# Patient Record
Sex: FEMALE | Race: WHITE | Employment: UNEMPLOYED | URBAN - METROPOLITAN AREA
[De-identification: names, ages, dates, MRNs, and addresses within clinical notes are randomized per-mention and may not be internally consistent; named-entity substitution may affect disease eponyms.]

---

## 2017-03-16 ENCOUNTER — EMPLOYEE WELLNESS (OUTPATIENT)
Dept: OTHER | Age: 62
End: 2017-03-16

## 2017-03-16 LAB
CHOLESTEROL/HDL RATIO: 3.3
CHOLESTEROL: 240 MG/DL
GLUCOSE BLD-MCNC: 92 MG/DL (ref 70–99)
HDLC SERPL-MCNC: 72 MG/DL
LDL CHOLESTEROL: 149 MG/DL (ref 0–130)
TRIGL SERPL-MCNC: 93 MG/DL
VLDLC SERPL CALC-MCNC: ABNORMAL MG/DL (ref 1–30)

## 2017-12-18 NOTE — PROGRESS NOTES
Subjective:      Patient ID: Miguel Sultana is a 63 y.o. female. HPI Presents for annual pap . Still has vag dryness admits when she uses the estrace on a regular basis things are improved, we did discuss DHEA cream the [facility] pharmacy will think about   Allergy : None  Med HX: None  Surgery : Leg Sx.  ,  , Breast biopsy     Review of Systems   Constitutional: Negative for chills, fatigue and fever. HENT: Negative for sinus pressure, sneezing, sore throat, tinnitus, trouble swallowing and voice change. Eyes: Negative for photophobia and visual disturbance. Respiratory: Negative for cough, shortness of breath, wheezing and stridor. Cardiovascular: Negative for chest pain and leg swelling. Gastrointestinal: Negative for abdominal distention, constipation, diarrhea, nausea and vomiting. Endocrine: Negative for polydipsia, polyphagia and polyuria. Genitourinary: Negative for decreased urine volume, difficulty urinating, dyspareunia, dysuria, flank pain, frequency, genital sores, hematuria, menstrual problem, urgency and vaginal bleeding. Musculoskeletal: Negative for arthralgias, back pain and gait problem. Skin: Negative for color change, pallor and rash. Neurological: Negative for dizziness, tremors, seizures, syncope, facial asymmetry, speech difficulty, light-headedness and headaches. Hematological: Negative for adenopathy. Does not bruise/bleed easily. Psychiatric/Behavioral: Negative for agitation, behavioral problems, confusion, decreased concentration, dysphoric mood, hallucinations and self-injury. The patient is not nervous/anxious. Objective:   Physical Exam   Constitutional: She is oriented to person, place, and time. She appears well-developed and well-nourished. HENT:   Head: Normocephalic. Right Ear: External ear normal.   Left Ear: External ear normal.   Eyes: Conjunctivae are normal. Pupils are equal, round, and reactive to light.    Neck: Normal range of motion. Neck supple. No thyromegaly present. Cardiovascular: Normal rate, regular rhythm, normal heart sounds and intact distal pulses. No murmur heard. Pulmonary/Chest: Effort normal and breath sounds normal. No respiratory distress. She has no wheezes. She has no rales. Abdominal: Soft. Bowel sounds are normal. She exhibits no distension. There is no rebound. Genitourinary: Vagina normal and uterus normal. No vaginal discharge found. Genitourinary Comments: Vag walls thin     Musculoskeletal: Normal range of motion. She exhibits no edema or tenderness. Neurological: She is alert and oriented to person, place, and time. Skin: Skin is warm and dry. No rash noted. No erythema. Psychiatric: She has a normal mood and affect.  Her behavior is normal. Thought content normal.       Assessment:    well adult exam with pap   Screen mammogram         Plan:    Collect pap   BSE reviewed  Mammogram   Osteoporosis prevention reviewed  Dexascan 2018   Preventive Health through PCP  RV prn/annual

## 2017-12-19 ENCOUNTER — HOSPITAL ENCOUNTER (OUTPATIENT)
Age: 62
Setting detail: SPECIMEN
Discharge: HOME | End: 2017-12-19
Payer: COMMERCIAL

## 2017-12-19 ENCOUNTER — OFFICE VISIT (OUTPATIENT)
Dept: OBGYN CLINIC | Age: 62
End: 2017-12-19
Payer: COMMERCIAL

## 2017-12-19 VITALS
WEIGHT: 138.2 LBS | DIASTOLIC BLOOD PRESSURE: 64 MMHG | SYSTOLIC BLOOD PRESSURE: 116 MMHG | BODY MASS INDEX: 24.49 KG/M2 | HEIGHT: 63 IN

## 2017-12-19 DIAGNOSIS — Z12.31 VISIT FOR SCREENING MAMMOGRAM: ICD-10-CM

## 2017-12-19 DIAGNOSIS — Z01.419 PAP SMEAR, AS PART OF ROUTINE GYNECOLOGICAL EXAMINATION: Primary | ICD-10-CM

## 2017-12-19 PROCEDURE — 99396 PREV VISIT EST AGE 40-64: CPT | Performed by: NURSE PRACTITIONER

## 2017-12-19 ASSESSMENT — ENCOUNTER SYMPTOMS
VOMITING: 0
CONSTIPATION: 0
VOICE CHANGE: 0
TROUBLE SWALLOWING: 0
WHEEZING: 0
SORE THROAT: 0
SHORTNESS OF BREATH: 0
STRIDOR: 0
DIARRHEA: 0
NAUSEA: 0
ABDOMINAL DISTENTION: 0
BACK PAIN: 0
COUGH: 0
PHOTOPHOBIA: 0
COLOR CHANGE: 0
SINUS PRESSURE: 0

## 2017-12-21 ENCOUNTER — HOSPITAL ENCOUNTER (OUTPATIENT)
Dept: WOMENS IMAGING | Age: 62
Discharge: HOME | End: 2017-12-21
Payer: COMMERCIAL

## 2017-12-21 DIAGNOSIS — Z12.31 VISIT FOR SCREENING MAMMOGRAM: ICD-10-CM

## 2017-12-21 LAB
HPV SAMPLE: NORMAL
HPV SOURCE: NORMAL
HPV, GENOTYPE 16: NOT DETECTED
HPV, GENOTYPE 18: NOT DETECTED
HPV, HIGH RISK OTHER: NOT DETECTED
HPV, INTERPRETATION: NORMAL

## 2017-12-21 PROCEDURE — G0202 SCR MAMMO BI INCL CAD: HCPCS

## 2018-01-04 LAB — CYTOLOGY REPORT: NORMAL

## 2018-02-20 ENCOUNTER — TELEPHONE (OUTPATIENT)
Dept: OBGYN CLINIC | Age: 63
End: 2018-02-20

## 2018-02-20 DIAGNOSIS — Z12.11 ENCOUNTER FOR SCREENING COLONOSCOPY: Primary | ICD-10-CM

## 2018-02-23 DIAGNOSIS — Z12.11 COLON CANCER SCREENING: Primary | ICD-10-CM

## 2018-02-27 RX ORDER — SODIUM, POTASSIUM,MAG SULFATES 17.5-3.13G
SOLUTION, RECONSTITUTED, ORAL ORAL
Qty: 1 BOTTLE | Refills: 0 | Status: SHIPPED | OUTPATIENT
Start: 2018-02-27 | End: 2018-05-01 | Stop reason: ALTCHOICE

## 2018-03-15 ENCOUNTER — EMPLOYEE WELLNESS (OUTPATIENT)
Dept: OTHER | Age: 63
End: 2018-03-15

## 2018-03-20 VITALS — BODY MASS INDEX: 24.45 KG/M2 | WEIGHT: 138 LBS

## 2018-03-22 LAB
CHOLESTEROL/HDL RATIO: 3.5
CHOLESTEROL: 223 MG/DL
GLUCOSE BLD-MCNC: 82 MG/DL (ref 70–99)
HDLC SERPL-MCNC: 63 MG/DL
LDL CHOLESTEROL: 142 MG/DL (ref 0–130)
PATIENT FASTING?: YES
TRIGL SERPL-MCNC: 89 MG/DL
VLDLC SERPL CALC-MCNC: ABNORMAL MG/DL (ref 1–30)

## 2018-03-30 ENCOUNTER — HOSPITAL ENCOUNTER (OUTPATIENT)
Age: 63
Setting detail: OUTPATIENT SURGERY
Discharge: HOME | End: 2018-03-30
Attending: INTERNAL MEDICINE | Admitting: INTERNAL MEDICINE
Payer: COMMERCIAL

## 2018-03-30 VITALS
HEART RATE: 62 BPM | OXYGEN SATURATION: 97 % | SYSTOLIC BLOOD PRESSURE: 107 MMHG | RESPIRATION RATE: 16 BRPM | DIASTOLIC BLOOD PRESSURE: 62 MMHG | TEMPERATURE: 98.7 F | BODY MASS INDEX: 24.45 KG/M2 | WEIGHT: 138 LBS | HEIGHT: 63 IN

## 2018-03-30 PROCEDURE — 88305 TISSUE EXAM BY PATHOLOGIST: CPT

## 2018-03-30 PROCEDURE — 3609010300 HC COLONOSCOPY W/BIOPSY SINGLE/MULTIPLE: Performed by: INTERNAL MEDICINE

## 2018-03-30 PROCEDURE — 2580000003 HC RX 258: Performed by: INTERNAL MEDICINE

## 2018-03-30 PROCEDURE — 7100000010 HC PHASE II RECOVERY - FIRST 15 MIN: Performed by: INTERNAL MEDICINE

## 2018-03-30 PROCEDURE — 6360000002 HC RX W HCPCS: Performed by: INTERNAL MEDICINE

## 2018-03-30 PROCEDURE — 7100000011 HC PHASE II RECOVERY - ADDTL 15 MIN: Performed by: INTERNAL MEDICINE

## 2018-03-30 PROCEDURE — 99153 MOD SED SAME PHYS/QHP EA: CPT | Performed by: INTERNAL MEDICINE

## 2018-03-30 PROCEDURE — 99152 MOD SED SAME PHYS/QHP 5/>YRS: CPT | Performed by: INTERNAL MEDICINE

## 2018-03-30 RX ORDER — FENTANYL CITRATE 50 UG/ML
INJECTION, SOLUTION INTRAMUSCULAR; INTRAVENOUS PRN
Status: DISCONTINUED | OUTPATIENT
Start: 2018-03-30 | End: 2018-03-30 | Stop reason: HOSPADM

## 2018-03-30 RX ORDER — MIDAZOLAM HYDROCHLORIDE 1 MG/ML
INJECTION INTRAMUSCULAR; INTRAVENOUS PRN
Status: DISCONTINUED | OUTPATIENT
Start: 2018-03-30 | End: 2018-03-30 | Stop reason: HOSPADM

## 2018-03-30 RX ORDER — SODIUM CHLORIDE 9 MG/ML
INJECTION, SOLUTION INTRAVENOUS CONTINUOUS
Status: DISCONTINUED | OUTPATIENT
Start: 2018-03-30 | End: 2018-03-30 | Stop reason: HOSPADM

## 2018-03-30 RX ADMIN — SODIUM CHLORIDE: 9 INJECTION, SOLUTION INTRAVENOUS at 08:35

## 2018-03-30 ASSESSMENT — PAIN - FUNCTIONAL ASSESSMENT: PAIN_FUNCTIONAL_ASSESSMENT: 0-10

## 2018-03-30 ASSESSMENT — PAIN SCALES - GENERAL: PAINLEVEL_OUTOF10: 0

## 2018-04-02 VITALS — BODY MASS INDEX: 24.45 KG/M2 | WEIGHT: 138 LBS

## 2018-04-02 LAB — SURGICAL PATHOLOGY REPORT: NORMAL

## 2018-05-01 ENCOUNTER — OFFICE VISIT (OUTPATIENT)
Dept: GASTROENTEROLOGY | Age: 63
End: 2018-05-01
Payer: COMMERCIAL

## 2018-05-01 VITALS
HEIGHT: 63 IN | HEART RATE: 72 BPM | BODY MASS INDEX: 25.16 KG/M2 | SYSTOLIC BLOOD PRESSURE: 111 MMHG | DIASTOLIC BLOOD PRESSURE: 65 MMHG | WEIGHT: 142 LBS | OXYGEN SATURATION: 97 %

## 2018-05-01 DIAGNOSIS — D12.6 SERRATED ADENOMA OF COLON: ICD-10-CM

## 2018-05-01 PROCEDURE — 99213 OFFICE O/P EST LOW 20 MIN: CPT | Performed by: INTERNAL MEDICINE

## 2018-05-01 ASSESSMENT — ENCOUNTER SYMPTOMS
COUGH: 0
RESPIRATORY NEGATIVE: 1
BLOOD IN STOOL: 0
SHORTNESS OF BREATH: 0
ABDOMINAL PAIN: 0
WHEEZING: 0
ABDOMINAL DISTENTION: 0
SORE THROAT: 0
TROUBLE SWALLOWING: 0
CONSTIPATION: 0
RHINORRHEA: 0
VOMITING: 0
SINUS PRESSURE: 0
RECTAL PAIN: 0
VOICE CHANGE: 0
ALLERGIC/IMMUNOLOGIC NEGATIVE: 1
SINUS PAIN: 0
ANAL BLEEDING: 0
FACIAL SWELLING: 0
BACK PAIN: 0
GASTROINTESTINAL NEGATIVE: 1
NAUSEA: 0
DIARRHEA: 0

## 2018-12-19 ASSESSMENT — ENCOUNTER SYMPTOMS
CONSTIPATION: 0
VOMITING: 0
SHORTNESS OF BREATH: 0
NAUSEA: 0
ABDOMINAL PAIN: 0
COUGH: 0
DIARRHEA: 0
RHINORRHEA: 0
BACK PAIN: 0

## 2018-12-19 NOTE — PROGRESS NOTES
Arm, Position: Sitting, Cuff Size: Large Adult)   Resp 16   Ht 5' 3\" (1.6 m)   Wt 142 lb 2 oz (64.5 kg)   LMP 2010   BMI 25.18 kg/m²     Assessment:       Diagnosis Orders   1. Pap smear, as part of routine gynecological examination  PAP Smear   2. Postmenopausal         Breast exam completed. Pelvic exam pap smear collected and sent. Cultures sent No    Plan:   Collect pap   BSE reviewed, Mammogram annually. Cultures declined   Uses estrace vaginally intermittently. DVT signs reviewed with patient. Refill medication if appropriate  Diet & Exercise reviewed with pt. DEXA SCAN-recommended, but pt declined she states she will send us the heel screening. Recommend Calcium with Vitamin D, weight bearing exercises limit caffeine. Colonoscopy reviewed continue as recommended  Hemorrhoid- encouraged tucks wipes high fiber increase water would consider anusol if persist and refer back to GI. Preventive  Health through PCP   RV prn/annual           Orders Placed This Encounter   Procedures    PAP Smear     Patient History:    Patient's last menstrual period was 2010. OBGYN Status: Postmenopausal  Past Surgical History:  2014: BREAST BIOPSY  No date:  SECTION, LOW TRANSVERSE      Comment: [id number] [facility]  3/30/2018: COLONOSCOPY N/A      Comment: COLONOSCOPY WITH BIOPSY and photos performed                by Meryl Walters MD at [address]  2018: COLONOSCOPY      Comment: Small polyp upper part of the right                colon--sessile serrated adenoma, redundant                colon and spasms  1979: LEG SURGERY      Smoking status: Never Smoker                                                              Smokeless tobacco: Never Used                           Standing Status:   Future     Standing Expiration Date:   2019     Order Specific Question:   Collection Type     Answer:    Thin Prep     Order Specific Question:   Prior Abnormal Pap Test     Answer:   No     Order Specific Question:   Screening or Diagnostic     Answer:   Screening     Order Specific Question:   HPV Requested? Answer:   Yes     Order Specific Question:   High Risk Patient     Answer:   N/A       Patient given educational materials - seepatient instructions. Discussed use, benefit, and side effects of prescribed medications. All patient questions answered. Pt voiced understanding. Reviewed health maintenance. Instructed to continue current medications, diet and exercise. Patient agreedwith treatment plan. Follow up as directed.       Electronically signed by ANAIS Jimenes CNP on 12/27/2018at 4:58 PM

## 2018-12-21 ENCOUNTER — HOSPITAL ENCOUNTER (OUTPATIENT)
Dept: WOMENS IMAGING | Age: 63
Discharge: HOME | End: 2018-12-23
Payer: COMMERCIAL

## 2018-12-21 DIAGNOSIS — Z12.39 BREAST CANCER SCREENING: ICD-10-CM

## 2018-12-21 PROCEDURE — 77067 SCR MAMMO BI INCL CAD: CPT

## 2018-12-27 ENCOUNTER — OFFICE VISIT (OUTPATIENT)
Dept: OBGYN CLINIC | Age: 63
End: 2018-12-27
Payer: COMMERCIAL

## 2018-12-27 ENCOUNTER — HOSPITAL ENCOUNTER (OUTPATIENT)
Age: 63
Setting detail: SPECIMEN
Discharge: HOME | End: 2018-12-27
Payer: COMMERCIAL

## 2018-12-27 VITALS
RESPIRATION RATE: 16 BRPM | WEIGHT: 142.13 LBS | SYSTOLIC BLOOD PRESSURE: 102 MMHG | DIASTOLIC BLOOD PRESSURE: 60 MMHG | BODY MASS INDEX: 25.18 KG/M2 | HEIGHT: 63 IN

## 2018-12-27 DIAGNOSIS — Z01.419 PAP SMEAR, AS PART OF ROUTINE GYNECOLOGICAL EXAMINATION: Primary | ICD-10-CM

## 2018-12-27 DIAGNOSIS — Z78.0 POSTMENOPAUSAL: ICD-10-CM

## 2018-12-27 PROCEDURE — 99396 PREV VISIT EST AGE 40-64: CPT | Performed by: NURSE PRACTITIONER

## 2018-12-27 ASSESSMENT — ENCOUNTER SYMPTOMS: BLOOD IN STOOL: 0

## 2018-12-27 NOTE — PATIENT INSTRUCTIONS
results and keep a list of the medicines you take. How can you care for yourself at home? · Sit in a few inches of warm water (sitz bath) 3 times a day and after bowel movements. The warm water helps with pain and itching. · Put ice on your anal area several times a day for 10 minutes at a time. Put a thin cloth between the ice and your skin. Follow this by placing a warm, wet towel on the area for another 10 to 20 minutes. · Take pain medicines exactly as directed. ? If the doctor gave you a prescription medicine for pain, take it as prescribed. ? If you are not taking a prescription pain medicine, ask your doctor if you can take an over-the-counter medicine. · Keep the anal area clean, but be gentle. Use water and a fragrance-free soap, such as [place], or use baby wipes or medicated pads, such as Tucks. · Wear cotton underwear and loose clothing to decrease moisture in the anal area. · Eat more fiber. Include foods such as whole-grain breads and cereals, raw vegetables, raw and dried fruits, and beans. · Drink plenty of fluids, enough so that your urine is light yellow or clear like water. If you have kidney, heart, or liver disease and have to limit fluids, talk with your doctor before you increase the amount of fluids you drink. · Use a stool softener that contains bran or psyllium. You can save money by buying bran or psyllium (available in bulk at most health food stores) and sprinkling it on foods or stirring it into fruit juice. Or you can use a product such as Metamucil or Hydrocil. · Practice healthy bowel habits. ? Go to the bathroom as soon as you have the urge. ? Avoid straining to pass stools. Relax and give yourself time to let things happen naturally. ? Do not hold your breath while passing stools. ? Do not read while sitting on the toilet. Get off the toilet as soon as you have finished. · Take your medicines exactly as prescribed.  Call your doctor if you think you are having a cancer of the breast, uterus, or ovaries. What is estradiol vaginal (local)? Estradiol is a form of estrogen, a female sex hormone that regulates many processes in the body. Some estradiol products placed directly into the vagina are used for \"local\" treatment of vaginal menopause symptoms (such as dryness, burning, and irritation). Other vaginal estradiol products are used for treating vaginal menopause symptoms and symptoms that affect other parts of the body (such as hot flashes). This type of vaginal estradiol has \"systemic\" effects, meaning that it can affect parts of the body other than where the medicine is directly applied. This medication guide provides information about estradiol vaginal for local treatment of vaginal symptoms of menopause (such as dryness, itching, irritation, and pain during sexual intercourse). Estradiol vaginal (local) may also be used for purposes not listed in this medication guide. What should I discuss with my healthcare provider before using estradiol vaginal (local)? You should not use estradiol if you are allergic to it, or if you have:  · unusual vaginal bleeding that has not been checked by a doctor;  · liver disease;  · a history of heart attack, stroke, or blood clot;  · an increased risk of having blood clots due to a heart problem or a hereditary blood disorder; or  · a history of hormone-related cancer, or cancer of the breast, uterus/cervix, or vagina. Do not use estradiol if you are pregnant. Tell your doctor right away if you become pregnant during treatment. Using this medicine can increase your risk of blood clots, stroke, or heart attack. You are even more at risk if you have high blood pressure, diabetes, high cholesterol, if you are overweight, or if you smoke. Estradiol should not be used to prevent heart disease, stroke, or dementia, because this medicine may actually increase your risk of developing these conditions.   To make sure this medicine is safe for you, tell your doctor if you have ever had:  · heart disease;  · liver problems, or jaundice caused by pregnancy or taking hormones;  · kidney disease;  · gallbladder disease;  · asthma;  · epilepsy or other seizure disorder;  · migraines;  · lupus;  · endometriosis or uterine fibroid tumors;  · hereditary angioedema (an autoimmune disorder);  · porphyria (a genetic enzyme disorder that causes symptoms affecting the skin or nervous system);  · a thyroid disorder; or  · high or low levels of calcium in your blood. Long-term use of estradiol may increase your risk of cancer of the breast, uterus, or ovaries. Talk with your doctor about this risk. Estradiol lowers the hormone needed to produce breast milk and can slow breast milk production. Tell your doctor if you are breast-feeding. How should I use estradiol vaginal (local)? Follow all directions on your prescription label. Do not use this medicine in larger or smaller amounts or for longer than recommended. Estradiol may increase your risk of developing a condition that may lead to uterine cancer. Your doctor may prescribe a progestin to help lower this risk. Call your doctor at once if you have any unusual vaginal bleeding. Read all patient information, medication guides, and instruction sheets provided to you. Ask your doctor or pharmacist if you have any questions. Wash your hands before and after inserting estradiol vaginal.  Use the applicator provided to measure the prescribed dose of estradiol vaginal cream. Take apart the cream applicator and wash it with mild soap and warm water after each use. Each estradiol vaginal tablet is supplied in a single-use disposable applicator. Throw the tablet applicator away after one use. You should not be able to feel the vaginal ring once it is in place. Leave the vaginal ring in place for 90 days, then remove it. Your doctor may want you to replace it with a new ring.  The ring does not need to be removed during sexual intercourse. If the ring is bothersome, you may remove it, rinse it with warm water, and reinsert it after intercourse. To remove the ring, loop a finger through the ring and gently pull it from the vagina. Call your doctor if you have trouble removing a vaginal ring. Your doctor should check your progress on a regular basis to determine whether you should continue this treatment. Self-examine your breasts for lumps on a monthly basis and have a mammogram every year while using estradiol. If you need major surgery with long-term bed rest, you may need to stop using this medicine for a short time. Any doctor or surgeon who treats you should know that you are using estradiol. Store at room temperature away from moisture and heat. Keep the vaginal ring in its protective pouch until you are ready to use it. What happens if I miss a dose? Use the missed dose as soon as you remember. Skip the missed dose if it is almost time for your next scheduled dose. Do not use extra medicine to make up the missed dose. Remove the vaginal ring and insert a new one as soon as you remember. Do not use an extra vaginal ring to make up the missed wearing time. If a vaginal ring falls out, rinse it with warm water and reinsert it. If it slides down into the lower part of the vagina, use your finger to push it in farther. What happens if I overdose? Seek emergency medical attention or call the Poison Help line at 1-774.528.1294. What should I avoid while using estradiol vaginal (local)? Avoid smoking. It can greatly increase your risk of blood clots, stroke, or heart attack while using estradiol. Avoid using other vaginal products without your doctor's advice. Grapefruit and grapefruit juice may interact with estradiol and lead to unwanted side effects. Discuss the use of grapefruit products with your doctor. What are the possible side effects of estradiol vaginal (local)?   Get emergency medical help if

## 2019-01-11 LAB — CYTOLOGY REPORT: NORMAL

## 2019-03-06 ENCOUNTER — EMPLOYEE WELLNESS (OUTPATIENT)
Dept: OTHER | Age: 64
End: 2019-03-06

## 2019-03-06 LAB
CHOLESTEROL/HDL RATIO: 3.7
CHOLESTEROL: 215 MG/DL
GLUCOSE BLD-MCNC: 91 MG/DL (ref 70–99)
HDLC SERPL-MCNC: 58 MG/DL
LDL CHOLESTEROL: 140 MG/DL (ref 0–130)
PATIENT FASTING?: YES
TRIGL SERPL-MCNC: 83 MG/DL
VLDLC SERPL CALC-MCNC: ABNORMAL MG/DL (ref 1–30)

## 2019-05-06 VITALS — BODY MASS INDEX: 23.91 KG/M2 | WEIGHT: 135 LBS

## 2019-11-26 ENCOUNTER — PATIENT MESSAGE (OUTPATIENT)
Dept: OBGYN CLINIC | Age: 64
End: 2019-11-26

## 2019-12-23 ENCOUNTER — HOSPITAL ENCOUNTER (OUTPATIENT)
Dept: WOMENS IMAGING | Age: 64
Discharge: HOME | End: 2019-12-25
Payer: COMMERCIAL

## 2019-12-23 DIAGNOSIS — Z12.39 SCREENING BREAST EXAMINATION: ICD-10-CM

## 2019-12-23 PROCEDURE — 77063 BREAST TOMOSYNTHESIS BI: CPT

## 2020-01-02 ENCOUNTER — HOSPITAL ENCOUNTER (OUTPATIENT)
Age: 65
Setting detail: SPECIMEN
Discharge: HOME | End: 2020-01-02
Payer: COMMERCIAL

## 2020-01-02 ENCOUNTER — OFFICE VISIT (OUTPATIENT)
Dept: OBGYN CLINIC | Age: 65
End: 2020-01-02
Payer: COMMERCIAL

## 2020-01-02 VITALS
HEIGHT: 63 IN | WEIGHT: 140 LBS | SYSTOLIC BLOOD PRESSURE: 118 MMHG | BODY MASS INDEX: 24.8 KG/M2 | DIASTOLIC BLOOD PRESSURE: 62 MMHG

## 2020-01-02 PROCEDURE — 99396 PREV VISIT EST AGE 40-64: CPT | Performed by: NURSE PRACTITIONER

## 2020-01-02 ASSESSMENT — ENCOUNTER SYMPTOMS
NAUSEA: 0
COLOR CHANGE: 0
VOMITING: 0
ABDOMINAL PAIN: 0
RHINORRHEA: 0
SHORTNESS OF BREATH: 0
BACK PAIN: 0
CONSTIPATION: 0
DIARRHEA: 0
COUGH: 0

## 2020-01-02 NOTE — PATIENT INSTRUCTIONS
[id number] [employer]. Care instructions adapted under license by [facility] ([facility]). If you have questions about a medical condition or this instruction, always ask your healthcare professional. [address] any warranty or liability for your use of this information. Learning About Breast Cancer Screening  What is breast cancer screening? Breast cancer occurs when cells that are not normal grow in one or both of your breasts. Screening tests can help find breast cancer early. Cancer is easier to treat when it's found early. Having concerns about breast cancer is common. That's why it's important to talk with your doctor about when to start and how often to get screened for breast cancer. How is breast cancer screening done? Several screening tests can be used to check for breast cancer. · Mammograms check for signs of cancer using X-rays. They can show tumors that are too small for you or your doctor to feel. During a mammogram, a machine squeezes your breasts to make them flatter and easier to X-ray. At least two pictures are taken of each breast. One is taken from the top and one from the side. · 3-D mammograms are also called digital breast tomosynthesis. Your breast is positioned on a flat plate. A top plate is pressed against your breast to keep it in position. The X-ray arm then moves in an arc above the breast and takes many pictures. A computer uses these X-rays to create a three-dimensional image. · Clinical breast exams are a doctor's exam. Your doctor carefully feels your breasts and under your arms to check for lumps or other changes. After the screening, your doctor will tell you the results. You will also be told if you need any follow-up tests. When should you get screened? Talk with your doctor about when you should start being tested for breast cancer. How often you get tested and the kind of tests you get will depend on your age and your risk.   The guidelines that follow are for women who have an average risk for breast cancer. If you have a higher risk for breast cancer, such as having a family history of breast cancer in multiple relatives or at a young age, your doctor may recommend different screening for you. · Ages 21 to 44: Some experts recommend that women have a clinical breast exam every 3 years, starting at age 21. Ask your doctor how often you should have this test. If you have a high risk for breast cancer, talk with your doctor about when to start yearly mammograms and other screening tests. · Ages 36 and older: Talk with your doctor about how often you should have mammograms and clinical breast exams. What is your risk for breast cancer? If you don't already know your risk of breast cancer, you can ask your doctor about it. You can also look it up at www.cancer.gov/bcrisktool/. If your doctor says that you have a high or very high risk, ask about ways to reduce your risk. These could include getting extra screening, taking medicine, or having surgery. If you have a strong family history of breast cancer, ask your doctor about genetic testing. What steps can you take to stay healthy? Some things that increase your risk of breast cancer, such as your age and being female, cannot be controlled. But you can do some things to stay as healthy as you can. · Learn what your breasts normally look and feel like. If you notice any changes, tell your doctor. · Drink alcohol wisely. Your risk goes up the more you drink. For the best health, women should have no more than 1 drink a day or 7 drinks a week. · If you smoke, quit. When you quit smoking, you lower your chances of getting many types of cancer. You can also do your best to eat well, be active, and stay at a healthy weight. Eating healthy foods and being active every day, as well as staying at a healthy weight, may help prevent cancer. Where can you learn more?   Go to even more at risk if you have high blood pressure, diabetes, high cholesterol, if you are overweight, or if you smoke. Estradiol should not be used to prevent heart disease, stroke, or dementia. This medicine may actually increase your risk of developing these conditions. Tell your doctor if you have ever had:  · heart disease;  · liver problems, or jaundice caused by pregnancy or taking hormones;  · kidney disease;  · gallbladder disease;  · asthma;  · epilepsy or other seizure disorder;  · migraines;  · lupus;  · endometriosis or uterine fibroid tumors;  · hereditary angioedema;  · porphyria (a genetic enzyme disorder that causes symptoms affecting the skin or nervous system);  · a thyroid disorder; or  · high or low levels of calcium in your blood. Using estradiol may increase your risk of cancer of the breast, uterus, or ovaries. Talk with your doctor about this risk. Estradiol can slow breast milk production. Tell your doctor if you are breast-feeding. How should I use estradiol vaginal (local)? Follow all directions on your prescription label and read all medication guides or instruction sheets. Use the medicine exactly as directed. Estradiol may increase your risk of developing a condition that may lead to uterine cancer. Your doctor may prescribe a progestin to help lower this risk. Call your doctor at once if you have any unusual vaginal bleeding. Wash your hands before and after inserting estradiol vaginal.  Use the applicator provided to measure the prescribed dose of estradiol vaginal cream. Take apart the cream applicator and wash it with mild soap and warm water after each use. Each estradiol vaginal tablet is supplied in a single-use disposable applicator. Throw the tablet applicator away after one use. You should not be able to feel the vaginal ring once it is in place. Leave the vaginal ring in place for 90 days, then remove it. Your doctor may want you to replace it with a new ring.  The medicines, vitamins, and herbal products. Not all possible interactions are listed here. Tell your doctor about all your current medicines and any medicine you start or stop using. Where can I get more information? Your pharmacist can provide more information about estradiol vaginal.  Remember, keep this and all other medicines out of the reach of children, never share your medicines with others, and use this medication only for the indication prescribed. Every effort has been made to ensure that the information provided by [address] is accurate, up-to-date, and complete, but no guarantee is made to that effect. Drug information contained herein may be time sensitive. [facility] information has been compiled for use by healthcare practitioners and consumers in the [place] and therefore [facility] does not warrant that uses outside of the [place] are appropriate, unless specifically indicated otherwise. [facility]'s drug information does not endorse drugs, diagnose patients or recommend therapy. [facility] drug information is an informational resource designed to assist licensed healthcare practitioners in caring for their patients and/or to serve consumers viewing this service as a supplement to, and not a substitute for, the expertise, skill, knowledge and judgment of healthcare practitioners. The absence of a warning for a given drug or drug combination in no way should be construed to indicate that the drug or drug combination is safe, effective or appropriate for any given patient. [facility] does not assume any responsibility for any aspect of healthcare administered with the aid of information [facility] provides. The information contained herein is not intended to cover all possible uses, directions, precautions, warnings, drug interactions, allergic reactions, or adverse effects.  If you have questions about the drugs you are taking, check with your doctor, nurse or pharmacist.  Copyright use this medicine in larger or smaller amounts or for longer than recommended. Read all patient information, medication guides, and instruction sheets provided to you. Ask your doctor or pharmacist if you have any questions. The usual dose of this medicine is one insert placed into the vagina every day at bedtime. Use only the applicator supplied with this medicine to insert a prasterone vaginal insert. Store this medicine at room temperature away from moisture and heat. You may also store the medicine in a refrigerator. Do not freeze. What happens if I miss a dose? Use the missed dose as soon as you remember. Skip the missed dose if it is almost time for your next scheduled dose. Do not use extra medicine to make up the missed dose. What happens if I overdose? An overdose of prasterone vaginal is not expected to be dangerous. Seek emergency medical attention or call the Poison Help line at 1-465.140.8549 if anyone has accidentally swallowed the medication. What should I avoid while using prasterone vaginal?  Follow your doctor's instructions about any restrictions on food, beverages, or activity. What are the possible side effects of prasterone vaginal?  Get emergency medical help if you have signs of an allergic reaction: hives; difficult breathing; swelling of your face, lips, tongue, or throat. Common side effects may include:  · vaginal discharge; or  · changes in your Pap smear results. This is not a complete list of side effects and others may occur. Call your doctor for medical advice about side effects. You may report side effects to FDA at 7-823-ITN-6123. What other drugs will affect prasterone vaginal?  It is not likely that other drugs you take orally or inject will have an effect on prasterone used in the vagina. But many drugs can interact with each other.  Tell each of your healthcare providers about all medicines you use, including prescription and over-the-counter medicines, vitamins, and herbal products. Where can I get more information? Your pharmacist can provide more information about prasterone vaginal.  Remember, keep this and all other medicines out of the reach of children, never share your medicines with others, and use this medication only for the indication prescribed. Every effort has been made to ensure that the information provided by [address] is accurate, up-to-date, and complete, but no guarantee is made to that effect. Drug information contained herein may be time sensitive. [facility] information has been compiled for use by healthcare practitioners and consumers in the [place] and therefore [facility] does not warrant that uses outside of the [place] are appropriate, unless specifically indicated otherwise. [facility]'s drug information does not endorse drugs, diagnose patients or recommend therapy. [facility]'s drug information is an informational resource designed to assist licensed healthcare practitioners in caring for their patients and/or to serve consumers viewing this service as a supplement to, and not a substitute for, the expertise, skill, knowledge and judgment of healthcare practitioners. The absence of a warning for a given drug or drug combination in no way should be construed to indicate that the drug or drug combination is safe, effective or appropriate for any given patient. [facility] does not assume any responsibility for any aspect of healthcare administered with the aid of information [facility] provides. The information contained herein is not intended to cover all possible uses, directions, precautions, warnings, drug interactions, allergic reactions, or adverse effects. If you have questions about the drugs you are taking, check with your doctor, nurse or pharmacist.  Copyright [id number] [address] [place] [address] Dr TYSON. Version: 1.03. Revision date: 7/18/2017. Care instructions adapted under license by [facility] ([facility]).  If you have questions about a medical condition or this instruction, always ask your healthcare professional. [place] [address] any warranty or liability for your use of this information. Encourage Replens vaginal moisturizer twice weekly and lubricant such as astroglide or ky with intercourse.

## 2020-01-02 NOTE — PROGRESS NOTES
Psychiatric/Behavioral: Negative for self-injury and suicidal ideas. Objective:     Physical Exam  Vitals signs and nursing note reviewed. Constitutional:       General: She is not in acute distress. Appearance: She is well-developed. She is not diaphoretic. HENT:      Head: Normocephalic and atraumatic. Right Ear: External ear normal.      Left Ear: External ear normal.      Nose: Nose normal.   Eyes:      Pupils: Pupils are equal, round, and reactive to light. Neck:      Musculoskeletal: Normal range of motion and neck supple. Thyroid: No thyromegaly. Cardiovascular:      Rate and Rhythm: Normal rate and regular rhythm. Heart sounds: Normal heart sounds. No murmur. No friction rub. No gallop. Comments: No bilateral calf tenderness or swelling  Pulmonary:      Effort: Pulmonary effort is normal. No respiratory distress. Breath sounds: Normal breath sounds. No wheezing. Abdominal:      General: Bowel sounds are normal.      Palpations: Abdomen is soft. Tenderness: There is no tenderness. Genitourinary:     Comments: Breasts nipples everted, no masses or tenderness, does BSE  Vulva-no lesions  Vagina-pale smooth  Cervix-firm, 2 cm. Nontender, freely movable, no lesions  Uterus-ant. Smooth, firm, nontender, freely movable  Adnexa-no masses or tenderness   Musculoskeletal: Normal range of motion. Lymphadenopathy:      Cervical: No cervical adenopathy. Skin:     General: Skin is warm and dry. Findings: No rash. Neurological:      Mental Status: She is alert and oriented to person, place, and time. Cranial Nerves: No cranial nerve deficit. Deep Tendon Reflexes: Reflexes are normal and symmetric. Psychiatric:         Behavior: Behavior normal.         Thought Content:  Thought content normal.         Judgment: Judgment normal.       /62 (Site: Right Upper Arm, Position: Sitting, Cuff Size: Medium Adult)   Ht 5' 3\" (1.6 m)   Wt 140 lb

## 2020-01-07 LAB
HPV SAMPLE: NORMAL
HPV, GENOTYPE 16: NOT DETECTED
HPV, GENOTYPE 18: NOT DETECTED
HPV, HIGH RISK OTHER: NOT DETECTED
HPV, INTERPRETATION: NORMAL
SPECIMEN DESCRIPTION: NORMAL

## 2020-01-08 LAB — CYTOLOGY REPORT: NORMAL

## 2020-02-05 ENCOUNTER — OFFICE VISIT (OUTPATIENT)
Dept: PRIMARY CARE CLINIC | Age: 65
End: 2020-02-05
Payer: COMMERCIAL

## 2020-02-05 VITALS
TEMPERATURE: 97.8 F | OXYGEN SATURATION: 98 % | WEIGHT: 141.1 LBS | DIASTOLIC BLOOD PRESSURE: 78 MMHG | HEART RATE: 83 BPM | BODY MASS INDEX: 24.99 KG/M2 | SYSTOLIC BLOOD PRESSURE: 119 MMHG | RESPIRATION RATE: 18 BRPM

## 2020-02-05 LAB
INFLUENZA A ANTIBODY: NORMAL
INFLUENZA B ANTIBODY: NORMAL

## 2020-02-05 PROCEDURE — 99214 OFFICE O/P EST MOD 30 MIN: CPT | Performed by: NURSE PRACTITIONER

## 2020-02-05 PROCEDURE — 87804 INFLUENZA ASSAY W/OPTIC: CPT | Performed by: NURSE PRACTITIONER

## 2020-02-05 RX ORDER — PREDNISONE 20 MG/1
TABLET ORAL
Qty: 11 TABLET | Refills: 0 | Status: SHIPPED | OUTPATIENT
Start: 2020-02-05 | End: 2020-03-12 | Stop reason: SDUPTHER

## 2020-02-05 RX ORDER — AZITHROMYCIN 250 MG/1
250 TABLET, FILM COATED ORAL SEE ADMIN INSTRUCTIONS
Qty: 6 TABLET | Refills: 0 | Status: SHIPPED | OUTPATIENT
Start: 2020-02-05 | End: 2020-02-10

## 2020-02-05 RX ORDER — AMOXICILLIN AND CLAVULANATE POTASSIUM 875; 125 MG/1; MG/1
1 TABLET, FILM COATED ORAL 2 TIMES DAILY
Qty: 20 TABLET | Refills: 0 | Status: SHIPPED | OUTPATIENT
Start: 2020-02-05 | End: 2020-02-15

## 2020-02-05 ASSESSMENT — ENCOUNTER SYMPTOMS
SINUS PRESSURE: 0
DIARRHEA: 0
TROUBLE SWALLOWING: 0
WHEEZING: 0
COUGH: 1
SINUS PAIN: 0
NAUSEA: 0
SORE THROAT: 1
SHORTNESS OF BREATH: 0
ABDOMINAL PAIN: 0
VOMITING: 0
RHINORRHEA: 1

## 2020-02-05 NOTE — PROGRESS NOTES
[facility] PHYSICIANS  [facility] PRIMARY CARE [place] [address] [address]  [facility]  Dept: [phone]  Dept Fax: [phone]    Shelby Hair is a 60 y.o. female [facility] for her medical conditions/complaints as noted below. Shelby Hair is c/o of Cough (X 2 weeks); Fever; Generalized Body Aches; and Headache      HPI:     Cough   This is a new problem. The current episode started 1 to 4 weeks ago. The problem has been gradually worsening. The problem occurs constantly. The cough is productive of sputum. Associated symptoms include chills, a fever, headaches, myalgias, postnasal drip, rhinorrhea and a sore throat. Pertinent negatives include no chest pain, rash, shortness of breath or wheezing. Nothing aggravates the symptoms. She has tried OTC cough suppressant for the symptoms. The treatment provided mild relief. Her past medical history is significant for bronchitis. There is no history of asthma or COPD. Fever    This is a recurrent problem. The current episode started 1 to 4 weeks ago. The problem occurs intermittently. The problem has been waxing and waning. The maximum temperature noted was 101 to 101.9 F. Associated symptoms include congestion, coughing, headaches and a sore throat. Pertinent negatives include no abdominal pain, chest pain, diarrhea, nausea, rash, vomiting or wheezing. She has tried acetaminophen and NSAIDs for the symptoms. The treatment provided mild relief. Risk factors: no immunosuppression and no recent sickness        No past medical history on file. Current Outpatient Medications   Medication Sig Dispense Refill    amoxicillin-clavulanate (AUGMENTIN) 875-125 MG per tablet Take 1 tablet by mouth 2 times daily for 10 days 20 tablet 0    predniSONE (DELTASONE) 20 MG tablet Take 2 tablets by mouth daily for 3 days, THEN 1 tablet daily for 3 days, THEN 0.5 tablets daily for 3 days.  11 tablet 0    azithromycin (ZITHROMAX) 250 General: No focal deficit present. Mental Status: She is alert and oriented to person, place, and time. Psychiatric:         Behavior: Behavior normal.       /78 (Site: Right Upper Arm, Position: Sitting)   Pulse 83   Temp 97.8 °F (36.6 °C) (Temporal)   Resp 18   Wt 141 lb 1.6 oz (64 kg)   LMP 01/01/2010 (Approximate)   SpO2 98%   BMI 24.99 kg/m²     Assessment:      Diagnosis Orders   1. Community acquired pneumonia of left lower lobe of lung (HCC)  amoxicillin-clavulanate (AUGMENTIN) 875-125 MG per tablet    predniSONE (DELTASONE) 20 MG tablet    azithromycin (ZITHROMAX) 250 MG tablet   2. Cough  POCT Influenza A/B       Plan:             Discussed exam, POCT findings, plan of care (including prescriptive and supportive as listed below) and follow-up atlength with  Patient. Reviewed all prescribed and recommended medications, administration and side effects. Encouraged to return to [address] [place] [address] for noimprovement and or worsening of symptoms. To ER or call 911 if any difficulty breathing, shortness of breath, inability to swallow, hives or temp greater than 103 degrees. Questions answered. They verbalized understandingand agreement. Return if symptoms worsen or fail to improve. Orders Placed This Encounter   Medications    amoxicillin-clavulanate (AUGMENTIN) 875-125 MG per tablet     Sig: Take 1 tablet by mouth 2 times daily for 10 days     Dispense:  20 tablet     Refill:  0    predniSONE (DELTASONE) 20 MG tablet     Sig: Take 2 tablets by mouth daily for 3 days, THEN 1 tablet daily for 3 days, THEN 0.5 tablets daily for 3 days. Dispense:  11 tablet     Refill:  0    azithromycin (ZITHROMAX) 250 MG tablet     Sig: Take 1 tablet by mouth See Admin Instructions for 5 days 500mg on day 1 followed by 250mg on days 2 - 5     Dispense:  6 tablet     Refill:  0          All patient questions answered. Pt voiced understanding.       Electronically signed by Thais Hunter ANAIS Soliman CNP on 2/5/2020 at 1:33 PM

## 2020-03-02 ENCOUNTER — HOSPITAL ENCOUNTER (EMERGENCY)
Age: 65
Discharge: HOME | End: 2020-03-02
Payer: COMMERCIAL

## 2020-03-02 ENCOUNTER — NURSE TRIAGE (OUTPATIENT)
Dept: OTHER | Facility: CLINIC | Age: 65
End: 2020-03-02

## 2020-03-02 ENCOUNTER — APPOINTMENT (OUTPATIENT)
Dept: GENERAL RADIOLOGY | Age: 65
End: 2020-03-02
Payer: COMMERCIAL

## 2020-03-02 VITALS
SYSTOLIC BLOOD PRESSURE: 120 MMHG | TEMPERATURE: 98 F | DIASTOLIC BLOOD PRESSURE: 62 MMHG | RESPIRATION RATE: 14 BRPM | OXYGEN SATURATION: 97 % | HEART RATE: 80 BPM

## 2020-03-02 LAB
ANION GAP SERPL CALCULATED.3IONS-SCNC: 11 MMOL/L (ref 9–17)
BUN BLDV-MCNC: 12 MG/DL (ref 8–23)
BUN/CREAT BLD: 15 (ref 9–20)
CALCIUM SERPL-MCNC: 9.2 MG/DL (ref 8.6–10.4)
CHLORIDE BLD-SCNC: 102 MMOL/L (ref 98–107)
CO2: 27 MMOL/L (ref 20–31)
CREAT SERPL-MCNC: 0.82 MG/DL (ref 0.5–0.9)
GFR AFRICAN AMERICAN: >60 ML/MIN
GFR NON-AFRICAN AMERICAN: >60 ML/MIN
GFR SERPL CREATININE-BSD FRML MDRD: ABNORMAL ML/MIN/{1.73_M2}
GFR SERPL CREATININE-BSD FRML MDRD: ABNORMAL ML/MIN/{1.73_M2}
GLUCOSE BLD-MCNC: 125 MG/DL (ref 70–99)
POTASSIUM SERPL-SCNC: 3.8 MMOL/L (ref 3.7–5.3)
SODIUM BLD-SCNC: 140 MMOL/L (ref 135–144)
TROPONIN INTERP: NORMAL
TROPONIN T: NORMAL NG/ML
TROPONIN, HIGH SENSITIVITY: <6 NG/L (ref 0–14)

## 2020-03-02 PROCEDURE — 96375 TX/PRO/DX INJ NEW DRUG ADDON: CPT

## 2020-03-02 PROCEDURE — 80048 BASIC METABOLIC PNL TOTAL CA: CPT

## 2020-03-02 PROCEDURE — 71045 X-RAY EXAM CHEST 1 VIEW: CPT

## 2020-03-02 PROCEDURE — 93005 ELECTROCARDIOGRAM TRACING: CPT | Performed by: EMERGENCY MEDICINE

## 2020-03-02 PROCEDURE — 96374 THER/PROPH/DIAG INJ IV PUSH: CPT

## 2020-03-02 PROCEDURE — 99284 EMERGENCY DEPT VISIT MOD MDM: CPT

## 2020-03-02 PROCEDURE — 36415 COLL VENOUS BLD VENIPUNCTURE: CPT

## 2020-03-02 PROCEDURE — 6360000002 HC RX W HCPCS: Performed by: PHYSICIAN ASSISTANT

## 2020-03-02 PROCEDURE — 84484 ASSAY OF TROPONIN QUANT: CPT

## 2020-03-02 RX ORDER — CYCLOBENZAPRINE HCL 10 MG
10 TABLET ORAL 3 TIMES DAILY PRN
Qty: 21 TABLET | Refills: 0 | Status: SHIPPED | OUTPATIENT
Start: 2020-03-02 | End: 2020-03-12

## 2020-03-02 RX ORDER — KETOROLAC TROMETHAMINE 15 MG/ML
30 INJECTION, SOLUTION INTRAMUSCULAR; INTRAVENOUS ONCE
Status: COMPLETED | OUTPATIENT
Start: 2020-03-02 | End: 2020-03-02

## 2020-03-02 RX ORDER — NAPROXEN 500 MG/1
500 TABLET ORAL 2 TIMES DAILY
Qty: 14 TABLET | Refills: 0 | Status: SHIPPED | OUTPATIENT
Start: 2020-03-02 | End: 2020-03-12 | Stop reason: ALTCHOICE

## 2020-03-02 RX ORDER — ORPHENADRINE CITRATE 30 MG/ML
60 INJECTION INTRAMUSCULAR; INTRAVENOUS ONCE
Status: COMPLETED | OUTPATIENT
Start: 2020-03-02 | End: 2020-03-02

## 2020-03-02 RX ADMIN — KETOROLAC TROMETHAMINE 30 MG: 15 INJECTION, SOLUTION INTRAMUSCULAR; INTRAVENOUS at 21:18

## 2020-03-02 RX ADMIN — ORPHENADRINE CITRATE 60 MG: 30 INJECTION INTRAMUSCULAR; INTRAVENOUS at 21:18

## 2020-03-02 ASSESSMENT — PAIN SCALES - GENERAL
PAINLEVEL_OUTOF10: 8
PAINLEVEL_OUTOF10: 9

## 2020-03-02 ASSESSMENT — PAIN DESCRIPTION - LOCATION: LOCATION: BACK;SHOULDER

## 2020-03-02 ASSESSMENT — PAIN DESCRIPTION - FREQUENCY: FREQUENCY: INTERMITTENT

## 2020-03-02 ASSESSMENT — PAIN DESCRIPTION - ORIENTATION: ORIENTATION: LEFT;POSTERIOR

## 2020-03-02 ASSESSMENT — PAIN DESCRIPTION - PAIN TYPE: TYPE: ACUTE PAIN

## 2020-03-02 ASSESSMENT — PAIN DESCRIPTION - DESCRIPTORS: DESCRIPTORS: THROBBING

## 2020-03-03 LAB
EKG ATRIAL RATE: 73 BPM
EKG P AXIS: 47 DEGREES
EKG P-R INTERVAL: 126 MS
EKG Q-T INTERVAL: 410 MS
EKG QRS DURATION: 86 MS
EKG QTC CALCULATION (BAZETT): 451 MS
EKG R AXIS: 57 DEGREES
EKG T AXIS: 23 DEGREES
EKG VENTRICULAR RATE: 73 BPM

## 2020-03-03 PROCEDURE — 93010 ELECTROCARDIOGRAM REPORT: CPT | Performed by: INTERNAL MEDICINE

## 2020-03-03 NOTE — ED PROVIDER NOTES
[facility] ED  EMERGENCY DEPARTMENT ENCOUNTER      Pt Name: Mahogany Doran  MRN: [id number]  [place] 1955  Date of evaluation: 3/2/2020  Provider: Tyra Hansen PA-C    CHIEF COMPLAINT       Chief Complaint   Patient presents with    Shoulder Pain     on and off since last week; left sided that radiates down her left arm       HISTORY OF PRESENT ILLNESS    Mahogany Doran is a 60 y.o. female who presents to the emergency department from home pain under the left scapula beginning last week and now hurting in the left lateral upper arm area. No anterior chest pain shortness of breath pleuritic component seems to be worse with certain movements but not all the time she states that it hurt for a day and then yesterday she was able to walk on the treadmill for 30 minutes for approximately 2 miles without any pain. Triage notes and Nursing notes were reviewed by myself. Any discrepancies are addressed above. PAST MEDICAL HISTORY   History reviewed. No pertinent past medical history. SURGICAL HISTORY       Past Surgical History:   Procedure Laterality Date    BREAST BIOPSY  2014     SECTION, LOW TRANSVERSE       &     COLONOSCOPY N/A 3/30/2018    COLONOSCOPY WITH BIOPSY and photos performed by Elly Brizuela MD at [address] [place] [address]  2018    Small polyp upper part of the right colon--sessile serrated adenoma, redundant colon and spasms    [address]       Discharge Medication List as of 3/2/2020  9:57 PM          ALLERGIES     Patient has no known allergies.     FAMILY HISTORY       Family History   Problem Relation Age of Onset    Cancer Mother         parotid    Thyroid Disease Mother     Other Father         PKD    Hypertension Father     Heart Attack Maternal Grandmother     Heart Attack Maternal Grandfather     Other Paternal Grandmother         PKD    Cancer Maternal Aunt         breast    Cancer Maternal Aunt         pancreas        SOCIAL HISTORY       Social History     Socioeconomic History    Marital status:      Spouse name: None    Number of children: None    Years of education: None    Highest education level: None   Occupational History    None   Social Needs    Financial resource strain: None    Food insecurity:     Worry: None     Inability: None    Transportation needs:     Medical: None     Non-medical: None   Tobacco Use    Smoking status: Never Smoker    Smokeless tobacco: Never Used   Substance and Sexual Activity    Alcohol use: No    Drug use: No    Sexual activity: None   Lifestyle    Physical activity:     Days per week: None     Minutes per session: None    Stress: None   Relationships    Social connections:     Talks on phone: None     Gets together: None     Attends Restorationism service: None     Active member of club or organization: None     Attends meetings of clubs or organizations: None     Relationship status: None    Intimate partner violence:     Fear of current or ex partner: None     Emotionally abused: None     Physically abused: None     Forced sexual activity: None   Other Topics Concern    None   Social History Narrative    None       REVIEW OF SYSTEMS     Review of Systems    Except as noted above the remainder of the review of systems was reviewed and is negative. SCREENINGS    [place] Coma Scale  Eye Opening: Spontaneous  Best Verbal Response: Oriented  Best Motor Response: Obeys commands  [place] Coma Scale Score: 15      PHYSICAL EXAM    (up to 7 for level 4, 8 or more for level 5)     ED Triage Vitals [03/02/20 1932]   BP Temp Temp Source Pulse Resp SpO2 Height Weight   120/62 98 °F (36.7 °C) Oral 80 14 97 % -- --       Physical Exam  Active and oriented ×3. Nontoxic. No acute distress. Well-hydrated.   Head is atraumatic, facies symmetrical.  Pupils equal round and reactive to light, extraocular movements intact, anterior chambers clear bilaterally  Neck is supple. No adenopathy. Respirations nonlabored. Lungs clear to auscultation. No wheezes rales or rhonchi noted. Heart regular rate and rhythm. No murmur noted  Skin free of any obvious rashes or lesions. Back with small area of tenderness noted just superior to the rhomboid on the left scapula. Range of motion of upper extremity intact with distal neurovascular response intact there is also a small area of tenderness just inferior to the base of the left deltoid that causes the patient discomfort. Extremities without edema. No calf tenderness noted. Distal pulses and sensation intact. Good capillary refill noted. No acute neurologic deficit noted. Clear speech. Good affect. Pleasant patient. DIAGNOSTIC RESULTS     EKG:(none if blank)  All EKG's are interpreted by [facility] Phys[facility] who either signs or Co-signs this chart in the absence of a cardiologist.  EKG Interpretation  Interpreted by emergency department physician  Rhythm: normal sinus   Rate: 73  Axis: normal  Ectopy: none  Conduction: normal  ST Segments: no acute change  T Waves: non specific changes and inversion in  v1, III and aVr  Q Waves: none    Clinical Impression: non-specific EKG  [facility]      RADIOLOGY: (none if blank)   Interpretation per the Radiologistbelow, if available at the time of this note:    XR CHEST PORTABLE   Final Result   No evidence of acute cardiopulmonary disease. LABS:  Labs Reviewed   BASIC METABOLIC PANEL W/ REFLEX TO MG FOR LOW K - Abnormal; Notable for the following components:       Result Value    Glucose 125 (*)     All other components within normal limits   TROPONIN       All other labs were within normal range or not returned as of this dictation.     EMERGENCY DEPARTMENT COURSE andMedical Decision Making:     Vitals:    Vitals:    03/02/20 1932   BP: 120/62   Pulse: 80   Resp: 14   Temp: 98 °F (36.7 °C)   TempSrc: Oral   SpO2: 97%       [facility]/ Findings consistent with musculoskeletal pain recommend naproxen and muscle relaxers and follow-up with family physician. Suspicion for pulmonary embolism thoracic dissection or acute MI is low. Strict return precautions and follow up instructions were discussed with the patient with which the patient agrees    ED Medications administered this visit:    Medications   ketorolac (TORADOL) injection 30 mg (30 mg Intravenous Given 3/2/20 2118)   orphenadrine (NORFLEX) injection 60 mg (60 mg Intravenous Given 3/2/20 2118)       CONSULTS: (None if blank)  None    Procedures: (None if blank)       CLINICAL       1.  Upper back pain on left side          DISPOSITION/PLAN   DISPOSITION Decision To Discharge 03/02/2020 09:56:04 PM      PATIENT REFERRED TO:  Derik Petty, [address] [place] [id number] [phone]    In 4 days        DISCHARGE MEDICATIONS:  Discharge Medication List as of 3/2/2020  9:57 PM      START taking these medications    Details   naproxen (NAPROSYN) 500 MG tablet Take 1 tablet by mouth 2 times daily, Disp-14 tablet, R-0Print      cyclobenzaprine (FLEXERIL) 10 MG tablet Take 1 tablet by mouth 3 times daily as needed for Muscle spasms, Disp-21 tablet, R-0Print                    (Please note that portions of this note were completed with a voice recognition program.  Efforts were made to edit the dictations but occasionallywords are mis-transcribed.)      Jose Thompson II, PA-C (electronically signed)           Jose Thompson II, PA-C  03/02/20 [id number]

## 2020-03-12 ENCOUNTER — OFFICE VISIT (OUTPATIENT)
Dept: PRIMARY CARE CLINIC | Age: 65
End: 2020-03-12
Payer: COMMERCIAL

## 2020-03-12 VITALS
DIASTOLIC BLOOD PRESSURE: 72 MMHG | RESPIRATION RATE: 14 BRPM | TEMPERATURE: 97.7 F | BODY MASS INDEX: 25.41 KG/M2 | HEART RATE: 91 BPM | WEIGHT: 143.4 LBS | SYSTOLIC BLOOD PRESSURE: 107 MMHG | HEIGHT: 63 IN

## 2020-03-12 PROCEDURE — 99214 OFFICE O/P EST MOD 30 MIN: CPT | Performed by: NURSE PRACTITIONER

## 2020-03-12 RX ORDER — PREDNISONE 20 MG/1
TABLET ORAL
Qty: 20 TABLET | Refills: 0 | Status: SHIPPED | OUTPATIENT
Start: 2020-03-12 | End: 2021-11-19

## 2020-03-12 ASSESSMENT — ENCOUNTER SYMPTOMS
WHEEZING: 0
SORE THROAT: 0
PHOTOPHOBIA: 0
BACK PAIN: 1
SHORTNESS OF BREATH: 0
VOMITING: 0
DIARRHEA: 0
RHINORRHEA: 0
CONSTIPATION: 0
TROUBLE SWALLOWING: 0
COUGH: 0
VISUAL CHANGE: 0
NAUSEA: 0

## 2020-03-12 ASSESSMENT — PATIENT HEALTH QUESTIONNAIRE - PHQ9
SUM OF ALL RESPONSES TO PHQ9 QUESTIONS 1 & 2: 0
2. FEELING DOWN, DEPRESSED OR HOPELESS: 0
1. LITTLE INTEREST OR PLEASURE IN DOING THINGS: 0
SUM OF ALL RESPONSES TO PHQ QUESTIONS 1-9: 0
SUM OF ALL RESPONSES TO PHQ QUESTIONS 1-9: 0

## 2020-03-12 NOTE — PATIENT INSTRUCTIONS
you must sit for long periods of time, take breaks from sitting. Get up and walk around, or lie in a comfortable position. · Try using a heating pad on a low or medium setting for 15 to 20 minutes every 2 or 3 hours. Try a warm shower in place of one session with the heating pad. · You can also try an ice pack for 10 to 15 minutes every 2 to 3 hours. Put a thin cloth between the ice pack and your skin. · Take pain medicines exactly as directed. ? If the doctor gave you a prescription medicine for pain, take it as prescribed. ? If you are not taking a prescription pain medicine, ask your doctor if you can take an over-the-counter medicine. · Take short walks several times a day. You can start with 5 to 10 minutes, 3 or 4 times a day, and work up to longer walks. Walk on level surfaces and avoid hills and stairs until your back is better. · Return to work and other activities as soon as you can. Continued rest without activity is usually not good for your back. · To prevent future back pain, do exercises to stretch and strengthen your back and stomach. Learn how to use good posture, safe lifting techniques, and proper body mechanics. When should you call for help? Call your doctor now or seek immediate medical care if:    · You have new or worsening numbness in your legs.     · You have new or worsening weakness in your legs. (This could make it hard to stand up.)     · You lose control of your bladder or bowels.    Watch closely for changes in your health, and be sure to contact your doctor if:    · You have a fever, lose weight, or don't feel well.     · You do not get better as expected. Where can you learn more? Go to [email or web address]. [email or web address] and sign in to your [employer] account. Enter U121 in the [employer] box to learn more about \"Back Pain: Care Instructions. \"     If you do not have an account, please click on the \"Sign Up Now\" link.   Current as of: June 26,

## 2020-03-12 NOTE — PROGRESS NOTES
discharge. Musculoskeletal: Positive for back pain and neck pain. Negative for gait problem. Skin: Negative for rash. Neurological: Positive for numbness. Negative for dizziness, tingling, syncope, weakness and headaches. Physical Exam:   Vitals:  /72 (Site: Right Upper Arm, Position: Sitting, Cuff Size: Medium Adult)   Pulse 91   Temp 97.7 °F (36.5 °C) (Temporal)   Resp 14   Ht 5' 3\" (1.6 m)   Wt 143 lb 6.4 oz (65 kg)   LMP 01/01/2010 (Approximate)   BMI 25.40 kg/m²     Physical Exam  Vitals signs and nursing note reviewed. Constitutional:       General: She is not in acute distress. Appearance: Normal appearance. HENT:      Mouth/Throat:      Mouth: Mucous membranes are moist.   Eyes:      General: No scleral icterus. Conjunctiva/sclera: Conjunctivae normal.   Neck:      Musculoskeletal: Neck supple. Decreased range of motion. Muscular tenderness present. No neck rigidity, injury, torticollis or spinous process tenderness. Vascular: No carotid bruit. Cardiovascular:      Rate and Rhythm: Normal rate and regular rhythm. Pulses: Normal pulses. Heart sounds: No murmur. Pulmonary:      Effort: Pulmonary effort is normal.      Breath sounds: Normal breath sounds. No wheezing. Abdominal:      General: Bowel sounds are normal. There is no distension. Palpations: Abdomen is soft. Tenderness: There is no abdominal tenderness. Musculoskeletal:         General: Tenderness present. Right lower leg: No edema. Left lower leg: No edema. Lymphadenopathy:      Cervical: No cervical adenopathy. Skin:     General: Skin is warm and dry. Findings: No rash. Neurological:      Mental Status: She is alert and oriented to person, place, and time.    Psychiatric:         Mood and Affect: Mood normal.         Behavior: Behavior normal.         Data:     Lab Results   Component Value Date     03/02/2020    K 3.8 03/02/2020     03/02/2020

## 2020-03-13 ENCOUNTER — HOSPITAL ENCOUNTER (OUTPATIENT)
Dept: GENERAL RADIOLOGY | Age: 65
Discharge: HOME | End: 2020-03-15
Payer: COMMERCIAL

## 2020-03-13 ENCOUNTER — TELEPHONE (OUTPATIENT)
Dept: PRIMARY CARE CLINIC | Age: 65
End: 2020-03-13

## 2020-03-13 PROCEDURE — 72040 X-RAY EXAM NECK SPINE 2-3 VW: CPT

## 2020-03-13 NOTE — TELEPHONE ENCOUNTER
Called patient and informed her that her xray showed mild age related changes, nothing acute. Verbalizes understanding.

## 2020-12-23 ENCOUNTER — HOSPITAL ENCOUNTER (OUTPATIENT)
Dept: WOMENS IMAGING | Age: 65
Discharge: HOME | End: 2020-12-25
Payer: COMMERCIAL

## 2020-12-23 PROCEDURE — 77063 BREAST TOMOSYNTHESIS BI: CPT

## 2021-05-11 ENCOUNTER — HOSPITAL ENCOUNTER (OUTPATIENT)
Age: 66
Setting detail: SPECIMEN
Discharge: HOME | End: 2021-05-11
Payer: COMMERCIAL

## 2021-05-11 ENCOUNTER — OFFICE VISIT (OUTPATIENT)
Dept: OBGYN CLINIC | Age: 66
End: 2021-05-11
Payer: COMMERCIAL

## 2021-05-11 VITALS
DIASTOLIC BLOOD PRESSURE: 70 MMHG | BODY MASS INDEX: 25.51 KG/M2 | RESPIRATION RATE: 16 BRPM | WEIGHT: 144 LBS | SYSTOLIC BLOOD PRESSURE: 116 MMHG

## 2021-05-11 DIAGNOSIS — Z12.31 SCREENING MAMMOGRAM, ENCOUNTER FOR: ICD-10-CM

## 2021-05-11 DIAGNOSIS — Z01.419 WOMEN'S ANNUAL ROUTINE GYNECOLOGICAL EXAMINATION: Primary | ICD-10-CM

## 2021-05-11 DIAGNOSIS — Z13.820 SCREENING FOR OSTEOPOROSIS: ICD-10-CM

## 2021-05-11 PROCEDURE — 99396 PREV VISIT EST AGE 40-64: CPT | Performed by: NURSE PRACTITIONER

## 2021-05-11 NOTE — PROGRESS NOTES
Savita Coles is a 63 y.o.  here for her annual exam.  The patient was seen and examined. The patients past medical, surgical, social and family history were reviewed. Current medications and allergies were reviewed, and documented in the chart. She is  works for [employer] in [place] [employer].      Exercise Yes  Diet Yes  Tobacco abuse No     Last PAP: 2020-negative, hx of abnormal PAP yes - possible colp over 20 yrs ago. Family hx uterine or ovarian cancer-denies  Last mammogram- 2020-negative Family hx of breast cancer -maternal aunt  Last Dexa scan if indicated- Denies fracture hx had heel screening in 2019 we have results from 2019 normal she states this year normal too. She is not currently taking calcium with vit D. .   Colon cancer screening 2018- every 3-5 yrs, family hx colon cancer -denies           Sexually active: yes - with , multiple partners: No, Dyspareunia: dryness no longer taking estrace. , Vaginal discharge: no,  UTI symptoms: no, voiding difficulties: no, bowels regular:No bloating:no        Menstrual history:  Menopause around age 53-48, had hot flashes but improved     HX vaginal dryness/atrophy has stopped vaginal estrace. OB History   No obstetric history on file. Vitals:    21 1413   BP: 116/70   Site: Left Upper Arm   Position: Sitting   Cuff Size: Large Adult   Resp: 16   Weight: 144 lb (65.3 kg)       Wt Readings from Last 3 Encounters:   21 144 lb (65.3 kg)   20 143 lb 6.4 oz (65 kg)   20 141 lb 1.6 oz (64 kg)     No past medical history on file.                                                                 Past Surgical History:   Procedure Laterality Date    BREAST BIOPSY  2014     SECTION, LOW TRANSVERSE       &     COLONOSCOPY N/A 3/30/2018    COLONOSCOPY WITH BIOPSY and photos performed by Lamine Oconnor MD at [address] [place] [address] COLONOSCOPY  2018    Small polyp upper part of the Musculoskeletal: Negative for back pain and myalgias. Skin: Negative for color change and rash. Neurological: Negative for dizziness, light-headedness and headaches. Hematological: Negative for adenopathy. Does not bruise/bleed easily. Psychiatric/Behavioral: Negative for self-injury and suicidal ideas. Objective:     Physical Exam  Vitals signs and nursing note reviewed. Constitutional:       General: She is not in acute distress. Appearance: She is well-developed. She is not diaphoretic. HENT:      Head: Normocephalic and atraumatic. Right Ear: External ear normal.      Left Ear: External ear normal.      Nose: Nose normal.   Eyes:      Pupils: Pupils are equal, round, and reactive to light. Neck:      Musculoskeletal: Normal range of motion and neck supple. Thyroid: No thyromegaly. Cardiovascular:      Rate and Rhythm: Normal rate and regular rhythm. Heart sounds: Normal heart sounds. No murmur. No friction rub. No gallop. Comments: No bilateral calf tenderness or swelling  Pulmonary:      Effort: Pulmonary effort is normal. No respiratory distress. Breath sounds: Normal breath sounds. No wheezing. Abdominal:      General: Bowel sounds are normal.      Palpations: Abdomen is soft. Tenderness: There is no abdominal tenderness. Genitourinary:     Comments: Breasts nipples everted, no masses or tenderness, does BSE  Vulva-no lesions  Vagina-pale smooth, atrophic mucosa  Cervix-firm, 2 cm. Nontender, freely movable, no lesions  Uterus-ant. Smooth, firm, nontender, freely movable  Adnexa-no masses or tenderness   Musculoskeletal: Normal range of motion. Lymphadenopathy:      Cervical: No cervical adenopathy. Skin:     General: Skin is warm and dry. Findings: No rash. Neurological:      Mental Status: She is alert and oriented to person, place, and time. Cranial Nerves: No cranial nerve deficit.       Deep Tendon Reflexes: Reflexes are normal and symmetric. Psychiatric:         Behavior: Behavior normal.         Thought Content: Thought content normal.         Judgment: Judgment normal.       /70 (Site: Left Upper Arm, Position: Sitting, Cuff Size: Large Adult)   Resp 16   Wt 144 lb (65.3 kg)   LMP 2010 (Approximate)   BMI 25.51 kg/m²     Assessment:       Diagnosis Orders   1. Women's annual routine gynecological examination  PAP Smear   2. Screening mammogram, encounter for  KHAI DIGITAL SCREEN W OR WO CAD BILATERAL   3. Screening for osteoporosis  DEXA BONE DENSITY AXIAL SKELETON       Breast exam completed. Pelvic exam pap smear collected and sent. Cultures sent No    Plan:   Collect pap   BSE reviewed, Mammogram ordered: yes    Cultures declined   Vaginal atrophy/dryness- using lubricants encouraged Encouraged Replens vaginal moisturizer twice weekly and lubricant such as astroglide or ky with intercourse. If symptoms persist/worsen will consider restarting vaginal estrace. Discussed she could be at increased risk of breast cancer, cardiac events, strokes and blood clots if restarts estrogen. Diet & Exercise reviewed with pt. DEXA SCAN ordered: yes  Recommend Calcium with Vitamin D   Colonoscopy reviewed with pt -states believes UTD until 5 yrs  Preventive  Health through PCP   RV prn/annual           Orders Placed This Encounter   Procedures    KHAI DIGITAL SCREEN W OR WO CAD BILATERAL     Standing Status:   Future     Standing Expiration Date:   2022     Order Specific Question:   Reason for exam:     Answer:   annual screening mammogram    DEXA BONE DENSITY AXIAL SKELETON     Standing Status:   Future     Standing Expiration Date:   2022    PAP Smear     Patient History:    Patient's last menstrual period was 2010 (approximate).   OBGYN Status: Postmenopausal  Past Surgical History:  2014: BREAST BIOPSY  No date:  SECTION, LOW TRANSVERSE      Comment:  [id number] [facility]  3/30/2018: COLONOSCOPY; N/A

## 2021-05-11 NOTE — PATIENT INSTRUCTIONS
Preventing Osteoporosis: Care Instructions  Your Care Instructions    Osteoporosis means the bones are weak and thin enough that they can break easily. The older you are, the more likely you are to get osteoporosis. But with plenty of calcium, vitamin D, and exercise, you can help prevent osteoporosis. The preteen and teen years are a key time for bone building. With the help of calcium, vitamin D, and exercise in those early years and beyond, the bones reach their peak density and strength by age 27. After age 27, your bones naturally start to thin and weaken. The stronger your bones are at around age 27, the lower your risk for osteoporosis. But no matter what your age and risk are, your bones still need calcium, vitamin D, and exercise to stay strong. Also avoid smoking, and limit alcohol. Smoking and heavy alcohol use can make your bones thinner. Talk to your doctor about any special risks you might have, such as having a close relative with osteoporosis or taking a medicine that can weaken bones. Your doctor can tell you the best ways to protect your bones from thinning. Follow-up care is a key part of your treatment and safety. Be sure to make and go to all appointments, and call your doctor if you are having problems. It's also a good idea to know your test results and keep a list of the medicines you take. How can you care for yourself at home? · Get enough calcium and vitamin D. The [place] [employer] recommends adults younger than age 46 need 1,000 mg of calcium and 600 IU of vitamin D each day. Women ages 46 to 79 need 1,200 mg of calcium and 600 IU of vitamin D each day. Men ages 46 to 79 need 1,000 mg of calcium and 600 IU of vitamin D each day. Adults 71 and older need 1,200 mg of calcium and 800 IU of vitamin D each day. ? Eat foods rich in calcium, like yogurt, cheese, milk, and dark green vegetables. ? Eat foods rich in vitamin D, like eggs, fatty fish, cereal, and fortified milk.   ? Get 9736-0617 Healthwise, Incorporated. Care instructions adapted under license by [facility] ([facility]). If you have questions about a medical condition or this instruction, always ask your healthcare professional. [address] any warranty or liability for your use of this information. Learning About Breast Cancer Screening  What is breast cancer screening? Breast cancer occurs when cells that are not normal grow in one or both of your breasts. Screening tests can help find breast cancer early. Cancer is easier to treat when it's found early. Having concerns about breast cancer is common. That's why it's important to talk with your doctor about when to start and how often to get screened for breast cancer. How is breast cancer screening done? Several screening tests can be used to check for breast cancer. · Mammograms check for signs of cancer using X-rays. They can show tumors that are too small for you or your doctor to feel. During a mammogram, a machine squeezes your breasts to make them flatter and easier to X-ray. At least two pictures are taken of each breast. One is taken from the top and one from the side. · 3-D mammograms are also called digital breast tomosynthesis. Your breast is positioned on a flat plate. A top plate is pressed against your breast to keep it in position. The X-ray arm then moves in an arc above the breast and takes many pictures. A computer uses these X-rays to create a three-dimensional image. · Clinical breast exams are a doctor's exam. Your doctor carefully feels your breasts and under your arms to check for lumps or other changes. After the screening, your doctor will tell you the results. You will also be told if you need any follow-up tests. When should you get screened? Talk with your doctor about when you should start being tested for breast cancer. How often you get tested and the kind of tests you get will depend on your age and your risk.   The guidelines that follow are for women who have an average risk for breast cancer. If you have a higher risk for breast cancer, such as having a family history of breast cancer in multiple relatives or at a young age, your doctor may recommend different screening for you. · Ages 21 to 44: Some experts recommend that women have a clinical breast exam every 3 years, starting at age 21. Ask your doctor how often you should have this test. If you have a high risk for breast cancer, talk with your doctor about when to start yearly mammograms and other screening tests. · Ages 36 and older: Talk with your doctor about how often you should have mammograms and clinical breast exams. What is your risk for breast cancer? If you don't already know your risk of breast cancer, you can ask your doctor about it. You can also look it up at www.cancer.gov/bcrisktool/. If your doctor says that you have a high or very high risk, ask about ways to reduce your risk. These could include getting extra screening, taking medicine, or having surgery. If you have a strong family history of breast cancer, ask your doctor about genetic testing. What steps can you take to stay healthy? Some things that increase your risk of breast cancer, such as your age and being female, cannot be controlled. But you can do some things to stay as healthy as you can. · Learn what your breasts normally look and feel like. If you notice any changes, tell your doctor. · Drink alcohol wisely. Your risk goes up the more you drink. For the best health, women should have no more than 1 drink a day or 7 drinks a week. · If you smoke, quit. When you quit smoking, you lower your chances of getting many types of cancer. You can also do your best to eat well, be active, and stay at a healthy weight. Eating healthy foods and being active every day, as well as staying at a healthy weight, may help prevent cancer. Where can you learn more?   Go to https://chpepiceweb.[employer]. org and sign in to your [employer] account. Enter R767 in the [facility] box to learn more about \"Learning About Breast Cancer Screening. \"     If you do not have an account, please click on the \"Sign Up Now\" link. Current as of: December 19, 2018  Content Version: 12.0  © 0562-2017 [employer]. Care instructions adapted under license by [facility] ([facility]). If you have questions about a medical condition or this instruction, always ask your healthcare professional. [address] any warranty or liability for your use of this information. Pap Test: Care Instructions  Your Care Instructions    The Pap test (also called a Pap smear) is a screening test for cancer of the cervix, which is the lower part of the uterus that opens into the vagina. The test can help your doctor find early changes in the cells that could lead to cancer. The sample of cells taken during your test has been sent to a lab so that an expert can look at the cells. It usually takes a week or two to get the results back. Follow-up care is a key part of your treatment and safety. Be sure to make and go to all appointments, and call your doctor if you are having problems. It's also a good idea to know your test results and keep a list of the medicines you take. What do the results mean? · A normal result means that the test did not find any abnormal cells in the sample. · An abnormal result can mean many things. Most of these are not cancer. The results of your test may be abnormal because:  ? You have an infection of the vagina or cervix, such as a yeast infection. ? You have an IUD (intrauterine device for birth control). ? You have low estrogen levels after menopause that are causing the cells to change. ? You have cell changes that may be a sign of precancer or cancer. The results are ranked based on how serious the changes might be.   There are many other reasons why you might not get a normal result. If the results were abnormal, you may need to get another test within a few weeks or months. If the results show changes that could be a sign of cancer, you may need a test called a colposcopy, which provides a more complete view of the cervix. Sometimes the lab cannot use the sample because it does not contain enough cells or was not preserved well. If so, you may need to have the test again. This is not common, but it does happen from time to time. When should you call for help? Watch closely for changes in your health, and be sure to contact your doctor if:    · You have vaginal bleeding or pain for more than 2 days after the test. It is normal to have a small amount of bleeding for a day or two after the test.   Where can you learn more? Go to https://NooshpetarunCarhoots.comeb.[employer]. org and sign in to your [employer] account. Enter P306 in the [employer] box to learn more about \"Pap Test: Care Instructions. \"     If you do not have an account, please click on the \"Sign Up Now\" link. Current as of: December 19, 2018  Content Version: 12.0  © 8721-3093 Healthwise, Incorporated. Care instructions adapted under license by [facility] ([facility]). If you have questions about a medical condition or this instruction, always ask your healthcare professional. [address] any warranty or liability for your use of this information.

## 2021-05-13 ASSESSMENT — ENCOUNTER SYMPTOMS
VOMITING: 0
SHORTNESS OF BREATH: 0
NAUSEA: 0
BACK PAIN: 0
RHINORRHEA: 0
ABDOMINAL PAIN: 0
COLOR CHANGE: 0
COUGH: 0
DIARRHEA: 0
CONSTIPATION: 0

## 2021-05-14 LAB — CYTOLOGY REPORT: NORMAL

## 2021-07-15 LAB
CHOLESTEROL/HDL RATIO: 3.8
CHOLESTEROL: 211 MG/DL
GLUCOSE BLD-MCNC: 88 MG/DL (ref 70–99)
HDLC SERPL-MCNC: 56 MG/DL
LDL CHOLESTEROL: 138 MG/DL (ref 0–130)
PATIENT FASTING?: YES
TRIGL SERPL-MCNC: 85 MG/DL
VLDLC SERPL CALC-MCNC: ABNORMAL MG/DL (ref 1–30)

## 2021-11-03 ENCOUNTER — HOSPITAL ENCOUNTER (OUTPATIENT)
Age: 66
Discharge: HOME | End: 2021-11-05
Payer: COMMERCIAL

## 2021-11-03 ENCOUNTER — HOSPITAL ENCOUNTER (OUTPATIENT)
Dept: GENERAL RADIOLOGY | Age: 66
Discharge: HOME | End: 2021-11-05
Payer: COMMERCIAL

## 2021-11-03 DIAGNOSIS — R52 PAIN: ICD-10-CM

## 2021-11-03 PROCEDURE — 73564 X-RAY EXAM KNEE 4 OR MORE: CPT

## 2021-11-03 PROCEDURE — 73590 X-RAY EXAM OF LOWER LEG: CPT

## 2021-11-08 ENCOUNTER — HOSPITAL ENCOUNTER (OUTPATIENT)
Age: 66
Discharge: HOME | End: 2021-11-08
Payer: COMMERCIAL

## 2021-11-08 LAB
BUN BLDV-MCNC: 13 MG/DL (ref 8–23)
CREAT SERPL-MCNC: 0.7 MG/DL (ref 0.5–0.9)
GFR AFRICAN AMERICAN: >60 ML/MIN
GFR NON-AFRICAN AMERICAN: >60 ML/MIN
GFR SERPL CREATININE-BSD FRML MDRD: NORMAL ML/MIN/{1.73_M2}
GFR SERPL CREATININE-BSD FRML MDRD: NORMAL ML/MIN/{1.73_M2}

## 2021-11-08 PROCEDURE — 36415 COLL VENOUS BLD VENIPUNCTURE: CPT

## 2021-11-08 PROCEDURE — 84520 ASSAY OF UREA NITROGEN: CPT

## 2021-11-08 PROCEDURE — 82565 ASSAY OF CREATININE: CPT

## 2021-11-09 ENCOUNTER — HOSPITAL ENCOUNTER (OUTPATIENT)
Dept: MRI IMAGING | Age: 66
Discharge: HOME | End: 2021-11-11
Payer: COMMERCIAL

## 2021-11-09 DIAGNOSIS — R22.42 MASS OF THIGH, LEFT: ICD-10-CM

## 2021-11-09 PROCEDURE — 6360000004 HC RX CONTRAST MEDICATION: Performed by: ORTHOPAEDIC SURGERY

## 2021-11-09 PROCEDURE — A9579 GAD-BASE MR CONTRAST NOS,1ML: HCPCS | Performed by: ORTHOPAEDIC SURGERY

## 2021-11-09 PROCEDURE — 73720 MRI LWR EXTREMITY W/O&W/DYE: CPT

## 2021-11-09 RX ADMIN — GADOTERIDOL 12 ML: 279.3 INJECTION, SOLUTION INTRAVENOUS at 16:30

## 2021-11-19 ENCOUNTER — HOSPITAL ENCOUNTER (OUTPATIENT)
Age: 66
Discharge: HOME | End: 2021-11-19
Payer: COMMERCIAL

## 2021-11-19 ENCOUNTER — HOSPITAL ENCOUNTER (OUTPATIENT)
Dept: LAB | Age: 66
Setting detail: SPECIMEN
Discharge: HOME | End: 2021-11-19
Payer: COMMERCIAL

## 2021-11-19 ENCOUNTER — OFFICE VISIT (OUTPATIENT)
Dept: FAMILY MEDICINE CLINIC | Age: 66
End: 2021-11-19
Payer: COMMERCIAL

## 2021-11-19 ENCOUNTER — ANESTHESIA EVENT (OUTPATIENT)
Dept: OPERATING ROOM | Age: 66
End: 2021-11-19
Payer: COMMERCIAL

## 2021-11-19 VITALS
HEART RATE: 84 BPM | DIASTOLIC BLOOD PRESSURE: 70 MMHG | TEMPERATURE: 97 F | HEIGHT: 63 IN | RESPIRATION RATE: 14 BRPM | OXYGEN SATURATION: 98 % | BODY MASS INDEX: 25.16 KG/M2 | WEIGHT: 142 LBS | SYSTOLIC BLOOD PRESSURE: 120 MMHG

## 2021-11-19 DIAGNOSIS — Z01.818 PRE-OP EXAM: ICD-10-CM

## 2021-11-19 DIAGNOSIS — Z20.822 COVID-19 RULED OUT: ICD-10-CM

## 2021-11-19 DIAGNOSIS — Z01.818 PRE-OP EXAM: Primary | ICD-10-CM

## 2021-11-19 LAB
ANION GAP SERPL CALCULATED.3IONS-SCNC: 10 MMOL/L (ref 9–17)
BUN BLDV-MCNC: 16 MG/DL (ref 8–23)
BUN/CREAT BLD: 22 (ref 9–20)
CALCIUM SERPL-MCNC: 10.1 MG/DL (ref 8.6–10.4)
CHLORIDE BLD-SCNC: 101 MMOL/L (ref 98–107)
CO2: 29 MMOL/L (ref 20–31)
CREAT SERPL-MCNC: 0.73 MG/DL (ref 0.5–0.9)
GFR AFRICAN AMERICAN: >60 ML/MIN
GFR NON-AFRICAN AMERICAN: >60 ML/MIN
GFR SERPL CREATININE-BSD FRML MDRD: ABNORMAL ML/MIN/{1.73_M2}
GFR SERPL CREATININE-BSD FRML MDRD: ABNORMAL ML/MIN/{1.73_M2}
GLUCOSE BLD-MCNC: 108 MG/DL (ref 70–99)
HCT VFR BLD CALC: 42 % (ref 36.3–47.1)
HEMOGLOBIN: 13.5 G/DL (ref 11.9–15.1)
MCH RBC QN AUTO: 28.7 PG (ref 25.2–33.5)
MCHC RBC AUTO-ENTMCNC: 32.1 G/DL (ref 28.4–34.8)
MCV RBC AUTO: 89.4 FL (ref 82.6–102.9)
NRBC AUTOMATED: 0 PER 100 WBC
PDW BLD-RTO: 12.2 % (ref 11.8–14.4)
PLATELET # BLD: 361 K/UL (ref 138–453)
PMV BLD AUTO: 9.5 FL (ref 8.1–13.5)
POTASSIUM SERPL-SCNC: 4.1 MMOL/L (ref 3.7–5.3)
RBC # BLD: 4.7 M/UL (ref 3.95–5.11)
SODIUM BLD-SCNC: 140 MMOL/L (ref 135–144)
WBC # BLD: 8.2 K/UL (ref 3.5–11.3)

## 2021-11-19 PROCEDURE — U0003 INFECTIOUS AGENT DETECTION BY NUCLEIC ACID (DNA OR RNA); SEVERE ACUTE RESPIRATORY SYNDROME CORONAVIRUS 2 (SARS-COV-2) (CORONAVIRUS DISEASE [COVID-19]), AMPLIFIED PROBE TECHNIQUE, MAKING USE OF HIGH THROUGHPUT TECHNOLOGIES AS DESCRIBED BY CMS-2020-01-R: HCPCS

## 2021-11-19 PROCEDURE — C9803 HOPD COVID-19 SPEC COLLECT: HCPCS

## 2021-11-19 PROCEDURE — U0005 INFEC AGEN DETEC AMPLI PROBE: HCPCS

## 2021-11-19 PROCEDURE — 93005 ELECTROCARDIOGRAM TRACING: CPT

## 2021-11-19 PROCEDURE — 36415 COLL VENOUS BLD VENIPUNCTURE: CPT

## 2021-11-19 PROCEDURE — 85027 COMPLETE CBC AUTOMATED: CPT

## 2021-11-19 PROCEDURE — 99214 OFFICE O/P EST MOD 30 MIN: CPT | Performed by: NURSE PRACTITIONER

## 2021-11-19 PROCEDURE — 80048 BASIC METABOLIC PNL TOTAL CA: CPT

## 2021-11-19 ASSESSMENT — ENCOUNTER SYMPTOMS
SORE THROAT: 0
SINUS PAIN: 0
RHINORRHEA: 0
COUGH: 0
SHORTNESS OF BREATH: 0
WHEEZING: 0
SINUS PRESSURE: 0
ABDOMINAL PAIN: 0
CONSTIPATION: 0
DIARRHEA: 0

## 2021-11-19 NOTE — PROGRESS NOTES
Jen Feil    Age 63 y.o.    female    1955    MRN [id number]    11/22/2021  Arrival Time_____________  OR Time____________[address]     Procedure(s):  INCISIONAL BIOPSY LEFT THIGH MASS                      General     Surgeon(s):  Brett Everett, MD      DAY ADMIT ___  SDS/OP ___  OUTPT IN BED ___         Phone [phone] (home) [phone] (work)   PCP _____________________ Phone_________________ Naseem ( ) Epic CE ( ) Appt ________    ADDITIONAL INFO __________________________________ Cardio/Consult _____________    NOTES _____________________________________________________________________    ____________________________________________________________________________    PAT APPT DATE:________ TIME: ________  FAXED QAD: _______  (__) H&P w/ hospitalist  ____________________________________________________________________________    COVID TEST: Date/Location______________        NURSING HISTORY COMPLETE: _______  (__) CBC       (__) W/ DIFF ___________  (__)  ECHO    __________  (__) Hgb A1C    ___________  (__) CHEST X RAY   __________  (__) LIPID PROFILE  ___________  (__) EKG   __________  (__) PT/PTT   ___________  (__) PFT's   __________  (__) BMP   ___________  (__) CAROTIDS  __________  (__) CMP   ___________  (__) VEIN MAPPING  __________  (__) U/A   ___________  (__) HISTORY & PHYSICAL __________  (__) URINE C & S  ___________  (__) CARDIAC CLEARANCE __________  (__) U/A W/ FLEX  ___________  (__) PULM.  CLEARANCE __________  (__) SERUM PREGNANCY ___________  (__) Check Epic DOS orders __________  (__) TYPE & SCREEN ________ repeat ( ) (__)  __________________ __________  (__) ALBUMIN   ___________  (__)  __________________ __________  (__) TRANSFERRIN  ___________  (__)  __________________ __________  (__) LIVER PROFILE  ___________  (__)  __________________ __________  (__) CARBOXY HGB  ___________  (__) URINE PREG DOS __________  (__) NICOTINE & MET.  ___________  (__) BLOOD SUGAR DOS __________  (__) PREALBUMIN  ___________    (__) MRSA NASAL SWAB ___________  (__) BLOOD THINNERS __________  (__) ACE/ ARBS: _____________________    (__) BETABLOCKERS ___________________

## 2021-11-19 NOTE — PATIENT INSTRUCTIONS
SURVEY:    You may be receiving a survey from [employer] regarding your visit today. Please complete the survey to enable us to provide the highest quality of care to you and your family. If you cannot score us a very good on any question, please call the office to discuss how we could of made your experience a very good one. Thank you.

## 2021-11-19 NOTE — PROGRESS NOTES
LLE, TUMOR ON SHIN        Medications:       Prior to Admission medications    Not on File        Allergies:       Patient has no known allergies. Social History:     Tobacco:    reports that she has never smoked. She has never used smokeless tobacco.  Alcohol:      reports current alcohol use. Drug Use:  reports no history of drug use. Family History:     Family History   Problem Relation Age of Onset    Cancer Mother         parotid    Thyroid Disease Mother     Other Father         PKD    Hypertension Father     Heart Attack Maternal Grandmother     Heart Attack Maternal Grandfather     Other Paternal Grandmother         PKD    Cancer Maternal Aunt         breast    Cancer Maternal Aunt         pancreas       Review of Systems:     Positive and Negative as described in HPI    Review of Systems   Constitutional: Negative for chills, fatigue, fever and unexpected weight change. HENT: Negative for congestion, postnasal drip, rhinorrhea, sinus pressure, sinus pain and sore throat. Respiratory: Negative for cough, shortness of breath and wheezing. Cardiovascular: Negative for chest pain and palpitations. Gastrointestinal: Negative for abdominal pain, constipation and diarrhea. Genitourinary: Negative for difficulty urinating. Musculoskeletal:        Admits left leg pain    Skin: Negative for rash. Neurological: Negative for dizziness, light-headedness and headaches. Physical Exam:   Vitals:  /70   Pulse 84   Temp 97 °F (36.1 °C)   Resp 14   Ht 5' 3\" (1.6 m)   Wt 142 lb (64.4 kg)   LMP 01/01/2010 (Approximate)   SpO2 98%   BMI 25.15 kg/m²     Physical Exam  Constitutional:       General: She is not in acute distress. Appearance: Normal appearance. She is normal weight. She is not ill-appearing or toxic-appearing. HENT:      Head: Normocephalic. Right Ear: Tympanic membrane, ear canal and external ear normal. There is no impacted cerumen.       Left Ear: Tympanic membrane, ear canal and external ear normal. There is no impacted cerumen. Nose: Nose normal. No congestion or rhinorrhea. Mouth/Throat:      Mouth: Mucous membranes are moist.      Pharynx: No oropharyngeal exudate or posterior oropharyngeal erythema. Eyes:      Conjunctiva/sclera: Conjunctivae normal.   Cardiovascular:      Rate and Rhythm: Normal rate and regular rhythm. Pulses: Normal pulses. Heart sounds: Normal heart sounds. No murmur heard. Pulmonary:      Effort: Pulmonary effort is normal. No respiratory distress. Breath sounds: Normal breath sounds. No stridor. No wheezing, rhonchi or rales. Abdominal:      General: Abdomen is flat. Bowel sounds are normal. There is no distension. Palpations: Abdomen is soft. There is no mass. Tenderness: There is no abdominal tenderness. There is no guarding. Hernia: No hernia is present. Musculoskeletal:      Cervical back: Neck supple. Right lower leg: No edema. Left lower leg: No edema. Lymphadenopathy:      Cervical: No cervical adenopathy. Skin:     General: Skin is warm. Neurological:      Mental Status: She is alert and oriented to person, place, and time. Psychiatric:         Mood and Affect: Mood normal.         Behavior: Behavior normal.         Thought Content:  Thought content normal.         Judgment: Judgment normal.         Data:     Lab Results   Component Value Date     11/19/2021    K 4.1 11/19/2021     11/19/2021    CO2 29 11/19/2021    BUN 16 11/19/2021    CREATININE 0.73 11/19/2021    GLUCOSE 108 11/19/2021    GLUCOSE 95 03/28/2012     Lab Results   Component Value Date    WBC 8.2 11/19/2021    RBC 4.70 11/19/2021    HGB 13.5 11/19/2021    HCT 42.0 11/19/2021    MCV 89.4 11/19/2021    MCH 28.7 11/19/2021    MCHC 32.1 11/19/2021    RDW 12.2 11/19/2021     11/19/2021    MPV 9.5 11/19/2021     No results found for: TSH  Lab Results   Component Value Date CHOL 211 07/15/2021    HDL 56 07/15/2021       Assessment/Plan:      Diagnosis Orders   1. Pre-op exam  Basic Metabolic Panel    CBC    EKG 12 lead   2. COVID-19 ruled out  COVID-19     -EKG, BMP, CBC ordered today  -Covid test ordered today  -No concerning findings to delay patient from upcoming biopsy found from history taking or physical exam today  -Following the visit, EKG and lab tests were reviewed and the patient was cleared for surgery from a primary care standpoint. Medical clearance form was completed and faxed. Completed Refills   Requested Prescriptions      No prescriptions requested or ordered in this encounter       Orders Placed This Encounter   Procedures    COVID-19     Standing Status:   Future     Number of Occurrences:   1     Standing Expiration Date:   11/19/2022     Scheduling Instructions:      1) Due to current limited availability of the COVID-19 test, tests will be prioritized based on responses to questions above. Testing may be delayed due to volume. 2) Print and instruct patient to adhere to CDC home isolation program. (Link Above)              3) Set up or refer patient for a monitoring program.              4) Have patient sign up for and leverage [employer] (if not previously done). Order Specific Question:   Is this test for diagnosis or screening? Answer:   Screening     Order Specific Question:   Symptomatic for COVID-19 as defined by CDC? Answer:   No     Order Specific Question:   Date of Symptom Onset     Answer:   N/A     Order Specific Question:   Hospitalized for COVID-19? Answer:   No     Order Specific Question:   Admitted to ICU for COVID-19? Answer:   No     Order Specific Question:   Employed in healthcare setting? Answer:   Yes     Order Specific Question:   Resident in a congregate (group) care setting? Answer:   No     Order Specific Question:   Pregnant?      Answer:   No     Order Specific Question:   Previously tested for COVID-19? Answer:   Unknown    Basic Metabolic Panel     Standing Status:   Future     Number of Occurrences:   1     Standing Expiration Date:   11/19/2022    CBC     Standing Status:   Future     Number of Occurrences:   1     Standing Expiration Date:   11/19/2022    EKG 12 lead     Standing Status:   Future     Number of Occurrences:   1     Standing Expiration Date:   1/18/2022     Order Specific Question:   Reason for Exam?     Answer: Other     Comments:   pre op        No results found for this visit on 11/19/21. Return if symptoms worsen or fail to improve.     Electronically signed by ANAIS Palacios CNP on 11/22/21 at 8:19 AM.

## 2021-11-20 LAB
EKG ATRIAL RATE: 68 BPM
EKG P AXIS: 67 DEGREES
EKG P-R INTERVAL: 154 MS
EKG Q-T INTERVAL: 388 MS
EKG QRS DURATION: 84 MS
EKG QTC CALCULATION (BAZETT): 412 MS
EKG R AXIS: 58 DEGREES
EKG T AXIS: 25 DEGREES
EKG VENTRICULAR RATE: 68 BPM
SARS-COV-2: NORMAL
SARS-COV-2: NOT DETECTED
SOURCE: NORMAL

## 2021-11-20 PROCEDURE — 93010 ELECTROCARDIOGRAM REPORT: CPT | Performed by: INTERNAL MEDICINE

## 2021-11-22 ENCOUNTER — ANESTHESIA (OUTPATIENT)
Dept: OPERATING ROOM | Age: 66
End: 2021-11-22
Payer: COMMERCIAL

## 2021-11-22 ENCOUNTER — HOSPITAL ENCOUNTER (OUTPATIENT)
Age: 66
Setting detail: OUTPATIENT SURGERY
Discharge: HOME | End: 2021-11-22
Attending: ORTHOPAEDIC SURGERY | Admitting: ORTHOPAEDIC SURGERY
Payer: COMMERCIAL

## 2021-11-22 VITALS
TEMPERATURE: 97.4 F | SYSTOLIC BLOOD PRESSURE: 113 MMHG | BODY MASS INDEX: 24.63 KG/M2 | HEIGHT: 63 IN | WEIGHT: 139 LBS | RESPIRATION RATE: 36 BRPM | HEART RATE: 77 BPM | DIASTOLIC BLOOD PRESSURE: 60 MMHG | OXYGEN SATURATION: 98 %

## 2021-11-22 VITALS
DIASTOLIC BLOOD PRESSURE: 56 MMHG | RESPIRATION RATE: 6 BRPM | SYSTOLIC BLOOD PRESSURE: 101 MMHG | OXYGEN SATURATION: 99 %

## 2021-11-22 DIAGNOSIS — R22.42 MASS OF LEFT THIGH: ICD-10-CM

## 2021-11-22 PROCEDURE — 3600000013 HC SURGERY LEVEL 3 ADDTL 15MIN: Performed by: ORTHOPAEDIC SURGERY

## 2021-11-22 PROCEDURE — 2580000003 HC RX 258: Performed by: ORTHOPAEDIC SURGERY

## 2021-11-22 PROCEDURE — 6370000000 HC RX 637 (ALT 250 FOR IP): Performed by: ANESTHESIOLOGY

## 2021-11-22 PROCEDURE — 6360000002 HC RX W HCPCS: Performed by: NURSE ANESTHETIST, CERTIFIED REGISTERED

## 2021-11-22 PROCEDURE — 2500000003 HC RX 250 WO HCPCS: Performed by: NURSE ANESTHETIST, CERTIFIED REGISTERED

## 2021-11-22 PROCEDURE — 2500000003 HC RX 250 WO HCPCS: Performed by: ORTHOPAEDIC SURGERY

## 2021-11-22 PROCEDURE — 3600000003 HC SURGERY LEVEL 3 BASE: Performed by: ORTHOPAEDIC SURGERY

## 2021-11-22 PROCEDURE — 7100000000 HC PACU RECOVERY - FIRST 15 MIN: Performed by: ORTHOPAEDIC SURGERY

## 2021-11-22 PROCEDURE — 3700000000 HC ANESTHESIA ATTENDED CARE: Performed by: ORTHOPAEDIC SURGERY

## 2021-11-22 PROCEDURE — 2709999900 HC NON-CHARGEABLE SUPPLY: Performed by: ORTHOPAEDIC SURGERY

## 2021-11-22 PROCEDURE — 3700000001 HC ADD 15 MINUTES (ANESTHESIA): Performed by: ORTHOPAEDIC SURGERY

## 2021-11-22 PROCEDURE — 2580000003 HC RX 258: Performed by: ANESTHESIOLOGY

## 2021-11-22 PROCEDURE — 88331 PATH CONSLTJ SURG 1 BLK 1SPC: CPT

## 2021-11-22 PROCEDURE — 88307 TISSUE EXAM BY PATHOLOGIST: CPT

## 2021-11-22 PROCEDURE — 6360000002 HC RX W HCPCS: Performed by: ORTHOPAEDIC SURGERY

## 2021-11-22 PROCEDURE — 7100000001 HC PACU RECOVERY - ADDTL 15 MIN: Performed by: ORTHOPAEDIC SURGERY

## 2021-11-22 RX ORDER — DIPHENHYDRAMINE HYDROCHLORIDE 50 MG/ML
12.5 INJECTION INTRAMUSCULAR; INTRAVENOUS
Status: DISCONTINUED | OUTPATIENT
Start: 2021-11-22 | End: 2021-11-22 | Stop reason: HOSPADM

## 2021-11-22 RX ORDER — ONDANSETRON 2 MG/ML
4 INJECTION INTRAMUSCULAR; INTRAVENOUS PRN
Status: DISCONTINUED | OUTPATIENT
Start: 2021-11-22 | End: 2021-11-22 | Stop reason: HOSPADM

## 2021-11-22 RX ORDER — ONDANSETRON 2 MG/ML
INJECTION INTRAMUSCULAR; INTRAVENOUS PRN
Status: DISCONTINUED | OUTPATIENT
Start: 2021-11-22 | End: 2021-11-22 | Stop reason: SDUPTHER

## 2021-11-22 RX ORDER — DEXAMETHASONE SODIUM PHOSPHATE 4 MG/ML
INJECTION, SOLUTION INTRA-ARTICULAR; INTRALESIONAL; INTRAMUSCULAR; INTRAVENOUS; SOFT TISSUE PRN
Status: DISCONTINUED | OUTPATIENT
Start: 2021-11-22 | End: 2021-11-22 | Stop reason: SDUPTHER

## 2021-11-22 RX ORDER — HYDRALAZINE HYDROCHLORIDE 20 MG/ML
5 INJECTION INTRAMUSCULAR; INTRAVENOUS EVERY 10 MIN PRN
Status: DISCONTINUED | OUTPATIENT
Start: 2021-11-22 | End: 2021-11-22 | Stop reason: HOSPADM

## 2021-11-22 RX ORDER — LIDOCAINE HYDROCHLORIDE 20 MG/ML
INJECTION, SOLUTION INFILTRATION; PERINEURAL PRN
Status: DISCONTINUED | OUTPATIENT
Start: 2021-11-22 | End: 2021-11-22 | Stop reason: SDUPTHER

## 2021-11-22 RX ORDER — MORPHINE SULFATE 2 MG/ML
1 INJECTION, SOLUTION INTRAMUSCULAR; INTRAVENOUS EVERY 5 MIN PRN
Status: DISCONTINUED | OUTPATIENT
Start: 2021-11-22 | End: 2021-11-22 | Stop reason: HOSPADM

## 2021-11-22 RX ORDER — SODIUM CHLORIDE, SODIUM LACTATE, POTASSIUM CHLORIDE, CALCIUM CHLORIDE 600; 310; 30; 20 MG/100ML; MG/100ML; MG/100ML; MG/100ML
INJECTION, SOLUTION INTRAVENOUS CONTINUOUS
Status: DISCONTINUED | OUTPATIENT
Start: 2021-11-22 | End: 2021-11-22 | Stop reason: HOSPADM

## 2021-11-22 RX ORDER — LABETALOL HYDROCHLORIDE 5 MG/ML
5 INJECTION, SOLUTION INTRAVENOUS EVERY 10 MIN PRN
Status: DISCONTINUED | OUTPATIENT
Start: 2021-11-22 | End: 2021-11-22 | Stop reason: HOSPADM

## 2021-11-22 RX ORDER — OXYCODONE HYDROCHLORIDE AND ACETAMINOPHEN 5; 325 MG/1; MG/1
1 TABLET ORAL PRN
Status: COMPLETED | OUTPATIENT
Start: 2021-11-22 | End: 2021-11-22

## 2021-11-22 RX ORDER — MAGNESIUM HYDROXIDE 1200 MG/15ML
LIQUID ORAL CONTINUOUS PRN
Status: COMPLETED | OUTPATIENT
Start: 2021-11-22 | End: 2021-11-22

## 2021-11-22 RX ORDER — PROPOFOL 10 MG/ML
INJECTION, EMULSION INTRAVENOUS PRN
Status: DISCONTINUED | OUTPATIENT
Start: 2021-11-22 | End: 2021-11-22 | Stop reason: SDUPTHER

## 2021-11-22 RX ORDER — MEPERIDINE HYDROCHLORIDE 50 MG/ML
12.5 INJECTION INTRAMUSCULAR; INTRAVENOUS; SUBCUTANEOUS EVERY 5 MIN PRN
Status: DISCONTINUED | OUTPATIENT
Start: 2021-11-22 | End: 2021-11-22 | Stop reason: HOSPADM

## 2021-11-22 RX ORDER — MORPHINE SULFATE 2 MG/ML
2 INJECTION, SOLUTION INTRAMUSCULAR; INTRAVENOUS EVERY 5 MIN PRN
Status: DISCONTINUED | OUTPATIENT
Start: 2021-11-22 | End: 2021-11-22 | Stop reason: HOSPADM

## 2021-11-22 RX ORDER — BUPIVACAINE HYDROCHLORIDE 2.5 MG/ML
INJECTION, SOLUTION EPIDURAL; INFILTRATION; INTRACAUDAL PRN
Status: DISCONTINUED | OUTPATIENT
Start: 2021-11-22 | End: 2021-11-22 | Stop reason: ALTCHOICE

## 2021-11-22 RX ORDER — SODIUM CHLORIDE 0.9 % (FLUSH) 0.9 %
10 SYRINGE (ML) INJECTION EVERY 12 HOURS SCHEDULED
Status: DISCONTINUED | OUTPATIENT
Start: 2021-11-22 | End: 2021-11-22 | Stop reason: HOSPADM

## 2021-11-22 RX ORDER — SODIUM CHLORIDE 9 MG/ML
25 INJECTION, SOLUTION INTRAVENOUS PRN
Status: DISCONTINUED | OUTPATIENT
Start: 2021-11-22 | End: 2021-11-22 | Stop reason: HOSPADM

## 2021-11-22 RX ORDER — HYDROCODONE BITARTRATE AND ACETAMINOPHEN 5; 325 MG/1; MG/1
1 TABLET ORAL EVERY 8 HOURS PRN
Qty: 3 TABLET | Refills: 0 | Status: SHIPPED | OUTPATIENT
Start: 2021-11-22 | End: 2021-11-23

## 2021-11-22 RX ORDER — PROMETHAZINE HYDROCHLORIDE 25 MG/ML
6.25 INJECTION, SOLUTION INTRAMUSCULAR; INTRAVENOUS
Status: DISCONTINUED | OUTPATIENT
Start: 2021-11-22 | End: 2021-11-22 | Stop reason: HOSPADM

## 2021-11-22 RX ORDER — OXYCODONE HYDROCHLORIDE AND ACETAMINOPHEN 5; 325 MG/1; MG/1
2 TABLET ORAL PRN
Status: COMPLETED | OUTPATIENT
Start: 2021-11-22 | End: 2021-11-22

## 2021-11-22 RX ORDER — SCOLOPAMINE TRANSDERMAL SYSTEM 1 MG/1
1 PATCH, EXTENDED RELEASE TRANSDERMAL
Status: DISCONTINUED | OUTPATIENT
Start: 2021-11-22 | End: 2021-11-22 | Stop reason: HOSPADM

## 2021-11-22 RX ORDER — SODIUM CHLORIDE 0.9 % (FLUSH) 0.9 %
10 SYRINGE (ML) INJECTION PRN
Status: DISCONTINUED | OUTPATIENT
Start: 2021-11-22 | End: 2021-11-22 | Stop reason: HOSPADM

## 2021-11-22 RX ORDER — SCOLOPAMINE TRANSDERMAL SYSTEM 1 MG/1
1 PATCH, EXTENDED RELEASE TRANSDERMAL ONCE
Status: CANCELLED | OUTPATIENT
Start: 2021-11-22 | End: 2021-11-22

## 2021-11-22 RX ORDER — FENTANYL CITRATE 50 UG/ML
INJECTION, SOLUTION INTRAMUSCULAR; INTRAVENOUS PRN
Status: DISCONTINUED | OUTPATIENT
Start: 2021-11-22 | End: 2021-11-22 | Stop reason: SDUPTHER

## 2021-11-22 RX ADMIN — CEFAZOLIN 2000 MG: 10 INJECTION, POWDER, FOR SOLUTION INTRAVENOUS at 12:10

## 2021-11-22 RX ADMIN — FENTANYL CITRATE 25 MCG: 50 INJECTION INTRAMUSCULAR; INTRAVENOUS at 12:29

## 2021-11-22 RX ADMIN — FENTANYL CITRATE 25 MCG: 50 INJECTION INTRAMUSCULAR; INTRAVENOUS at 12:24

## 2021-11-22 RX ADMIN — OXYCODONE AND ACETAMINOPHEN 1 TABLET: 5; 325 TABLET ORAL at 14:18

## 2021-11-22 RX ADMIN — LIDOCAINE HYDROCHLORIDE 60 MG: 20 INJECTION, SOLUTION INFILTRATION; PERINEURAL at 12:14

## 2021-11-22 RX ADMIN — ONDANSETRON 4 MG: 2 INJECTION INTRAMUSCULAR; INTRAVENOUS at 12:19

## 2021-11-22 RX ADMIN — DEXAMETHASONE SODIUM PHOSPHATE 8 MG: 4 INJECTION, SOLUTION INTRAMUSCULAR; INTRAVENOUS at 12:19

## 2021-11-22 RX ADMIN — FENTANYL CITRATE 25 MCG: 50 INJECTION INTRAMUSCULAR; INTRAVENOUS at 12:20

## 2021-11-22 RX ADMIN — PROPOFOL 200 MG: 10 INJECTION, EMULSION INTRAVENOUS at 12:15

## 2021-11-22 RX ADMIN — SODIUM CHLORIDE, SODIUM LACTATE, POTASSIUM CHLORIDE, AND CALCIUM CHLORIDE: .6; .31; .03; .02 INJECTION, SOLUTION INTRAVENOUS at 12:11

## 2021-11-22 RX ADMIN — PROPOFOL 25 MG: 10 INJECTION, EMULSION INTRAVENOUS at 12:43

## 2021-11-22 RX ADMIN — PROPOFOL 25 MG: 10 INJECTION, EMULSION INTRAVENOUS at 12:40

## 2021-11-22 ASSESSMENT — PAIN SCALES - GENERAL
PAINLEVEL_OUTOF10: 5
PAINLEVEL_OUTOF10: 0
PAINLEVEL_OUTOF10: 0

## 2021-11-22 ASSESSMENT — PULMONARY FUNCTION TESTS
PIF_VALUE: 2
PIF_VALUE: 2
PIF_VALUE: 1
PIF_VALUE: 3
PIF_VALUE: 3
PIF_VALUE: 0
PIF_VALUE: 3
PIF_VALUE: 0
PIF_VALUE: 3
PIF_VALUE: 2
PIF_VALUE: 3
PIF_VALUE: 3
PIF_VALUE: 2
PIF_VALUE: 0
PIF_VALUE: 2
PIF_VALUE: 2
PIF_VALUE: 1
PIF_VALUE: 3
PIF_VALUE: 2
PIF_VALUE: 1
PIF_VALUE: 2
PIF_VALUE: 3
PIF_VALUE: 3
PIF_VALUE: 2
PIF_VALUE: 14
PIF_VALUE: 0
PIF_VALUE: 2
PIF_VALUE: 2

## 2021-11-22 ASSESSMENT — PAIN - FUNCTIONAL ASSESSMENT: PAIN_FUNCTIONAL_ASSESSMENT: 0-10

## 2021-11-22 NOTE — H&P
I have reviewed the patients history and physical and have found no changes. I have reviewed the procedure with the patient and answered all questions and explained the risks and benefits. The operative site was marked. Risks benefits of nartotic use and abuse were discussed as were alternatives to their use. They have consented for the use of narcotics. Because of the type of procedure the patient may need and use narcotics above 30 MED. The patient was counseled at length about the risks of asha Covid-19 in the amanda-operative and post-operative states including the recovery window of their procedure. The patient was made aware that asha Covid-19 after a surgical procedure may worsen their prognosis for recovering from the virus and lend to a higher morbidity and or mortality risk. The patient was given the options of postponing their procedure. All of the risks, benefits, and alternatives were discussed. The patient does wish to proceed with the procedure.

## 2021-11-22 NOTE — PROGRESS NOTES
Patient DCd home,teaching completed. Answered all questions. IV removed. Patient taken to car by wheelchair.

## 2021-11-22 NOTE — ANESTHESIA PRE PROCEDURE
Department of Anesthesiology  Preprocedure Note       Name:  Jaswant Mendoza   Age:  63 y.o.  :  1955                                          MRN:  [id number]         Date:  2021      Surgeon: Laurence Franks):  Molly Espinosa MD    Procedure: Procedure(s):  INCISIONAL BIOPSY LEFT THIGH MASS    Medications prior to admission:   Prior to Admission medications    Not on File       Current medications:    Current Facility-Administered Medications   Medication Dose Route Frequency Provider Last Rate Last Admin    ceFAZolin (ANCEF) 2000 mg in dextrose 5 % 100 mL IVPB  2,000 mg IntraVENous On Call to [address] [place] [address], MD        lactated ringers infusion   IntraVENous Continuous Venkatesh Brownlee MD        sodium chloride flush 0.9 % injection 10 mL  10 mL IntraVENous 2 times per day Venkatesh Brownlee MD        sodium chloride flush 0.9 % injection 10 mL  10 mL IntraVENous PRN Venkatesh Brownlee MD        0.9 % sodium chloride infusion  25 mL IntraVENous PRN Venkatesh Brownlee MD        famotidine (PEPCID) injection 20 mg  20 mg IntraVENous Once Venkatesh Brownlee MD        scopolamine (TRANSDERM-SCOP) transdermal patch 1 patch  1 patch TransDERmal Q72H Torri Wright MD   1 patch at 21 1106       Allergies:  No Known Allergies    Problem List:    Patient Active Problem List   Diagnosis Code    Dyspareunia in female N94.10    Post-menopause atrophic vaginitis N95.2    Serrated adenoma of colon D12.6       Past Medical History:        Diagnosis Date    PONV (postoperative nausea and vomiting)        Past Surgical History:        Procedure Laterality Date    ABDOMEN SURGERY      BREAST BIOPSY  2014     SECTION, LOW TRANSVERSE       &     COLONOSCOPY N/A 3/30/2018    COLONOSCOPY WITH BIOPSY and photos performed by Prema Francisco MD at [facility]  2018    Small polyp upper part of the right colon--sessile serrated adenoma, redundant colon and spasms    LEG SURGERY Left 1979    LLE, TUMOR ON [place]       Social History:    Social History     Tobacco Use    Smoking status: Never Smoker    Smokeless tobacco: Never Used   Substance Use Topics    Alcohol use: Yes     Comment: Socially                                Counseling given: Not Answered      Vital Signs (Current):   Vitals:    11/19/21 1036 11/22/21 1003   BP:  121/75   Pulse:  81   Resp:  16   Temp:  98.1 °F (36.7 °C)   TempSrc:  Temporal   SpO2:  98%   Weight: 140 lb (63.5 kg) 139 lb (63 kg)   Height: 3' (0.914 m) 5' 3\" (1.6 m)                                              BP Readings from Last 3 Encounters:   11/22/21 121/75   11/19/21 120/70   05/11/21 116/70       NPO Status: Time of last liquid consumption: 1700                        Time of last solid consumption: 1700                        Date of last liquid consumption: 11/21/21                        Date of last solid food consumption: 11/21/21    BMI:   Wt Readings from Last 3 Encounters:   11/22/21 139 lb (63 kg)   11/19/21 142 lb (64.4 kg)   05/11/21 144 lb (65.3 kg)     Body mass index is 24.62 kg/m². CBC:   Lab Results   Component Value Date    WBC 8.2 11/19/2021    RBC 4.70 11/19/2021    HGB 13.5 11/19/2021    HCT 42.0 11/19/2021    MCV 89.4 11/19/2021    RDW 12.2 11/19/2021     11/19/2021       CMP:   Lab Results   Component Value Date     11/19/2021    K 4.1 11/19/2021     11/19/2021    CO2 29 11/19/2021    BUN 16 11/19/2021    CREATININE 0.73 11/19/2021    GFRAA >60 11/19/2021    LABGLOM >60 11/19/2021    GLUCOSE 108 11/19/2021    GLUCOSE 95 03/28/2012    CALCIUM 10.1 11/19/2021       POC Tests: No results for input(s): POCGLU, POCNA, POCK, POCCL, POCBUN, POCHEMO, POCHCT in the last 72 hours.     Coags: No results found for: PROTIME, INR, APTT    HCG (If Applicable): No results found for: PREGTESTUR, PREGSERUM, HCG, HCGQUANT     ABGs: No results found for: PHART, PO2ART, TQH2PSL, NOI2XBD, BEART, E1QRFYAM     Type & Screen (If Applicable):  No results found for: LABABO, LABRH    Drug/Infectious Status (If Applicable):  No results found for: HIV, HEPCAB    COVID-19 Screening (If Applicable):   Lab Results   Component Value Date    COVID19 Not Detected 11/19/2021           Anesthesia Evaluation  Patient summary reviewed and Nursing notes reviewed   history of anesthetic complications: PONV. Airway: Mallampati: I  TM distance: >3 FB   Neck ROM: full  Mouth opening: > = 3 FB Dental: normal exam         Pulmonary:Negative Pulmonary ROS and normal exam  breath sounds clear to auscultation                             Cardiovascular:Negative CV ROS            Rhythm: regular  Rate: normal                    Neuro/Psych:   Negative Neuro/Psych ROS              GI/Hepatic/Renal: Neg GI/Hepatic/Renal ROS            Endo/Other: Negative Endo/Other ROS                    Abdominal:             Vascular: negative vascular ROS. Other Findings:             Anesthesia Plan      general     ASA 2       Induction: intravenous. MIPS: Postoperative opioids intended and Prophylactic antiemetics administered. Anesthetic plan and risks discussed with patient. Plan discussed with CRNA.                   Rachel Campoverde MD   11/22/2021

## 2021-11-22 NOTE — OP NOTE
Operative Note      Patient: Garrett Jones  YOB: 1955  MRN: [id number]    Date of Procedure: 11/22/2021    Pre-Op Diagnosis: Mass of left thigh [R22.42]    Post-Op Diagnosis: Same       Procedure(s):  INCISIONAL BIOPSY LEFT THIGH MASS    Surgeon(s):  Lise Hanna MD    Assistant:   Surgical Assistant: Anna Agustin; Eda Maddox  Physician Assistant: SKYLER Garcia    Anesthesia: General    Estimated Blood Loss (mL): Minimal    Complications: None    Specimens:   ID Type Source Tests Collected by Time Destination   A : LEFT [address] [place] [address], MD 11/22/2021 1226        Implants:  * No implants in log *      Drains: * No LDAs found *    Findings: none    Detailed Description of Procedure: Indications for surgery: This is a pleasant female with a left thigh mass presents today for biopsy risk benefits and alternatives were discussed including permanent neurologic injury vascular injury infection DVT or other potential complications were discussed alternatives were discussed and the patient consented    Details of procedure: The patient was taken to the operating placed supine underwent general anesthesia. Left thigh was prepped draped you sterile fashion. A longitudinal incision was made over the mass on the skin tissue through the fascia to the mass. Mass was biopsied frozen return that of a possible malignancy. More tissue was taken for permanent.   The wound was irrigated and closed sterile dressing was applied the patient was taken from the operating to recovery where they arrived in a stable condition    Electronically signed by Lise Hanna MD on 11/22/2021 at 12:52 PM

## 2021-11-22 NOTE — ANESTHESIA POSTPROCEDURE EVALUATION
Department of Anesthesiology  Postprocedure Note    Patient: Jemima Degroot  MRN: [id number]  YOB: 1955  Date of evaluation: 11/22/2021  Time:  4:27 PM     Procedure Summary     Date: 11/22/21 Room / Location: [facility]    Anesthesia Start: 1211 Anesthesia Stop: 1252    Procedure: INCISIONAL BIOPSY LEFT THIGH MASS (Left Thigh) Diagnosis:       Mass of left thigh      (Mass of left thigh [R22.42])    Surgeons: Jennie Shone, MD Responsible Provider: Jessy Rice MD    Anesthesia Type: general ASA Status: 2          Anesthesia Type: general    Conchita Phase I: Conchita Score: 10    Conchita Phase II:      Last vitals: Reviewed and per EMR flowsheets.        Anesthesia Post Evaluation    Patient location during evaluation: PACU  Patient participation: complete - patient participated  Level of consciousness: awake and alert  Pain score: 0  Airway patency: patent  Nausea & Vomiting: no nausea and no vomiting  Complications: no  Cardiovascular status: blood pressure returned to baseline  Respiratory status: acceptable  Hydration status: stable

## 2021-11-29 ENCOUNTER — TELEPHONE (OUTPATIENT)
Dept: PRIMARY CARE CLINIC | Age: 66
End: 2021-11-29

## 2021-11-29 DIAGNOSIS — C49.9 LIPOSARCOMA (HCC): Primary | ICD-10-CM

## 2021-12-03 ENCOUNTER — HOSPITAL ENCOUNTER (OUTPATIENT)
Dept: NUCLEAR MEDICINE | Age: 66
Discharge: HOME | End: 2021-12-05
Payer: COMMERCIAL

## 2021-12-03 ENCOUNTER — TELEPHONE (OUTPATIENT)
Dept: PRIMARY CARE CLINIC | Age: 66
End: 2021-12-03

## 2021-12-03 DIAGNOSIS — C49.9 LIPOSARCOMA (HCC): ICD-10-CM

## 2021-12-03 PROCEDURE — 78306 BONE IMAGING WHOLE BODY: CPT

## 2021-12-03 PROCEDURE — A9503 TC99M MEDRONATE: HCPCS | Performed by: NURSE PRACTITIONER

## 2021-12-03 PROCEDURE — 3430000000 HC RX DIAGNOSTIC RADIOPHARMACEUTICAL: Performed by: NURSE PRACTITIONER

## 2021-12-03 RX ORDER — TC 99M MEDRONATE 20 MG/10ML
25 INJECTION, POWDER, LYOPHILIZED, FOR SOLUTION INTRAVENOUS
Status: COMPLETED | OUTPATIENT
Start: 2021-12-03 | End: 2021-12-03

## 2021-12-03 RX ADMIN — TC 99M MEDRONATE 25 MILLICURIE: 20 INJECTION, POWDER, LYOPHILIZED, FOR SOLUTION INTRAVENOUS at 12:52

## 2021-12-03 NOTE — TELEPHONE ENCOUNTER
----- Message from ANAIS Arechiga CNP sent at 12/3/2021  2:45 PM EST -----  No obvious abnormality noted other than the tumor in the left thigh. Continue follow-up with orthopedics and oncology as scheduled. Thank you.

## 2021-12-07 ENCOUNTER — HOSPITAL ENCOUNTER (OUTPATIENT)
Dept: CT IMAGING | Age: 66
Discharge: HOME | End: 2021-12-09
Payer: COMMERCIAL

## 2021-12-07 DIAGNOSIS — C49.9 MALIGNANT NEOPLASM OF CONNECTIVE AND SOFT TISSUE (HCC): ICD-10-CM

## 2021-12-07 PROCEDURE — 6360000004 HC RX CONTRAST MEDICATION: Performed by: ORTHOPAEDIC SURGERY

## 2021-12-07 PROCEDURE — 74177 CT ABD & PELVIS W/CONTRAST: CPT

## 2021-12-07 RX ADMIN — IOPAMIDOL 75 ML: 755 INJECTION, SOLUTION INTRAVENOUS at 13:14

## 2021-12-14 ENCOUNTER — HOSPITAL ENCOUNTER (OUTPATIENT)
Dept: RADIATION ONCOLOGY | Age: 66
Discharge: HOME | End: 2021-12-14
Payer: COMMERCIAL

## 2021-12-14 VITALS
WEIGHT: 144 LBS | TEMPERATURE: 97.8 F | HEART RATE: 75 BPM | BODY MASS INDEX: 25.52 KG/M2 | RESPIRATION RATE: 14 BRPM | OXYGEN SATURATION: 98 % | HEIGHT: 63 IN | SYSTOLIC BLOOD PRESSURE: 123 MMHG | DIASTOLIC BLOOD PRESSURE: 76 MMHG

## 2021-12-14 DIAGNOSIS — C49.22 LIPOSARCOMA OF THIGH, LEFT (HCC): ICD-10-CM

## 2021-12-14 DIAGNOSIS — C49.22 SOFT TISSUE SARCOMA OF LEFT THIGH (HCC): Primary | ICD-10-CM

## 2021-12-14 PROCEDURE — 99205 OFFICE O/P NEW HI 60 MIN: CPT | Performed by: RADIOLOGY

## 2021-12-14 PROCEDURE — 99213 OFFICE O/P EST LOW 20 MIN: CPT | Performed by: RADIOLOGY

## 2021-12-14 NOTE — PROGRESS NOTES
Referring Physician: Dr Suggs Carlie:    21 0745   BP: 123/76   Pulse: 75   Resp: 14   Temp: 97.8 °F (36.6 °C)   SpO2: 98%    :  Patient Currently in Pain: Denies             Wt Readings from Last 1 Encounters:   21 144 lb (65.3 kg)        Body mass index is 25.51 kg/m². Height: 5' 3\" (160 cm)         Immunizations:    Influenza status:    [x]   Current   []   Patient declined    Pneumococcal status:  []   Current  []   Patient declined  Covid status:   [x]  Dose #1:                     [x]  Dose #2:               []   Patient declined    Smoking Status:    [] Smoker - PPD:   [] Nonsmoker - Quit Date:               [x] Never a smoker      No chief complaint on file. Cancer Staging  No matching staging information was found for the patient. Prior Radiation Therapy? No   If yes, site treated:   Facility:                             Date:    Concurrent Chemo/radiation? No   If yes, start date:    Prior Chemotherapy? No   If yes    Facility:                             Date:    Prior Hormonal Therapy or Contraceptive Medications? Yes   If yes                                Date: Pt unsure of dates but states less than 6 months     Head and Neck Cancer? No   If yes, please remind physician to place swallow study order and speech therapy order. Pacemaker/Defibulator/ICD:  No    Do you have any musculoskeletal diseases, Lupus or arthritic conditions? No   If yes, are you currently taking Methotrexate? []  Yes  [x]  No              No current outpatient medications on file. No current facility-administered medications for this encounter.        Past Medical History:   Diagnosis Date    Cancer ([address]     PONV (postoperative nausea and vomiting)        Past Surgical History:   Procedure Laterality Date    ABDOMEN SURGERY      BREAST BIOPSY  2014     SECTION, LOW TRANSVERSE       &     COLONOSCOPY N/A 3/30/2018    COLONOSCOPY WITH BIOPSY and photos performed by Ulices Murphy MD at [facility]  03/30/2018    Small polyp upper part of the right colon--sessile serrated adenoma, redundant colon and spasms    LEG BIOPSY EXCISION Left 11/22/2021    INCISIONAL BIOPSY LEFT THIGH MASS performed by Venecia Chaparro MD at [facility] Left 1979    LLE, TUMOR ON [place]       Family History   Problem Relation Age of Onset    Cancer Mother         parotid    Thyroid Disease Mother     Other Father         PKD    Hypertension Father     Heart Attack Maternal Grandmother     Heart Attack Maternal Grandfather     Other Paternal Grandmother         PKD    Cancer Maternal Aunt         breast    Cancer Maternal Aunt         pancreas       Social History     Socioeconomic History    Marital status:      Spouse name: Not on file    Number of children: Not on file    Years of education: Not on file    Highest education level: Not on file   Occupational History    Not on file   Tobacco Use    Smoking status: Never Smoker    Smokeless tobacco: Never Used   Vaping Use    Vaping Use: Never used   Substance and Sexual Activity    Alcohol use: Yes     Comment: Socially    Drug use: No    Sexual activity: Not on file   Other Topics Concern    Not on file   Social History Narrative    Not on file     Social Determinants of Health     Financial Resource Strain:     Difficulty of Paying Living Expenses: Not on file   Food Insecurity:     Worried About [address] [employer] in the Last Year: Not on file    Haim of Food in the Last Year: Not on file   Transportation Needs:     Lack of Transportation (Medical): Not on file    Lack of Transportation (Non-Medical):  Not on file   Physical Activity:     Days of Exercise per Week: Not on file    Minutes of Exercise per Session: Not on file   Stress:     Feeling of Stress : Not on file   Social Connections:     Frequency of Communication with Friends and Family: Not on file    Frequency of Social Gatherings with Friends and Family: Not on file    Attends Rastafarian Services: Not on file    Active Member of Clubs or Organizations: Not on file    Attends Club or Organization Meetings: Not on file    Marital Status: Not on file   Intimate Partner Violence:     Fear of Current or Ex-Partner: Not on file    Emotionally Abused: Not on file    Physically Abused: Not on file    Sexually Abused: Not on file   Housing Stability:     Unable to Pay for Housing in the Last Year: Not on file    Number of [place] in the Last Year: Not on file    Unstable Housing in the Last Year: Not on file             FALLS RISK SCREEN  Instructions:  Assess the patient and enter the appropriate indicators that are present for fall risk identification. Total the numbers entered and assign a fall risk score from Table 2.  Reassess patient at a minimum every 12 weeks or with status change. Assessment   Date  12/14/2021     1. Mental Ability: confusion/cognitively impaired 0     2. Elimination Issues: incontinence, frequency 0       3. Ambulatory: use of assistive devices (walker, cane, off-loading devices),        attached to equipment (IV pole, oxygen) 0     4. Sensory Limitations: dizziness, vertigo, impaired vision 0     5. Age less than 65        0     6. Age 72 or greater 0     7. Medication: diuretics, strong analgesics, hypnotics, sedatives,        antihypertensive agents 0   8. Falls:  recent history of falls within the last 3 months (not to include slipping or        tripping) 0   TOTAL 0    If score of 4 or greater was education given? No       TABLE 2   Risk Score Risk Level Plan of Care   0-3 Little or  No Risk 1. Provide assistance as indicated for ambulation activities  2. Reorient confused/cognitively impaired patient  3. Call-light/bell within patient's reach  4. Chair/bed in low position, stretcher/bed with siderails up except when performing patient care activities  5.   Educate patient/family/caregiver on falls prevention  6.  Reassess in 12 weeks or with any noted change in patient condition which places them at a risk for a fall   4-6 Moderate Risk 1. Provide assistance as indicated for ambulation activities  2. Reorient confused/cognitively impaired patient  3. Call-light/bell within patient's reach  4. Chair/bed in low position, stretcher/bed with siderails up except when performing patient care activities  5. Educate patient/family/caregiver on falls prevention     7 or   Higher High Risk 1. Place patient in easily observable treatment room  2. Patient attended at all times by family member or staff  3. Provide assistance as indicated for ambulation activities  4. Reorient confused/cognitively impaired patient  5. Call-light/bell within patient's reach  6. Chair/bed in low position, stretcher/bed with siderails up except when performing patient care activities  7. Educate patient/family/caregiver on falls prevention             Assessment/Plan: Patient was seen today for consultation. Dr Donny Weston notified and examined pt. Pt signed consent, copy was provided.  She will return for teach/DORENE Bell RN 12/14/2021 8:03 AM

## 2021-12-15 PROBLEM — C49.22: Status: ACTIVE | Noted: 2021-12-15

## 2021-12-15 NOTE — CONSULTS
[address]            Radiation Oncology          [address] [facility]          rafaelginny corinne Talbot, [address]        [facility]: [phone]        F: [phone]       [employer] [address] [place] [address]       Radiation Oncology   [address] [facility], [address] [facility]       Bernie Yin: [phone]       F: [phone]       Beam.        12/15/2021    Patient: Brian Luke   YOB: 1955       Dear Dr Arnie James: Thank you for referring Brian Luke to me for evaluation. Below are the relevant portions of my assessment and plan of care. If you have questions, please do not hesitate to call me. I look forward to following this patient along with you. Sincerely,    Electronically signed by Elizabeth Roy MD on 12/15/21 at 1:29 PM EST    CC: Patient Care Team:  ANAIS Moura CNP as PCP - General (Family Nurse Practitioner)  ANAIS Moura CNP as PCP - 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 female with a mass on the left thigh which she noticed a few months ago. Patient felt this mass slowly increase in size at which point she saw her local orthopedic surgeon and was eventually referred to Dr. Arnie James in [place] for a incisional biopsy which was performed on 2021.   Unfortunately the pathology revealed a high-grade liposarcoma in the left thigh. Patient had an MRI of the left femur on 2021 which revealed the tumor to be approximately 8.9 x 8 x 6.8 cm. Patient was seen and also had a bone scan and CT chest abdomen pelvis done which showed no evidence of metastatic disease. Patient was recommended to see us today in radiation oncology for consideration of neoadjuvant treatment prior to surgery to help achieve better surgical margins at the time of resection. Patient denies any other symptoms of headaches, dizziness, chest pain, shortness of breath, abdominal pain,, diarrhea, bony pain, swelling she does note some weakness and limited range of motion in her left leg because of the mass. PRIOR RADIATION HISTORY:  No prior history of radiation therapy     PACEMAKER: None    PAST MEDICAL HISTORY:  Past Medical History:   Diagnosis Date    Cancer ([address]     PONV (postoperative nausea and vomiting)        PAST SURGICAL HISTORY:  Past Surgical History:   Procedure Laterality Date    ABDOMEN SURGERY      BREAST BIOPSY  2014     SECTION, LOW TRANSVERSE       &     COLONOSCOPY N/A 3/30/2018    COLONOSCOPY WITH BIOPSY and photos performed by Ora Dakin, MD at [address] [place] [address]  2018    Small polyp upper part of the right colon--sessile serrated adenoma, redundant colon and spasms    LEG BIOPSY EXCISION Left 2021    INCISIONAL BIOPSY LEFT THIGH MASS performed by Carina Brizuela MD at  [facility] Left     LLE, TUMOR ON SHIN       MEDICATIONS:  No current outpatient medications on file.     ALLERGIES:   No Known Allergies    SOCIAL HISTORY:  Social History     Socioeconomic History    Marital status:      Spouse name: Not on file    Number of children: Not on file    Years of education: Not on file    Highest education level: Not on file   Occupational History    Not on file   Tobacco Use    Smoking status: Never Smoker    Smokeless tobacco: Never Used Vaping Use    Vaping Use: Never used   Substance and Sexual Activity    Alcohol use: Yes     Comment: Socially    Drug use: No    Sexual activity: Not on file   Other Topics Concern    Not on file   Social History Narrative    Not on file     Social Determinants of Health     Financial Resource Strain:     Difficulty of Paying Living Expenses: Not on file   Food Insecurity:     Worried About Running Out of Food in the Last Year: Not on file    Haim of Food in the Last Year: Not on file   Transportation Needs:     Lack of Transportation (Medical): Not on file    Lack of Transportation (Non-Medical):  Not on file   Physical Activity:     Days of Exercise per Week: Not on file    Minutes of Exercise per Session: Not on file   Stress:     Feeling of Stress : Not on file   Social Connections:     Frequency of Communication with Friends and Family: Not on file    Frequency of Social Gatherings with Friends and Family: Not on file    Attends [employer] Services: Not on file    Active Member of [address] [place] [address] or Organizations: Not on file    Attends Club or Organization Meetings: Not on file    Marital Status: Not on file   Intimate Partner Violence:     Fear of Current or Ex-Partner: Not on file    Emotionally Abused: Not on file    Physically Abused: Not on file    Sexually Abused: Not on file   Housing Stability:     Unable to Pay for Housing in the Last Year: Not on file    Number of [place] in the Last Year: Not on file    Unstable Housing in the Last Year: Not on file       FAMILY HISTORY:  Family History   Problem Relation Age of Onset    Cancer Mother         parotid    Thyroid Disease Mother     Other Father         PKD    Hypertension Father     Heart Attack Maternal Grandmother     Heart Attack Maternal Grandfather     Other Paternal Grandmother         PKD    Cancer Maternal Aunt         breast    Cancer Maternal Aunt         pancreas       REVIEW OF SYSTEMS: GENERAL/CONSTITUTIONAL: The patient denies fever, fatigue, weakness, weight gain or weight loss. HEENT: The patient denies pain, redness, loss of vision, double or blurred vision. The patient denies ringing in the ears, loss of hearing, hoarseness, trouble swallowing, or painful swallowing. CARDIOVASCULAR: The patient denies chest pain, irregular heartbeats, sudden changes in heartbeat or palpitation. RESPIRATORY: The patient denies shortness of breath, cough, hemoptysis. GASTROINTESTINAL: The patient denies nausea, vomiting, diarrhea, constipation, blood in the stools. GENITOURINARY: The patient denies difficult urination, pain or burning with urination, blood in the urine. MUSCULOSKELETAL: (+) L leg ROM limited by mass. The patient denies arm, buttock, thigh or calf cramps. No joint or muscle pain. SKIN: The patient denies easy bruising, skin redness, skin rash, hives. NEUROLOGIC: The patient denies headache, dizziness, fainting, muscle spasm, loss of consciousness. ENDOCRINE: The patient denies intolerance to hot or cold temperature, flushing. HEMATOLOGIC/LYMPHATIC: The patient denies anemia, bleeding tendency or clotting tendency. ALLERGIC/IMMUNOLOGIC: The patient denies rhinitis, asthma, skin sensitivity. PYSCHOLOGIC: The patient denies any depression, anxiety, or confusion. A full 14 point review of systems was performed and assessed and found to be negative except as noted above. PHYSICAL EXAMINATION:    CHAPERONE: Family/friend/companieon Present    ECO Symptomatic but completely ambulatory    VITAL SIGNS: /76   Pulse 75   Temp 97.8 °F (36.6 °C)   Resp 14   Ht 5' 3\" (1.6 m)   Wt 144 lb (65.3 kg)   LMP 2010 (Approximate)   SpO2 98%   BMI 25.51 kg/m²   GENERAL:  General appearance is that of a well-nourished, well-developed in no apparent distress. HEENT: Normocephalic, atraumatic, EOMI, moist mucosa, no erythema.    NECK:  No adenopathy or a palpable thyroid mass, trachea is midline. HEART:  Normal rate and regular rhythm, normal heart sounds. LUNGS:  Pulmonary effort normal. Normal breath sounds. ABDOMEN:  Soft, nontender, non distended. EXTREMITIES: (+) Large L anterior thigh mass ~10cm. No edema. No calf tenderness. MSK:  No spinal tenderness. Normal ROM. NEUROLOGICAL: Alert and oriented. Strength and sensation intact bilaterally. No focal deficits. PSYCH: Mood normal, behavior normal.  SKIN: No erythema, no desquamation. LABS:  WBC   Date Value Ref Range Status   2021 8.2 3.5 - 11.3 k/uL Final     Hemoglobin   Date Value Ref Range Status   2021 13.5 11.9 - 15.1 g/dL Final     Platelets   Date Value Ref Range Status   2021 361 138 - 453 k/uL Final     No results found for: , CEA  No results found for:   No results found for: PSA      IMAGIN/9/21 MRI L Femur  Impression   1. Large heterogeneously enhancing intramuscular mass lesion in the left   vastus lateralis, left rectus femoris and left vastus intermedius muscle   measuring 8.9 x 8.0 x 6.8 cm in greatest dimensions with edema throughout the   quadriceps musculature.  Mild edema in the soft tissues along the anterior   aspect of the left knee and about the thigh.  No discrete organized fluid   collection.  Findings are highly concerning for soft tissue malignancy with   differential considerations including synovial sarcoma and malignant fibrous   histiocytoma.  Consultation with orthopedic oncologist is recommended prior   to any intervention. 2. Indeterminate 8 mm lesion in the mid/distal femoral diaphysis which could   represent a previously seen bone island from left femur radiographs of   2009.  Metastatic lesion not entirely excluded on the basis of this   study.  Further evaluation with nuclear medicine whole-body bone scan is   recommended. 3. No acute osseous abnormality.        PATHOLOGY:  21 L Thigh Biopsy  FINAL DIAGNOSIS:     Left thigh mass, incisional biopsy:      - High grade liposarcoma.  See comment. COMMENT:  The specimen consists of sheets of malignant epithelioid cells   with low nuclear to cytoplasmic ratios, severe nuclear pleomorphism,   atypical mitotic figures and lipoblasts.  Tumoral necrosis is evident. The morphologic features are consistent with the diagnosis of high grade   liposarcoma with the differential diagnosis including pleomorphic   liposarcoma and dedifferentiated liposarcoma.  A second pathologist has   reviewed this case and agrees with the above diagnosis.  FISH analysis   for MDM2 amplification is ordered with the interpretation and definitive   classification to be issued in a separate report.            ROCJO/MARTINEZ     ASSESSMENT AND PLAN:   Baldemar Cunningham is a 61 y.o. female with a stage III T2 N0 M0 high grade liposarcoma of the left thigh. We reviewed with the patient and family the testing that has been done so far, including imaging and pathology results. We also reviewed their staging based on the testing that as been done and the recommendations per the NCCN guidelines. We discussed the options of treatment, including surgery, chemotherapy, and radiation, and the rationale of why radiation therapy would be indicated for this diagnosis. We also discussed that they have the option to not pursue our recommended treatment as well. We reviewed the recommendation for neoadjuvant radiation therapy to help allow for more oncologically thorough resection with adequate margins given the location of the tumor. We reviewed the logistics of radiation treatment planning, including a CT Simulation session, as well as daily treatments for approximately 5 weeks.     With regards to radiation to the extremity, I discussed the possible short-term side effects discussed included skin irritation (causing redness, dryness, or peeling), tiredness, low blood counts (causing infection or bleeding) and swelling of the extremity. Possible long-term side effects discussed included hyperpigmentation of the skin, damage to the bone (causing fracture), damage to the nerves (causing numbness or weakness), and swelling of the extremity. After ample time to review the patient's questions, an informed consent was obtained to proceed with scheduling a treatment planning session for radiation therapy. Patient will also be referred for lymphedema and physical therapy to help with her range of motion as well as prevent edema. Patient was in agreement with my recommendations. All questions were answered to their satisfaction. Patient was advised to contact us anytime should they have any questions or concerns. Electronically signed by Cedric Baumgarten, MD on 12/15/21 at 1:29 PM EST      Drugs Prescribed:  New Prescriptions    No medications on file       Orders Placed:     Orders Placed This Encounter   Procedures   [address] [place]   [address] [facility]         CC:  Patient Care Team:  ANAIS Myers CNP as PCP - General (Family Nurse Practitioner)  ANAIS Myers CNP as PCP - [facility] Empaneled Provider         Total time spent on this case on the day of encounter is more than 60 minutes. This time includes combination of one or more of the following - review of necessary tests, review of pertinent medical records from the EMR, performing medically appropriate examination and evaluation, counseling and educating the patient/family/caregiver, ordering necessary medical tests, procedures etc., documenting the clinical information in the electronic medical record, care coordination, referring and communicating with other health care providers and interpretation of results independently.  This note is created with the assistance of a speech recognition program.  While intending to generate a document that actually reflects the content of the visit, the document can still have some errors including those of syntax and sound a like substitutions which may escape proof reading. It such instances, actual meaning can be extrapolated by contextual diversion.

## 2021-12-20 ENCOUNTER — HOSPITAL ENCOUNTER (OUTPATIENT)
Dept: RADIATION ONCOLOGY | Age: 66
Discharge: HOME | End: 2021-12-20
Payer: COMMERCIAL

## 2021-12-20 ENCOUNTER — TELEPHONE (OUTPATIENT)
Dept: INFUSION THERAPY | Age: 66
End: 2021-12-20

## 2021-12-20 PROCEDURE — 77263 THER RADIOLOGY TX PLNG CPLX: CPT | Performed by: RADIOLOGY

## 2021-12-20 PROCEDURE — 77334 RADIATION TREATMENT AID(S): CPT | Performed by: RADIOLOGY

## 2021-12-20 RX ORDER — CYCLOBENZAPRINE HCL 5 MG
5 TABLET ORAL NIGHTLY PRN
Qty: 7 TABLET | Refills: 0 | Status: SHIPPED | OUTPATIENT
Start: 2021-12-20 | End: 2021-12-27

## 2021-12-20 NOTE — TELEPHONE ENCOUNTER
[facility]    PG-SGA screening tool reviewed with score of 10. Pt reports unintentional weight loss, no appetite, leg pain, consuming less than normal amounts of food, and not feeling her normal self but able to be up and about with fairly normal activity. Full nutrition assessment for scores > 4. Will follow up for full nutrition assessment.     MAKAYLA Cai, RD, LD  Registered Dietitian  [facility] [place]  [phone]

## 2021-12-23 ENCOUNTER — HOSPITAL ENCOUNTER (OUTPATIENT)
Dept: RADIATION ONCOLOGY | Age: 66
Discharge: HOME | End: 2021-12-23
Payer: COMMERCIAL

## 2021-12-23 PROCEDURE — 77300 RADIATION THERAPY DOSE PLAN: CPT | Performed by: RADIOLOGY

## 2021-12-23 PROCEDURE — 77301 RADIOTHERAPY DOSE PLAN IMRT: CPT | Performed by: RADIOLOGY

## 2021-12-23 PROCEDURE — 77336 RADIATION PHYSICS CONSULT: CPT | Performed by: RADIOLOGY

## 2021-12-23 PROCEDURE — 77338 DESIGN MLC DEVICE FOR IMRT: CPT | Performed by: RADIOLOGY

## 2021-12-30 ENCOUNTER — TELEPHONE (OUTPATIENT)
Dept: ONCOLOGY | Age: 66
End: 2021-12-30

## 2021-12-30 NOTE — TELEPHONE ENCOUNTER
NUTRITION NOTE    Called pt on listed phone number r/t pgsga score 10. Pt reports wt loss, poor appetite have resolved after acceptance of diagnosis and now having a treatment plan in place. No nutrition-related concerns or questions at this time. Pt appreciative of phone call. Will follow as requested.     MAKAYLA Inman, RD, LD  Registered Dietitian  [facility] [place]  [phone]

## 2022-01-05 ENCOUNTER — HOSPITAL ENCOUNTER (OUTPATIENT)
Dept: RADIATION ONCOLOGY | Age: 67
Discharge: HOME | End: 2022-01-05
Payer: COMMERCIAL

## 2022-01-05 PROCEDURE — 77014 PR CT GUIDANCE PLACEMENT RAD THERAPY FIELDS: CPT | Performed by: RADIOLOGY

## 2022-01-05 PROCEDURE — 77336 RADIATION PHYSICS CONSULT: CPT | Performed by: RADIOLOGY

## 2022-01-05 PROCEDURE — 77386 HC NTSTY MODUL RAD TX DLVR CPLX: CPT | Performed by: RADIOLOGY

## 2022-01-06 ENCOUNTER — HOSPITAL ENCOUNTER (OUTPATIENT)
Dept: RADIATION ONCOLOGY | Age: 67
Discharge: HOME | End: 2022-01-06
Payer: COMMERCIAL

## 2022-01-06 PROCEDURE — 77386 HC NTSTY MODUL RAD TX DLVR CPLX: CPT | Performed by: RADIOLOGY

## 2022-01-06 PROCEDURE — 77014 PR CT GUIDANCE PLACEMENT RAD THERAPY FIELDS: CPT | Performed by: RADIOLOGY

## 2022-01-07 ENCOUNTER — HOSPITAL ENCOUNTER (OUTPATIENT)
Dept: RADIATION ONCOLOGY | Age: 67
Discharge: HOME | End: 2022-01-07
Attending: RADIOLOGY
Payer: COMMERCIAL

## 2022-01-07 PROCEDURE — 77014 PR CT GUIDANCE PLACEMENT RAD THERAPY FIELDS: CPT | Performed by: RADIOLOGY

## 2022-01-07 PROCEDURE — 77386 HC NTSTY MODUL RAD TX DLVR CPLX: CPT | Performed by: RADIOLOGY

## 2022-01-10 ENCOUNTER — HOSPITAL ENCOUNTER (OUTPATIENT)
Dept: PHYSICAL THERAPY | Age: 67
Setting detail: THERAPIES SERIES
Discharge: HOME | End: 2022-01-10
Payer: COMMERCIAL

## 2022-01-10 ENCOUNTER — HOSPITAL ENCOUNTER (OUTPATIENT)
Dept: RADIATION ONCOLOGY | Age: 67
Discharge: HOME | End: 2022-01-10
Attending: RADIOLOGY
Payer: COMMERCIAL

## 2022-01-10 PROCEDURE — 97140 MANUAL THERAPY 1/> REGIONS: CPT

## 2022-01-10 PROCEDURE — 77386 HC NTSTY MODUL RAD TX DLVR CPLX: CPT | Performed by: RADIOLOGY

## 2022-01-10 PROCEDURE — 97162 PT EVAL MOD COMPLEX 30 MIN: CPT

## 2022-01-10 PROCEDURE — 77014 PR CT GUIDANCE PLACEMENT RAD THERAPY FIELDS: CPT | Performed by: RADIOLOGY

## 2022-01-10 ASSESSMENT — PAIN SCALES - GENERAL: PAINLEVEL_OUTOF10: 4

## 2022-01-11 ENCOUNTER — HOSPITAL ENCOUNTER (OUTPATIENT)
Dept: RADIATION ONCOLOGY | Age: 67
Discharge: HOME | End: 2022-01-11
Attending: RADIOLOGY
Payer: COMMERCIAL

## 2022-01-11 PROCEDURE — 77014 PR CT GUIDANCE PLACEMENT RAD THERAPY FIELDS: CPT | Performed by: RADIOLOGY

## 2022-01-11 PROCEDURE — 77386 HC NTSTY MODUL RAD TX DLVR CPLX: CPT | Performed by: RADIOLOGY

## 2022-01-12 ENCOUNTER — HOSPITAL ENCOUNTER (OUTPATIENT)
Dept: RADIATION ONCOLOGY | Age: 67
Discharge: HOME | End: 2022-01-12
Payer: COMMERCIAL

## 2022-01-12 ENCOUNTER — HOSPITAL ENCOUNTER (OUTPATIENT)
Dept: RADIATION ONCOLOGY | Age: 67
Discharge: HOME | End: 2022-01-12
Attending: RADIOLOGY
Payer: COMMERCIAL

## 2022-01-12 VITALS
DIASTOLIC BLOOD PRESSURE: 68 MMHG | TEMPERATURE: 98.8 F | OXYGEN SATURATION: 97 % | SYSTOLIC BLOOD PRESSURE: 117 MMHG | WEIGHT: 145 LBS | RESPIRATION RATE: 14 BRPM | HEART RATE: 77 BPM | BODY MASS INDEX: 25.69 KG/M2

## 2022-01-12 PROCEDURE — 77336 RADIATION PHYSICS CONSULT: CPT | Performed by: RADIOLOGY

## 2022-01-12 PROCEDURE — 77386 HC NTSTY MODUL RAD TX DLVR CPLX: CPT | Performed by: RADIOLOGY

## 2022-01-12 PROCEDURE — 77014 PR CT GUIDANCE PLACEMENT RAD THERAPY FIELDS: CPT | Performed by: RADIOLOGY

## 2022-01-12 PROCEDURE — 77427 RADIATION TX MANAGEMENT X5: CPT | Performed by: RADIOLOGY

## 2022-01-12 NOTE — PROGRESS NOTES
Arun Powers  1/12/2022  Wt Readings from Last 3 Encounters:   01/12/22 145 lb (65.8 kg)   12/14/21 144 lb (65.3 kg)   11/22/21 139 lb (63 kg)     Body mass index is 25.69 kg/m². Treatment Area: thigh     Patient was seen today for weekly visit. Comfort Alteration  Fatigue: None      Nutritional Alteration  Anorexia: No   Nausea: No   Vomiting: No       Elimination Alterations  Constipation: no  Diarrhea:  no    Skin Alteration   Sensation:intact     Radiation Dermatitis:  Intact [x]     Erythema  []     Discoloration  []     Rash []     Dry desquamation  []     Moist desquamation []       Emotional  Coping: effective      Injury, potential bleeding or infection: Skin care reviewed     Lab Results   Component Value Date    WBC 8.2 11/19/2021     11/19/2021         /68   Pulse 77   Temp 98.8 °F (37.1 °C)   Resp 14   Wt 145 lb (65.8 kg)   LMP 01/01/2010 (Approximate)   SpO2 97%   BMI 25.69 kg/m²   Patient Currently in Pain: Denies               Assessment/Plan: Patient was seen today for weekly visit. Dr Tyrone Carroll notified and examined pt.        Adrian Brandon, RN

## 2022-01-12 NOTE — PROGRESS NOTES
[facility] Located within [facility]       Radiation Oncology          [address] [facility], [address]        [place]: [phone]        F: [phone]       [facility] [address] [facility]       Radiation Oncology   [address] [facility], [address] [facility]       [place]: [phone]       F: [phone]       [email or web address]          RADIATION ONCOLOGY WEEKLY PROGRESS NOTE  Patient ID:   Juan Carlos Arora  : 1955   MRN: [id number]    Location:  [facility] Radiation Oncology,   [facility], [facility]   [phone]     DIAGNOSIS:  Left anterior distal thigh liposarcoma     TREATMENT DETAILS:  Treatment Site: L thigh tumor  Actual Dose: 1200cGy  Total Planned Dose: 5000cGy  Treatment Technique: IMRT  Fraction Technique: Daily  Therapy imaging monitoring: CBCT daily  Concurrent Chemotherapy: NA    SUBJECTIVE:   Patient seen for their weekly on treatment evaluation today. Patient feels heaviness in her thigh and notes its more stiff to move after she is either rested or has been sleeping. She notes no during the day when she is up and moving it is less difficult to bend. She did meet with physical therapy who also recommended some exercises and will also be working through her lymphedema exercises as well. OBJECTIVE:   CHAPERONE: Not Required    ECO Symptomatic but completely ambulatory    VITAL SIGNS: /68   Pulse 77   Temp 98.8 °F (37.1 °C)   Resp 14   Wt 145 lb (65.8 kg)   LMP 2010 (Approximate)   SpO2 97%   BMI 25.69 kg/m²   Wt Readings from Last 5 Encounters:   22 145 lb (65.8 kg)   21 144 lb (65.3 kg)   21 139 lb (63 kg)   21 142 lb (64.4 kg)   21 144 lb (65.3 kg)     GENERAL:  General appearance is that of a well-nourished, well-developed in no apparent distress. EXTREMITIES:  (+)L Thigh mass. NEUROLOGICAL: Alert and oriented.  Strength and sensation intact bilaterally. No focal deficits. PSYCH: Mood normal, behavior normal.  SKIN: No erythema, no desquamation. LABS:  WBC   Date Value Ref Range Status   11/19/2021 8.2 3.5 - 11.3 k/uL Final     Hemoglobin   Date Value Ref Range Status   11/19/2021 13.5 11.9 - 15.1 g/dL Final     Platelets   Date Value Ref Range Status   11/19/2021 361 138 - 453 k/uL Final     CREATININE   Date Value Ref Range Status   11/19/2021 0.73 0.50 - 0.90 mg/dL Final   11/08/2021 0.70 0.50 - 0.90 mg/dL Final   03/02/2020 0.82 0.50 - 0.90 mg/dL Final     No results found for: , CEA  No results found for: PSA    MEDICATIONS:  No current outpatient medications on file. ASSESSMENT PLAN:     Treatment setup and plan reviewed. Port images/CBCT images reviewed. Appropriate laboratory work was reviewed. Treatment side effects and toxicities reviewed with the patient, and appropriate management was advised. Will continue radiation treatment as planned, and recommend patient contact us if they have any questions or concerns. Recommend continue using skin care as directed and to facilitate compression stockings and massaging to help alleviate lymphedema. Electronically signed by Samara Berger MD on 1/12/2022 at 4:21 PM      Drugs Prescribed:  New Prescriptions    No medications on file       Other Orders Placed:  No orders of the defined types were placed in this encounter.

## 2022-01-13 ENCOUNTER — TELEPHONE (OUTPATIENT)
Dept: SURGERY | Age: 67
End: 2022-01-13

## 2022-01-13 ENCOUNTER — HOSPITAL ENCOUNTER (OUTPATIENT)
Dept: RADIATION ONCOLOGY | Age: 67
Discharge: HOME | End: 2022-01-13
Attending: RADIOLOGY
Payer: COMMERCIAL

## 2022-01-13 PROCEDURE — 77386 HC NTSTY MODUL RAD TX DLVR CPLX: CPT | Performed by: RADIOLOGY

## 2022-01-13 PROCEDURE — 77014 PR CT GUIDANCE PLACEMENT RAD THERAPY FIELDS: CPT | Performed by: RADIOLOGY

## 2022-01-13 NOTE — TELEPHONE ENCOUNTER
Dr. Daniel Alvarez called and states we will need to see the patient as this will be a co-case. Patient is undergoing treatments right now and states she will be doing this once treatments are done. Scheduled appt. Patient will call Dr. Gordo Mirza office to see if this coordinates with his schedule. Patient lives three hours away and we want to be able to see her all in one day.

## 2022-01-14 ENCOUNTER — HOSPITAL ENCOUNTER (OUTPATIENT)
Dept: RADIATION ONCOLOGY | Age: 67
Discharge: HOME | End: 2022-01-14
Attending: RADIOLOGY
Payer: COMMERCIAL

## 2022-01-14 PROCEDURE — 77014 PR CT GUIDANCE PLACEMENT RAD THERAPY FIELDS: CPT | Performed by: RADIOLOGY

## 2022-01-14 PROCEDURE — 77386 HC NTSTY MODUL RAD TX DLVR CPLX: CPT | Performed by: RADIOLOGY

## 2022-01-14 NOTE — TELEPHONE ENCOUNTER
Returned Patient call. Patient is scheduled on 2/16/22 with us. Dr Gera Samuel is in office on Mondays and Wednesday. Dr Santa Mata had agreed to see patient on a day we were in clinic. Holding note for Nurse Jung Flowers to call DR Rodas's office Monday.

## 2022-01-14 NOTE — TELEPHONE ENCOUNTER
Pt called to discuss other possible dates for appointment. Dr. Erlin Alegria is not available for current appointment. Please call patient.

## 2022-01-17 ENCOUNTER — HOSPITAL ENCOUNTER (OUTPATIENT)
Dept: RADIATION ONCOLOGY | Age: 67
Discharge: HOME | End: 2022-01-17
Attending: RADIOLOGY
Payer: COMMERCIAL

## 2022-01-17 ENCOUNTER — HOSPITAL ENCOUNTER (OUTPATIENT)
Dept: PHYSICAL THERAPY | Age: 67
Setting detail: THERAPIES SERIES
Discharge: HOME | End: 2022-01-17
Payer: COMMERCIAL

## 2022-01-17 PROCEDURE — 77386 HC NTSTY MODUL RAD TX DLVR CPLX: CPT | Performed by: RADIOLOGY

## 2022-01-17 PROCEDURE — 97140 MANUAL THERAPY 1/> REGIONS: CPT

## 2022-01-17 PROCEDURE — 97110 THERAPEUTIC EXERCISES: CPT

## 2022-01-17 PROCEDURE — 77014 PR CT GUIDANCE PLACEMENT RAD THERAPY FIELDS: CPT | Performed by: RADIOLOGY

## 2022-01-18 ENCOUNTER — HOSPITAL ENCOUNTER (OUTPATIENT)
Dept: RADIATION ONCOLOGY | Age: 67
Discharge: HOME | End: 2022-01-18
Attending: RADIOLOGY
Payer: COMMERCIAL

## 2022-01-18 PROCEDURE — 77386 HC NTSTY MODUL RAD TX DLVR CPLX: CPT | Performed by: RADIOLOGY

## 2022-01-18 PROCEDURE — 77014 PR CT GUIDANCE PLACEMENT RAD THERAPY FIELDS: CPT | Performed by: RADIOLOGY

## 2022-01-18 NOTE — TELEPHONE ENCOUNTER
Spoke with Dr. Emil Quiroz office. They can see patient on 2/22/21 at 1. We will see that patient on Tuesday as well at 1200. I will call and inform patient.

## 2022-01-18 NOTE — PLAN OF CARE
[facility]           Phone: [phone]             Outpatient Physical Therapy  Fax: [phone]                                           Date: 1/10/2022  Patient: Saeed Trinidad : 1955 [facility] #: [id number]   Referring Practitioner:  Dr. Kendall Marie Referral Date:  21       [x] Plan of Care   [] Updated Plan of Care    Dates of Service to Include: 1/10/2022 to 22    Diagnosis:  L thigh soft tissue sarcome    Rehab (Treatment) Diagnosis:  L LE lymphedema, difficulty with ambulation             Onset Date:  21    Attendance  Total # of Visits to Date: 1        Assessment  Assessment: Pt is a 61year old female that presents with L sided connective tissue cancer affect her L knee and L distal thigh. Pt presents with increased girth measurements and lymphedema, pain, decreased ambulation tolerance with antalgic gait, reduced L knee flexion andstrength. Pt will benefit from skilled PT in order to address these deficits.       Goals  Short term goals  Time Frame for Short term goals: 3 weeks  Short term goal 1: Pt will be educated on her POC and HEP-met  Short term goal 2: Pt will initiate MLD to L LE  and slight compression as allowed by MD  Long term goals  Time Frame for Long term goals : 6 weeks  Long term goal 1: Pt will be safe and independent with her HEP  Long term goal 2: Pt will increase L knee flexion to >115 degress in order to decrease difficulty with perform steps and normalizing gait  Long term goal 3: Pt will be fitted for compression when approved by onocologist  Long term goal 4: Pt will demonstrate a decreased in knee/distal thigh girth size by 2-3cm in order to increase ambulation tolerance     Prognosis  Prognosis: Good    Treatment Plan   Times per week: 1-2 x/wk  Plan weeks: 4-6 weeks  [] HP/CP      [] Electrical Stim   [x] Therapeutic Exercise      [x] Gait Training  [] Aquatics [] Ultrasound         [x] Patient Education/HEP   [x] Manual Therapy  [] Traction    [] Neuro-sadie        [x] Soft Tissue Mobs            [] Home TENS  [] Iontophoresis    [] Orthotic casting/fitting      [x] lymphedema            Electronically signed by: Beronica Curtis PT, DPT    Date: 1/10/2022      ______________________________________ Date: 1/10/2022   Physician Signature

## 2022-01-18 NOTE — PROGRESS NOTES
Phone: [facility]          Fax: [phone]                      Outpatient Physical Therapy                                                                      Evaluation  Date: 1/10/2022  Patient: Miguel Paulino  : 1955  CSN #: [id number]  Referring Practitioner: Dr. Quang Disla    Referral Date : 21     Medical Diagnosis: L thigh soft tissue sarcome    Treatment Diagnosis: L LE lymphedema, difficulty with ambulation  Onset Date: 21  PT Insurance Information: Medical [place]  Total # of Visits Approved: 12   Total # of Visits to Date: 1     Subjective  Subjective: Pt stated she started with swelling above her knee, pt had a biopsy done and came back cancerous on . Pt has recieved 3 radiation treatments. Pt reports increased thigh swelling along with L knee stiffness. Pain Screening  Patient Currently in Pain: Yes  Pain Assessment  Pain Assessment: 0-10  Pain Level: 4          Objective     Observation/Palpation  Observation: antalgic gait noted  Edema: R LE midpatella 36cm, 3\" above 44.3cm, 6\" above 52.5cm L LE patella 37.3cm, 3\" above 49.6cm, 6\" above 52.9cm    LLE General AROM: L knee flexion 104                Strength LLE  Comment: L knee flex 4+/5, ext 4/5           Exercises:  Exercise 1: HEP educated on heel slides, quad sets, SAQ, and gentle massage, keep legs elevated    Manual:  MLD to L knee/distal thigh     Functional Outcome Measures              Any of your usual work, housework, or school activities: Moderate Difficulty  Your usual hobbies, recreational, or sporting activities: Quite a Bit of Difficulty  Getting into or out of the bath: Moderate Difficulty  Walking between rooms: Moderate Difficulty  Putting on your shoes or socks:  Moderate Difficulty  Squatting: Quite a Bit of Difficulty  Lifting an object, like a bag of groceries from the floor: A Little Bit of Difficulty  Performing light activities around your home: A Little Bit of Difficulty  Performing heavy activities around your home: Moderate Difficulty  Getting into or out of a car: A Little Bit of Difficulty  Walking 2 blocks: Moderate Difficulty  Walking a mile: Quite a Bit of Difficulty  Going up or down 10 stairs (about 1 flight of stairs): Moderate Difficulty  Standing for 1 hour: Moderate Difficulty  Sitting for 1 hour: A Little Bit of Difficulty  Running on even ground : Extreme Difficulty or Unable to Perform Activity  Running on uneven ground : Extreme Difficulty or Unable to Perform Activity  Making sharp turns while running fast : Extreme Difficulty or Unable to Perform Activity  Hopping: Extreme Difficulty or Unable to Perform Activity  Rolling over in bed: No Difficulty  LEFS Total Score: 35          Assessment  Assessment: Pt is a 61year old female that presents with L sided connective tissue cancer affect her L knee and L distal thigh. Pt presents with increased girth measurements and lymphedema, pain, decreased ambulation tolerance with antalgic gait, reduced L knee flexion andstrength. Pt will benefit from skilled PT in order to address these deficits. Prognosis: Good        Decision Making: Medium Complexity    Patient Education  Patient Education: pt educated on her POC and HEP  Pt verbalized/demonstrated good understanding:     [X] Yes         [] No, pt required further clarification.       Goals  Short term goals  Time Frame for Short term goals: 3 weeks  Short term goal 1: Pt will be educated on her POC and HEP-met  Short term goal 2: Pt will initiate MLD to L LE  and slight compression as allowed by MD    Long term goals  Time Frame for Long term goals : 6 weeks  Long term goal 1: Pt will be safe and independent with her HEP  Long term goal 2: Pt will increase L knee flexion to >115 degress in order to decrease difficulty with perform steps and normalizing gait  Long term goal 3: Pt will be fitted for compression when approved by onocologist  Anurag term goal 4: Pt will demonstrate a decreased in knee/distal thigh girth size by 2-3cm in order to increase ambulation tolerance      Patient goals : \"to decreased L knee/thigh swelling and pain\"        Minutes Tracking:  Time In: 0282  Time Out: Danna Camp 65 22  Minutes: 45  Timed Code Treatment Minutes: Siena Leal 36, PT, DPT    1/10/2022

## 2022-01-18 NOTE — PROGRESS NOTES
Phone: Brian           Fax: [phone]                           Outpatient Physical Therapy                                                                            Daily Note    Patient: Mike Sutton : 1955  CSN #: [id number]   Referring Practitioner:  Dr. Finn Shirley    Referral Date : 21     Date: 2022    Diagnosis: L thigh soft tissue sarcome  Treatment Diagnosis: L LE lymphedema, difficulty with ambulation    Onset Date: 21  PT Insurance Information: Medical [place]  Total # of Visits Approved: 12 Per Physician Order  Total # of Visits to Date: 2      Pre-Treatment Pain:  4-5/10  Subjective: Pt reported she saw who doctor who stated she could wear compression just not directly over the cancerous site. Pt states she has been doing her exercises but her L knee and thigh feel very swollen    Exercises:  Exercise 1: HEP educated on heel slides, quad sets, SAQ, and gentle massage, keep legs elevated  Exercise 2: bike 5 min  Exercise 3: stair stretch 3x20\"  Exercise 4: heel slides 10x10\"  Exercise 5: SAQx10  Exercise 6: quad set 10x5\"    Manual:  Soft Tissue Mobalization: MLD to L LE        Assessment  Assessment: Pt tolerated new exercises well with no increase in discomfort. PT initiate MLD to L LE and pt had good response. Pt given tubigrip for compression this date      Patient Education  Patient Education: new exercises and compression  Pt verbalized/demonstrated good understanding:     [x] Yes         [] No, pt required further clarification.        Post Treatment Pain:  3/10      Plan  Times per week: 1-2 x/wk  Plan weeks: 4-6 weeks      Goals  (Total # of Visits to Date: 2)      Short term goals  Time Frame for Short term goals: 3 weeks  Short term goal 1: Pt will be educated on her POC and HEP-met  Short term goal 2: Pt will initiate MLD to L LE  and slight compression as allowed by MD    Long term goals  Time Frame for Long term goals : 6 weeks  Long term goal 1: Pt will be safe and independent with her HEP  Long term goal 2: Pt will increase L knee flexion to >115 degress in order to decrease difficulty with perform steps and normalizing gait  Long term goal 3: Pt will be fitted for compression when approved by onocologist  Long term goal 4: Pt will demonstrate a decreased in knee/distal thigh girth size by 2-3cm in order to increase ambulation tolerance    Minutes Tracking:  Time In: 2820  Time Out: 3645  Minutes: 47  Timed Code Treatment Minutes: Tonio Vergara PT DPT     Date: 1/17/2022

## 2022-01-19 ENCOUNTER — HOSPITAL ENCOUNTER (OUTPATIENT)
Dept: RADIATION ONCOLOGY | Age: 67
Discharge: HOME | End: 2022-01-19
Payer: COMMERCIAL

## 2022-01-19 ENCOUNTER — HOSPITAL ENCOUNTER (OUTPATIENT)
Dept: RADIATION ONCOLOGY | Age: 67
Discharge: HOME | End: 2022-01-19
Attending: RADIOLOGY
Payer: COMMERCIAL

## 2022-01-19 VITALS
SYSTOLIC BLOOD PRESSURE: 117 MMHG | OXYGEN SATURATION: 99 % | WEIGHT: 138 LBS | RESPIRATION RATE: 18 BRPM | DIASTOLIC BLOOD PRESSURE: 74 MMHG | HEART RATE: 74 BPM | TEMPERATURE: 97.8 F | BODY MASS INDEX: 24.45 KG/M2

## 2022-01-19 PROCEDURE — 77427 RADIATION TX MANAGEMENT X5: CPT | Performed by: RADIOLOGY

## 2022-01-19 PROCEDURE — 77014 PR CT GUIDANCE PLACEMENT RAD THERAPY FIELDS: CPT | Performed by: RADIOLOGY

## 2022-01-19 PROCEDURE — 77386 HC NTSTY MODUL RAD TX DLVR CPLX: CPT | Performed by: RADIOLOGY

## 2022-01-19 ASSESSMENT — PAIN DESCRIPTION - ORIENTATION: ORIENTATION: LEFT

## 2022-01-19 ASSESSMENT — PAIN DESCRIPTION - LOCATION: LOCATION: LEG

## 2022-01-19 ASSESSMENT — PAIN SCALES - GENERAL: PAINLEVEL_OUTOF10: 2

## 2022-01-19 NOTE — PROGRESS NOTES
Issac Ferrarajennifer  1/19/2022  Wt Readings from Last 3 Encounters:   01/19/22 138 lb (62.6 kg)   01/12/22 145 lb (65.8 kg)   12/14/21 144 lb (65.3 kg)     Body mass index is 24.45 kg/m². Treatment Area: L thigh mass     Patient was seen today for weekly visit. Comfort Alteration  Fatigue: None      Nutritional Alteration  Anorexia: No   Nausea: No   Vomiting: No       Elimination Alterations  Constipation: no  Diarrhea:  no    Skin Alteration   Sensation:intact     Radiation Dermatitis:  Intact [x]     Erythema  []     Discoloration  []     Rash []     Dry desquamation  []     Moist desquamation []       Emotional  Coping: effective      Injury, potential bleeding or infection: skin care reviewed     Lab Results   Component Value Date    WBC 8.2 11/19/2021     11/19/2021         /74   Pulse 74   Temp 97.8 °F (36.6 °C)   Resp 18   Wt 138 lb (62.6 kg)   LMP 01/01/2010 (Approximate)   SpO2 99%   BMI 24.45 kg/m²   Patient Currently in Pain: Yes  Pain Assessment: 0-10  Pain Level: 2         Assessment/Plan: Patient was seen today for weekly visit. Dr Emilee Mike notified and examined pt.       Clementina Leavitt RN

## 2022-01-19 NOTE — PROGRESS NOTES
[address]       Radiation Oncology          [address] [facility]          [address]        Dorothy Rust: [phone]        F: [phone]       [facility] [address] [facility]       Radiation Oncology   [address] [facility], [address] [facility]       Dorothy Rust: [phone]       F: [phone]       [email or web address]          RADIATION ONCOLOGY WEEKLY PROGRESS NOTE  Patient ID:   Hayward Baumgarten  : 1955   MRN: [id number]    Location:  [facility] Radiation Oncology,   [address] [facility], [place]   [phone]     DIAGNOSIS:  Left anterior distal thigh liposarcoma     TREATMENT DETAILS:  Treatment Site: L thigh tumor  Actual Dose: 2200cGy  Total Planned Dose: 5000cGy  Treatment Technique: IMRT  Fraction Technique: Daily  Therapy imaging monitoring: CBCT daily  Concurrent Chemotherapy: NA    SUBJECTIVE:   Patient seen for their weekly on treatment evaluation today. Patient feels more swelling in her thigh and notes its more stiff to move after she is either resting or has been sleeping. She notes no during the day when she is up and moving it is less difficult to bend. She did meet with physical therapy who also recommended a lymphedema sleeve. OBJECTIVE:   CHAPERONE: Not Required    ECO Symptomatic but completely ambulatory    VITAL SIGNS: /74   Pulse 74   Temp 97.8 °F (36.6 °C)   Resp 18   Wt 138 lb (62.6 kg)   LMP 2010 (Approximate)   SpO2 99%   BMI 24.45 kg/m²   Wt Readings from Last 5 Encounters:   22 138 lb (62.6 kg)   22 145 lb (65.8 kg)   21 144 lb (65.3 kg)   21 139 lb (63 kg)   21 142 lb (64.4 kg)     GENERAL:  General appearance is that of a well-nourished, well-developed in no apparent distress. EXTREMITIES:  (+)L Thigh mass. NEUROLOGICAL: Alert and oriented. Strength and sensation intact bilaterally. No focal deficits.    PSYCH: Mood normal, behavior normal.  SKIN: No erythema, no desquamation. LABS:  WBC   Date Value Ref Range Status   11/19/2021 8.2 3.5 - 11.3 k/uL Final     Hemoglobin   Date Value Ref Range Status   11/19/2021 13.5 11.9 - 15.1 g/dL Final     Platelets   Date Value Ref Range Status   11/19/2021 361 138 - 453 k/uL Final     CREATININE   Date Value Ref Range Status   11/19/2021 0.73 0.50 - 0.90 mg/dL Final   11/08/2021 0.70 0.50 - 0.90 mg/dL Final   03/02/2020 0.82 0.50 - 0.90 mg/dL Final     No results found for: , CEA  No results found for: PSA    MEDICATIONS:  No current outpatient medications on file. ASSESSMENT PLAN:     Treatment setup and plan reviewed. Port images/CBCT images reviewed. Appropriate laboratory work was reviewed. Treatment side effects and toxicities reviewed with the patient, and appropriate management was advised. Will continue radiation treatment as planned, and recommend patient contact us if they have any questions or concerns. Recommend continue using skin care as directed and compression stockings and massages. Electronically signed by Faye James MD on 1/19/2022 at 3:40 PM      Drugs Prescribed:  New Prescriptions    No medications on file       Other Orders Placed:  No orders of the defined types were placed in this encounter.

## 2022-01-20 ENCOUNTER — HOSPITAL ENCOUNTER (OUTPATIENT)
Dept: RADIATION ONCOLOGY | Age: 67
Discharge: HOME | End: 2022-01-20
Attending: RADIOLOGY
Payer: COMMERCIAL

## 2022-01-20 ENCOUNTER — APPOINTMENT (OUTPATIENT)
Dept: RADIATION ONCOLOGY | Age: 67
End: 2022-01-20
Attending: RADIOLOGY
Payer: COMMERCIAL

## 2022-01-20 PROCEDURE — 77386 HC NTSTY MODUL RAD TX DLVR CPLX: CPT | Performed by: RADIOLOGY

## 2022-01-20 PROCEDURE — 77334 RADIATION TREATMENT AID(S): CPT | Performed by: RADIOLOGY

## 2022-01-20 PROCEDURE — 77014 PR CT GUIDANCE PLACEMENT RAD THERAPY FIELDS: CPT | Performed by: RADIOLOGY

## 2022-01-21 ENCOUNTER — HOSPITAL ENCOUNTER (OUTPATIENT)
Dept: RADIATION ONCOLOGY | Age: 67
Discharge: HOME | End: 2022-01-21
Attending: RADIOLOGY
Payer: COMMERCIAL

## 2022-01-21 PROCEDURE — 77014 PR CT GUIDANCE PLACEMENT RAD THERAPY FIELDS: CPT | Performed by: RADIOLOGY

## 2022-01-21 PROCEDURE — 77386 HC NTSTY MODUL RAD TX DLVR CPLX: CPT | Performed by: RADIOLOGY

## 2022-01-24 ENCOUNTER — HOSPITAL ENCOUNTER (OUTPATIENT)
Dept: PHYSICAL THERAPY | Age: 67
Setting detail: THERAPIES SERIES
Discharge: HOME | End: 2022-01-24
Payer: COMMERCIAL

## 2022-01-24 ENCOUNTER — HOSPITAL ENCOUNTER (OUTPATIENT)
Dept: RADIATION ONCOLOGY | Age: 67
Discharge: HOME | End: 2022-01-24
Attending: RADIOLOGY
Payer: COMMERCIAL

## 2022-01-24 PROCEDURE — 97110 THERAPEUTIC EXERCISES: CPT

## 2022-01-24 PROCEDURE — 97140 MANUAL THERAPY 1/> REGIONS: CPT

## 2022-01-24 PROCEDURE — 77014 PR CT GUIDANCE PLACEMENT RAD THERAPY FIELDS: CPT | Performed by: RADIOLOGY

## 2022-01-24 PROCEDURE — 77386 HC NTSTY MODUL RAD TX DLVR CPLX: CPT | Performed by: RADIOLOGY

## 2022-01-25 ENCOUNTER — HOSPITAL ENCOUNTER (OUTPATIENT)
Dept: RADIATION ONCOLOGY | Age: 67
Discharge: HOME | End: 2022-01-25
Attending: RADIOLOGY
Payer: COMMERCIAL

## 2022-01-25 PROCEDURE — 77014 PR CT GUIDANCE PLACEMENT RAD THERAPY FIELDS: CPT | Performed by: RADIOLOGY

## 2022-01-25 PROCEDURE — 77386 HC NTSTY MODUL RAD TX DLVR CPLX: CPT | Performed by: RADIOLOGY

## 2022-01-25 NOTE — PROGRESS NOTES
Phone: Brian           Fax: [phone]                           Outpatient Physical Therapy                                                                            Daily Note    Patient: Al Dodd : 1955  CSN #: [id number]   Referring Practitioner:  Dr. Jennifer Romero    Referral Date : 21     Date: 2022    Diagnosis: L thigh soft tissue sarcome  Treatment Diagnosis: L LE lymphedema, difficulty with ambulation    Onset Date: 21  PT Insurance Information: Medical [place]  Total # of Visits Approved: 12 Per Physician Order  Total # of Visits to Date: 3      Pre-Treatment Pain:  4/10  Subjective: Pt reports her legs is still feelign tihgt but she has been wearing compression    Exercises:  Exercise 1: HEP educated on heel slides, quad sets, SAQ, and gentle massage, keep legs elevated  Exercise 2: bike 5 min    Manual:  Soft Tissue Mobalization: MLD to L LE        Assessment  Assessment: Pt's measurements are bigger this date midpatella 38.5cm, 3\" above 48.6cm, 6\" above 51.8cm. PT wrote a note to MD to see if PT can initiate pneumatic pumps      Patient Education  Patient Education: educated on pneumatic pumps  Pt verbalized/demonstrated good understanding:     [x] Yes         [] No, pt required further clarification.        Post Treatment Pain:  3/10      Plan  Times per week: 1-2 x/wk  Plan weeks: 4-6 weeks      Goals  (Total # of Visits to Date: 3)      Short term goals  Time Frame for Short term goals: 3 weeks  Short term goal 1: Pt will be educated on her POC and HEP-met  Short term goal 2: Pt will initiate MLD to L LE  and slight compression as allowed by MD    Long term goals  Time Frame for Long term goals : 6 weeks  Long term goal 1: Pt will be safe and independent with her HEP  Long term goal 2: Pt will increase L knee flexion to >115 degress in order to decrease difficulty with perform steps and normalizing gait  Long term goal 3: Pt will be fitted for compression when approved by onocologist  Long term goal 4: Pt will demonstrate a decreased in knee/distal thigh girth size by 2-3cm in order to increase ambulation tolerance    Minutes Tracking:  Time In: 0700  Time Out: 6062  Minutes: 41  Timed Code Treatment Minutes: 31 Sharita Isaac PT DPT     Date: 1/24/2022

## 2022-01-26 ENCOUNTER — HOSPITAL ENCOUNTER (OUTPATIENT)
Dept: RADIATION ONCOLOGY | Age: 67
Discharge: HOME | End: 2022-01-26
Payer: COMMERCIAL

## 2022-01-26 ENCOUNTER — HOSPITAL ENCOUNTER (OUTPATIENT)
Dept: RADIATION ONCOLOGY | Age: 67
Discharge: HOME | End: 2022-01-26
Attending: RADIOLOGY
Payer: COMMERCIAL

## 2022-01-26 VITALS
SYSTOLIC BLOOD PRESSURE: 135 MMHG | RESPIRATION RATE: 14 BRPM | BODY MASS INDEX: 24.45 KG/M2 | DIASTOLIC BLOOD PRESSURE: 76 MMHG | WEIGHT: 138 LBS | HEART RATE: 77 BPM | TEMPERATURE: 98 F | OXYGEN SATURATION: 98 %

## 2022-01-26 PROCEDURE — 77336 RADIATION PHYSICS CONSULT: CPT | Performed by: RADIOLOGY

## 2022-01-26 PROCEDURE — 77014 PR CT GUIDANCE PLACEMENT RAD THERAPY FIELDS: CPT | Performed by: RADIOLOGY

## 2022-01-26 PROCEDURE — 77427 RADIATION TX MANAGEMENT X5: CPT | Performed by: RADIOLOGY

## 2022-01-26 PROCEDURE — 77386 HC NTSTY MODUL RAD TX DLVR CPLX: CPT | Performed by: RADIOLOGY

## 2022-01-26 ASSESSMENT — PAIN SCALES - GENERAL: PAINLEVEL_OUTOF10: 2

## 2022-01-26 ASSESSMENT — PAIN DESCRIPTION - ORIENTATION: ORIENTATION: LEFT

## 2022-01-26 ASSESSMENT — PAIN DESCRIPTION - LOCATION: LOCATION: LEG

## 2022-01-26 NOTE — PROGRESS NOTES
Segundo Powers  1/26/2022  Wt Readings from Last 3 Encounters:   01/26/22 138 lb (62.6 kg)   01/19/22 138 lb (62.6 kg)   01/12/22 145 lb (65.8 kg)     Body mass index is 24.45 kg/m². Treatment Area:thigh     Patient was seen today for weekly visit. Comfort Alteration  Fatigue: Mild      Nutritional Alteration  Anorexia: No   Nausea: No   Vomiting: No       Elimination Alterations  Constipation: no  Diarrhea:  no    Skin Alteration   Sensation:intact     Radiation Dermatitis:  Intact [x]     Erythema  [x]     Discoloration  []     Rash []     Dry desquamation  []     Moist desquamation []       Emotional  Coping: effective      Injury, potential bleeding or infection: n/a     Lab Results   Component Value Date    WBC 8.2 11/19/2021     11/19/2021         /76   Pulse 77   Temp 98 °F (36.7 °C)   Resp 14   Wt 138 lb (62.6 kg)   LMP 01/01/2010 (Approximate)   SpO2 98%   BMI 24.45 kg/m²   Patient Currently in Pain: Yes     Pain Level: 2         Assessment/Plan: Patient was seen today for weekly visit. Dr Julio Cesar Wakefield notified and examined pt.        Faye Marshall, RN

## 2022-01-26 NOTE — PROGRESS NOTES
[address]       Radiation Oncology          [address]          [address]        Bambi Quevedo: [phone]        F: [phone]       [facility]. [address] [facility]       Radiation Oncology   [address] [facility], [address] [facility] [address]       Bambi Quevedo: [phone]       F: [phone]       [email or web address]          RADIATION ONCOLOGY WEEKLY PROGRESS NOTE  Patient ID:   Rancho Hillman  : 1955   MRN: [id number]    Location:  [facility] Radiation Oncology,   [address] [facility] [place]   [phone]     DIAGNOSIS:  Left anterior distal thigh liposarcoma     TREATMENT DETAILS:  Treatment Site: L thigh tumor  Actual Dose: 3200cGy  Total Planned Dose: 5000cGy  Treatment Technique: IMRT  Fraction Technique: Daily  Therapy imaging monitoring: CBCT daily  Concurrent Chemotherapy: NA    SUBJECTIVE:   Patient seen for their weekly on treatment evaluation today. Patient feels more swelling in her thigh extending down towards her knee. She also notes some irritation in her groin muscles. She has met with physical therapy and lymphedema who has recommended compression sleeve and pump. OBJECTIVE:   CHAPERONE: Not Required    ECO Symptomatic but completely ambulatory    VITAL SIGNS: /76   Pulse 77   Temp 98 °F (36.7 °C)   Resp 14   Wt 138 lb (62.6 kg)   LMP 2010 (Approximate)   SpO2 98%   BMI 24.45 kg/m²   Wt Readings from Last 5 Encounters:   22 138 lb (62.6 kg)   22 138 lb (62.6 kg)   22 145 lb (65.8 kg)   21 144 lb (65.3 kg)   21 139 lb (63 kg)     GENERAL:  General appearance is that of a well-nourished, well-developed in no apparent distress. EXTREMITIES:  (+)L Thigh mass. NEUROLOGICAL: Alert and oriented. Strength and sensation intact bilaterally. No focal deficits. PSYCH: Mood normal, behavior normal.  SKIN: No erythema, no desquamation.     LABS:  WBC Date Value Ref Range Status   11/19/2021 8.2 3.5 - 11.3 k/uL Final     Hemoglobin   Date Value Ref Range Status   11/19/2021 13.5 11.9 - 15.1 g/dL Final     Platelets   Date Value Ref Range Status   11/19/2021 361 138 - 453 k/uL Final     CREATININE   Date Value Ref Range Status   11/19/2021 0.73 0.50 - 0.90 mg/dL Final   11/08/2021 0.70 0.50 - 0.90 mg/dL Final   03/02/2020 0.82 0.50 - 0.90 mg/dL Final     No results found for: , CEA  No results found for: PSA    MEDICATIONS:  No current outpatient medications on file. ASSESSMENT PLAN:     Treatment setup and plan reviewed. Port images/CBCT images reviewed. Appropriate laboratory work was reviewed. Treatment side effects and toxicities reviewed with the patient, and appropriate management was advised. Will continue radiation treatment as planned, and recommend patient contact us if they have any questions or concerns. Recommend continue using skin care as directed and compression stockings and massages. Electronically signed by Kp Souza MD on 1/26/2022 at 3:48 PM      Drugs Prescribed:  New Prescriptions    No medications on file       Other Orders Placed:  No orders of the defined types were placed in this encounter.

## 2022-01-27 ENCOUNTER — HOSPITAL ENCOUNTER (OUTPATIENT)
Dept: RADIATION ONCOLOGY | Age: 67
Discharge: HOME | End: 2022-01-27
Attending: RADIOLOGY
Payer: COMMERCIAL

## 2022-01-27 PROCEDURE — 77386 HC NTSTY MODUL RAD TX DLVR CPLX: CPT | Performed by: RADIOLOGY

## 2022-01-27 PROCEDURE — 77014 PR CT GUIDANCE PLACEMENT RAD THERAPY FIELDS: CPT | Performed by: RADIOLOGY

## 2022-01-28 ENCOUNTER — HOSPITAL ENCOUNTER (OUTPATIENT)
Dept: RADIATION ONCOLOGY | Age: 67
Discharge: HOME | End: 2022-01-28
Attending: RADIOLOGY
Payer: COMMERCIAL

## 2022-01-28 PROCEDURE — 77014 PR CT GUIDANCE PLACEMENT RAD THERAPY FIELDS: CPT | Performed by: RADIOLOGY

## 2022-01-28 PROCEDURE — 77386 HC NTSTY MODUL RAD TX DLVR CPLX: CPT | Performed by: RADIOLOGY

## 2022-01-29 DIAGNOSIS — C49.22 LIPOSARCOMA OF LEFT THIGH (HCC): Primary | ICD-10-CM

## 2022-01-31 ENCOUNTER — HOSPITAL ENCOUNTER (OUTPATIENT)
Dept: RADIATION ONCOLOGY | Age: 67
Discharge: HOME | End: 2022-01-31
Attending: RADIOLOGY
Payer: COMMERCIAL

## 2022-01-31 ENCOUNTER — HOSPITAL ENCOUNTER (OUTPATIENT)
Dept: PHYSICAL THERAPY | Age: 67
Setting detail: THERAPIES SERIES
Discharge: HOME | End: 2022-01-31
Payer: COMMERCIAL

## 2022-01-31 PROCEDURE — 97140 MANUAL THERAPY 1/> REGIONS: CPT

## 2022-01-31 PROCEDURE — 77386 HC NTSTY MODUL RAD TX DLVR CPLX: CPT | Performed by: RADIOLOGY

## 2022-01-31 PROCEDURE — 97110 THERAPEUTIC EXERCISES: CPT

## 2022-01-31 PROCEDURE — 77014 PR CT GUIDANCE PLACEMENT RAD THERAPY FIELDS: CPT | Performed by: RADIOLOGY

## 2022-02-01 ENCOUNTER — HOSPITAL ENCOUNTER (OUTPATIENT)
Dept: RADIATION ONCOLOGY | Age: 67
Discharge: HOME | End: 2022-02-01
Attending: RADIOLOGY
Payer: COMMERCIAL

## 2022-02-01 PROCEDURE — 77386 HC NTSTY MODUL RAD TX DLVR CPLX: CPT | Performed by: RADIOLOGY

## 2022-02-01 PROCEDURE — 77014 PR CT GUIDANCE PLACEMENT RAD THERAPY FIELDS: CPT | Performed by: RADIOLOGY

## 2022-02-01 NOTE — PROGRESS NOTES
Phone: Brian           Fax: [phone]                           Outpatient Physical Therapy                                                                            Daily Note    Patient: Hayward Baumgarten : 1955  CSN #: [id number]   Referring Practitioner:  Dr. Malena Stanton    Referral Date : 21     Date: 2022    Diagnosis: L thigh soft tissue sarcome  Treatment Diagnosis: L LE lymphedema, difficulty with ambulation    Onset Date: 21  PT Insurance Information: Medical [place]  Total # of Visits Approved: 12 Per Physician Order  Total # of Visits to Date: 4      Pre-Treatment Pain:  4-5/10  Subjective: Pt reports her leg still feels swollen and stiff    Exercises:  Exercise 1: HEP educated on heel slides, quad sets, SAQ, and gentle massage, keep legs elevated  Exercise 2: bike 5 min    Manual:  Soft Tissue Mobalization: MLD to L LE        Assessment  Assessment: Per MD he does not want pneumatic pumps performed over cancerous tumor, PT explained to the pt how the pumps work and that we wouldn't be able to avoid that area so that treatment cant be performed. Continue with MLD      Patient Education  *Patient Education: educated on way treatment can't be performed  Pt verbalized/demonstrated good understanding:     [x] Yes         [] No, pt required further clarification.        Post Treatment Pain:  3/10      Plan  Times per week: 1-2 x/wk  Plan weeks: 4-6 weeks      Goals  (Total # of Visits to Date: 4)      Short term goals  Time Frame for Short term goals: 3 weeks  Short term goal 1: Pt will be educated on her POC and HEP-met  Short term goal 2: Pt will initiate MLD to L LE  and slight compression as allowed by MD    Long term goals  Time Frame for Long term goals : 6 weeks  Long term goal 1: Pt will be safe and independent with her HEP  Long term goal 2: Pt will increase L knee flexion to >115 degress in order to decrease difficulty with perform steps and normalizing gait  Long term goal 3: Pt will be fitted for compression when approved by onocologist  Long term goal 4: Pt will demonstrate a decreased in knee/distal thigh girth size by 2-3cm in order to increase ambulation tolerance    Minutes Tracking:  Time In: 0700  Time Out: 6876  Minutes: 36  Timed Code Treatment Minutes: Chari Garcia 89., PT DPT     Date: 1/31/2022

## 2022-02-02 ENCOUNTER — HOSPITAL ENCOUNTER (OUTPATIENT)
Dept: RADIATION ONCOLOGY | Age: 67
Discharge: HOME | End: 2022-02-02
Payer: COMMERCIAL

## 2022-02-02 ENCOUNTER — HOSPITAL ENCOUNTER (OUTPATIENT)
Dept: RADIATION ONCOLOGY | Age: 67
Discharge: HOME | End: 2022-02-02
Attending: RADIOLOGY
Payer: COMMERCIAL

## 2022-02-02 VITALS
WEIGHT: 141.2 LBS | RESPIRATION RATE: 16 BRPM | TEMPERATURE: 97.5 F | OXYGEN SATURATION: 99 % | BODY MASS INDEX: 25.01 KG/M2 | DIASTOLIC BLOOD PRESSURE: 75 MMHG | SYSTOLIC BLOOD PRESSURE: 112 MMHG | HEART RATE: 81 BPM

## 2022-02-02 PROCEDURE — 77336 RADIATION PHYSICS CONSULT: CPT | Performed by: RADIOLOGY

## 2022-02-02 PROCEDURE — 77427 RADIATION TX MANAGEMENT X5: CPT | Performed by: RADIOLOGY

## 2022-02-02 PROCEDURE — 77386 HC NTSTY MODUL RAD TX DLVR CPLX: CPT | Performed by: RADIOLOGY

## 2022-02-02 PROCEDURE — 77014 PR CT GUIDANCE PLACEMENT RAD THERAPY FIELDS: CPT | Performed by: RADIOLOGY

## 2022-02-02 NOTE — PROGRESS NOTES
Eda Powers  2/2/2022  Wt Readings from Last 3 Encounters:   02/02/22 141 lb 3.2 oz (64 kg)   01/26/22 138 lb (62.6 kg)   01/19/22 138 lb (62.6 kg)     Body mass index is 25.01 kg/m². Treatment Area:left thigh    Patient was seen today for weekly visit. Comfort Alteration  Fatigue: None      Nutritional Alteration  Anorexia: No   Nausea: No   Vomiting: No     Elimination Alterations  Constipation: no  Diarrhea:  no  Bowel Incontinence: No  Urinary Incontinence: No    Skin Alteration   Sensation:intact    Radiation Dermatitis:  Intact [x]     Erythema  []     Discoloration  []     Rash []     Dry desquamation  []     Moist desquamation []       Emotional  Coping: effective      Injury, potential bleeding or infection: skin care reinforced    Lab Results   Component Value Date    WBC 8.2 11/19/2021     11/19/2021         /75   Pulse 81   Temp 97.5 °F (36.4 °C) (Temporal)   Resp 16   Wt 141 lb 3.2 oz (64 kg)   LMP 01/01/2010 (Approximate)   SpO2 99%   BMI 25.01 kg/m²   Patient Currently in Pain: No                 Assessment/Plan: Patient was seen today for weekly visit. Dr. Ryder Edwards examined patient. No new orders.     Dallas Vital RN

## 2022-02-03 ENCOUNTER — APPOINTMENT (OUTPATIENT)
Dept: RADIATION ONCOLOGY | Age: 67
End: 2022-02-03
Attending: RADIOLOGY
Payer: COMMERCIAL

## 2022-02-03 NOTE — PROGRESS NOTES
[address]       Radiation Oncology          [address]          [address]        Josh Rape: [phone]        F: [phone]       [facility] [address] [place] [address]       Radiation Oncology   [address] [facility], [address] [facility]       Josh Rape: [phone]       F: [phone]       [email or web address]          RADIATION ONCOLOGY WEEKLY PROGRESS NOTE  Patient ID:   Michelle Hummel  : 1955   MRN: [id number]    Location:  [facility] Radiation Oncology,   [address] [facility] [place]   [phone]     DIAGNOSIS:  Left anterior distal thigh liposarcoma     TREATMENT DETAILS:  Treatment Site: L thigh tumor  Actual Dose: 4200cGy  Total Planned Dose: 5000cGy  Treatment Technique: IMRT  Fraction Technique: Daily  Therapy imaging monitoring: CBCT daily  Concurrent Chemotherapy: NA    SUBJECTIVE:   Patient seen for their weekly on treatment evaluation today. Patient feels more swelling in her thigh extending down towards her knee causing some difficulty in walking. She has met with physical therapy and lymphedema who has recommended compression sleeve and pump. OBJECTIVE:   CHAPERONE: Not Required    ECO Symptomatic but completely ambulatory    VITAL SIGNS: /75   Pulse 81   Temp 97.5 °F (36.4 °C) (Temporal)   Resp 16   Wt 141 lb 3.2 oz (64 kg)   LMP 2010 (Approximate)   SpO2 99%   BMI 25.01 kg/m²   Wt Readings from Last 5 Encounters:   22 141 lb 3.2 oz (64 kg)   22 138 lb (62.6 kg)   22 138 lb (62.6 kg)   22 145 lb (65.8 kg)   21 144 lb (65.3 kg)     GENERAL:  General appearance is that of a well-nourished, well-developed in no apparent distress. EXTREMITIES:  (+)L Thigh mass. NEUROLOGICAL: Alert and oriented. Strength and sensation intact bilaterally. No focal deficits.    PSYCH: Mood normal, behavior normal.  SKIN: Mild L thigh erythema, no desquamation. LABS:  WBC   Date Value Ref Range Status   11/19/2021 8.2 3.5 - 11.3 k/uL Final     Hemoglobin   Date Value Ref Range Status   11/19/2021 13.5 11.9 - 15.1 g/dL Final     Platelets   Date Value Ref Range Status   11/19/2021 361 138 - 453 k/uL Final     CREATININE   Date Value Ref Range Status   11/19/2021 0.73 0.50 - 0.90 mg/dL Final   11/08/2021 0.70 0.50 - 0.90 mg/dL Final   03/02/2020 0.82 0.50 - 0.90 mg/dL Final     No results found for: , CEA  No results found for: PSA    MEDICATIONS:  No current outpatient medications on file. ASSESSMENT PLAN:     Treatment setup and plan reviewed. Port images/CBCT images reviewed. Appropriate laboratory work was reviewed. Treatment side effects and toxicities reviewed with the patient, and appropriate management was advised. Will continue radiation treatment as planned, and recommend patient contact us if they have any questions or concerns. Recommend continue using skin care as directed and compression stockings and can see PT to start compression device. Ordered MRI post treatment for 2/18 so she can have it done before seeing Ortho in [place] 2/22. Will follow up with us in 1 month after finishing. Electronically signed by Julee Gosselin, MD on 2/2/2022 at 10:25 PM      Drugs Prescribed:  New Prescriptions    No medications on file       Other Orders Placed:  No orders of the defined types were placed in this encounter.

## 2022-02-04 ENCOUNTER — HOSPITAL ENCOUNTER (OUTPATIENT)
Dept: RADIATION ONCOLOGY | Age: 67
Discharge: HOME | End: 2022-02-04
Attending: RADIOLOGY
Payer: COMMERCIAL

## 2022-02-04 PROCEDURE — 77386 HC NTSTY MODUL RAD TX DLVR CPLX: CPT | Performed by: RADIOLOGY

## 2022-02-04 PROCEDURE — 77014 PR CT GUIDANCE PLACEMENT RAD THERAPY FIELDS: CPT | Performed by: RADIOLOGY

## 2022-02-07 ENCOUNTER — HOSPITAL ENCOUNTER (OUTPATIENT)
Dept: RADIATION ONCOLOGY | Age: 67
Discharge: HOME | End: 2022-02-07
Attending: RADIOLOGY
Payer: COMMERCIAL

## 2022-02-07 ENCOUNTER — HOSPITAL ENCOUNTER (OUTPATIENT)
Dept: PHYSICAL THERAPY | Age: 67
Setting detail: THERAPIES SERIES
Discharge: HOME | End: 2022-02-07
Payer: COMMERCIAL

## 2022-02-07 PROCEDURE — 97140 MANUAL THERAPY 1/> REGIONS: CPT

## 2022-02-07 PROCEDURE — 77014 PR CT GUIDANCE PLACEMENT RAD THERAPY FIELDS: CPT | Performed by: RADIOLOGY

## 2022-02-07 PROCEDURE — 77386 HC NTSTY MODUL RAD TX DLVR CPLX: CPT | Performed by: RADIOLOGY

## 2022-02-08 ENCOUNTER — HOSPITAL ENCOUNTER (OUTPATIENT)
Dept: RADIATION ONCOLOGY | Age: 67
Discharge: HOME | End: 2022-02-08
Attending: RADIOLOGY
Payer: COMMERCIAL

## 2022-02-08 PROCEDURE — 77014 PR CT GUIDANCE PLACEMENT RAD THERAPY FIELDS: CPT | Performed by: RADIOLOGY

## 2022-02-08 PROCEDURE — 77386 HC NTSTY MODUL RAD TX DLVR CPLX: CPT | Performed by: RADIOLOGY

## 2022-02-08 NOTE — PROGRESS NOTES
Phone: Brian           Fax: [phone]                           Outpatient Physical Therapy                                                                            Daily Note    Patient: Brisa Armas : 1955  CSN #: [id number]   Referring Practitioner:  Dr. Efe Flores    Referral Date : 21     Date: 2022    Diagnosis: L thigh soft tissue sarcome  Treatment Diagnosis: L LE lymphedema, difficulty with ambulation    Onset Date: 21  PT Insurance Information: Medical [place]  Total # of Visits Approved: 12 Per Physician Order  Total # of Visits to Date: 5      Pre-Treatment Pain:  4/10  Subjective: Pt states she has three more treatments of radiation this week and then is done and Oncologist stated she can start the pumps after her treatment is done      Manual:  Soft Tissue Mobalization: MLD to L LE        Assessment  Assessment: Per pt's request only manual treatment was performed today, Pt's oncologist gave the okay to start pumps once radiation is done. PT set patient up for pumps       Patient Education  Patient Education: eduated on trying pumps next visit  Pt verbalized/demonstrated good understanding:     [x] Yes         [] No, pt required further clarification.        Post Treatment Pain:  3/10      Plan  Times per week: 1-2 x/wk  Plan weeks: 4-6 weeks      Goals  (Total # of Visits to Date: 5)      Short term goals  Time Frame for Short term goals: 3 weeks  Short term goal 1: Pt will be educated on her POC and HEP-met  Short term goal 2: Pt will initiate MLD to L LE  and slight compression as allowed by MD    Long term goals  Time Frame for Long term goals : 6 weeks  Long term goal 1: Pt will be safe and independent with her HEP  Long term goal 2: Pt will increase L knee flexion to >115 degress in order to decrease difficulty with perform steps and normalizing gait  Long term goal 3: Pt will be fitted for compression when approved by onocologist  Long term goal 4: Pt will demonstrate a decreased in knee/distal thigh girth size by 2-3cm in order to increase ambulation tolerance    Minutes Tracking:  Time In: 7099  Time Out: 2773  Minutes: 25  Timed Code Treatment Minutes: 95 Mercedes Watters PT DPT      Date: 2/7/2022

## 2022-02-09 ENCOUNTER — HOSPITAL ENCOUNTER (OUTPATIENT)
Dept: RADIATION ONCOLOGY | Age: 67
Discharge: HOME | End: 2022-02-09
Attending: RADIOLOGY
Payer: COMMERCIAL

## 2022-02-09 VITALS
RESPIRATION RATE: 16 BRPM | HEART RATE: 73 BPM | SYSTOLIC BLOOD PRESSURE: 106 MMHG | BODY MASS INDEX: 24.98 KG/M2 | WEIGHT: 141 LBS | OXYGEN SATURATION: 99 % | DIASTOLIC BLOOD PRESSURE: 73 MMHG | TEMPERATURE: 98 F

## 2022-02-09 PROCEDURE — 77427 RADIATION TX MANAGEMENT X5: CPT | Performed by: RADIOLOGY

## 2022-02-09 PROCEDURE — 77014 PR CT GUIDANCE PLACEMENT RAD THERAPY FIELDS: CPT | Performed by: RADIOLOGY

## 2022-02-09 PROCEDURE — 77386 HC NTSTY MODUL RAD TX DLVR CPLX: CPT | Performed by: RADIOLOGY

## 2022-02-09 NOTE — PROGRESS NOTES
Taiwo Rowemo  2/9/2022  Wt Readings from Last 3 Encounters:   02/09/22 141 lb (64 kg)   02/02/22 141 lb 3.2 oz (64 kg)   01/26/22 138 lb (62.6 kg)     Body mass index is 24.98 kg/m². Treatment Area:lt thigh  Patient was seen today for weekly visit. Comfort Alteration  Fatigue: Mild      Nutritional Alteration  Anorexia: No   Nausea: No   Vomiting: No     Elimination Alterations  Constipation: no  Diarrhea:  no  Bowel Incontinence: No  Urinary Incontinence: No    Skin Alteration   Sensation:intact    Radiation Dermatitis:  Intact []     Erythema  []     Discoloration  [x]     Rash []     Dry desquamation  []     Moist desquamation []       Emotional  Coping: effective      Injury, potential bleeding or infection: none    Lab Results   Component Value Date    WBC 8.2 11/19/2021     11/19/2021         /73   Pulse 73   Temp 98 °F (36.7 °C) (Temporal)   Resp 16   Wt 141 lb (64 kg)   LMP 01/01/2010 (Approximate)   SpO2 99%   BMI 24.98 kg/m²   Patient Currently in Pain: No                 Assessment/Plan: Patient was seen today for weekly visit. C/o redness to left thigh. No pain at site. Last treatment today. Dr. Clover Ch examined patient. Follow-up appointment scheduled for 1 month.     Keshia Pandya RN

## 2022-02-09 NOTE — PROGRESS NOTES
[address]       Radiation Oncology          [address] [facility]          [place], [address]        Ching Risk: [phone]        F: [phone]       [facility]. [address] [facility]       Radiation Oncology   [address] [facility], [address] [facility]       Ching Risk: [phone]       F: [phone]       [email or web address]          RADIATION ONCOLOGY WEEKLY PROGRESS NOTE  Patient ID:   Giacomo Landry  : 1955   MRN: [id number]    Location:  [facility] Radiation Oncology,   [address] [facility], [place], [place]   [phone]     DIAGNOSIS:  Left anterior distal thigh liposarcoma     TREATMENT DETAILS:  Treatment Site: L thigh tumor  Actual Dose: 5000cGy  Total Planned Dose: 5000cGy  Treatment Technique: IMRT  Fraction Technique: Daily  Therapy imaging monitoring: CBCT daily  Concurrent Chemotherapy: NA    SUBJECTIVE:   Patient seen for their weekly on treatment evaluation today. Patient feels more swelling in her thigh and pain above the knee. She has more swelling and is seeing physical therapy for lymphedema massaging and has been recommended compression sleeve and pump for home. OBJECTIVE:   CHAPERONE: Not Required    ECO Symptomatic but completely ambulatory    VITAL SIGNS: /73   Pulse 73   Temp 98 °F (36.7 °C) (Temporal)   Resp 16   Wt 141 lb (64 kg)   LMP 2010 (Approximate)   SpO2 99%   BMI 24.98 kg/m²   Wt Readings from Last 5 Encounters:   22 141 lb (64 kg)   22 141 lb 3.2 oz (64 kg)   22 138 lb (62.6 kg)   22 138 lb (62.6 kg)   22 145 lb (65.8 kg)     GENERAL:  General appearance is that of a well-nourished, well-developed in no apparent distress. EXTREMITIES:  (+)L Thigh mass. (+) Edema. NEUROLOGICAL: Alert and oriented. Strength and sensation intact bilaterally. No focal deficits.    PSYCH: Mood normal, behavior normal.  SKIN: Mild L thigh erythema, no desquamation. LABS:  WBC   Date Value Ref Range Status   11/19/2021 8.2 3.5 - 11.3 k/uL Final     Hemoglobin   Date Value Ref Range Status   11/19/2021 13.5 11.9 - 15.1 g/dL Final     Platelets   Date Value Ref Range Status   11/19/2021 361 138 - 453 k/uL Final     CREATININE   Date Value Ref Range Status   11/19/2021 0.73 0.50 - 0.90 mg/dL Final   11/08/2021 0.70 0.50 - 0.90 mg/dL Final   03/02/2020 0.82 0.50 - 0.90 mg/dL Final     No results found for: , CEA  No results found for: PSA    MEDICATIONS:  No current outpatient medications on file. ASSESSMENT PLAN:     Treatment setup and plan reviewed. Port images/CBCT images reviewed. Appropriate laboratory work was reviewed. Treatment side effects and toxicities reviewed with the patient, and appropriate management was advised. Patient completed radiation treatment as planned, and recommend patient contact us if they have any questions or concerns. Recommend continue using skin care as directed and compression stockings and should see PT for her compression device. Patient has been ordered a MRI for February 18 and will see orthopedic surgery in [place] on February 22. We recommend patient follow-up with us after surgery which we will for now schedule in 1 month but will move if needed. Patient has our contact information should she have any further symptoms or concerns. Electronically signed by Tiff Middleton MD on 2/9/2022 at 4:27 PM      Drugs Prescribed:  New Prescriptions    No medications on file       Other Orders Placed:  No orders of the defined types were placed in this encounter.

## 2022-02-11 ENCOUNTER — HOSPITAL ENCOUNTER (OUTPATIENT)
Dept: WOMENS IMAGING | Age: 67
Discharge: HOME | End: 2022-02-13
Payer: COMMERCIAL

## 2022-02-11 DIAGNOSIS — Z12.31 SCREENING MAMMOGRAM, ENCOUNTER FOR: ICD-10-CM

## 2022-02-11 PROCEDURE — 77063 BREAST TOMOSYNTHESIS BI: CPT

## 2022-02-11 NOTE — PROGRESS NOTES
[address]            Radiation Oncology          [address] [facility], \[facility]        Payton Magaña: [phone]        F: [phone]       [facility] [address] [facility]       Radiation Oncology   [address] [facility], [address] [facility]       Payton Magaña: [phone]       F: [phone]       [facility] [employer] [facility]      Dear Dr Pierre Armstrong: Thank you for referring Brisa Armas to me for evaluation and treatment. Below is a summary of the patient's recently completed radiation course. If you have questions, please do not hesitate to call me. I look forward to following this patient along with you. Sincerely,  Electronically signed by Jack Champagne MD on 2/10/22 at 7:44 PM EST      CC: Patient Care Team:  ANAIS Bishop CNP as PCP - General (Family Nurse Practitioner)  ANAIS Bishop CNP as PCP - [facility]  ------------------------------------------------------------------------------------------------------------------------------------------------------------------------------------------        Date of Service: 2022     Location:  [facility] Radiation Oncology,   [address] [facility], [facility]   [phone]        RADIATION ONCOLOGY END OF TREATMENT SUMMARY:    Patient ID:   Brisa Armas  : 1955   MRN: [id number]    DIAGNOSIS:  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 Patient developed significant swelling and edema in the left thigh and leg, as well as fatigue. Patient will come back in 1 month for a follow up visit and to assess treatment toxicity and response. Patient was advised to continue close follow up with orthopedic surgery as well. Patient does have our contact information in case they have any questions or concerns in the interim.     Electronically signed by Lay Ferguson MD on 2/10/2022 at 7:44 PM

## 2022-02-14 ENCOUNTER — HOSPITAL ENCOUNTER (OUTPATIENT)
Dept: PHYSICAL THERAPY | Age: 67
Setting detail: THERAPIES SERIES
Discharge: HOME | End: 2022-02-14
Payer: COMMERCIAL

## 2022-02-14 PROCEDURE — 97110 THERAPEUTIC EXERCISES: CPT

## 2022-02-14 PROCEDURE — 97140 MANUAL THERAPY 1/> REGIONS: CPT

## 2022-02-15 NOTE — PROGRESS NOTES
Phone: Brian           Fax: [phone]                           Outpatient Physical Therapy                                                                            Daily Note    Patient: Wolfgang Garber : 1955  CSN #: [id number]   Referring Practitioner:  Dr. Peg Bazan    Referral Date : 21     Date: 2022    Diagnosis: L thigh soft tissue sarcome  Treatment Diagnosis: L LE lymphedema, difficulty with ambulation    Onset Date: 21  PT Insurance Information: Medical [place]  Total # of Visits Approved: 12 Per Physician Order  Total # of Visits to Date: 6      Pre-Treatment Pain:  10  Subjective: Pt reports her leg is stiff    Exercises:  Exercise 1: HEP educated on heel slides, quad sets, SAQ, and gentle massage, keep legs elevated    Manual:  Soft Tissue Mobalization: MLD to L LE      Assessment  Assessment: started pumps at 45mmHg for 30 minutes with good tolerance. Measurements this date L LE midpatella 39cm, 3\" above 47.8cm, 6\" above 51.5cm. Pt would benefit from a compression pump and is compliant with elevation, compression and low sodium diet      Patient Education  Patient Education: educted on pump protocol  Pt verbalized/demonstrated good understanding:     [x] Yes         [] No, pt required further clarification.        Post Treatment Pain:  3/10      Plan  Times per week: 1-2 x/wk  Plan weeks: 4-6 weeks      Goals  (Total # of Visits to Date: 6)      Short term goals  Time Frame for Short term goals: 3 weeks  Short term goal 1: Pt will be educated on her POC and HEP-met  Short term goal 2: Pt will initiate MLD to L LE  and slight compression as allowed by MD    Long term goals  Time Frame for Long term goals : 6 weeks  Long term goal 1: Pt will be safe and independent with her HEP  Long term goal 2: Pt will increase L knee flexion to >115 degress in order to decrease difficulty with perform steps and normalizing gait  Long term goal 3: Pt will be fitted for compression when approved by onocologist  Long term goal 4: Pt will demonstrate a decreased in knee/distal thigh girth size by 2-3cm in order to increase ambulation tolerance    Minutes Tracking:  Time In: 0700  Time Out: 0740  Minutes: 4000 Ivis Silva PT DPT      Date: 2/14/2022

## 2022-02-17 ENCOUNTER — HOSPITAL ENCOUNTER (OUTPATIENT)
Age: 67
Discharge: HOME | End: 2022-02-17
Payer: COMMERCIAL

## 2022-02-17 ENCOUNTER — HOSPITAL ENCOUNTER (OUTPATIENT)
Dept: MRI IMAGING | Age: 67
Discharge: HOME | End: 2022-02-19
Payer: COMMERCIAL

## 2022-02-17 DIAGNOSIS — C49.22 LIPOSARCOMA OF LEFT THIGH (HCC): ICD-10-CM

## 2022-02-17 LAB
CREAT SERPL-MCNC: 0.53 MG/DL (ref 0.5–0.9)
GFR AFRICAN AMERICAN: >60 ML/MIN
GFR NON-AFRICAN AMERICAN: >60 ML/MIN
GFR SERPL CREATININE-BSD FRML MDRD: NORMAL ML/MIN/{1.73_M2}
GFR SERPL CREATININE-BSD FRML MDRD: NORMAL ML/MIN/{1.73_M2}

## 2022-02-17 PROCEDURE — 36415 COLL VENOUS BLD VENIPUNCTURE: CPT

## 2022-02-17 PROCEDURE — 6360000004 HC RX CONTRAST MEDICATION: Performed by: RADIOLOGY

## 2022-02-17 PROCEDURE — 73720 MRI LWR EXTREMITY W/O&W/DYE: CPT

## 2022-02-17 PROCEDURE — 82565 ASSAY OF CREATININE: CPT

## 2022-02-17 PROCEDURE — A9579 GAD-BASE MR CONTRAST NOS,1ML: HCPCS | Performed by: RADIOLOGY

## 2022-02-17 RX ADMIN — GADOTERIDOL 12 ML: 279.3 INJECTION, SOLUTION INTRAVENOUS at 13:35

## 2022-02-22 ENCOUNTER — OFFICE VISIT (OUTPATIENT)
Dept: SURGERY | Age: 67
End: 2022-02-22
Payer: COMMERCIAL

## 2022-02-22 VITALS
RESPIRATION RATE: 16 BRPM | SYSTOLIC BLOOD PRESSURE: 111 MMHG | DIASTOLIC BLOOD PRESSURE: 73 MMHG | TEMPERATURE: 98 F | HEART RATE: 95 BPM | WEIGHT: 140 LBS | OXYGEN SATURATION: 98 % | HEIGHT: 63 IN | BODY MASS INDEX: 24.8 KG/M2

## 2022-02-22 DIAGNOSIS — C49.22 LIPOSARCOMA OF LEFT THIGH (HCC): Primary | ICD-10-CM

## 2022-02-22 PROCEDURE — 99205 OFFICE O/P NEW HI 60 MIN: CPT | Performed by: SURGERY

## 2022-02-22 NOTE — PROGRESS NOTES
[facility]    CC: Skin lesions    Referring Physician: Mendoza Xiao MD    HPI: This is an 61 y. o.female with a PMHx as delineated below who presents to clinic in consultation for left thigh biopsy proven liposarcoma. She is to undergo resection with Dr. Alison Dye and given the defect, plastic surgery was consulted for assistance with reconstruction. She underwent radiation treatment completed 2 weeks ago. Plastic surgery was consulted for evaluation and treatment.     PMHx:   Past Medical History:   Diagnosis Date    Cancer ([address]     PONV (postoperative nausea and vomiting)      PSHx:   Past Surgical History:   Procedure Laterality Date    ABDOMEN SURGERY      BREAST BIOPSY  2014     SECTION, LOW TRANSVERSE       &     COLONOSCOPY N/A 3/30/2018    COLONOSCOPY WITH BIOPSY and photos performed by Emily Alaniz MD at [address] [place] [address]  2018    Small polyp upper part of the right colon--sessile serrated adenoma, redundant colon and spasms    LEG BIOPSY EXCISION Left 2021    INCISIONAL BIOPSY LEFT THIGH MASS performed by Quenten Claude, MD at [facility] Left     LLE, TUMOR ON SHIN     Allergy: No Known Allergies    SHx:   Social History     Socioeconomic History    Marital status:      Spouse name: Not on file    Number of children: Not on file    Years of education: Not on file    Highest education level: Not on file   Occupational History    Not on file   Tobacco Use    Smoking status: Never Smoker    Smokeless tobacco: Never Used   Vaping Use    Vaping Use: Never used   Substance and Sexual Activity    Alcohol use: Yes     Comment: Socially    Drug use: No    Sexual activity: Not on file   Other Topics Concern    Not on file   Social History Narrative    Not on file     Social Determinants of Health     Financial Resource Strain:     Difficulty of Paying Living Expenses: Not on file   Food Insecurity:     Worried About [address] [facility] in the Last Year: Not on file    Haim of Food in the Last Year: Not on file   Transportation Needs:     Lack of Transportation (Medical): Not on file    Lack of Transportation (Non-Medical): Not on file   Physical Activity:     Days of Exercise per Week: Not on file    Minutes of Exercise per Session: Not on file   Stress:     Feeling of Stress : Not on file   Social Connections:     Frequency of Communication with Friends and Family: Not on file    Frequency of Social Gatherings with Friends and Family: Not on file    Attends Hoahaoism Services: Not on file    Active Member of [address] [place] [address] or Organizations: Not on file    Attends Club or Organization Meetings: Not on file    Marital Status: Not on file   Intimate Partner Violence:     Fear of Current or Ex-Partner: Not on file    Emotionally Abused: Not on file    Physically Abused: Not on file    Sexually Abused: Not on file   Housing Stability:     Unable to Pay for Housing in the Last Year: Not on file    Number of [place] in the Last Year: Not on file    Unstable Housing in the Last Year: Not on file     FHx: Family history of CA: Multiple in her immediate family  Meds:   No current outpatient medications on file. No current facility-administered medications for this visit. ROS   Constitutional: Negative for chills and fever. HENT: Negative for congestion, facial swelling, and voice change. Eyes: Negative for photophobia and visual disturbance. Respiratory: Negative for apnea, cough, chest tightness and shortness of breath. Cardiovascular: Negative for chest pain and palpitations. Gastrointestinal: Negative for dysphagia and early satiety. Genitourinary: Negative for difficulty urinating, dysuria, flank pain, frequency and hematuria. Musculoskeletal: Negative for new gait problem, joint swelling and myalgias. Skin: Negative for color change, pallor and rash.    Endocrine: negative for tremors, temperature intolerance or polydipsia. Allergic/Immunologic: Negative for new environmental or food allergies. Neurological: Negative for dizziness, seizures, speech difficulty, numbness. Hematological: Negative for adenopathy. Psychiatric/Behavioral: Negative for agitation and confusion. EXAM    /73   Pulse 95   Temp 98 °F (36.7 °C)   Resp 16   Ht 5' 3\" (1.6 m)   Wt 140 lb (63.5 kg)   LMP 01/01/2010 (Approximate)   SpO2 98%   BMI 24.80 kg/m²     GEN: NAD, pleasant, healthy  CVS: RRR  PULM: No respiratory distress  HEENT: PERRLA/EOMI; hearing appears within normal limits  NECK: Supple with trachea in midline, no masses  ABD: No scarring noted    EXT: Significant radiation changes and large mass (over 20 cm in both dimensions)    PATHOLOGY/WORKUP: Liposarcoma on pathology    CT femur reviewed    IMP: 61 y. o.female with left lower extremity liposarcoma. PLAN: An extremely difficult case given the extent of disease - as well as needed recent radiation. Will require radical resection with Dr. Sabine Kasper and potentialmedial gastrocnemius flap for knee protection with possible STSG. Should there be worsening bony exposure - or failure in the setting of radiation - will then likely require free flap reconstruction potentially with bilateral, bipedicled ESTEPHANIE vs contralateral ALT. Will work with Dr. Sabine Kasper and schedule. In the interim, will obtain CTA imaging of the abdomen and contralataeral femur for surgical planning. A discussion regarding surgical options including: gastrocnemius flap vs contralateral ALT vs ESTEPHANIE flaps vs STSG were discussed the patient and family. The pathophysiology of lower extremity wounds was also elucidated specifying need for resection, observation, & margins. Clinical photos were obtained.   Additionally, discussion regarding the risks including, but not limited to: bleeding (potentially requiring transfusion or reoperation), infection, seroma, reoperation, poor cosmetic outcome, scarring, flap loss, nerve pain, donor site complication, recurrence, revisional surgery, diminished sensation, VTE (DVT/PE), and death was performed. All questions were answered in a satisfactory manner. The patient was counseled at length about the risks of asha Covid-19 during their perioperative period and any recovery window from their procedure. The patient was made aware that asha Covid-19  may worsen their prognosis for recovering from their procedure  and lend to a higher morbidity and/or mortality risk. All material risks, benefits, and reasonable alternatives including postponing the procedure were discussed. The patient does wish to proceed with the procedure at this time.     Bobbi Kolb MD  [address] [address] Reconstructive Surgery  [phone]  02/22/22

## 2022-02-25 ENCOUNTER — TELEPHONE (OUTPATIENT)
Dept: SURGERY | Age: 67
End: 2022-02-25

## 2022-02-25 NOTE — TELEPHONE ENCOUNTER
Spoke with patient about previous concerns about using thigh compression boot on left leg. Dr. Brian Blank agreed with Dr. Ventura Bell suggestion to give the leg a rest and not use a compression device. This was communicated to the patient to not use any compression devices on her left leg prior to surgery.

## 2022-02-25 NOTE — TELEPHONE ENCOUNTER
I spoke with Inocencia Exon this AM.  She called to inquire if she was able to use a thigh pneumatic compression boot on her left leg. She verbalized concern for swelling in her left leg, with a tentative surgery date of 3/21, being so far in the future . She also explained to me that Dr. Tal Weaver suggested that she give her leg a rest.  Dr. Keisha Ramirez notified.

## 2022-02-25 NOTE — LETTER
Surgery Schedule Request Form  [facility]  [address] [place] [address]. [place], [facility]  The patient received her COVID vaccines 1-6-2021 and 2-3-2021. The information is documented in Epic. 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       LENGTH OF PROCEDURE: 10 hours  Patient Status: Admit    Labs Needed:   CBC ___  PT/PTT___ INR ____  CMP ___ EKG ___   Urine Hcg ___            PATIENT NAME: Martine Powers            YOB: 1955  SEX: female   [address]:   PHONE: [phone] (home) [phone] (work)    Pre-Op to be done by: PCP  Cardiac Clearance Done by: per primary    Pt Position:  supine  Patient to meet with Anesthesiology prior to surgery: no     Medications to be stopped 5 days before surgery: anticoagulation     Ancef 2 gm IV OCTOR (NOTE:  If patient weight is > 120 kg, Administer 3 gm)  Other Orders: No Decadron, microequipment available, microscope available    INSURANCE: [employer]i                                               SUBSCRIBER NAME: Self   MEMBER ID:  [id number]                                              AUTHORIZATION #: The codes do not require pre certification. Call Reference # [id number]    PCP: Pelon Soliman APRN - ONEIL                                        ANESTHESIA:  Naye Jaramillo MD                             FAX TO: [phone]   QUESTIONS?  CALL: Everardo Craft   Fax   [phone] Date Of Procedure: 3-    PATIENT:       Sharon Atkinson                    :  1955      No Known Allergies    No.   PHYSICIAN ORDERS   HUC/  RN      ORDERS WITH CHECK BOXES MUST BE SELECTED. ALL OTHER ORDERS WILL BE AUTOMATICALLY INITIATED. Date / Time of Order:   3/10/22   10:16 AM          Procedure:  Left leg reconstruction with gastrocnemius, rectus femoris flap            Possible deep inferior epigastric artery  flap            Possible adjacent tissue transfer            Possible split thickness skin graft         1. Cefazolin (Ancef) 2 gm IV OCTOR   ** NOTE:  If patient weight is > 120 kg, Administer 3 gm         2. ** If PCN allergy, then Clindamycin 600mg IV OCTOR.   ** If prior history of MRSA, Vancomycin 1g IV OCTOR (please check with renal function prior to administration)       3.  Other orders:  No Decadron, microequipment available, microscope available     Physician / Surgeon:  Cm Ramirez MD  Office Ph:  [phone]       Physician Signature:

## 2022-02-25 NOTE — TELEPHONE ENCOUNTER
I spoke with Domingo Forbes CT tech at [facility] this AM about ESTEPHANIE protocol. The tech was inquiring what exactly the protocol is and what kind of imaging Dr. Gasper Do is looking for. I reached out to Enrrique Landeros, the manager of the CT department at [facility] for more instructions for Dr. Anders Gonzalez protocol. No instructions were given. I reached out to Dr. Gasper Do to communicate what imaging he needed. Dr. Gasper Do needed imaging to evaluate the deep inferior epigastric inferior artery perforators below the umbilicus. This information was communicated to the [address] [place] [address] in [facility]. She was going to reach out to her manager to see if the radiology group is able to accommodate. Otherwise, the patient may need to have this ESTEPHANIE imaging at [facility].

## 2022-02-28 ENCOUNTER — TELEPHONE (OUTPATIENT)
Dept: SURGERY | Age: 67
End: 2022-02-28

## 2022-02-28 NOTE — TELEPHONE ENCOUNTER
I spoke with Zach Murray, [address] at [address] [place] [address] to follow up with the ESTEPHANIE protocol for patient's upcoming CT scan on Wednesday, 3/2/2022. Zach Murray stated that they received a ESTEPHANIE protocol from [facility] in [place]. Oneilhali Airam stated she did understand that the study needed to evaluate deep inferior epigatric artery perforators below the umbilicus. The radiology group seems confident that they can read the scan. Sagittal/coronal and axial views are able to be obtained. Any reconstructions will need to be sent to a 3D lab. Dr. Mariela Baxter notified.

## 2022-02-28 NOTE — TELEPHONE ENCOUNTER
Spoke with Sonia Medley at [facility] again this AM.  Communicated that Dr. Anthony Berumen could do his own reconstruction. I communicated to [place] [facility] that Dr. Anthony Berumen also needed raw 1mm MIP cuts in the imaging.

## 2022-02-28 NOTE — TELEPHONE ENCOUNTER
Called patient to let her know that she is good to have her CT scan on Wednesday, 3/3 in [facility].

## 2022-03-01 ENCOUNTER — OFFICE VISIT (OUTPATIENT)
Dept: PRIMARY CARE CLINIC | Age: 67
End: 2022-03-01
Payer: COMMERCIAL

## 2022-03-01 ENCOUNTER — TELEPHONE (OUTPATIENT)
Dept: PRIMARY CARE CLINIC | Age: 67
End: 2022-03-01

## 2022-03-01 VITALS
WEIGHT: 141.4 LBS | HEIGHT: 63 IN | DIASTOLIC BLOOD PRESSURE: 68 MMHG | HEART RATE: 74 BPM | OXYGEN SATURATION: 98 % | SYSTOLIC BLOOD PRESSURE: 110 MMHG | TEMPERATURE: 97.9 F | RESPIRATION RATE: 20 BRPM | BODY MASS INDEX: 25.05 KG/M2

## 2022-03-01 DIAGNOSIS — C49.9 LIPOSARCOMA (HCC): Primary | ICD-10-CM

## 2022-03-01 DIAGNOSIS — C49.22 SARCOMA OF LEFT THIGH (HCC): Primary | ICD-10-CM

## 2022-03-01 DIAGNOSIS — Z01.818 PREOPERATIVE CLEARANCE: ICD-10-CM

## 2022-03-01 PROBLEM — M62.89 MASS OF MUSCLE OF LEFT LOWER EXTREMITY: Status: ACTIVE | Noted: 2022-03-01

## 2022-03-01 PROCEDURE — 99214 OFFICE O/P EST MOD 30 MIN: CPT | Performed by: NURSE PRACTITIONER

## 2022-03-01 RX ORDER — OXYCODONE AND ACETAMINOPHEN 7.5; 325 MG/1; MG/1
1 TABLET ORAL EVERY 8 HOURS PRN
Qty: 21 TABLET | Refills: 0 | Status: SHIPPED | OUTPATIENT
Start: 2022-03-01 | End: 2022-03-08

## 2022-03-01 RX ORDER — DOCUSATE SODIUM 100 MG/1
100 CAPSULE, LIQUID FILLED ORAL PRN
COMMUNITY

## 2022-03-01 RX ORDER — IBUPROFEN 200 MG
200 TABLET ORAL EVERY 6 HOURS PRN
COMMUNITY
End: 2022-03-14

## 2022-03-01 SDOH — ECONOMIC STABILITY: FOOD INSECURITY: WITHIN THE PAST 12 MONTHS, YOU WORRIED THAT YOUR FOOD WOULD RUN OUT BEFORE YOU GOT MONEY TO BUY MORE.: PATIENT DECLINED

## 2022-03-01 SDOH — ECONOMIC STABILITY: FOOD INSECURITY: WITHIN THE PAST 12 MONTHS, THE FOOD YOU BOUGHT JUST DIDN'T LAST AND YOU DIDN'T HAVE MONEY TO GET MORE.: PATIENT DECLINED

## 2022-03-01 ASSESSMENT — ENCOUNTER SYMPTOMS
SORE THROAT: 0
SHORTNESS OF BREATH: 0
ABDOMINAL PAIN: 0
RHINORRHEA: 0
VOMITING: 0
NAUSEA: 0
CONSTIPATION: 0
COUGH: 0
WHEEZING: 0
DIARRHEA: 0

## 2022-03-01 ASSESSMENT — SOCIAL DETERMINANTS OF HEALTH (SDOH): HOW HARD IS IT FOR YOU TO PAY FOR THE VERY BASICS LIKE FOOD, HOUSING, MEDICAL CARE, AND HEATING?: PATIENT DECLINED

## 2022-03-01 ASSESSMENT — PATIENT HEALTH QUESTIONNAIRE - PHQ9
1. LITTLE INTEREST OR PLEASURE IN DOING THINGS: 0
SUM OF ALL RESPONSES TO PHQ QUESTIONS 1-9: 0
2. FEELING DOWN, DEPRESSED OR HOPELESS: 0
SUM OF ALL RESPONSES TO PHQ QUESTIONS 1-9: 0
SUM OF ALL RESPONSES TO PHQ QUESTIONS 1-9: 0
SUM OF ALL RESPONSES TO PHQ9 QUESTIONS 1 & 2: 0
SUM OF ALL RESPONSES TO PHQ QUESTIONS 1-9: 0

## 2022-03-01 NOTE — PATIENT INSTRUCTIONS
SURVEY:     You may be receiving a survey from [employer] regarding your visit today. Please complete the survey to enable us to provide the highest quality of care to you and your family. If you cannot score us a very good on any question, please call the office to discuss how we could have made your experience a very good one. Thank you.   Carlos Soliman, APRN-CNP  Reyes Enter, ONEIL Cheung, LPN  Ernesto Hurtado, LPRAMY Rodriguez, CMA  Delonte Weiner, CMA  Giulia, CMA  Carlie, PCA

## 2022-03-01 NOTE — PROGRESS NOTES
Name: Wolfgang Garber  : 1955         Chief Complaint:     Chief Complaint   Patient presents with    Pre-op Exam     left thigh 3/21/2022,liposarcoma       History of Present Illness:      Wolfgang Garber is a 61 y.o.  female who presents with Pre-op Exam (left thigh 3/21/2022,liposarcoma)      Beauty Cools today for a preoperative clearance visit. Liposarcoma-patient will be having surgery in a few weeks to remove the tumor. She is here for preoperative testing. She states she will be having surgery in conjunction with the surgical oncologist and plastic surgeon for reconstruction. She will be in [place] for approximately 1 week. She has orders for Covid testing 6 days prior to surgery but nothing else. Leg Pain   The incident occurred more than 1 week ago (LIPOSARCOMA). The incident occurred at home (NO SPECIFIC INCIDENT). There was no injury mechanism. The pain is present in the left leg and left thigh. The quality of the pain is described as aching. The pain is at a severity of 5/10. The pain is moderate. The pain has been constant since onset. Associated symptoms include a loss of motion. Pertinent negatives include no inability to bear weight, loss of sensation, muscle weakness, numbness or tingling. She reports no foreign bodies present. The symptoms are aggravated by movement, palpation and weight bearing. She has tried elevation, heat, immobilization, ice, non-weight bearing, NSAIDs and rest for the symptoms. The treatment provided mild relief.          Past Medical History:     Past Medical History:   Diagnosis Date    Cancer ([address]     PONV (postoperative nausea and vomiting)       Reviewed all health maintenance requirements and ordered appropriate tests  Health Maintenance Due   Topic Date Due    Depression Screen  Never done    Diabetes screen  Never done    Colorectal Cancer Screen  2021       Past Surgical History:     Past Surgical History:   Procedure Laterality Date    ABDOMEN SURGERY      BREAST BIOPSY  2014     SECTION, LOW TRANSVERSE       &     COLONOSCOPY N/A 3/30/2018    COLONOSCOPY WITH BIOPSY and photos performed by Rebecca Chase MD at [address] [place] [address]  2018    Small polyp upper part of the right colon--sessile serrated adenoma, redundant colon and spasms    LEG BIOPSY EXCISION Left 2021    INCISIONAL BIOPSY LEFT THIGH MASS performed by Margareth Mireles MD at [facility] Left 1979    LLE, TUMOR ON SHIN        Medications:       Prior to Admission medications    Medication Sig Start Date End Date Taking? Authorizing Provider   ibuprofen (ADVIL;MOTRIN) 200 MG tablet Take 200 mg by mouth every 6 hours as needed for Pain Takes 200-600 mg   Yes Historical Provider, MD   docusate sodium (COLACE) 100 MG capsule Take 100 mg by mouth as needed for Constipation   Yes Historical Provider, MD        Allergies:       Patient has no known allergies. Social History:     Tobacco:    reports that she has never smoked. She has never used smokeless tobacco.  Alcohol:      reports current alcohol use. Drug Use:  reports no history of drug use. Family History:     Family History   Problem Relation Age of Onset    Cancer Mother         parotid    Thyroid Disease Mother     Other Father         PKD    Hypertension Father     Heart Attack Maternal Grandmother     Heart Attack Maternal Grandfather     Other Paternal Grandmother         PKD    Cancer Maternal Aunt         breast    Cancer Maternal Aunt         pancreas       Review of Systems:     Positive and Negative as described in HPI    Review of Systems   Constitutional: Negative for chills, fatigue and fever. HENT: Negative for congestion, rhinorrhea and sore throat. Eyes: Negative for visual disturbance. Respiratory: Negative for cough, shortness of breath and wheezing. Cardiovascular: Negative for chest pain and palpitations.    Gastrointestinal: Negative for abdominal pain, constipation, diarrhea, nausea and vomiting. Genitourinary: Negative for difficulty urinating and dysuria. Musculoskeletal: Positive for gait problem and myalgias. Negative for neck pain and neck stiffness. Skin: Negative for rash. Neurological: Negative for dizziness, tingling, syncope, light-headedness, numbness and headaches. Physical Exam:   Vitals:  /68 (Position: Sitting)   Pulse 74   Temp 97.9 °F (36.6 °C) (Temporal)   Resp 20   Ht 5' 3\" (1.6 m)   Wt 141 lb 6.4 oz (64.1 kg)   LMP 01/01/2010 (Approximate)   SpO2 98%   BMI 25.05 kg/m²     Physical Exam  Vitals and nursing note reviewed. Constitutional:       General: She is not in acute distress. Appearance: Normal appearance. She is not ill-appearing. HENT:      Mouth/Throat:      Mouth: Mucous membranes are moist.   Eyes:      General: No scleral icterus. Conjunctiva/sclera: Conjunctivae normal.   Neck:      Vascular: No carotid bruit. Cardiovascular:      Rate and Rhythm: Normal rate and regular rhythm. Heart sounds: No murmur heard. Pulmonary:      Effort: Pulmonary effort is normal.      Breath sounds: Normal breath sounds. No wheezing or rales. Abdominal:      General: Bowel sounds are normal. There is no distension. Palpations: Abdomen is soft. Tenderness: There is no abdominal tenderness. Musculoskeletal:      Cervical back: Normal range of motion and neck supple. Left upper leg: Swelling and tenderness present. Right lower leg: No edema. Left lower leg: Edema present. Legs:    Skin:     General: Skin is warm and dry. Neurological:      Mental Status: She is alert and oriented to person, place, and time.    Psychiatric:         Mood and Affect: Mood normal.         Behavior: Behavior normal.         Data:     Lab Results   Component Value Date     11/19/2021    K 4.1 11/19/2021     11/19/2021    CO2 29 11/19/2021    BUN 16 11/19/2021 CREATININE 0.53 02/17/2022    GLUCOSE 108 11/19/2021    GLUCOSE 95 03/28/2012     Lab Results   Component Value Date    WBC 8.2 11/19/2021    RBC 4.70 11/19/2021    HGB 13.5 11/19/2021    HCT 42.0 11/19/2021    MCV 89.4 11/19/2021    MCH 28.7 11/19/2021    MCHC 32.1 11/19/2021    RDW 12.2 11/19/2021     11/19/2021    MPV 9.5 11/19/2021     No results found for: TSH  Lab Results   Component Value Date    CHOL 211 07/15/2021    HDL 56 07/15/2021       Assessment/Plan:      Diagnosis Orders   1. Liposarcoma (HCC)  CBC with Auto Differential    Basic Metabolic Panel    MRSA DNA Probe    Protime-INR    APTT   2. Preoperative clearance  CBC with Auto Differential    Basic Metabolic Panel    MRSA DNA Probe    Protime-INR    APTT     Preoperative testing ordered. We will follow with results. Clearance pending results. 1.  Eloisa Martinez received counseling on the following healthy behaviors: nutrition, exercise and medication adherence  2. Patient given educational materials - see patient instructions  3. Was a self-tracking handout given in paper form or via [employer]? No  If yes, see orders or list here. 4.  Discussed use, benefit, and side effects of prescribed medications. Barriers to medication compliance addressed. All patient questions answered. Pt voiced understanding. 5.  Reviewed prior labs and health maintenance  6. Continue current medications, diet and exercise. Completed Refills   Requested Prescriptions      No prescriptions requested or ordered in this encounter         Return if symptoms worsen or fail to improve.

## 2022-03-02 ENCOUNTER — HOSPITAL ENCOUNTER (OUTPATIENT)
Age: 67
Discharge: HOME | End: 2022-03-02
Payer: COMMERCIAL

## 2022-03-02 ENCOUNTER — HOSPITAL ENCOUNTER (OUTPATIENT)
Dept: CT IMAGING | Age: 67
Discharge: HOME | End: 2022-03-04
Payer: COMMERCIAL

## 2022-03-02 DIAGNOSIS — C49.9 LIPOSARCOMA (HCC): ICD-10-CM

## 2022-03-02 DIAGNOSIS — C49.22 LIPOSARCOMA OF LEFT THIGH (HCC): ICD-10-CM

## 2022-03-02 DIAGNOSIS — Z01.818 PREOPERATIVE CLEARANCE: ICD-10-CM

## 2022-03-02 LAB
ABSOLUTE EOS #: 0.09 K/UL (ref 0–0.44)
ABSOLUTE IMMATURE GRANULOCYTE: <0.03 K/UL (ref 0–0.3)
ABSOLUTE LYMPH #: 0.72 K/UL (ref 1.1–3.7)
ABSOLUTE MONO #: 0.74 K/UL (ref 0.1–1.2)
ANION GAP SERPL CALCULATED.3IONS-SCNC: 10 MMOL/L (ref 9–17)
BASOPHILS # BLD: 1 % (ref 0–2)
BASOPHILS ABSOLUTE: 0.04 K/UL (ref 0–0.2)
BUN BLDV-MCNC: 9 MG/DL (ref 8–23)
BUN/CREAT BLD: 14 (ref 9–20)
CALCIUM SERPL-MCNC: 9.5 MG/DL (ref 8.6–10.4)
CHLORIDE BLD-SCNC: 100 MMOL/L (ref 98–107)
CO2: 29 MMOL/L (ref 20–31)
CREAT SERPL-MCNC: 0.66 MG/DL (ref 0.5–0.9)
EOSINOPHILS RELATIVE PERCENT: 1 % (ref 1–4)
GFR AFRICAN AMERICAN: >60 ML/MIN
GFR NON-AFRICAN AMERICAN: >60 ML/MIN
GFR SERPL CREATININE-BSD FRML MDRD: ABNORMAL ML/MIN/{1.73_M2}
GFR SERPL CREATININE-BSD FRML MDRD: ABNORMAL ML/MIN/{1.73_M2}
GLUCOSE BLD-MCNC: 101 MG/DL (ref 70–99)
HCT VFR BLD CALC: 37 % (ref 36.3–47.1)
HEMOGLOBIN: 11.6 G/DL (ref 11.9–15.1)
IMMATURE GRANULOCYTES: 0 %
INR BLD: 1.4
LYMPHOCYTES # BLD: 10 % (ref 24–43)
MCH RBC QN AUTO: 27.8 PG (ref 25.2–33.5)
MCHC RBC AUTO-ENTMCNC: 31.4 G/DL (ref 28.4–34.8)
MCV RBC AUTO: 88.7 FL (ref 82.6–102.9)
MONOCYTES # BLD: 11 % (ref 3–12)
NRBC AUTOMATED: 0 PER 100 WBC
PARTIAL THROMBOPLASTIN TIME: 39.7 SEC (ref 26.8–34.8)
PDW BLD-RTO: 12.4 % (ref 11.8–14.4)
PLATELET # BLD: 346 K/UL (ref 138–453)
PMV BLD AUTO: 9.2 FL (ref 8.1–13.5)
POTASSIUM SERPL-SCNC: 4.4 MMOL/L (ref 3.7–5.3)
PROTHROMBIN TIME: 16.6 SEC (ref 11.5–14.2)
RBC # BLD: 4.17 M/UL (ref 3.95–5.11)
SEG NEUTROPHILS: 77 % (ref 36–65)
SEGMENTED NEUTROPHILS ABSOLUTE COUNT: 5.28 K/UL (ref 1.5–8.1)
SODIUM BLD-SCNC: 139 MMOL/L (ref 135–144)
WBC # BLD: 6.9 K/UL (ref 3.5–11.3)

## 2022-03-02 PROCEDURE — 85610 PROTHROMBIN TIME: CPT

## 2022-03-02 PROCEDURE — 87641 MR-STAPH DNA AMP PROBE: CPT

## 2022-03-02 PROCEDURE — 6360000004 HC RX CONTRAST MEDICATION: Performed by: SURGERY

## 2022-03-02 PROCEDURE — 74174 CTA ABD&PLVS W/CONTRAST: CPT

## 2022-03-02 PROCEDURE — 80048 BASIC METABOLIC PNL TOTAL CA: CPT

## 2022-03-02 PROCEDURE — 85025 COMPLETE CBC W/AUTO DIFF WBC: CPT

## 2022-03-02 PROCEDURE — 36415 COLL VENOUS BLD VENIPUNCTURE: CPT

## 2022-03-02 PROCEDURE — 85730 THROMBOPLASTIN TIME PARTIAL: CPT

## 2022-03-02 RX ADMIN — IOPAMIDOL 150 ML: 755 INJECTION, SOLUTION INTRAVENOUS at 07:14

## 2022-03-03 ENCOUNTER — TELEPHONE (OUTPATIENT)
Dept: PRIMARY CARE CLINIC | Age: 67
End: 2022-03-03

## 2022-03-03 LAB
MRSA, DNA, NASAL: NEGATIVE
SPECIMEN DESCRIPTION: NORMAL

## 2022-03-03 NOTE — TELEPHONE ENCOUNTER
The patient had a CTA Abdomen Pelvis W Wo Contrast yesterday. The results are in [facility] [address]. Please review and send surgery letter.     I will route to MD.

## 2022-03-03 NOTE — TELEPHONE ENCOUNTER
Called patient and left message that her MRSA was negative and that she is cleared for surgery. To call office with any questions.

## 2022-03-03 NOTE — TELEPHONE ENCOUNTER
----- Message from [address] [place] [address], APRN - CNP sent at 3/3/2022 11:45 AM EST -----  Cleared for surgery. Thank you.

## 2022-03-04 NOTE — TELEPHONE ENCOUNTER
I returned the patients call mentioned below. I let her know that Dr. Marizol Godinez sent us the following message this morning :     Saw the images, will need to review with radiology. Will send letter ASAP.     Thanks! NK    I will close this phone note.

## 2022-03-04 NOTE — TELEPHONE ENCOUNTER
[facility]    Saw the images, will need to review with radiology. Will send letter ASAP. Thanks!   NK

## 2022-03-04 NOTE — TELEPHONE ENCOUNTER
Pt called inquiring about results and wanting to make sure everything needed has been received. Please call pt .

## 2022-03-10 NOTE — TELEPHONE ENCOUNTER
I received a surgery letter from Dr. Mary Sloan. I spoke with Sandra Young at [facility] ([phone]) to see if CPT Code 58062, [address], [address] or [address] require pre certification. The codes do not require pre certification. Call Reference # [id number]    [facility] will need to submit for ADMIT authorization. I spoke with the patient at the home number listed. The patient is now scheduled for surgery with  on 3-. The patient had her H&P on 3-1-2022, lab work on 3-2-2022 and an EKG on 11-. The patient was told by her PCP that she would not need to repeat the EKG, since the last one was within 6 months. The patient received her COVID vaccines 1-6-2021 and 2-3-2021. The information is documented in Epic. The patient will call the office to schedule her post op appointment once discharged from [facility]. I will submit the surgery letter today. I verbally reviewed all of the surgery information and instructions to the patient today. I will close this phone note.

## 2022-03-11 NOTE — TELEPHONE ENCOUNTER
I received a call from [facility] [employer]. She stated that she spoke with [employer] and was told that CPT Code 12487 requires pre certification. I spoke with Hinda Kanner at [facility] ([phone]) to see if CPT Code 0356 6446188, 54005, 29404 or 59281 require pre certification. CPT Code 35956 requires pre certification, but the other codes do not. I started the pre certification during our call and requested that the case be EXPEDITED. I will fax clinicals to [phone]. I will scan the information that I faxed and the fax success into Epic under the media tab.     Pending Authorization # [id number]

## 2022-03-14 RX ORDER — OXYCODONE HYDROCHLORIDE AND ACETAMINOPHEN 5; 325 MG/1; MG/1
1 TABLET ORAL EVERY 4 HOURS PRN
Status: ON HOLD | COMMUNITY
End: 2022-03-24 | Stop reason: HOSPADM

## 2022-03-14 NOTE — PROGRESS NOTES
Place patient label inside box (if no patient label, complete below)  Name:  :  MR#:   Ac Ra / PROCEDURE  I (we)[place] (Patient Name) authorize Radha Barcenas (Provider / Miriam Santana) and/or such assistants as may be selected by him/her, to perform the following operation/procedure(s): WIDE EXCISION LEFT THIGH SARCOMA,  LEFT LEG RECONSTRUCTION WITH GASTROCNEMIUS, RECTUS FEMORIS FLAP, POSSIBLE DEEP INFERIOR EPIGASTRIC ARTERY  FLAP POSSIBLE ADJACENT TISSUE TRANSFER POSSIBLE SPLIT THICKNESS SKIN GRAFT        Note: If unable to obtain consent prior to an emergent procedure, document the emergent reason in the medical record. This procedure has been explained to my (our) satisfaction and included in the explanation was:  A) The intended benefit, nature, and extent of the procedure to be performed;  B) The significant risks involved and the probability of success;  C) Alternative procedures and methods of treatment;  D) The dangers and probable consequences of such alternatives (including no procedure or treatment); E) The expected consequences of the procedure on my future health;  F) Whether other qualified individuals would be performing important surgical tasks and/or whether  would be present to advise or support the procedure. I (we) understand that there are other risks of infection and other serious complications in the pre-operative/procedural and postoperative/procedural stages of my (our) care. I (we) have asked all of the questions which I (we) thought were important in deciding whether or not to undergo treatment or diagnosis. These questions have been answered to my (our) satisfaction. I (we) understand that no assurance can be given that the procedure will be a success, and no guarantee or warranty of success has been given to me (us).     1. It has been explained to me (us) that during the course of the operation/procedure, unforeseen conditions may be revealed that necessitate extension of the original procedure(s) or different procedure(s) than those set forth in Paragraph 1. I (we) authorize and request that the above-named physician, his/her assistants or his/her designees, perform procedures as necessary and desirable if deemed to be in my (our) best interest.     Revised 8/2/2021                                                                          Page 1 of 2         2. I acknowledge that health care personnel may be observing this procedure for the purpose of medical education or other specified purposes as may be necessary as requested and/or approved by my (our) physician. 3. I (we) consent to the disposal by the hospital Pathologist of the removed tissue, parts or organs in accordance with hospital policy. 4. I do ____ do not ____ consent to the use of a local infiltration pain blocking agent that will be used by my provider/surgical provider to help alleviate pain during my procedure. 5. I do ____ do not ____ consent to an emergent blood transfusion in the case of a life-threatening situation that requires blood components to be administered. This consent is valid for 24 hours from the beginning of the procedure. 6. This patient does ____ or does not ____ currently have a DNR status/order. If DNR order is in place, obtain Addendum to the Surgical Consent for ALL Patients with a DNR Order to address amanda-operative status for limited intervention or DNR suspension.      7. I have read and fully understand the above Consent for Operation/Procedure and that all blanks were completed before I signed the consent.   _____________________________       _____________________      ____/____am/pm  Signature of Patient or legal representative      Printed Name / Relationship            Date / Time   ____________________________       _____________________      ____/____am/pm  Witness to Signature                                    Printed Name                    Date / Time     If patient is unable to sign or is a minor, complete the following)  Patient is a minor, ____ years of age, or unable to sign because:   ______________________________________________________________________________________________    Larance Guerrero If a phone consent is obtained, consent will be documented by using two health care professionals, each affirming that the consenting party has no questions and gives consent for the procedure discussed with the physician/provider.   _____________________          ____________________       _____/_____am/pm   2nd witness to phone consent        Printed name           Date / Time    Informed Consent:  I have provided the explanation described above in section 1 to the patient and/or legal representative.  I have provided the patient and/or legal representative with an opportunity to ask any questions about the proposed operation/procedure.   ___________________________          ____________________         ____/____am/pm  Provider / Proceduralist                            Printed name            Date / Time  Revised 8/2/2021                                                                      Page 2 of 2

## 2022-03-14 NOTE — PROGRESS NOTES
[facility] patients having surgery or anesthesia are required to be Covid tested OR to have been vaccinated at least 14 days prior to your procedure. It is very important to return our call to [phone] and notify the staff of your last vaccination date otherwise you will be required to complete Covid PCR test within the 5-6 days prior to surgery & quarantine. The results will need to be faxed to PreAdmission Testing at [phone]. PRIOR TO PROCEDURE DATE:        1. PLEASE FOLLOW ANY  GUIDELINES/ INSTRUCTIONS PRIOR TO YOUR PROCEDURE AS ADVISED BY YOUR SURGEON. 2. Arrange for someone to drive you home and be with you for the first 24 hours after discharge for your safety after your procedure for which you received sedation. Ensure it is someone we can share information with regarding your discharge. 3. You must contact your surgeon for instructions IF:   You are taking any blood thinners, aspirin, anti-inflammatory or vitamin E.   There is a change in your physical condition such as a cold, fever, rash, cuts, sores or any other infection, especially near your surgical site. 4. Do not drink alcohol the day before or day of your procedure. 5. A Pre-op History and Physical for surgery MUST be completed by your Physician or Urgent Care within 30 days of your procedure date. Please bring a copy with you on the day of your procedure and along with any other testing performed. THE DAY OF YOUR PROCEDURE:  1. Follow instructions for ARRIVAL TIME as DIRECTED BY YOUR SURGEON. 2. Enter the MAIN entrance from [employer] and follow the signs to the free [employer] or [employer] parking (offered free of charge 6am-5pm). 3. Enter the Main Entrance of the hospital (do not enter from the lower level of the parking garage). Upon entrance, check in with the  at the main desk on your left. If no one is available at the desk, proceed into the [facility] Waiting Room and go through the door directly into the [facility]. There is a Check-in desk ACROSS from Room 5 (marked with a sign hanging from the ceiling). The phone number for the surgery center is [phone]. 4. Please call [phone] option #2 option #2 if you have not been preregistered yet. On the day of your procedure bring your insurance card and photo ID. You will be registered at your bedside once brought back to your room. 5. DO NOT EAT ANYTHING eight hours prior to your arrival for surgery. May have 8 ounces of water 4 hours prior to your arrival for surgery. NOTE: ALL Gastric, Bariatric and Bowel surgery patients MUST follow their surgeon's instructions. 6. MEDICATIONS    Take the following medications with a SMALL sip of water: PERCOCET   Bariatric patient's call surgeon if on diabetic medications as some need to be stopped 1 week preop   Use your usual dose of inhalers the morning of surgery. BRING your rescue inhaler with you to hospital.    Anesthesia does NOT want you to take insulin the morning of surgery. They will control your blood sugar while you are at the hospital. Please contact your ordering physician for instructions regarding your insulin the night before your procedure. If you have an insulin pump, please keep it set on basal rate. 7. Do not swallow water when brushing teeth. No gum, candy, mints or ice chips. Refrain from smoking or at least decrease the amount. 8. Dress in loose, comfortable clothing appropriate for redressing after your procedure. Do not wear jewelry (including body piercings), make-up (especially NO eye make-up), fingernail polish (NO toenail polish if foot/leg surgery), lotion, powders or metal hairclips. 9. Dentures, glasses, or contacts will need to be removed before your procedure.  Bring cases for your glasses, contacts, dentures, or hearing aids to protect them while you are in surgery. 10. If you use a CPAP, please bring it with you on the day of your procedure. 11. We recommend that valuable personal  belongings such as cash, cell phones, e-tablets or jewelry, be left at home during your stay. The hospital will not be responsible for valuables that are not secured in the hospital safe. However, if your insurance requires a co-pay, you may want to bring a method of payment, i.e. Check or credit card, if you wish to pay your co-pay the day of surgery. 12. If you are to stay overnight, you may bring a bag with personal items. Please have any large items you may need brought in by your family after your arrival to your hospital room. 15. If you have a Living Will or Durable Power of , please bring a copy on the day of your procedure. 15. With your permission, one family member may accompany you while you are being prepared for surgery. Once you are ready, additional family members may join you. HOW WE KEEP YOU SAFE and WORK TO PREVENT SURGICAL SITE INFECTIONS:  1. Health care workers should always check your ID bracelet to verify your name and birth date. You will be asked many times to state your name, date of birth, and allergies. 2. Health care workers should always clean their hands with soap or alcohol gel before providing care to you. It is okay to ask anyone if they cleaned their hands before they touch you. 3. You will be actively involved in verifying the type of procedure you are having and ensuring the correct surgical site. This will be confirmed multiple times prior to your procedure. Do NOT coty your surgery site UNLESS instructed to by your surgeon. 4. Do not shave or wax for 72 hours prior to procedure near your operative site. Shaving with a razor can irritate your skin and make it easier to develop an infection.  On the day of your procedure, any hair that needs to be removed near the surgical site will be WITH YOU AND/OR YOUR FAMILY:  Yes Hibiclens® Bathing Instructions   No Antibacterial Soap

## 2022-03-15 ENCOUNTER — APPOINTMENT (OUTPATIENT)
Dept: RADIATION ONCOLOGY | Age: 67
End: 2022-03-15
Attending: RADIOLOGY
Payer: COMMERCIAL

## 2022-03-15 NOTE — PROGRESS NOTES
3/15 Pt, PTT, ROUTED to pcp, Dr Ancelmo White and Dr Gege Maynard. Also messaged Karina at Dr Jessy Mckeon with results-mp    3/15 per Karina at Dr Gloria Giraldo, he is fine with coags, but final is with Dr Jcarlos Green. Lm at Dr Jeremiah Lozano to have MA call this nurse back-mp    3/15 COAGS FAXED TO MARSHALL AT DR Severiano Roch    3/17LMOR FOR GRAHAM TO CALL -MP    3/18 resent coags to [facility], aware pcp cleared and Dr Gege Maynard aware and fine with them.   Will repeat dos-mp

## 2022-03-18 ENCOUNTER — ANESTHESIA EVENT (OUTPATIENT)
Dept: OPERATING ROOM | Age: 67
DRG: 498 | End: 2022-03-18
Payer: COMMERCIAL

## 2022-03-21 ENCOUNTER — HOSPITAL ENCOUNTER (INPATIENT)
Age: 67
LOS: 4 days | Discharge: NURSING, REHAB OR LONG-TERM CARE | DRG: 498 | End: 2022-03-25
Attending: ORTHOPAEDIC SURGERY | Admitting: SURGERY
Payer: COMMERCIAL

## 2022-03-21 ENCOUNTER — ANESTHESIA (OUTPATIENT)
Dept: OPERATING ROOM | Age: 67
DRG: 498 | End: 2022-03-21
Payer: COMMERCIAL

## 2022-03-21 VITALS
DIASTOLIC BLOOD PRESSURE: 49 MMHG | OXYGEN SATURATION: 100 % | TEMPERATURE: 98.4 F | RESPIRATION RATE: 17 BRPM | SYSTOLIC BLOOD PRESSURE: 89 MMHG

## 2022-03-21 DIAGNOSIS — C49.22 SOFT TISSUE SARCOMA OF THIGH, LEFT (HCC): ICD-10-CM

## 2022-03-21 PROBLEM — C49.9 SARCOMA (HCC): Status: ACTIVE | Noted: 2022-03-21

## 2022-03-21 LAB
ABO/RH: NORMAL
ANTIBODY SCREEN: NORMAL
APTT: 36.1 SEC (ref 26.2–38.6)
INR BLD: 1.21 (ref 0.88–1.12)
PROTHROMBIN TIME: 13.8 SEC (ref 9.9–12.7)

## 2022-03-21 PROCEDURE — 2580000003 HC RX 258: Performed by: ORTHOPAEDIC SURGERY

## 2022-03-21 PROCEDURE — 85610 PROTHROMBIN TIME: CPT

## 2022-03-21 PROCEDURE — 14302 TIS TRNFR ADDL 30 SQ CM: CPT | Performed by: SURGERY

## 2022-03-21 PROCEDURE — 6360000002 HC RX W HCPCS: Performed by: ORTHOPAEDIC SURGERY

## 2022-03-21 PROCEDURE — 2720000010 HC SURG SUPPLY STERILE: Performed by: ORTHOPAEDIC SURGERY

## 2022-03-21 PROCEDURE — C1729 CATH, DRAINAGE: HCPCS | Performed by: ORTHOPAEDIC SURGERY

## 2022-03-21 PROCEDURE — 3600000004 HC SURGERY LEVEL 4 BASE: Performed by: ORTHOPAEDIC SURGERY

## 2022-03-21 PROCEDURE — 6370000000 HC RX 637 (ALT 250 FOR IP): Performed by: ANESTHESIOLOGY

## 2022-03-21 PROCEDURE — 86850 RBC ANTIBODY SCREEN: CPT

## 2022-03-21 PROCEDURE — 6360000002 HC RX W HCPCS: Performed by: NURSE ANESTHETIST, CERTIFIED REGISTERED

## 2022-03-21 PROCEDURE — 13122 CMPLX RPR S/A/L ADDL 5 CM/>: CPT | Performed by: SURGERY

## 2022-03-21 PROCEDURE — 85730 THROMBOPLASTIN TIME PARTIAL: CPT

## 2022-03-21 PROCEDURE — 13121 CMPLX RPR S/A/L 2.6-7.5 CM: CPT | Performed by: SURGERY

## 2022-03-21 PROCEDURE — 2580000003 HC RX 258: Performed by: ANESTHESIOLOGY

## 2022-03-21 PROCEDURE — 3700000000 HC ANESTHESIA ATTENDED CARE: Performed by: ORTHOPAEDIC SURGERY

## 2022-03-21 PROCEDURE — 2500000003 HC RX 250 WO HCPCS: Performed by: ORTHOPAEDIC SURGERY

## 2022-03-21 PROCEDURE — 3600000014 HC SURGERY LEVEL 4 ADDTL 15MIN: Performed by: ORTHOPAEDIC SURGERY

## 2022-03-21 PROCEDURE — 2700000000 HC OXYGEN THERAPY PER DAY

## 2022-03-21 PROCEDURE — 2500000003 HC RX 250 WO HCPCS: Performed by: NURSE ANESTHETIST, CERTIFIED REGISTERED

## 2022-03-21 PROCEDURE — 0KXT0ZZ TRANSFER LEFT LOWER LEG MUSCLE, OPEN APPROACH: ICD-10-PCS | Performed by: SURGERY

## 2022-03-21 PROCEDURE — 2580000003 HC RX 258: Performed by: SURGERY

## 2022-03-21 PROCEDURE — 94761 N-INVAS EAR/PLS OXIMETRY MLT: CPT

## 2022-03-21 PROCEDURE — 6360000002 HC RX W HCPCS: Performed by: ANESTHESIOLOGY

## 2022-03-21 PROCEDURE — 7100000001 HC PACU RECOVERY - ADDTL 15 MIN: Performed by: ORTHOPAEDIC SURGERY

## 2022-03-21 PROCEDURE — 3700000001 HC ADD 15 MINUTES (ANESTHESIA): Performed by: ORTHOPAEDIC SURGERY

## 2022-03-21 PROCEDURE — 97607 NEG PRS WND THR NDME<=50SQCM: CPT | Performed by: SURGERY

## 2022-03-21 PROCEDURE — 14301 TIS TRNFR ANY 30.1-60 SQ CM: CPT | Performed by: SURGERY

## 2022-03-21 PROCEDURE — 2709999900 HC NON-CHARGEABLE SUPPLY: Performed by: ORTHOPAEDIC SURGERY

## 2022-03-21 PROCEDURE — 6370000000 HC RX 637 (ALT 250 FOR IP): Performed by: ORTHOPAEDIC SURGERY

## 2022-03-21 PROCEDURE — 7100000000 HC PACU RECOVERY - FIRST 15 MIN: Performed by: ORTHOPAEDIC SURGERY

## 2022-03-21 PROCEDURE — 6370000000 HC RX 637 (ALT 250 FOR IP): Performed by: INTERNAL MEDICINE

## 2022-03-21 PROCEDURE — 94150 VITAL CAPACITY TEST: CPT

## 2022-03-21 PROCEDURE — 86901 BLOOD TYPING SEROLOGIC RH(D): CPT

## 2022-03-21 PROCEDURE — 0QB70ZZ EXCISION OF LEFT UPPER FEMUR, OPEN APPROACH: ICD-10-PCS | Performed by: SURGERY

## 2022-03-21 PROCEDURE — 1200000000 HC SEMI PRIVATE

## 2022-03-21 PROCEDURE — A4217 STERILE WATER/SALINE, 500 ML: HCPCS | Performed by: ORTHOPAEDIC SURGERY

## 2022-03-21 PROCEDURE — 86900 BLOOD TYPING SEROLOGIC ABO: CPT

## 2022-03-21 PROCEDURE — 88307 TISSUE EXAM BY PATHOLOGIST: CPT

## 2022-03-21 PROCEDURE — 6360000002 HC RX W HCPCS: Performed by: SURGERY

## 2022-03-21 RX ORDER — ONDANSETRON 4 MG/1
4 TABLET, ORALLY DISINTEGRATING ORAL EVERY 8 HOURS PRN
Status: DISCONTINUED | OUTPATIENT
Start: 2022-03-21 | End: 2022-03-25 | Stop reason: HOSPADM

## 2022-03-21 RX ORDER — ROCURONIUM BROMIDE 10 MG/ML
INJECTION, SOLUTION INTRAVENOUS PRN
Status: DISCONTINUED | OUTPATIENT
Start: 2022-03-21 | End: 2022-03-21 | Stop reason: SDUPTHER

## 2022-03-21 RX ORDER — OXYCODONE HYDROCHLORIDE 5 MG/1
10 TABLET ORAL EVERY 4 HOURS PRN
Status: DISCONTINUED | OUTPATIENT
Start: 2022-03-21 | End: 2022-03-25 | Stop reason: HOSPADM

## 2022-03-21 RX ORDER — DEXTROSE, SODIUM CHLORIDE, AND POTASSIUM CHLORIDE 5; .45; .15 G/100ML; G/100ML; G/100ML
1000 INJECTION INTRAVENOUS CONTINUOUS
Status: DISCONTINUED | OUTPATIENT
Start: 2022-03-21 | End: 2022-03-22

## 2022-03-21 RX ORDER — OXYCODONE HYDROCHLORIDE 5 MG/1
5 TABLET ORAL EVERY 4 HOURS PRN
Status: DISCONTINUED | OUTPATIENT
Start: 2022-03-21 | End: 2022-03-21 | Stop reason: SDUPTHER

## 2022-03-21 RX ORDER — SCOLOPAMINE TRANSDERMAL SYSTEM 1 MG/1
1 PATCH, EXTENDED RELEASE TRANSDERMAL ONCE
Status: COMPLETED | OUTPATIENT
Start: 2022-03-21 | End: 2022-03-24

## 2022-03-21 RX ORDER — LIDOCAINE HYDROCHLORIDE 20 MG/ML
INJECTION, SOLUTION INTRAVENOUS PRN
Status: DISCONTINUED | OUTPATIENT
Start: 2022-03-21 | End: 2022-03-21 | Stop reason: SDUPTHER

## 2022-03-21 RX ORDER — SODIUM CHLORIDE 9 MG/ML
25 INJECTION, SOLUTION INTRAVENOUS PRN
Status: DISCONTINUED | OUTPATIENT
Start: 2022-03-21 | End: 2022-03-21 | Stop reason: HOSPADM

## 2022-03-21 RX ORDER — SODIUM CHLORIDE 0.9 % (FLUSH) 0.9 %
5-40 SYRINGE (ML) INJECTION EVERY 12 HOURS SCHEDULED
Status: DISCONTINUED | OUTPATIENT
Start: 2022-03-21 | End: 2022-03-25 | Stop reason: HOSPADM

## 2022-03-21 RX ORDER — OXYCODONE HYDROCHLORIDE 5 MG/1
5 TABLET ORAL EVERY 4 HOURS PRN
Status: DISCONTINUED | OUTPATIENT
Start: 2022-03-21 | End: 2022-03-25 | Stop reason: HOSPADM

## 2022-03-21 RX ORDER — SODIUM CHLORIDE 0.9 % (FLUSH) 0.9 %
5-40 SYRINGE (ML) INJECTION EVERY 12 HOURS SCHEDULED
Status: DISCONTINUED | OUTPATIENT
Start: 2022-03-21 | End: 2022-03-21 | Stop reason: HOSPADM

## 2022-03-21 RX ORDER — SODIUM CHLORIDE 0.9 % (FLUSH) 0.9 %
5-40 SYRINGE (ML) INJECTION PRN
Status: DISCONTINUED | OUTPATIENT
Start: 2022-03-21 | End: 2022-03-21 | Stop reason: HOSPADM

## 2022-03-21 RX ORDER — ONDANSETRON 2 MG/ML
INJECTION INTRAMUSCULAR; INTRAVENOUS PRN
Status: DISCONTINUED | OUTPATIENT
Start: 2022-03-21 | End: 2022-03-21 | Stop reason: SDUPTHER

## 2022-03-21 RX ORDER — PROPOFOL 10 MG/ML
INJECTION, EMULSION INTRAVENOUS PRN
Status: DISCONTINUED | OUTPATIENT
Start: 2022-03-21 | End: 2022-03-21 | Stop reason: SDUPTHER

## 2022-03-21 RX ORDER — MEPERIDINE HYDROCHLORIDE 25 MG/ML
12.5 INJECTION INTRAMUSCULAR; INTRAVENOUS; SUBCUTANEOUS EVERY 5 MIN PRN
Status: DISCONTINUED | OUTPATIENT
Start: 2022-03-21 | End: 2022-03-21 | Stop reason: HOSPADM

## 2022-03-21 RX ORDER — FENTANYL CITRATE 50 UG/ML
INJECTION, SOLUTION INTRAMUSCULAR; INTRAVENOUS PRN
Status: DISCONTINUED | OUTPATIENT
Start: 2022-03-21 | End: 2022-03-21 | Stop reason: SDUPTHER

## 2022-03-21 RX ORDER — SODIUM CHLORIDE, SODIUM LACTATE, POTASSIUM CHLORIDE, CALCIUM CHLORIDE 600; 310; 30; 20 MG/100ML; MG/100ML; MG/100ML; MG/100ML
INJECTION, SOLUTION INTRAVENOUS CONTINUOUS
Status: DISCONTINUED | OUTPATIENT
Start: 2022-03-21 | End: 2022-03-21

## 2022-03-21 RX ORDER — SUCCINYLCHOLINE/SOD CL,ISO/PF 200MG/10ML
SYRINGE (ML) INTRAVENOUS PRN
Status: DISCONTINUED | OUTPATIENT
Start: 2022-03-21 | End: 2022-03-21 | Stop reason: SDUPTHER

## 2022-03-21 RX ORDER — ONDANSETRON 2 MG/ML
4 INJECTION INTRAMUSCULAR; INTRAVENOUS EVERY 6 HOURS PRN
Status: DISCONTINUED | OUTPATIENT
Start: 2022-03-21 | End: 2022-03-25 | Stop reason: HOSPADM

## 2022-03-21 RX ORDER — SODIUM CHLORIDE 0.9 % (FLUSH) 0.9 %
5-40 SYRINGE (ML) INJECTION PRN
Status: DISCONTINUED | OUTPATIENT
Start: 2022-03-21 | End: 2022-03-25 | Stop reason: HOSPADM

## 2022-03-21 RX ORDER — MAGNESIUM HYDROXIDE 1200 MG/15ML
LIQUID ORAL CONTINUOUS PRN
Status: COMPLETED | OUTPATIENT
Start: 2022-03-21 | End: 2022-03-21

## 2022-03-21 RX ORDER — APREPITANT 40 MG/1
40 CAPSULE ORAL ONCE
Status: COMPLETED | OUTPATIENT
Start: 2022-03-21 | End: 2022-03-21

## 2022-03-21 RX ORDER — GLYCOPYRROLATE 1 MG/5 ML
SYRINGE (ML) INTRAVENOUS PRN
Status: DISCONTINUED | OUTPATIENT
Start: 2022-03-21 | End: 2022-03-21 | Stop reason: SDUPTHER

## 2022-03-21 RX ORDER — ACETAMINOPHEN 325 MG/1
650 TABLET ORAL EVERY 4 HOURS PRN
Status: DISCONTINUED | OUTPATIENT
Start: 2022-03-21 | End: 2022-03-23

## 2022-03-21 RX ORDER — ESMOLOL HYDROCHLORIDE 10 MG/ML
INJECTION INTRAVENOUS PRN
Status: DISCONTINUED | OUTPATIENT
Start: 2022-03-21 | End: 2022-03-21 | Stop reason: SDUPTHER

## 2022-03-21 RX ORDER — SODIUM CHLORIDE 9 MG/ML
25 INJECTION, SOLUTION INTRAVENOUS PRN
Status: DISCONTINUED | OUTPATIENT
Start: 2022-03-21 | End: 2022-03-25 | Stop reason: HOSPADM

## 2022-03-21 RX ADMIN — BISACODYL 5 MG: 5 TABLET, COATED ORAL at 17:53

## 2022-03-21 RX ADMIN — FENTANYL CITRATE 50 MCG: 50 INJECTION, SOLUTION INTRAMUSCULAR; INTRAVENOUS at 10:14

## 2022-03-21 RX ADMIN — ESMOLOL HYDROCHLORIDE 5 MG: 10 INJECTION, SOLUTION INTRAVENOUS at 07:34

## 2022-03-21 RX ADMIN — CEFAZOLIN SODIUM 2000 MG: 10 INJECTION, POWDER, FOR SOLUTION INTRAVENOUS at 23:22

## 2022-03-21 RX ADMIN — HYDROMORPHONE HYDROCHLORIDE 0.5 MG: 1 INJECTION, SOLUTION INTRAMUSCULAR; INTRAVENOUS; SUBCUTANEOUS at 10:50

## 2022-03-21 RX ADMIN — SODIUM CHLORIDE, PRESERVATIVE FREE 10 ML: 5 INJECTION INTRAVENOUS at 20:46

## 2022-03-21 RX ADMIN — PROPOFOL 150 MG: 10 INJECTION, EMULSION INTRAVENOUS at 07:34

## 2022-03-21 RX ADMIN — ROCURONIUM BROMIDE 10 MG: 10 INJECTION INTRAVENOUS at 08:48

## 2022-03-21 RX ADMIN — PHENYLEPHRINE HYDROCHLORIDE 100 MCG: 10 INJECTION, SOLUTION INTRAMUSCULAR; INTRAVENOUS; SUBCUTANEOUS at 09:00

## 2022-03-21 RX ADMIN — OXYCODONE 5 MG: 5 TABLET ORAL at 17:53

## 2022-03-21 RX ADMIN — Medication 100 MG: at 07:35

## 2022-03-21 RX ADMIN — ROCURONIUM BROMIDE 25 MG: 10 INJECTION INTRAVENOUS at 07:46

## 2022-03-21 RX ADMIN — OXYCODONE HYDROCHLORIDE 5 MG: 5 TABLET ORAL at 13:00

## 2022-03-21 RX ADMIN — ONDANSETRON 4 MG: 2 INJECTION INTRAMUSCULAR; INTRAVENOUS at 10:10

## 2022-03-21 RX ADMIN — FENTANYL CITRATE 25 MCG: 50 INJECTION, SOLUTION INTRAMUSCULAR; INTRAVENOUS at 07:34

## 2022-03-21 RX ADMIN — POTASSIUM CHLORIDE, DEXTROSE MONOHYDRATE AND SODIUM CHLORIDE 1000 ML: 150; 5; 450 INJECTION, SOLUTION INTRAVENOUS at 11:20

## 2022-03-21 RX ADMIN — SUGAMMADEX 200 MG: 100 INJECTION, SOLUTION INTRAVENOUS at 10:10

## 2022-03-21 RX ADMIN — ESMOLOL HYDROCHLORIDE 5 MG: 10 INJECTION, SOLUTION INTRAVENOUS at 07:36

## 2022-03-21 RX ADMIN — ACETAMINOPHEN 650 MG: 325 TABLET ORAL at 22:55

## 2022-03-21 RX ADMIN — ROCURONIUM BROMIDE 5 MG: 10 INJECTION INTRAVENOUS at 07:34

## 2022-03-21 RX ADMIN — HYDROMORPHONE HYDROCHLORIDE 0.25 MG: 1 INJECTION, SOLUTION INTRAMUSCULAR; INTRAVENOUS; SUBCUTANEOUS at 11:24

## 2022-03-21 RX ADMIN — HYDROMORPHONE HYDROCHLORIDE 0.5 MG: 1 INJECTION, SOLUTION INTRAMUSCULAR; INTRAVENOUS; SUBCUTANEOUS at 10:57

## 2022-03-21 RX ADMIN — ROCURONIUM BROMIDE 10 MG: 10 INJECTION INTRAVENOUS at 09:31

## 2022-03-21 RX ADMIN — FENTANYL CITRATE 25 MCG: 50 INJECTION, SOLUTION INTRAMUSCULAR; INTRAVENOUS at 07:57

## 2022-03-21 RX ADMIN — SODIUM CHLORIDE, POTASSIUM CHLORIDE, SODIUM LACTATE AND CALCIUM CHLORIDE: 600; 310; 30; 20 INJECTION, SOLUTION INTRAVENOUS at 09:30

## 2022-03-21 RX ADMIN — SODIUM CHLORIDE, POTASSIUM CHLORIDE, SODIUM LACTATE AND CALCIUM CHLORIDE: 600; 310; 30; 20 INJECTION, SOLUTION INTRAVENOUS at 06:25

## 2022-03-21 RX ADMIN — HYDROMORPHONE HYDROCHLORIDE 0.25 MG: 1 INJECTION, SOLUTION INTRAMUSCULAR; INTRAVENOUS; SUBCUTANEOUS at 11:33

## 2022-03-21 RX ADMIN — ONDANSETRON 4 MG: 2 INJECTION INTRAMUSCULAR; INTRAVENOUS at 08:00

## 2022-03-21 RX ADMIN — CEFAZOLIN SODIUM 2000 MG: 10 INJECTION, POWDER, FOR SOLUTION INTRAVENOUS at 17:56

## 2022-03-21 RX ADMIN — HYDROMORPHONE HYDROCHLORIDE 1 MG: 1 INJECTION, SOLUTION INTRAMUSCULAR; INTRAVENOUS; SUBCUTANEOUS at 12:09

## 2022-03-21 RX ADMIN — ROCURONIUM BROMIDE 20 MG: 10 INJECTION INTRAVENOUS at 08:05

## 2022-03-21 RX ADMIN — CEFAZOLIN SODIUM 2000 MG: 10 INJECTION, POWDER, FOR SOLUTION INTRAVENOUS at 07:28

## 2022-03-21 RX ADMIN — LIDOCAINE HYDROCHLORIDE 100 MG: 20 INJECTION, SOLUTION INTRAVENOUS at 07:34

## 2022-03-21 RX ADMIN — OXYCODONE 5 MG: 5 TABLET ORAL at 22:44

## 2022-03-21 RX ADMIN — Medication 0.2 MG: at 09:04

## 2022-03-21 RX ADMIN — APREPITANT 40 MG: 40 CAPSULE ORAL at 06:59

## 2022-03-21 ASSESSMENT — PULMONARY FUNCTION TESTS
PIF_VALUE: 18
PIF_VALUE: 19
PIF_VALUE: 2
PIF_VALUE: 18
PIF_VALUE: 2
PIF_VALUE: 36
PIF_VALUE: 18
PIF_VALUE: 2
PIF_VALUE: 3
PIF_VALUE: 18
PIF_VALUE: 19
PIF_VALUE: 20
PIF_VALUE: 18
PIF_VALUE: 18
PIF_VALUE: 20
PIF_VALUE: 18
PIF_VALUE: 2
PIF_VALUE: 20
PIF_VALUE: 18
PIF_VALUE: 5
PIF_VALUE: 18
PIF_VALUE: 20
PIF_VALUE: 18
PIF_VALUE: 18
PIF_VALUE: 19
PIF_VALUE: 18
PIF_VALUE: 19
PIF_VALUE: 19
PIF_VALUE: 18
PIF_VALUE: 20
PIF_VALUE: 18
PIF_VALUE: 19
PIF_VALUE: 18
PIF_VALUE: 20
PIF_VALUE: 18
PIF_VALUE: 18
PIF_VALUE: 3
PIF_VALUE: 18
PIF_VALUE: 3
PIF_VALUE: 20
PIF_VALUE: 18
PIF_VALUE: 19
PIF_VALUE: 19
PIF_VALUE: 18
PIF_VALUE: 19
PIF_VALUE: 19
PIF_VALUE: 18
PIF_VALUE: 20
PIF_VALUE: 1
PIF_VALUE: 18
PIF_VALUE: 19
PIF_VALUE: 18
PIF_VALUE: 18
PIF_VALUE: 20
PIF_VALUE: 18
PIF_VALUE: 19
PIF_VALUE: 18
PIF_VALUE: 18
PIF_VALUE: 19
PIF_VALUE: 19
PIF_VALUE: 2
PIF_VALUE: 18
PIF_VALUE: 19
PIF_VALUE: 18
PIF_VALUE: 2
PIF_VALUE: 18
PIF_VALUE: 20
PIF_VALUE: 18
PIF_VALUE: 3
PIF_VALUE: 19
PIF_VALUE: 18
PIF_VALUE: 2
PIF_VALUE: 18
PIF_VALUE: 21
PIF_VALUE: 2
PIF_VALUE: 18
PIF_VALUE: 18
PIF_VALUE: 2
PIF_VALUE: 19
PIF_VALUE: 18
PIF_VALUE: 18
PIF_VALUE: 21
PIF_VALUE: 18
PIF_VALUE: 20
PIF_VALUE: 18
PIF_VALUE: 18
PIF_VALUE: 19
PIF_VALUE: 19
PIF_VALUE: 18
PIF_VALUE: 18
PIF_VALUE: 20
PIF_VALUE: 18
PIF_VALUE: 18
PIF_VALUE: 20
PIF_VALUE: 18

## 2022-03-21 ASSESSMENT — PAIN DESCRIPTION - ONSET: ONSET: ON-GOING

## 2022-03-21 ASSESSMENT — PAIN SCALES - GENERAL
PAINLEVEL_OUTOF10: 10
PAINLEVEL_OUTOF10: 4
PAINLEVEL_OUTOF10: 10
PAINLEVEL_OUTOF10: 10
PAINLEVEL_OUTOF10: 4
PAINLEVEL_OUTOF10: 2
PAINLEVEL_OUTOF10: 6
PAINLEVEL_OUTOF10: 6
PAINLEVEL_OUTOF10: 4
PAINLEVEL_OUTOF10: 9
PAINLEVEL_OUTOF10: 10
PAINLEVEL_OUTOF10: 2
PAINLEVEL_OUTOF10: 7
PAINLEVEL_OUTOF10: 6

## 2022-03-21 ASSESSMENT — PAIN DESCRIPTION - DESCRIPTORS: DESCRIPTORS: BURNING

## 2022-03-21 ASSESSMENT — PAIN DESCRIPTION - LOCATION
LOCATION: LEG

## 2022-03-21 ASSESSMENT — PAIN DESCRIPTION - PAIN TYPE
TYPE: SURGICAL PAIN
TYPE: ACUTE PAIN;SURGICAL PAIN
TYPE: SURGICAL PAIN

## 2022-03-21 ASSESSMENT — PAIN DESCRIPTION - ORIENTATION
ORIENTATION: LEFT

## 2022-03-21 ASSESSMENT — PAIN - FUNCTIONAL ASSESSMENT: PAIN_FUNCTIONAL_ASSESSMENT: 0-10

## 2022-03-21 ASSESSMENT — PAIN DESCRIPTION - FREQUENCY: FREQUENCY: CONTINUOUS

## 2022-03-21 NOTE — OP NOTE
[place] [address] SURGERY     OPERATIVE DICTATION    NAME: Annmarie Lackey   MRN: [id number]  DATE: 3/21/2022    AGE: 61 y.o. LOCATION: [facility]    PREOPERATIVE DIAGNOSIS:  Left thigh sarcoma. POSTOPERATIVE DIAGNOSIS:  Same. OPERATION PERFORMED: 1) Complex closure of the left leg including knee (27.5 cm)       2) Advancement of the medial thigh (wound: 15 x 3 cm; advancement flap: 15 x 4.8 cm)       3) Application of Prevena incisional wound vacuum device     SURGEON:  Segun Hutchins MD    SURGEON2: Jelani Umana MD     ANESTHESIA: General     ESTIMATED BLOOD LOSS:  75 cc (from plastics)     DRAINS: 1 (15F round)     SPECIMENS: None from plastics     OPERATIVE INDICATIONS:  This is a 61 y.o. female who presented to the office in consultation for a large, left distal thigh sarcoma that has been growing rapidly. She underwent preoperative radiation treatment and plastic surgery was consulted for assistance with coverage given the extent of resection anticipated. We discussed multiple options and a thorough discussion regarding the risks, benefits, alternatives, outcomes, and personnel involved was performed with the patient and family. After all questions were answered to the patient's satisfaction, they agreed to proceed. OPERATIVE PROCEDURE:  The patient was marked in the preoperative holding area and then brought to the operating room and placed in the supine position on the operating room table. Dr. Moon Gupta began by performing the extirpation as will be dictated in a separate operative note. Upon completion, I scrubbed and assessed the wound. The wound was approximately 48 x 5 cm and was noted to be devoid of musculature of the anterolateral thigh including the vastus lateralis. The quadriceps were also divided though the rectus femoris was in place, though weakened and the knee joint space was exposed.  After reviewing the extent of the wound, it was deemed to perform two separate issues, namely coverage of the knee joint space as well as obliteration of as much deadspace of the thigh as possible. We began by mobilizing the distal tissues of the remaining capsule and fascia. The capsule was closed in layers with 0-Ethibon sutures. There was excellent coverage and thus a gastrocnemius flap was not performed at this juncture. It was deemed that should there be issues with fluid in the future, this would be feasible. The crural fascia superiorly and near the middle of the wound was widely dissected. A 15F drain was placed into the deep space of the thigh created by the cancer excision and secured in place using 0-Silk suture. The fascia was then closed using 2-0 PDS sutures. To further obliterate the dead space, the medial flap of the thigh was advanced and this area was de-epithelialized and buried into position using 2-0 PDS sutures. The dermis was approximated using 3-0 Monocryl sutures followed by a running 4-0 Prolene suture. A Prevena incisional vac was applied with excellent seal.  The patient was then placed in a posterior, knee immobilizing splint and awakened and taken to the PACU in stable condition. There were no immediate complications and the patient tolerated the procedure well. At the end of the case, all counts were correct.     Ninoska Onofre MD

## 2022-03-21 NOTE — CARE COORDINATION
Cm following, spoke with pt and  at bedside, pt from home ind with  they live 3+ hours [facility] she work for Urology from [employer], agreeable to use [facility] [place] if needed. Pt with [address] to surgical site.    Electronically signed by Lowell Collins RN on 3/21/2022 at 2:45 PM   [phone]

## 2022-03-21 NOTE — H&P
[address] [address]    [facility] P  Department of General Surgery   Surgical Service   Pre-operative History and Physical  Last H & P within the last 30 days. 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 Deepak Cervantes History obtained from: Patient interview and EHR      HISTORY OF PRESENT ILLNESS:   The patient is a 61 y.o. female with biopsy demonstrated sarcome of the left thigh presents today for the above procedures with Raheel Peña and Javi Rodriguez. Covid 19:  Patient denies fever, chills, worsening cough, or known exposure to Covid-19.        Past Medical History:        Diagnosis Date    Cancer [facility])     SARCOMA LEFT THIGH    History of radiation therapy     PONV (postoperative nausea and vomiting)      Past Surgical History:        Procedure Laterality Date    BREAST BIOPSY  2014     SECTION, LOW TRANSVERSE       &     COLONOSCOPY N/A 3/30/2018    COLONOSCOPY WITH BIOPSY and photos performed by Kamaljit Beltran MD at [facility]  2018    Small polyp upper part of the right colon--sessile serrated adenoma, redundant colon and spasms    LEG BIOPSY EXCISION Left 2021    INCISIONAL BIOPSY LEFT THIGH MASS performed by Philly Lizarraga MD at [place] [address] Left     LLE, TUMOR ON SHIN     Past Social History:  Social History     Socioeconomic History    Marital status:      Spouse name: None    Number of children: None    Years of education: None    Highest education level: None   Occupational History    None   Tobacco Use    Smoking status: Never Smoker    Smokeless tobacco: Never Used   Vaping Use    Vaping Use: Never used   Substance and Sexual Activity    Alcohol use: Yes     Comment: Socially    Drug use: No    Sexual activity: None   Other Topics Concern    None   Social History Narrative    None     Social Determinants of Health     Financial Resource Strain: Unknown    Difficulty of Paying Living Expenses: Patient refused   Food Insecurity: Unknown    Worried About Running Out of Food in the Last Year: Patient refused    [address] in the Last Year: Patient refused   Transportation Needs:     Lack of Transportation (Medical): Not on file    Lack of Transportation (Non-Medical): Not on file   Physical Activity:     Days of Exercise per Week: Not on file    Minutes of Exercise per Session: Not on file   Stress:     Feeling of Stress : Not on file   Social Connections:     Frequency of Communication with Friends and Family: Not on file    Frequency of Social Gatherings with Friends and Family: Not on file    Attends [employer] Services: Not on file    Active Member of [address] [place] [address] [employer] medical or Organizations: Not on file    Attends Club or Organization Meetings: Not on file    Marital Status: Not on file   Intimate Partner Violence:     Fear of Current or Ex-Partner: Not on file    Emotionally Abused: Not on file    Physically Abused: Not on file    Sexually Abused: Not on file   Housing Stability:     Unable to Pay for Housing in the Last Year: Not on file    Number of [place] in the Last Year: Not on file    Unstable Housing in the Last Year: Not on file         Medications Prior to Admission:      Prior to Admission medications    Medication Sig Start Date End Date Taking? Authorizing Provider   oxyCODONE-acetaminophen (PERCOCET) 5-325 MG per tablet Take 1 tablet by mouth every 4 hours as needed for Pain. Yes Historical Provider, MD   docusate sodium (COLACE) 100 MG capsule Take 100 mg by mouth as needed for Constipation   Yes Historical Provider, MD         Allergies:  Patient has no known allergies.     PHYSICAL EXAM: /62   Pulse 77   Temp 98.1 °F (36.7 °C) (Temporal)   Resp 16   Ht 5' 3\" (1.6 m)   Wt 137 lb 12.8 oz (62.5 kg)   LMP 01/01/2010 (Approximate)   SpO2 96%   BMI 24.41 kg/m²      Airway:  Airway patent with no audible stridor    Heart:  Regular rate and rhythm, No murmur noted    Lungs:  No increased work of breathing, good air exchange, clear to auscultation bilaterally, no crackles or wheezing    Abdomen:  Soft, non-distended, non-tender, no rebound tenderness or guarding, and no masses palpated    ASSESSMENT AND PLAN     Patient is a 61 y.o. female with above specified procedure planned. 1.  The patients history and physical was obtained and signed off by the pre-admission testing department. Patient seen and focused exam done today- no new changes since last physical exam on 3/1/22    2. Access to ancillary services are available per request of the provider.     ANAIS Moreno - CNP     3/21/2022

## 2022-03-21 NOTE — PROGRESS NOTES
PACU Transfer Note    Vitals:    03/21/22 1300   BP: 115/68   Pulse: 80   Resp: 16   Temp: 98.8 °F (37.1 °C)   SpO2: 100%       In: 1885 [P.O.:60; I.V.:1825]  Out: 1430 [Urine:1280]    Pain assessment:  present - adequately treated  Pain Level: 4    Report given to Receiving unit RN.    3/21/2022 1:10 PM    Report given to Keyshawn Serrano at bedside pacu

## 2022-03-21 NOTE — ANESTHESIA PRE PROCEDURE
Department of Anesthesiology  Preprocedure Note       Name:  Al Dodd   Age:  61 y.o.  :  1955                                          MRN:  [id number]         Date:  3/21/2022      Surgeon: Carlyle Salvador):  MD Saritha Lujan MD    Procedure: Procedure(s):  WIDE EXCISION LEFT THIGH SARCOMA  LEFT LEG RECONSTRUCITON WITH GASTROCNEMIUS, RECTUS FEMORIS FLAP, POSSIBLE DEEP INFERIOR EPIGASTRIC ARTERY  FLAP POSSIBLE ADJACENT TISSUE TRANSFER POSSIBLE SPLIT THICKNESS SKIN GRAFT  . Marquis Dobbins Medications prior to admission:   Prior to Admission medications    Medication Sig Start Date End Date Taking? Authorizing Provider   oxyCODONE-acetaminophen (PERCOCET) 5-325 MG per tablet Take 1 tablet by mouth every 4 hours as needed for Pain.    Yes Historical Provider, MD   docusate sodium (COLACE) 100 MG capsule Take 100 mg by mouth as needed for Constipation   Yes Historical Provider, MD       Current medications:    Current Facility-Administered Medications   Medication Dose Route Frequency Provider Last Rate Last Admin    ceFAZolin (ANCEF) 2000 mg in dextrose 5 % 50 mL IVPB  2,000 mg IntraVENous Once Saritha Trinidad MD        lactated ringers infusion   IntraVENous Continuous Juventino Patterson  mL/hr at 22 0625 New Bag at 22 0625    sodium chloride flush 0.9 % injection 5-40 mL  5-40 mL IntraVENous 2 times per day Juventino Patterson MD        sodium chloride flush 0.9 % injection 5-40 mL  5-40 mL IntraVENous PRN Juventino Patterson MD        0.9 % sodium chloride infusion  25 mL IntraVENous PRN Juventino Patterson MD        scopolamine (TRANSDERM-SCOP) transdermal patch 1 patch  1 patch TransDERmal Once Sun Mendieta DO   1 patch at 22 7414       Allergies:  No Known Allergies    Problem List:    Patient Active Problem List   Diagnosis Code    Dyspareunia in female N94.10    Post-menopause atrophic vaginitis N95.2    Serrated adenoma of colon D12.6    Sarcoma of left thigh ([facility] C49.22    Mass of muscle of left lower extremity M62.89       Past Medical History:        Diagnosis Date    Cancer ([address]     SARCOMA LEFT THIGH    History of radiation therapy     PONV (postoperative nausea and vomiting)        Past Surgical History:        Procedure Laterality Date    BREAST BIOPSY  2014     SECTION, LOW TRANSVERSE       &     COLONOSCOPY N/A 3/30/2018    COLONOSCOPY WITH BIOPSY and photos performed by Jaja Hilliard MD at [address] [place] [address]  2018    Small polyp upper part of the right colon--sessile serrated adenoma, redundant colon and spasms    LEG BIOPSY EXCISION Left 2021    INCISIONAL BIOPSY LEFT THIGH MASS performed by Daren Cannon MD at [address]    LLE, TUMOR ON [place]       Social History:    Social History     Tobacco Use    Smoking status: Never Smoker    Smokeless tobacco: Never Used   Substance Use Topics    Alcohol use: Yes     Comment: Socially                                Counseling given: Not Answered      Vital Signs (Current):   Vitals:    22 1456 22 0612   BP:  107/62   Pulse:  77   Resp:  16   Temp:  98.1 °F (36.7 °C)   TempSrc:  Temporal   SpO2:  96%   Weight: 141 lb (64 kg) 137 lb 12.8 oz (62.5 kg)   Height: 5' 3\" (1.6 m) 5' 3\" (1.6 m)                                              BP Readings from Last 3 Encounters:   22 107/62   22 110/68   22 111/73       NPO Status: Time of last liquid consumption:                         Time of last solid consumption: 1800                        Date of last liquid consumption: 22                        Date of last solid food consumption: 22    BMI:   Wt Readings from Last 3 Encounters:   22 137 lb 12.8 oz (62.5 kg)   22 141 lb 6.4 oz (64.1 kg)   22 140 lb (63.5 kg)     Body mass index is 24.41 kg/m².     CBC:   Lab Results   Component Value Date    WBC 6.9 2022 RBC 4.17 03/02/2022    HGB 11.6 03/02/2022    HCT 37.0 03/02/2022    MCV 88.7 03/02/2022    RDW 12.4 03/02/2022     03/02/2022       CMP:   Lab Results   Component Value Date     03/02/2022    K 4.4 03/02/2022     03/02/2022    CO2 29 03/02/2022    BUN 9 03/02/2022    CREATININE 0.66 03/02/2022    GFRAA >60 03/02/2022    LABGLOM >60 03/02/2022    GLUCOSE 101 03/02/2022    GLUCOSE 95 03/28/2012    CALCIUM 9.5 03/02/2022       POC Tests: No results for input(s): POCGLU, POCNA, POCK, POCCL, POCBUN, POCHEMO, POCHCT in the last 72 hours. Coags:   Lab Results   Component Value Date    PROTIME 13.8 03/21/2022    INR 1.21 03/21/2022    APTT 36.1 03/21/2022       HCG (If Applicable): No results found for: PREGTESTUR, PREGSERUM, HCG, HCGQUANT     ABGs: No results found for: PHART, PO2ART, MSR8QGX, NCW9OMC, BEART, M1XCYUGE     Type & Screen (If Applicable):  No results found for: LABABO, LABRH    Drug/Infectious Status (If Applicable):  No results found for: HIV, HEPCAB    COVID-19 Screening (If Applicable):   Lab Results   Component Value Date    COVID19 Not Detected 11/19/2021           Anesthesia Evaluation  Patient summary reviewed and Nursing notes reviewed   history of anesthetic complications: PONV. Airway: Mallampati: II  TM distance: >3 FB   Neck ROM: full  Mouth opening: > = 3 FB Dental: normal exam         Pulmonary:Negative Pulmonary ROS and normal exam                               Cardiovascular:  Exercise tolerance: good (>4 METS),       (-)  angina, orthopnea and PND    ECG reviewed  Rhythm: regular  Rate: normal           Beta Blocker:  Not on Beta Blocker         Neuro/Psych:   Negative Neuro/Psych ROS              GI/Hepatic/Renal: Neg GI/Hepatic/Renal ROS            Endo/Other: Negative Endo/Other ROS                    Abdominal:             Vascular: negative vascular ROS.          Other Findings:             Anesthesia Plan      general     ASA 3       Induction: intravenous. MIPS: Postoperative opioids intended. Anesthetic plan and risks discussed with patient. Use of blood products discussed with patient whom consented to blood products. Plan discussed with CRNA and attending.                   Princess Kapoor DO   3/21/2022

## 2022-03-21 NOTE — PROGRESS NOTES
Plastic Surgery  Post-operative Note      Procedure(s) Performed: Wide excision of left thigh sarcoma, complex closure    Subjective:   Patient's pain is controlled, denies nausea or vomiting. Tolerating sips of clears, reports hunger but has not gotten food yet. Objective:  Anesthesia type: General      I/O    Intra op    Post op     Fluids  1600 mL 285 mL      mL 0 mL     Urine 1100 mL 180 mL     Vitals:   Vitals:    03/21/22 1245 03/21/22 1300 03/21/22 1315 03/21/22 1335   BP: 121/63 115/68 112/65 106/60   Pulse: 91 80 79 77   Resp: 14 16 13 14   Temp:  98.8 °F (37.1 °C)  98.9 °F (37.2 °C)   TempSrc:  Temporal  Oral   SpO2: 100% 100% 100% 100%   Weight:       Height:           Physical Exam:  Post-op vital signs:  Stable   General appearance: alert, no acute distress, grooming appropriate  Eyes: No scleral icterus, EOM grossly intact  Neck: trachea midline, no JVD, no lymphadenopathy, neck supple  Chest/Lungs: normal effort with no accessory muscle use on 2L NC  Cardiovascular: RRR, brisk capillary refill distally  Abdomen: Soft, non-tender, non-distended, no rebound, guarding, or rigidity. : Alegria in place with clear, yellow urine  Skin: warm and dry, no rashes  Extremities: no edema, no cyanosis; Left leg wrapped with Ace dressing, hemovac from upper leg with minimal sanguinous output, distal pulses palpable  Neuro: A&Ox3, no focal deficits, sensation intact    Assessment and Plan  This is a 61y.o. year old female status post wide excision of left thigh sarcoma, complex closure . (3/21) POD0. Pain management: Dilaudid pain panel, oxycodone pain panel.    Cardiovasc: hemodynamically stable, will continue to monitor  Respiratory:  IS ordered to bedside, encourage hourly IS and deep breathing, wean oxygen as tolerated  Fluids:  mIVF 100 x 10hrs, Diet: General diet  : Urine output is adequate   Ambulation: Bedrest unitl tomorrow then PT/OT  Prophylaxis: SCDs  Antibiotics: Ancef  Wound: Local wound care    - Will hold  BID for now, and will discuss with staff appropriate restart timing  - Neurovascular checks q4hrs      Nii Holcomb DO  PGY1, General Surgery  03/21/22  2:33 PM  [phone]

## 2022-03-21 NOTE — ANESTHESIA POSTPROCEDURE EVALUATION
Department of Anesthesiology  Postprocedure Note    Patient: Alvaro Benton  MRN: [id number]  YOB: 1955  Date of evaluation: 3/21/2022  Time:  2:45 PM     Procedure Summary     Date: 03/21/22 Room / Location: [address] [place] [address]    Anesthesia Start: 0730 Anesthesia Stop: 1647    Procedures:       WIDE EXCISION LEFT THIGH SARCOMA (Left )      LEFT LEG RECONSTRUCITON WITH GASTROCNEMIUS, RECTUS FEMORIS FLAP, POSSIBLE DEEP INFERIOR EPIGASTRIC ARTERY  FLAP POSSIBLE ADJACENT TISSUE TRANSFER POSSIBLE SPLIT THICKNESS SKIN GRAFT (Left Thigh)      . (Left Thigh)      . (Left ) Diagnosis:       Soft tissue sarcoma of thigh, left (HCC)      (Soft tissue sarcoma of thigh, left ([facility] [address] [C49.22])    Surgeons: Julianne Rowe MD; Naeem Pate MD Responsible Provider: Malini Magaña DO    Anesthesia Type: general ASA Status: 3          Anesthesia Type: general    Conchita Phase I: Conchita Score: 9    Conchita Phase II:      Last vitals: Reviewed and per EMR flowsheets.        Anesthesia Post Evaluation    Patient location during evaluation: PACU  Patient participation: complete - patient participated  Level of consciousness: awake  Pain score: 3  Airway patency: patent  Nausea & Vomiting: no nausea and no vomiting  Complications: no  Cardiovascular status: blood pressure returned to baseline  Respiratory status: acceptable  Hydration status: euvolemic  Multimodal analgesia pain management approach

## 2022-03-21 NOTE — PROGRESS NOTES
Patient admitted to [address]. Report received from [address] [place] [address]. Patient awake- A/O x4. VS stable. Denies any pain or nausea. Family at bedside- updated. Left leg with ace wrap/splint in place- hemovac compressed with scant amount of bloody drainage. Wound vac to left leg surgical area 125 mmgh continuous. Pulse present/ +1 weakness/ easily heard on doppler.    Call light within reach denies any needs or concerns at this time,

## 2022-03-21 NOTE — PROGRESS NOTES
Pt arrived from OR in pain 10/10 NRB mask with O2 at 9 L report from CRNA of 100 Fentanyl for pain in OR and BP managed well

## 2022-03-21 NOTE — OP NOTE
Operative Note      Patient: Kaycee Prado  YOB: 1955  MRN: [id number]    Date of Procedure: 3/21/2022    Pre-Op Diagnosis: Soft tissue sarcoma of thigh, left ([address] [C49.22]    Post-Op Diagnosis: Same       Procedure: Wide excision left thigh sarcoma    Surgeon(s):  MD Claribel Parra MD    Assistant:   Surgical Assistant: Raghav Bullard    Anesthesia: General    Estimated Blood Loss (mL): less than 389     Complications: None    Specimens:   ID Type Source Tests Collected by Time Destination   A : left thigh scaroma Tissue Tissue SURGICAL PATHOLOGY Claribel Christiansen MD 3/21/2022 3263        Implants:  * No implants in log *      Drains:   Closed/Suction Drain Left Thigh Accordion 15 Yi (Active)       Urethral Catheter Latex 16 fr (Active)       Findings: none    :   Indications for surgery: This is a pleasant female with a left thigh sarcoma status post external beam radiation therapy presents today for wide excision risks plan is alternatives discussed including permanent neurologic injury vascular injury infection DVT marked weakness need for future surgical procedures and she consented    Details procedure:    She was taken to the operative placed supine under general esthesia. Both legs were prepped draped usual fashion. A longitudinal incision was made over the thigh incorporating the biopsy tract incision down the skin tissue fascia. Skin flaps were elevated. The fascia was incised in a margin away from the skin flaps the anterior part of the quadricep tendon was freed up. The knee joint was entered. Once we freed up the anterior part of the quad tendon we incised the posterior part of the tendon ligament tach with the tumor as it was stuck down we then performed a circumferential dissection around the tumor.   Incising at a distance proximal through the quadricep muscular down to the femur as determined by her preop MRI entering the knee joint feeling all the anterior soft tissues off the femur circumferentially from medial to lateral medially were able to get them of the bone easily. We used a Bovie to get down and then we peeled the periosteum off the femur the periosteum easily peeled off without any difficulty. There were some erosions in the bone on MRI and grossly but there was no gross tumor or no tumor invasion into the bone and the paraspinal appeared to be intact. Laterally went through the tensor fascia laisha That on the tumor posterior lateral down to the femur and then took the tensor off the linea aspera with a Wilkinson elevator. Hemostasis was maintained electrocautery hemoclips. I then scrubbed out I took the specimen to pathology grossly all margins appear to be negative.   Plastic surgery then scrubbed in to perform closure procedure which would be dictated under separate operative note    Electronically signed by Priti Walker MD on 3/21/2022 at 9:07 AM

## 2022-03-22 LAB
ANION GAP SERPL CALCULATED.3IONS-SCNC: 8 MMOL/L (ref 3–16)
BUN BLDV-MCNC: 6 MG/DL (ref 7–20)
CALCIUM SERPL-MCNC: 8.6 MG/DL (ref 8.3–10.6)
CHLORIDE BLD-SCNC: 100 MMOL/L (ref 99–110)
CO2: 27 MMOL/L (ref 21–32)
CREAT SERPL-MCNC: 0.6 MG/DL (ref 0.6–1.2)
GFR AFRICAN AMERICAN: >60
GFR NON-AFRICAN AMERICAN: >60
GLUCOSE BLD-MCNC: 127 MG/DL (ref 70–99)
HCT VFR BLD CALC: 27.2 % (ref 36–48)
HEMOGLOBIN: 8.9 G/DL (ref 12–16)
MCH RBC QN AUTO: 26.5 PG (ref 26–34)
MCHC RBC AUTO-ENTMCNC: 32.5 G/DL (ref 31–36)
MCV RBC AUTO: 81.4 FL (ref 80–100)
PDW BLD-RTO: 14 % (ref 12.4–15.4)
PLATELET # BLD: 285 K/UL (ref 135–450)
PMV BLD AUTO: 7.9 FL (ref 5–10.5)
POTASSIUM REFLEX MAGNESIUM: 4 MMOL/L (ref 3.5–5.1)
RBC # BLD: 3.34 M/UL (ref 4–5.2)
SODIUM BLD-SCNC: 135 MMOL/L (ref 136–145)
WBC # BLD: 8.7 K/UL (ref 4–11)

## 2022-03-22 PROCEDURE — 2580000003 HC RX 258: Performed by: ORTHOPAEDIC SURGERY

## 2022-03-22 PROCEDURE — 85027 COMPLETE CBC AUTOMATED: CPT

## 2022-03-22 PROCEDURE — 97530 THERAPEUTIC ACTIVITIES: CPT

## 2022-03-22 PROCEDURE — 80048 BASIC METABOLIC PNL TOTAL CA: CPT

## 2022-03-22 PROCEDURE — 36415 COLL VENOUS BLD VENIPUNCTURE: CPT

## 2022-03-22 PROCEDURE — 2500000003 HC RX 250 WO HCPCS: Performed by: ORTHOPAEDIC SURGERY

## 2022-03-22 PROCEDURE — 6370000000 HC RX 637 (ALT 250 FOR IP): Performed by: ORTHOPAEDIC SURGERY

## 2022-03-22 PROCEDURE — 97535 SELF CARE MNGMENT TRAINING: CPT

## 2022-03-22 PROCEDURE — 97166 OT EVAL MOD COMPLEX 45 MIN: CPT

## 2022-03-22 PROCEDURE — 97116 GAIT TRAINING THERAPY: CPT

## 2022-03-22 PROCEDURE — 6360000002 HC RX W HCPCS: Performed by: STUDENT IN AN ORGANIZED HEALTH CARE EDUCATION/TRAINING PROGRAM

## 2022-03-22 PROCEDURE — 51798 US URINE CAPACITY MEASURE: CPT

## 2022-03-22 PROCEDURE — 1200000000 HC SEMI PRIVATE

## 2022-03-22 PROCEDURE — 99024 POSTOP FOLLOW-UP VISIT: CPT | Performed by: SURGERY

## 2022-03-22 PROCEDURE — 97163 PT EVAL HIGH COMPLEX 45 MIN: CPT

## 2022-03-22 RX ORDER — HEPARIN SODIUM 5000 [USP'U]/ML
5000 INJECTION, SOLUTION INTRAVENOUS; SUBCUTANEOUS EVERY 8 HOURS SCHEDULED
Status: DISCONTINUED | OUTPATIENT
Start: 2022-03-22 | End: 2022-03-25 | Stop reason: HOSPADM

## 2022-03-22 RX ADMIN — BISACODYL 5 MG: 5 TABLET, COATED ORAL at 08:55

## 2022-03-22 RX ADMIN — OXYCODONE 5 MG: 5 TABLET ORAL at 23:29

## 2022-03-22 RX ADMIN — OXYCODONE 5 MG: 5 TABLET ORAL at 03:01

## 2022-03-22 RX ADMIN — OXYCODONE 10 MG: 5 TABLET ORAL at 17:43

## 2022-03-22 RX ADMIN — OXYCODONE 10 MG: 5 TABLET ORAL at 13:32

## 2022-03-22 RX ADMIN — SODIUM CHLORIDE, PRESERVATIVE FREE 10 ML: 5 INJECTION INTRAVENOUS at 23:30

## 2022-03-22 RX ADMIN — HEPARIN SODIUM 5000 UNITS: 5000 INJECTION INTRAVENOUS; SUBCUTANEOUS at 08:54

## 2022-03-22 RX ADMIN — OXYCODONE 10 MG: 5 TABLET ORAL at 08:55

## 2022-03-22 RX ADMIN — HEPARIN SODIUM 5000 UNITS: 5000 INJECTION INTRAVENOUS; SUBCUTANEOUS at 13:33

## 2022-03-22 RX ADMIN — POTASSIUM CHLORIDE, DEXTROSE MONOHYDRATE AND SODIUM CHLORIDE 1000 ML: 150; 5; 450 INJECTION, SOLUTION INTRAVENOUS at 02:55

## 2022-03-22 RX ADMIN — HEPARIN SODIUM 5000 UNITS: 5000 INJECTION INTRAVENOUS; SUBCUTANEOUS at 23:29

## 2022-03-22 ASSESSMENT — PAIN SCALES - GENERAL
PAINLEVEL_OUTOF10: 5
PAINLEVEL_OUTOF10: 7
PAINLEVEL_OUTOF10: 5
PAINLEVEL_OUTOF10: 3
PAINLEVEL_OUTOF10: 7
PAINLEVEL_OUTOF10: 7

## 2022-03-22 NOTE — TELEPHONE ENCOUNTER
I received a letter from [facility] dated 3-. I will scan the letter into Epic under the media tab. APPROVED  Reference Number [id number]  CPT Code 96408  Date Range 3- to 10-    I will close this phone note.

## 2022-03-22 NOTE — PROGRESS NOTES
Occupational Therapy   Occupational Therapy Initial Assessment/tx  Date: 3/22/2022   Patient Name: Paola Dan  MRN: [id number]     : 1955    Date of Service: 3/22/2022    Discharge Recommendations:  Paola Dan scored a 16/24 on the AM-PAC ADL Inpatient form. Current research shows that an AM-PAC score of 17 or less is typically not associated with a discharge to the patient's home setting. Based on the patient's AM-PAC score and their current ADL deficits, it is recommended that the patient have 5-7 sessions per week of Occupational Therapy at d/c to increase the patient's independence. At this time, this patient demonstrates the endurance, and/or tolerance for 3 hours of therapy each day, with a treatment frequency of 5-7x/wk. Please see assessment section for further patient specific details. If patient discharges prior to next session this note will serve as a discharge summary. Please see below for the latest assessment towards goals. OT Equipment Recommendations  Other: if discharged to home, may need w/c with elevating leg rest, 2 toilet safety rails, shower chair    Assessment   Performance deficits / Impairments: Decreased functional mobility ; Decreased ADL status  Assessment: Pt is POD 1 s/p L LE wide excision of sarcoma with complex closure. She required increased time/exerted increased effort to do bed mobility and transfer to chair with Edouard of 2. Prior to admission,  pt was indep with all ADLs, mobility, IADLs, and employed full time. Recommend ongoing intensive OT to maximize functional status. Treatment Diagnosis: impaired mobility and ADLs  Prognosis: Good  Decision Making: Medium Complexity  OT Education: OT Role;Transfer Training  Patient Education: pt verbalized understanding  REQUIRES OT FOLLOW UP: Yes  Activity Tolerance  Activity Tolerance: Patient limited by pain; Patient Tolerated treatment well  Safety Devices  Safety Devices in place: Yes  Type of devices: Left in chair;Nurse notified;Call light within reach; Chair alarm in place           Patient Diagnosis(es): The encounter diagnosis was Soft tissue sarcoma of thigh, left ([facility] [address] has a past medical history of Cancer ([facility] [address] History of radiation therapy, and PONV (postoperative nausea and vomiting). has a past surgical history that includes Leg Surgery (Left, ); Breast biopsy (2014);  section, low transverse; Colonoscopy (N/A, 3/30/2018); Colonoscopy (2018); Leg biopsy excision (Left, 2021); Leg biopsy excision (Left, 3/21/2022); tissue transfer (Left, 3/21/2022); Leg Surgery (Left, 3/21/2022); and Skin graft (Left, 3/21/2022). Treatment Diagnosis: impaired mobility and ADLs      Restrictions  Position Activity Restriction  Other position/activity restrictions: ambulate pt, up with assist, FWBAT; Will have significant quad weakness use knee immobilizer    Subjective   General  Chart Reviewed: Yes  Family / Caregiver Present: No  Referring Practitioner: Dr. Arambula Pain  Diagnosis: Pt admitted with soft sarcoma L thigh, s/p Wide excision left thigh sarcoma, with complex closure  Subjective  Subjective: Pt in bed, agreeable to work with OT. Patient Currently in Pain: Yes (L knee and thigh, not rated-Rn informed)    Social/Functional History  Social/Functional History  Lives With: Spouse (spouse will be home initially)  Type of Home:  (condo)  Home Layout: One level  Home Access:  (2 to enter without rail)  Bathroom Shower/Tub: Walk-in shower  Bathroom Toilet: Handicap height (2 walls nearby)  Home Equipment: Tanda Duncan (borrowed w/c prior to admit)  ADL Assistance: Independent  Homemaking Assistance:  (shares with )  Ambulation Assistance: Independent  Transfer Assistance: Independent  Active : Yes  Occupation: Full time employment  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        Objective Orientation  Overall Orientation Status: Within Normal Limits     Toilet Transfers  Toilet - Technique: Stand step  Equipment Used:  (simulated 3 in1 commode)  Toilet Transfer: 2 Person assistance (mod A of 2), cues, increased time/effort  ADL  Feeding: Independent; Beverage management  LE Dressing: Dependent/Total (donning B socks)        Bed mobility  Supine to Sit:  (head of bed elevated, use of bed rail, increased time/effort, encouragemenet throughout)  Scooting:  Moderate assistance of 2  Transfers  Stand step with rolling walker: 2 Person assistance-  Sit to stand: 2 Person assistance (cues, Edouard of 1 and  mod A of 1, cues for safe positioning)     Cognition  Overall Cognitive Status: WNL                 LUE AROM (degrees)  LUE AROM : WNL  Left Hand AROM (degrees)  Left Hand AROM: WNL  RUE AROM (degrees)  RUE AROM : WNL  Right Hand AROM (degrees)  Right Hand AROM: WNL        Hand Dominance  Hand Dominance: Right     Patient participated in OT eval and tx including ADLs and transfer        Plan   Plan  Times per week: 5-7  Current Treatment Recommendations: Balance Training,Functional Mobility Training,Endurance Training,Self-Care / ADL,Safety Education & Training    G-Code     OutComes Score                                                  AM-PAC Score        AM-[facility] Inpatient Daily Activity Raw Score: 16 (03/22/22 0947)  AM-PAC Inpatient ADL T-Scale Score : 35.96 (03/22/22 0947)  ADL Inpatient CMS 0-100% Score: 53.32 (03/22/22 0947)  ADL Inpatient CMS G-Code Modifier : CK (03/22/22 0947)    Goals  Short term goals  Time Frame for Short term goals: discharge  Short term goal 1: pt to transfer to commode with CGA  Short term goal 2: pt to participate in LE dressing assessment  Short term goal 3: pt to stand for 2-4 minutes with SBA, while doing functional /ADL tasks  Short term goal 4: pt to manage toileting with SBA  Patient Goals   Patient goals : to have less pain, walk       Therapy Time   Individual Concurrent Group Co-treatment   Time In 0846         Time Out 0925         Minutes 39           Timed Code Treatment Minutes:   24    Total Treatment Minutes:  3630 [facility], OTR/L 0376

## 2022-03-22 NOTE — CARE COORDINATION
CM following, spoke with pt and  at bedside, spoke about PT OT evals and recs and the option to stay here in the ARU and get therapy before going home vs [facility] [place]. They agrees to think about it. Surgical team placed ARU consult,  [facility] accepted and ARU started precert today.   Electronically signed by Daren Hills RN on 3/22/2022 at 3:04 PM  [phone]

## 2022-03-22 NOTE — PROGRESS NOTES
and she verbalized understanding. REQUIRES PT FOLLOW UP: Yes  Activity Tolerance  Activity Tolerance: Patient limited by pain; Patient limited by endurance; Patient limited by fatigue       Patient Diagnosis(es): The encounter diagnosis was Soft tissue sarcoma of thigh, left ([facility] [address] has a past medical history of Cancer ([facility] [address] History of radiation therapy, and PONV (postoperative nausea and vomiting). has a past surgical history that includes Leg Surgery (Left, ); Breast biopsy (2014);  section, low transverse; Colonoscopy (N/A, 3/30/2018); Colonoscopy (2018); Leg biopsy excision (Left, 2021); Leg biopsy excision (Left, 3/21/2022); tissue transfer (Left, 3/21/2022); Leg Surgery (Left, 3/21/2022); and Skin graft (Left, 3/21/2022). Restrictions  Position Activity Restriction  Other position/activity restrictions: ambulate pt, up with assist, FWBAT; Will have significant quad weakness use knee immobilizer     Vision/Hearing  Vision: Within Functional Limits  Hearing: Within functional limits       Subjective  General  Chart Reviewed: Yes  Additional Pertinent Hx: 61y.o. year old female status post wide excision of left thigh sarcoma, complex closure . (3/21). Pmhx: L thigh sarcoma. Family / Caregiver Present: No  Diagnosis: L thigh sarcoma  Follows Commands: Within Functional Limits  Subjective  Subjective: Pt found supine in bed and agreeable to PT with max encouragement. \" I'm going to be in the hospital for a week or so.  \"  Pain Screening  Patient Currently in Pain: Yes (L knee and thigh, not rated-Rn informed)          Orientation  Orientation  Overall Orientation Status: Within Functional Limits     Social/Functional History  Social/Functional History  Lives With: Spouse (spouse will be home initially)  Type of Home:  (condo)  Home Layout: One level  Home Access:  (2 to enter without rail)  Bathroom Shower/Tub: Walk-in shower  Bathroom Toilet: Handicap height (2 walls nearby)  Home Equipment: [address] (borrowed w/c prior to admit)  ADL Assistance: Independent  Homemaking Assistance:  (shares with )  Ambulation Assistance: Independent  Transfer Assistance: Independent  Active : Yes  Occupation: Full time employment  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 Pt reports she lives 3 hours away from [place]. Objective          AROM RLE (degrees)  RLE AROM: WFL  AROM LLE (degrees)  LLE AROM :  (ankle DF to neutral; knee flex approx 10 degrees; hip flexion to 90 degrees (EOB))     Strength RLE  Strength RLE: WFL  Strength LLE  Strength LLE:  (ankle/hip 2/5 grossly (limited by pain); poor quad set; ankle DF/PF 2+/5)        Bed mobility  Supine to Sit: 2 Person assistance; Moderate assistance (slow and effortful)  Scooting: Moderate assistance;2 Person assistance (with max vc; assist for L LE in KI)     Transfers  Sit to Stand: 2 Person Assistance (mod x1, min x1)  Stand to sit: 2 Person Assistance;Minimal Assistance  Comment: txfers with L KI on; max vc and encouragement     Ambulation  Device: Standard Walker  Other Apparatus: Knee Immobilizer  Assistance: 2 Person assistance; Moderate assistance  Quality of Gait: slow hop on R LE (pt choosing to be NWB L due to pain); pt fatigued quickly and required max encouragement/vc to attempt  Distance: 4 steps to chair  Stairs/Curb  Stairs? :  (unsafe to attempt due to pain)     Balance  Sitting - Static: Good  Sitting - Dynamic: Fair  Standing - Static: Fair  Standing - Dynamic: Fair  Comments: Pt sat EOB x10 mins with L KI on and SBA.         Plan   Plan  Times per week: 5-7  Current Treatment Recommendations: Balance Training,Functional Mobility Training,Gait Training,Transfer Training,Stair training  Safety Devices  Type of devices: Left in chair,Call light within reach,Chair alarm in place,Nurse notified (pt reclined)           AM-PAC Score  AM-PAC Inpatient Mobility Raw Score : 8 (03/22/22 1002)  AM-PAC Inpatient T-Scale Score : 28.52 (03/22/22 1002)  Mobility Inpatient CMS 0-100% Score: 86.62 (03/22/22 1002)  Mobility Inpatient CMS G-Code Modifier : CM (03/22/22 1002)          Goals  Short term goals  Time Frame for Short term goals: discharge  Short term goal 1: sit to/from supine supervision  Short term goal 2: sit to/from stand supervision WBAT L  Short term goal 3: ambulate 25 ft with walker WBAT L supervision  Short term goal 4: up/down 2 steps without rail supervision WBAT L  Patient Goals   Patient goals : return home       Therapy Time   Individual Concurrent Group Co-treatment   Time In 0800         Time Out 0845         Minutes 45           Timed Code Treatment Minutes:35       Total Treatment Minutes:  100 Ivon Perry PT

## 2022-03-22 NOTE — PROGRESS NOTES
Plastic Surgery   Daily Progress Note  Patient: Yue Hurtado      CC: Sarcoma of LLE    SUBJECTIVE:   No acute issues overnight, states pain is well-controlled. Afebrile, HDS.     ROS:   A 14 point review of systems was conducted, significant findings as noted above. All other systems negative. OBJECTIVE:    PHYSICAL EXAM:    Vitals:    03/21/22 1730 03/21/22 2041 03/21/22 2242 03/22/22 0258   BP: 96/60 97/60 110/68 99/63   Pulse: 77 87 79 92   Resp: 14 14 14 14   Temp: 98.9 °F (37.2 °C) 98.5 °F (36.9 °C) 100.6 °F (38.1 °C) 97.8 °F (36.6 °C)   TempSrc: Oral Oral Oral Oral   SpO2: 99% 98% 99% 95%   Weight:       Height:           General appearance: alert, no acute distress, grooming appropriate  Eyes: No scleral icterus, EOM grossly intact  Neck: trachea midline, no JVD, no lymphadenopathy, neck supple  Chest/Lungs: normal effort with no accessory muscle use on RA  Cardiovascular: RRR, brisk capillary refill distally  Abdomen: Soft, non-tender, non-distended, no rebound, guarding, or rigidity. : Alegria in place with clear, yellow urine  Skin: warm and dry, no rashes  Extremities: no edema, no cyanosis; Left leg wrapped with Ace dressing, hemovac from upper leg with minimal sanguinous output, distal pulses palpable; Prevena in place holding adequate suction  Neuro: A&Ox3, no focal deficits, sensation intact    LABS:   No results for input(s): WBC, HGB, HCT, MCV, PLT in the last 72 hours. No results for input(s): NA, K, CL, CO2, PHOS, BUN, CREATININE, CA in the last 72 hours. No results for input(s): AST, ALT, ALB, BILIDIR, BILITOT, ALKPHOS in the last 72 hours. No results for input(s): LIPASE, AMYLASE in the last 72 hours. Recent Labs     03/21/22  0641   INR 1.21*   APTT 36.1      No results for input(s): CKTOTAL, CKMB, CKMBINDEX, TROPONINI in the last 72 hours.       ASSESSMENT & PLAN:   This is a 61y.o. year old female status post wide excision of left thigh sarcoma, complex closure . (3/21) POD #1    - Continue general diet, SLIV this morning  - Continue Prevena and Ace bandage to LLE  - Continue drain to suction  - Continue Ancef  - Follow up path  - PT/OT  - Follow up medicine consult  -  following, anticipate discharge home with home health care pending PT/OT debora Pearosn DO  PGY1, General Surgery  03/22/22  6:23 AM  [phone]    I saw and independently examined the patient today. I agree with the history of present illness, past medical/surgical histories, family history, social history, medication list and allergies as listed. The review of systems is as noted above. My physical exam confirms the findings listed above. Review of labs, pathology reports, radiology reports and medical records confirm the findings noted above. I edited the note where appropriate in italics, strikethrough font, or underline. Doing well overall. Continue with local care. OOB with assistance (PT/OT)  Disposition planning pending recommendations and pain control.     Bobbi Kolb MD  [address] [place] [address] [address] Reconstructive Surgery  [phone]  03/22/22

## 2022-03-22 NOTE — PROGRESS NOTES
[facility]   After review, this patient is felt to be:       [x]  Appropriate for Acute Inpatient Rehab- can admit today to ARU. []  Appropriate for Acute Inpatient Rehab Pending Insurance Authorization    []  Not appropriate for Acute Inpatient Rehab    []  Referral received and ARU reviewing patient      Precert approved 5/22/2731 for ARU admission. Pt in agreement per  and CL's conversation with pt this AM. Ref#: [id number]. SERGEI Chaparro aware. Will notify CM/WILLIAM with further updates.  Thank you for the referral.    Ruddy BARKLEY, OTR/L  Clinical Liaison- [facility]   (P): [phone]  (F): [phone]

## 2022-03-22 NOTE — PROGRESS NOTES
Pt had temp of 100.6, Tylenol was given. Pt's pain managed with medication, denies n/v, A&O, IVF and IV abx infused. Pt tolerating diet. L leg wrapped, wound vac intact, accordion drain intact with minimal output. Alegria intact. Fall precaution in place, call light used for needs. Uneventful night with pt.

## 2022-03-22 NOTE — PROGRESS NOTES
Patient alert and oriented x4. VSS. Patient surgical sites ACE wrapped with no breakthrough drainage noted. Accordion drain in place with minimal red output throughout shift. Wound vac in place under ACE wrap to continuous suction. Patient pain controlled with prn PO pain medication. Patient has been up to chair with a weak but steady stand-pivot. Alegria in place draining yellow, clear urine. Tolerating diet with no nausea complaints. Patient denies any other needs at this time. Bed is in the lowest position, call light and bedside table within reach. Patient bed alarm is on. Will continue to monitor for changes in patient status.      Electronically signed by Sarthak Velazquez RN on 3/22/2022 at 5:30 PM

## 2022-03-23 ENCOUNTER — APPOINTMENT (OUTPATIENT)
Dept: GENERAL RADIOLOGY | Age: 67
DRG: 498 | End: 2022-03-23
Attending: ORTHOPAEDIC SURGERY
Payer: COMMERCIAL

## 2022-03-23 LAB
ALBUMIN SERPL-MCNC: 3.1 G/DL (ref 3.4–5)
ANION GAP SERPL CALCULATED.3IONS-SCNC: 10 MMOL/L (ref 3–16)
BASOPHILS ABSOLUTE: 0.1 K/UL (ref 0–0.2)
BASOPHILS RELATIVE PERCENT: 1.1 %
BILIRUBIN URINE: NEGATIVE
BLOOD, URINE: NEGATIVE
BUN BLDV-MCNC: 9 MG/DL (ref 7–20)
CALCIUM SERPL-MCNC: 8.8 MG/DL (ref 8.3–10.6)
CHLORIDE BLD-SCNC: 99 MMOL/L (ref 99–110)
CLARITY: CLEAR
CO2: 28 MMOL/L (ref 21–32)
COLOR: YELLOW
CREAT SERPL-MCNC: 0.6 MG/DL (ref 0.6–1.2)
EOSINOPHILS ABSOLUTE: 0 K/UL (ref 0–0.6)
EOSINOPHILS RELATIVE PERCENT: 0.2 %
EPITHELIAL CELLS, UA: NORMAL /HPF (ref 0–5)
GFR AFRICAN AMERICAN: >60
GFR NON-AFRICAN AMERICAN: >60
GLUCOSE BLD-MCNC: 100 MG/DL (ref 70–99)
GLUCOSE URINE: NEGATIVE MG/DL
HCT VFR BLD CALC: 26.5 % (ref 36–48)
HEMOGLOBIN: 9 G/DL (ref 12–16)
KETONES, URINE: NEGATIVE MG/DL
LEUKOCYTE ESTERASE, URINE: ABNORMAL
LYMPHOCYTES ABSOLUTE: 0.8 K/UL (ref 1–5.1)
LYMPHOCYTES RELATIVE PERCENT: 9.2 %
MAGNESIUM: 2.1 MG/DL (ref 1.8–2.4)
MCH RBC QN AUTO: 27.3 PG (ref 26–34)
MCHC RBC AUTO-ENTMCNC: 34 G/DL (ref 31–36)
MCV RBC AUTO: 80.2 FL (ref 80–100)
MICROSCOPIC EXAMINATION: YES
MONOCYTES ABSOLUTE: 1.1 K/UL (ref 0–1.3)
MONOCYTES RELATIVE PERCENT: 12.6 %
NEUTROPHILS ABSOLUTE: 6.8 K/UL (ref 1.7–7.7)
NEUTROPHILS RELATIVE PERCENT: 76.9 %
NITRITE, URINE: NEGATIVE
PDW BLD-RTO: 14 % (ref 12.4–15.4)
PH UA: 6 (ref 5–8)
PHOSPHORUS: 2.6 MG/DL (ref 2.5–4.9)
PLATELET # BLD: 295 K/UL (ref 135–450)
PMV BLD AUTO: 8.3 FL (ref 5–10.5)
POTASSIUM SERPL-SCNC: 4.3 MMOL/L (ref 3.5–5.1)
PROTEIN UA: NEGATIVE MG/DL
RBC # BLD: 3.31 M/UL (ref 4–5.2)
RBC UA: NORMAL /HPF (ref 0–4)
SODIUM BLD-SCNC: 137 MMOL/L (ref 136–145)
SPECIFIC GRAVITY UA: <=1.005 (ref 1–1.03)
URINE REFLEX TO CULTURE: ABNORMAL
URINE TYPE: ABNORMAL
UROBILINOGEN, URINE: 2 E.U./DL
WBC # BLD: 8.9 K/UL (ref 4–11)
WBC UA: NORMAL /HPF (ref 0–5)

## 2022-03-23 PROCEDURE — 97535 SELF CARE MNGMENT TRAINING: CPT

## 2022-03-23 PROCEDURE — 71045 X-RAY EXAM CHEST 1 VIEW: CPT

## 2022-03-23 PROCEDURE — 2580000003 HC RX 258: Performed by: ORTHOPAEDIC SURGERY

## 2022-03-23 PROCEDURE — 85025 COMPLETE CBC W/AUTO DIFF WBC: CPT

## 2022-03-23 PROCEDURE — 6360000002 HC RX W HCPCS: Performed by: STUDENT IN AN ORGANIZED HEALTH CARE EDUCATION/TRAINING PROGRAM

## 2022-03-23 PROCEDURE — 81001 URINALYSIS AUTO W/SCOPE: CPT

## 2022-03-23 PROCEDURE — 99222 1ST HOSP IP/OBS MODERATE 55: CPT | Performed by: PHYSICAL MEDICINE & REHABILITATION

## 2022-03-23 PROCEDURE — 36415 COLL VENOUS BLD VENIPUNCTURE: CPT

## 2022-03-23 PROCEDURE — 80069 RENAL FUNCTION PANEL: CPT

## 2022-03-23 PROCEDURE — 87040 BLOOD CULTURE FOR BACTERIA: CPT

## 2022-03-23 PROCEDURE — 97116 GAIT TRAINING THERAPY: CPT

## 2022-03-23 PROCEDURE — 1200000000 HC SEMI PRIVATE

## 2022-03-23 PROCEDURE — 83735 ASSAY OF MAGNESIUM: CPT

## 2022-03-23 PROCEDURE — 6370000000 HC RX 637 (ALT 250 FOR IP): Performed by: STUDENT IN AN ORGANIZED HEALTH CARE EDUCATION/TRAINING PROGRAM

## 2022-03-23 PROCEDURE — 97530 THERAPEUTIC ACTIVITIES: CPT

## 2022-03-23 PROCEDURE — 2580000003 HC RX 258: Performed by: STUDENT IN AN ORGANIZED HEALTH CARE EDUCATION/TRAINING PROGRAM

## 2022-03-23 PROCEDURE — 6370000000 HC RX 637 (ALT 250 FOR IP): Performed by: INTERNAL MEDICINE

## 2022-03-23 PROCEDURE — 6370000000 HC RX 637 (ALT 250 FOR IP)

## 2022-03-23 RX ORDER — DOCUSATE SODIUM 100 MG/1
100 CAPSULE, LIQUID FILLED ORAL 2 TIMES DAILY
Status: DISCONTINUED | OUTPATIENT
Start: 2022-03-23 | End: 2022-03-25 | Stop reason: HOSPADM

## 2022-03-23 RX ORDER — ACETAMINOPHEN 500 MG
1000 TABLET ORAL EVERY 6 HOURS
Status: DISCONTINUED | OUTPATIENT
Start: 2022-03-23 | End: 2022-03-25 | Stop reason: HOSPADM

## 2022-03-23 RX ORDER — POLYETHYLENE GLYCOL 3350 17 G/17G
17 POWDER, FOR SOLUTION ORAL 2 TIMES DAILY
Status: DISCONTINUED | OUTPATIENT
Start: 2022-03-23 | End: 2022-03-25 | Stop reason: HOSPADM

## 2022-03-23 RX ADMIN — SODIUM CHLORIDE, PRESERVATIVE FREE 10 ML: 5 INJECTION INTRAVENOUS at 21:52

## 2022-03-23 RX ADMIN — DIBASIC SODIUM PHOSPHATE, MONOBASIC POTASSIUM PHOSPHATE AND MONOBASIC SODIUM PHOSPHATE 2 TABLET: 852; 155; 130 TABLET ORAL at 11:09

## 2022-03-23 RX ADMIN — CEFAZOLIN SODIUM 2000 MG: 10 INJECTION, POWDER, FOR SOLUTION INTRAVENOUS at 11:08

## 2022-03-23 RX ADMIN — POLYETHYLENE GLYCOL 3350 17 G: 17 POWDER, FOR SOLUTION ORAL at 21:51

## 2022-03-23 RX ADMIN — CEFAZOLIN SODIUM 2000 MG: 10 INJECTION, POWDER, FOR SOLUTION INTRAVENOUS at 19:16

## 2022-03-23 RX ADMIN — ACETAMINOPHEN 650 MG: 325 TABLET ORAL at 03:18

## 2022-03-23 RX ADMIN — SODIUM CHLORIDE, PRESERVATIVE FREE 10 ML: 5 INJECTION INTRAVENOUS at 11:08

## 2022-03-23 RX ADMIN — DIBASIC SODIUM PHOSPHATE, MONOBASIC POTASSIUM PHOSPHATE AND MONOBASIC SODIUM PHOSPHATE 2 TABLET: 852; 155; 130 TABLET ORAL at 21:50

## 2022-03-23 RX ADMIN — DOCUSATE SODIUM 100 MG: 100 CAPSULE, LIQUID FILLED ORAL at 08:29

## 2022-03-23 RX ADMIN — HEPARIN SODIUM 5000 UNITS: 5000 INJECTION INTRAVENOUS; SUBCUTANEOUS at 21:51

## 2022-03-23 RX ADMIN — ACETAMINOPHEN 1000 MG: 500 TABLET ORAL at 23:56

## 2022-03-23 RX ADMIN — ACETAMINOPHEN 1000 MG: 500 TABLET ORAL at 18:30

## 2022-03-23 RX ADMIN — SODIUM CHLORIDE, PRESERVATIVE FREE 10 ML: 5 INJECTION INTRAVENOUS at 08:32

## 2022-03-23 RX ADMIN — POLYETHYLENE GLYCOL 3350 17 G: 17 POWDER, FOR SOLUTION ORAL at 08:29

## 2022-03-23 RX ADMIN — ACETAMINOPHEN 1000 MG: 500 TABLET ORAL at 11:10

## 2022-03-23 RX ADMIN — HEPARIN SODIUM 5000 UNITS: 5000 INJECTION INTRAVENOUS; SUBCUTANEOUS at 15:37

## 2022-03-23 RX ADMIN — DOCUSATE SODIUM 100 MG: 100 CAPSULE, LIQUID FILLED ORAL at 21:50

## 2022-03-23 RX ADMIN — HEPARIN SODIUM 5000 UNITS: 5000 INJECTION INTRAVENOUS; SUBCUTANEOUS at 06:33

## 2022-03-23 ASSESSMENT — PAIN DESCRIPTION - PROGRESSION: CLINICAL_PROGRESSION: GRADUALLY WORSENING

## 2022-03-23 ASSESSMENT — PAIN DESCRIPTION - DESCRIPTORS: DESCRIPTORS: BURNING;TINGLING

## 2022-03-23 ASSESSMENT — PAIN DESCRIPTION - LOCATION
LOCATION: KNEE

## 2022-03-23 ASSESSMENT — PAIN DESCRIPTION - PAIN TYPE
TYPE: SURGICAL PAIN

## 2022-03-23 ASSESSMENT — PAIN DESCRIPTION - FREQUENCY
FREQUENCY: INTERMITTENT
FREQUENCY: INTERMITTENT

## 2022-03-23 ASSESSMENT — PAIN DESCRIPTION - ORIENTATION
ORIENTATION: LEFT

## 2022-03-23 ASSESSMENT — PAIN SCALES - GENERAL
PAINLEVEL_OUTOF10: 3
PAINLEVEL_OUTOF10: 3
PAINLEVEL_OUTOF10: 1
PAINLEVEL_OUTOF10: 3
PAINLEVEL_OUTOF10: 3
PAINLEVEL_OUTOF10: 0

## 2022-03-23 ASSESSMENT — PAIN DESCRIPTION - ONSET: ONSET: ON-GOING

## 2022-03-23 NOTE — PROGRESS NOTES
Physical Therapy  Facility/Department: [facility] SURGERY  Daily Treatment Note  NAME: Naye Martinez  : 1955  MRN: [id number]    Date of Service: 3/23/2022    Discharge Recommendations:Eloisa Powers scored a 16/24 on the AM-PAC short mobility form. Current research shows that an AM-PAC score of 17 or less is typically not associated with a discharge to the patient's home setting. Based on the patient's AM-PAC score and their current functional mobility deficits, it is recommended that the patient have 5-7 sessions per week of Physical Therapy at d/c to increase the patient's independence. At this time, this patient demonstrates the endurance, and/or tolerance for 3 hours of therapy each day, with a treatment frequency of 5-7x/wk. Please see assessment section for further patient specific details. If patient discharges prior to next session this note will serve as a discharge summary. Please see below for the latest assessment towards goals. PT Equipment Recommendations  Equipment Needed:  (walker if home)    Assessment   Assessment: Pt demo improved mobility/improved pain control this am and now discussing ARU at discharge. Pt demo mobility below her reported baseline of independent at home without AD. Pt would benefit from continued skilled IP PT at discharge to maximize her functional independence prior to d/c home. If home, recommend 24 hour assist, home PT, use of walker (needs). Anticipate pt will continue to progress well with pain controlled. Treatment Diagnosis: mobility impairment due to L thigh sarcoma removal  Patient Education: Pt educated on PT role, importance of OOB mobility, need to call for assist to get up, WBAT L with KI for comfort/support and she verbalized understanding. 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 has a past medical history of Cancer ([facility] [address] History of radiation therapy, and PONV (postoperative nausea and vomiting). has a past surgical history that includes Leg Surgery (Left, ); Breast biopsy (2014);  section, low transverse; Colonoscopy (N/A, 3/30/2018); Colonoscopy (2018); Leg biopsy excision (Left, 2021); Leg biopsy excision (Left, 3/21/2022); tissue transfer (Left, 3/21/2022); Leg Surgery (Left, 3/21/2022); and Skin graft (Left, 3/21/2022). Restrictions  Position Activity Restriction  Other position/activity restrictions: ambulate pt, up with assist, FWBAT; Will have significant quad weakness use knee immobilizer     Subjective   General  Chart Reviewed: Yes  Additional Pertinent Hx: 61y.o. year old female status post wide excision of left thigh sarcoma, complex closure . (3/21). Pmhx: L thigh sarcoma. Family / Caregiver Present: Yes (spouse)  Subjective  Subjective: Pt found reclined in chair and agreeable to PT. Pt reports she is moving much better today. Pain Screening  Patient Currently in Pain: Yes (rates surgical site pain 4/10, RN aware)         Orientation  Orientation  Overall Orientation Status: Within Functional Limits       Objective      Transfers  Sit to Stand: Minimal Assistance (x2 trials with vc for hand placement)  Stand to sit: Minimal Assistance (x2 trials with vc for hand placement)     Ambulation 1  Device: Standard Walker  Other Apparatus: Knee Immobilizer; Left  Assistance: Minimal assistance  Quality of Gait: slow cuong with NWB progressing to PWB L with encouragement; step to gait pattern leading L; pt fatigued quickly  Distance: 10 ft x2            PROM LLE (degrees)  LLE General PROM: gentle AAROM L knee extension in sitting (pt reports decreased knee ROM over past several months)                              AM-PAC Score  AM-PAC Inpatient Mobility Raw Score : 16 (22 1119)  AM-PAC Inpatient T-Scale Score : 40.78 (03/23/22 1119)  Mobility Inpatient CMS 0-100% Score: 54.16 (03/23/22 1119)  Mobility Inpatient CMS G-Code Modifier : CK (03/23/22 1119)          Goals  Short term goals  Time Frame for Short term goals: discharge  Short term goal 1: sit to/from supine supervision  ongoing  Short term goal 2: sit to/from stand supervision WBAT L  ongoing  Short term goal 3: ambulate 25 ft with walker WBAT L supervision  ongoing  Short term goal 4: up/down 2 steps without rail supervision WBAT L  ongoing  Patient Goals   Patient goals : return home    Plan    Plan  Times per week: 5-7  Current Treatment Recommendations: Balance Training,Functional Mobility Training,Gait Training,Transfer Training,Stair training  Safety Devices  Type of devices: Left in chair,Call light within reach,Chair alarm in place,Nurse notified (pt reclined)     Therapy Time   Individual Concurrent Group Co-treatment   Time In 1007         Time Out 1046         Minutes 39           Timed Code Treatment Minutes: 39      Total Treatment Minutes:  Carissa 94, PT

## 2022-03-23 NOTE — CONSULTS
Consult Note  Physical Medicine and Rehabilitation    Patient: Brad Carlisle  [id number]  Date: 3/23/2022      Chief Complaint: here for surgery. History of Present Illness/Hospital Course:  62 yo F 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 Post op she reports feeling okay. Sarina Vásquezter to go home. Has mild pain in LLE. Has difficulty moving LLE and fatigue Tolerating diet without N, V, CP, SOB, Abd pain. Overnight she has spiked a fever. Awaiting further workup. LLE weak. Canot lift it. Prior Level of Function:  Independent for mobility, ADLs, and IADLs    Current Level of Function:   Using She required increased time/exerted increased effort to do bed mobility and transfer to chair with Edouard of 2      Pertinent Social History:  Support:   Home set-up: Condo. 2 steps to get inside. Worked in doctors office. Past Medical History:   Diagnosis Date    Cancer [facility])     SARCOMA LEFT THIGH    History of radiation therapy     PONV (postoperative nausea and vomiting)        Past Surgical History:   Procedure Laterality Date    BREAST BIOPSY  2014     SECTION, LOW TRANSVERSE       &     COLONOSCOPY N/A 3/30/2018    COLONOSCOPY WITH BIOPSY and photos performed by Rebecca Chase MD at [address] [place] [address]  2018    Small polyp upper part of the right colon--sessile serrated adenoma, redundant colon and spasms    LEG BIOPSY EXCISION Left 2021    INCISIONAL BIOPSY LEFT THIGH MASS performed by Margareth Mireles MD at [facility] 3/21/2022    WIDE EXCISION LEFT THIGH SARCOMA performed by Margareth Mireles MD at [facility] Left     LLE, TUMOR ON SHIN    LEG SURGERY Left 3/21/2022    . performed by Hank Licona MD at [facility] 3/21/2022    .  performed by Hank Licona MD at [facility] Left 3/21/2022    LEFT LEG RECONSTRUCITON WITH Attends Club or Organization Meetings: Not on file    Marital Status: Not on file   Intimate Partner Violence:     Fear of Current or Ex-Partner: Not on file    Emotionally Abused: Not on file    Physically Abused: Not on file    Sexually Abused: Not on file   Housing Stability:     Unable to Pay for Housing in the Last Year: Not on file    Number of Isak in the Last Year: Not on file    Unstable Housing in the Last Year: Not on file           REVIEW OF SYSTEMS:   CONSTITUTIONAL: negative for fevers, chills, diaphoresis, appetite change, night sweats, unexpected weight change, fatigue  EYES: negative for blurred vision, eye discharge, visual disturbance and icterus  HEENT: negative for hearing loss, tinnitus, ear drainage, sinus pressure, nasal congestion, epistaxis and snoring  RESPIRATORY: Negative for hemoptysis, cough, sputum production  CARDIOVASCULAR: negative for chest pain, palpitations, exertional chest pressure/discomfort, syncope, edema  GASTROINTESTINAL: negative for nausea, vomiting, diarrhea, blood in stool, abdominal pain, constipation  GENITOURINARY: negative for frequency, dysuria, urinary incontinence, decreased urine volume, and hematuria  HEMATOLOGIC/LYMPHATIC: negative for easy bruising, bleeding and lymphadenopathy  ALLERGIC/IMMUNOLOGIC: negative for recurrent infections, angioedema, anaphylaxis and drug reactions  ENDOCRINE: negative for weight changes and diabetic symptoms including polyuria, polydipsia and polyphagia  MUSCULOSKELETAL: negative for pain, joint swelling, decreased range of motion  NEUROLOGICAL: negative for headaches, slurred speech, LLE weakness  PSYCHIATRIC/BEHAVIORAL: negative for hallucinations, behavioral problems, confusion and agitation.      Physical Examination:  Vitals:   Patient Vitals for the past 24 hrs:   BP Temp Temp src Pulse Resp SpO2   03/23/22 0751 (!) 90/56 97.6 °F (36.4 °C) Oral 76 18 95 %   03/23/22 0636 -- 98.4 °F (36.9 °C) Oral -- -- -- 03/23/22 0314 100/61 100.7 °F (38.2 °C) Oral 95 16 93 %   03/22/22 2329 111/68 99.8 °F (37.7 °C) Oral 96 16 93 %   03/22/22 2025 -- -- -- 100 -- --   03/22/22 2013 104/61 101.7 °F (38.7 °C) Oral 100 16 94 %   03/22/22 1542 104/69 98.7 °F (37.1 °C) Oral 82 16 94 %   03/22/22 1108 103/62 99.1 °F (37.3 °C) Oral 85 16 95 %     Const: Alert. WDWN. No distress  Eyes: Conjunctiva noninjected, no icterus noted; pupils equal, round, and reactive to light. HENT: Atraumatic, normocephalic; Oral mucosa moist  Neck: Trachea midline, neck supple. No thyromegaly noted. CV: Regular rate and rhythm, no murmur rub or gallop noted  Resp: Lungs clear to auscultation bilaterally, no rales wheezes or ronchi, no retractions. Respirations unlabored. GI: Soft, nontender, nondistended. Normal bowel sounds. No palpable masses. Skin: Normal temperature and turgor. No rashes or breakdown noted. Ext: No significant edema appreciated. No varicosities. MSK: No joint tenderness, erythema, warmth noted. AROM intact. Neuro: Alert, oriented, appropriate. No cranial nerve deficits appreciated. Sensation intact to light touch. Motor examination reveals normal strength in all four limbs diffusely. No abnormalities with finger/nose or heel/shin noted. Reflexes normal and symmetric. Psych: Stable mood, normal judgement, normal affect     Lab Results   Component Value Date    WBC 8.9 03/23/2022    HGB 9.0 (L) 03/23/2022    HCT 26.5 (L) 03/23/2022    MCV 80.2 03/23/2022     03/23/2022     Lab Results   Component Value Date    INR 1.21 (H) 03/21/2022    INR 1.4 03/02/2022    PROTIME 13.8 (H) 03/21/2022    PROTIME 16.6 (H) 03/02/2022     Lab Results   Component Value Date    CREATININE 0.6 03/23/2022    BUN 9 03/23/2022     03/23/2022    K 4.3 03/23/2022    CL 99 03/23/2022    CO2 28 03/23/2022     No results found for: ALT, AST, GGT, ALKPHOS, BILITOT    Most recent echocardiogram revealed:  Not available.      Most recent EKG revealed:  NSR. Non specific ST-T changes. Most recent imaging studies revealed:      XR CHEST PORTABLE   Final Result      Clear lungs. Normal cardiomediastinal silhouette. Assessment:  1. 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 Also has drain in LLE. -Is on Ancef. Drain management as per Gen Surg. Therapy restricted due to drains, immobilzer  -She is on oxycodone, and dilaudid pain panel. hasnt required IV pain meds in >48 hrs.   -Heparin for DVT ppx as per Gen Surg. 2. Fever  -Workup including CXR, UA as per Gen Surg. Remains on Ancef    Impairments- Decreased functional mobility, Decreased ADLs    Recommendations:    Start precert for ARU- possible future admit. Chau Blanco MD  PGY-3, Internal Medicine      3/23/2022  8:13 AM      This patient has been seen, examined, and discussed with the resident. This note has been altered to reflect my own examination findings, impression, and recommendations.      Charli Mccray D.O. M.P.H  PM&R  3/23/2022  9:37 AM

## 2022-03-23 NOTE — CARE COORDINATION
Cm following, spoke with Butch Slade in [facility] 96 still pending ARU percert from Ins. Pt and family agree to [facility] ARU once precert comes back.   Electronically signed by Irving Vasquez RN on 3/23/2022 at 1:01 PM  [phone]

## 2022-03-23 NOTE — PROGRESS NOTES
Patient is alert and oriented x4. VSS. Elevated temp of 101. 7. LLE remains wrapped in ACE with no breakthrough drainage. Wound Vac in place under ACE wrap to continuous suction with no output noted. Accordion drain in place with minimal red output. Alegria in place draining adequate amount of yellow/clear urine. PO oxycodone given for pain 5/10. No other needs at this time. Call light and table are within reach. Safety precautions are in place. Will continue to monitor.

## 2022-03-23 NOTE — FLOWSHEET NOTE
03/23/22 1429   Encounter Summary   Services provided to: Patient   Referral/Consult From: Rounding   Continue Visiting   (3/23, júnior )   Complexity of Encounter Moderate   Length of Encounter 15 minutes   Routine   Type Initial   Assessment Calm; Approachable; Hopeful   Intervention Active listening;Explored feelings, thoughts, concerns;Nurtured hope   Outcome Comfort;Expressed gratitude   Staff Brenton [place], [place]

## 2022-03-23 NOTE — PROGRESS NOTES
Plastic Surgery   Daily Progress Note  Patient: Alvaro Benton      CC: Sarcoma of LLE    SUBJECTIVE:   No acute issues overnight. Increased pain with mobility. Pain improving. TMAX 101.7. Tolerating diet. Denies any other complaints. Slept well. ROS:   A 14 point review of systems was conducted, significant findings as noted above. All other systems negative. OBJECTIVE:    PHYSICAL EXAM:    Vitals:    03/22/22 2013 03/22/22 2025 03/22/22 2329 03/23/22 0314   BP: 104/61  111/68 100/61   Pulse: 100 100 96 95   Resp: 16  16 16   Temp: 101.7 °F (38.7 °C)  99.8 °F (37.7 °C) 100.7 °F (38.2 °C)   TempSrc: Oral  Oral Oral   SpO2: 94%  93% 93%   Weight:       Height:           General appearance: alert, no acute distress, grooming appropriate  Eyes: No scleral icterus, EOM grossly intact  Neck: trachea midline, no JVD, no lymphadenopathy, neck supple  Chest/Lungs: normal effort with no accessory muscle use on RA  Cardiovascular: RRR, brisk capillary refill distally  Abdomen: Soft, non-tender, non-distended, no rebound, guarding, or rigidity. : Alegria in place with clear, yellow urine  Skin: warm and dry, no rashes  Extremities: no edema, no cyanosis; Left leg wrapped with Ace dressing, hemovac from upper leg with minimal sanguinous output, distal pulses palpable; Prevena in place holding adequate suction. PPP  Neuro: A&Ox3, no focal deficits, sensation intact    LABS:   Recent Labs     03/22/22  0602   WBC 8.7   HGB 8.9*   HCT 27.2*   MCV 81.4           Recent Labs     03/22/22  0602   *   K 4.0      CO2 27   BUN 6*   CREATININE 0.6      No results for input(s): AST, ALT, ALB, BILIDIR, BILITOT, ALKPHOS in the last 72 hours. No results for input(s): LIPASE, AMYLASE in the last 72 hours. Recent Labs     03/21/22  0641   INR 1.21*   APTT 36.1      No results for input(s): CKTOTAL, CKMB, CKMBINDEX, TROPONINI in the last 72 hours.       ASSESSMENT & PLAN:   This is a 61y.o. year old female status post wide excision of left thigh sarcoma, complex closure . (3/21) POD #2  -fever overnight. Chest Xray this morning and UA sent  - Continue general diet  - Continue Prevena and Ace bandage to LLE and immobilizer  - Continue drain to suction  - Continue Ancef  - Follow up path  - PT/OT  - SW following, anticipate discharge to ARU.   Precert started  -Plan on roman removal this morning

## 2022-03-23 NOTE — PROGRESS NOTES
Occupational Therapy  Facility/Department: [facility]'[address]  Daily Treatment Note  NAME: Saeed Trinidad  : 1955  MRN: [id number]    Date of Service: 3/23/2022    Discharge Recommendations:  Saeed Trinidad scored a 18/24 on the AM-PAC ADL Inpatient form. Current research shows that an AM-PAC score of 17 or less is typically not associated with a discharge to the patient's home setting. Based on the patient's AM-PAC score and their current ADL deficits, it is recommended that the patient have 5-7 sessions per week of Occupational Therapy at d/c to increase the patient's independence. At this time, this patient demonstrates the endurance, and/or tolerance for 3 hours of therapy each day, with a treatment frequency of 5-7x/wk. Please see assessment section for further patient specific details. If patient discharges prior to next session this note will serve as a discharge summary. Please see below for the latest assessment towards goals. OT Equipment Recommendations  Other: if discharged to home, may need w/c with elevating leg rest, 2 toilet safety rails, shower chair    Assessment   Performance deficits / Impairments: Decreased functional mobility ; Decreased ADL status  Assessment: Pt's pain better controlled today, and she demonstrated increased indep with ADLs and functional transfers. She still needs A with functional mobility with rolling walker, transfers, dressing, and toileting. Recommend ongoing intensive OT at discharge to maximize functional status. 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 has a past medical history of Cancer ([facility] [address] History of radiation therapy, and PONV (postoperative nausea and vomiting). has a past surgical history that includes Leg Surgery (Left, ); Breast biopsy (2014);  section, low transverse; Colonoscopy (N/A, 3/30/2018); Colonoscopy (2018); Leg biopsy excision (Left, 2021); Leg biopsy excision (Left, 3/21/2022); tissue transfer (Left, 3/21/2022); Leg Surgery (Left, 3/21/2022); and Skin graft (Left, 3/21/2022). Restrictions  Position Activity Restriction  Other position/activity restrictions: ambulate pt, up with assist, FWBAT; Will have significant quad weakness use knee immobilizer  Subjective   General  Chart Reviewed: Yes  Family / Caregiver Present: Yes ( for part of session)  Referring Practitioner: Dr. Maxime Davis  Diagnosis: Pt admitted with soft sarcoma L thigh, s/p Wide excision left thigh sarcoma, with complex closure, with wound vac  Subjective  Subjective: Pt in chair upon entry, agreeable to work with OT. Orientation  Orientation  Overall Orientation Status: Within Normal Limits  Objective    ADL  Feeding: Independent; Beverage management  Grooming: Contact guard assistance (CGA in stance, while indep washing face, hands, combing hair, and brushing teeth)  LE Dressing:  Moderate assistance (donning brief)        Balance  Sitting Balance: Supervision  Standing Balance: Contact guard assistance  Standing Balance  Time: ~3 minutes    Functional Mobility  Functional Mobility Comments: functional mobility in room with rolling walkerEdouard, cues for walker management     Transfers  Sit to stand: Minimal assistance (cues for hand placement)  Stand to sit: Minimal assistance (cues for safe  positioning, hand placement)                    Plan   Plan  Times per week: 5-7  Current Treatment Recommendations: Balance Training,Functional Mobility Training,Endurance Training,Self-Care / ADL,Safety Education & Training  G-Code OutComes Score                                                  AM-PAC Score        AM-PAC Inpatient Daily Activity Raw Score: 18 (03/23/22 1117)  AM-PAC Inpatient ADL T-Scale Score : 38.66 (03/23/22 1117)  ADL Inpatient CMS 0-100% Score: 46.65 (03/23/22 1117)  ADL Inpatient CMS G-Code Modifier : CK (03/23/22 1117)    Goals  Short term goals  Time Frame for Short term goals: discharge  Short term goal 1: pt to transfer to commode with CGA-3/23 goal not met  Short term goal 2: pt to participate in LE dressing assessment-3/23 pt needs mod A, updated goal-pt to don brief with Edouard  Short term goal 3: pt to stand for 2-4 minutes with SBA, while doing functional /ADL tasks-3/23 goal not met  Short term goal 4: pt to manage toileting with SBA-3/23 goal not met  Patient Goals   Patient goals : to have less pain, walk       Therapy Time   Individual Concurrent Group Co-treatment   Time In 1007         Time Out 1045         Minutes 38              Timed Code Treatment Minutes:   38    Total Treatment Minutes:  DAFNE SigalaR/L Tio 19

## 2022-03-23 NOTE — PROGRESS NOTES
Pt has been A&Ox4, VSS, thought BP has been a bit soft at times, pt remains asymptomatic. UA reflex was collected, afebrile thought pt has been on scheduled tylenol. Pt has been up in chair most of the day, dressing CD&I, will continue to monitor.

## 2022-03-24 LAB
ALBUMIN SERPL-MCNC: 3 G/DL (ref 3.4–5)
ANION GAP SERPL CALCULATED.3IONS-SCNC: 11 MMOL/L (ref 3–16)
BASOPHILS ABSOLUTE: 0 K/UL (ref 0–0.2)
BASOPHILS RELATIVE PERCENT: 0.5 %
BUN BLDV-MCNC: 8 MG/DL (ref 7–20)
CALCIUM SERPL-MCNC: 8.5 MG/DL (ref 8.3–10.6)
CHLORIDE BLD-SCNC: 99 MMOL/L (ref 99–110)
CO2: 30 MMOL/L (ref 21–32)
CREAT SERPL-MCNC: <0.5 MG/DL (ref 0.6–1.2)
EOSINOPHILS ABSOLUTE: 0.1 K/UL (ref 0–0.6)
EOSINOPHILS RELATIVE PERCENT: 2.2 %
GFR AFRICAN AMERICAN: >60
GFR NON-AFRICAN AMERICAN: >60
GLUCOSE BLD-MCNC: 98 MG/DL (ref 70–99)
HCT VFR BLD CALC: 25.2 % (ref 36–48)
HEMOGLOBIN: 8.2 G/DL (ref 12–16)
LYMPHOCYTES ABSOLUTE: 0.6 K/UL (ref 1–5.1)
LYMPHOCYTES RELATIVE PERCENT: 11.6 %
MAGNESIUM: 2.2 MG/DL (ref 1.8–2.4)
MCH RBC QN AUTO: 26.5 PG (ref 26–34)
MCHC RBC AUTO-ENTMCNC: 32.8 G/DL (ref 31–36)
MCV RBC AUTO: 80.9 FL (ref 80–100)
MONOCYTES ABSOLUTE: 0.6 K/UL (ref 0–1.3)
MONOCYTES RELATIVE PERCENT: 10.9 %
NEUTROPHILS ABSOLUTE: 3.9 K/UL (ref 1.7–7.7)
NEUTROPHILS RELATIVE PERCENT: 74.8 %
PDW BLD-RTO: 13.7 % (ref 12.4–15.4)
PHOSPHORUS: 3.4 MG/DL (ref 2.5–4.9)
PLATELET # BLD: 294 K/UL (ref 135–450)
PMV BLD AUTO: 7.7 FL (ref 5–10.5)
POTASSIUM SERPL-SCNC: 3.8 MMOL/L (ref 3.5–5.1)
RBC # BLD: 3.11 M/UL (ref 4–5.2)
SARS-COV-2, NAAT: NOT DETECTED
SODIUM BLD-SCNC: 140 MMOL/L (ref 136–145)
WBC # BLD: 5.2 K/UL (ref 4–11)

## 2022-03-24 PROCEDURE — 80069 RENAL FUNCTION PANEL: CPT

## 2022-03-24 PROCEDURE — 6360000002 HC RX W HCPCS: Performed by: STUDENT IN AN ORGANIZED HEALTH CARE EDUCATION/TRAINING PROGRAM

## 2022-03-24 PROCEDURE — 87635 SARS-COV-2 COVID-19 AMP PRB: CPT

## 2022-03-24 PROCEDURE — 1200000000 HC SEMI PRIVATE

## 2022-03-24 PROCEDURE — 94760 N-INVAS EAR/PLS OXIMETRY 1: CPT

## 2022-03-24 PROCEDURE — 97116 GAIT TRAINING THERAPY: CPT

## 2022-03-24 PROCEDURE — 97535 SELF CARE MNGMENT TRAINING: CPT

## 2022-03-24 PROCEDURE — 97530 THERAPEUTIC ACTIVITIES: CPT

## 2022-03-24 PROCEDURE — 2580000003 HC RX 258: Performed by: ORTHOPAEDIC SURGERY

## 2022-03-24 PROCEDURE — 85025 COMPLETE CBC W/AUTO DIFF WBC: CPT

## 2022-03-24 PROCEDURE — 6370000000 HC RX 637 (ALT 250 FOR IP): Performed by: STUDENT IN AN ORGANIZED HEALTH CARE EDUCATION/TRAINING PROGRAM

## 2022-03-24 PROCEDURE — 99024 POSTOP FOLLOW-UP VISIT: CPT | Performed by: SURGERY

## 2022-03-24 PROCEDURE — 2580000003 HC RX 258: Performed by: STUDENT IN AN ORGANIZED HEALTH CARE EDUCATION/TRAINING PROGRAM

## 2022-03-24 PROCEDURE — 99232 SBSQ HOSP IP/OBS MODERATE 35: CPT | Performed by: PHYSICAL MEDICINE & REHABILITATION

## 2022-03-24 PROCEDURE — 83735 ASSAY OF MAGNESIUM: CPT

## 2022-03-24 PROCEDURE — 36415 COLL VENOUS BLD VENIPUNCTURE: CPT

## 2022-03-24 RX ORDER — ONDANSETRON 4 MG/1
4 TABLET, ORALLY DISINTEGRATING ORAL EVERY 8 HOURS PRN
Qty: 20 TABLET | Refills: 0 | Status: ON HOLD
Start: 2022-03-24 | End: 2022-03-25

## 2022-03-24 RX ORDER — HEPARIN SODIUM 5000 [USP'U]/ML
5000 INJECTION, SOLUTION INTRAVENOUS; SUBCUTANEOUS EVERY 8 HOURS SCHEDULED
Qty: 21 ML | Refills: 0 | Status: ON HOLD
Start: 2022-03-24 | End: 2022-03-25

## 2022-03-24 RX ORDER — OXYCODONE HYDROCHLORIDE 5 MG/1
5 TABLET ORAL EVERY 6 HOURS PRN
Qty: 28 TABLET | Refills: 0 | Status: ON HOLD
Start: 2022-03-24 | End: 2022-03-25

## 2022-03-24 RX ORDER — POLYETHYLENE GLYCOL 3350 17 G/17G
17 POWDER, FOR SOLUTION ORAL 2 TIMES DAILY
Qty: 527 G | Refills: 1 | Status: ON HOLD | OUTPATIENT
Start: 2022-03-24 | End: 2022-03-25

## 2022-03-24 RX ADMIN — CEFAZOLIN SODIUM 2000 MG: 10 INJECTION, POWDER, FOR SOLUTION INTRAVENOUS at 18:13

## 2022-03-24 RX ADMIN — POLYETHYLENE GLYCOL 3350 17 G: 17 POWDER, FOR SOLUTION ORAL at 21:47

## 2022-03-24 RX ADMIN — CEFAZOLIN SODIUM 2000 MG: 10 INJECTION, POWDER, FOR SOLUTION INTRAVENOUS at 03:17

## 2022-03-24 RX ADMIN — HEPARIN SODIUM 5000 UNITS: 5000 INJECTION INTRAVENOUS; SUBCUTANEOUS at 06:16

## 2022-03-24 RX ADMIN — DOCUSATE SODIUM 100 MG: 100 CAPSULE, LIQUID FILLED ORAL at 08:28

## 2022-03-24 RX ADMIN — DOCUSATE SODIUM 100 MG: 100 CAPSULE, LIQUID FILLED ORAL at 21:47

## 2022-03-24 RX ADMIN — ACETAMINOPHEN 1000 MG: 500 TABLET ORAL at 06:16

## 2022-03-24 RX ADMIN — CEFAZOLIN SODIUM 2000 MG: 10 INJECTION, POWDER, FOR SOLUTION INTRAVENOUS at 10:08

## 2022-03-24 RX ADMIN — POLYETHYLENE GLYCOL 3350 17 G: 17 POWDER, FOR SOLUTION ORAL at 08:28

## 2022-03-24 RX ADMIN — HEPARIN SODIUM 5000 UNITS: 5000 INJECTION INTRAVENOUS; SUBCUTANEOUS at 21:47

## 2022-03-24 RX ADMIN — HEPARIN SODIUM 5000 UNITS: 5000 INJECTION INTRAVENOUS; SUBCUTANEOUS at 15:29

## 2022-03-24 RX ADMIN — SODIUM CHLORIDE, PRESERVATIVE FREE 10 ML: 5 INJECTION INTRAVENOUS at 10:05

## 2022-03-24 RX ADMIN — SODIUM CHLORIDE, PRESERVATIVE FREE 10 ML: 5 INJECTION INTRAVENOUS at 21:54

## 2022-03-24 RX ADMIN — ACETAMINOPHEN 1000 MG: 500 TABLET ORAL at 23:43

## 2022-03-24 ASSESSMENT — PAIN SCALES - GENERAL
PAINLEVEL_OUTOF10: 3
PAINLEVEL_OUTOF10: 1
PAINLEVEL_OUTOF10: 0
PAINLEVEL_OUTOF10: 0
PAINLEVEL_OUTOF10: 1
PAINLEVEL_OUTOF10: 0
PAINLEVEL_OUTOF10: 0

## 2022-03-24 ASSESSMENT — PAIN DESCRIPTION - LOCATION
LOCATION: LEG
LOCATION: LEG

## 2022-03-24 ASSESSMENT — PAIN DESCRIPTION - ORIENTATION
ORIENTATION: LEFT
ORIENTATION: LEFT

## 2022-03-24 ASSESSMENT — PAIN DESCRIPTION - PAIN TYPE
TYPE: SURGICAL PAIN
TYPE: SURGICAL PAIN

## 2022-03-24 NOTE — PROGRESS NOTES
Pt has been A&Ox4, VSS aside from 90/48, surgical team aware, pt asymptomatic. NVC checks have been WDL, UO more than adequate, rapid covid was sent and negative. Will continue to monitor.

## 2022-03-24 NOTE — PROGRESS NOTES
Physical Therapy  Facility/Department: [address] [facility] [address] [facility]  Daily Treatment Note  NAME: Karin Mckeon  : 1955  MRN: [id number]    Date of Service: 3/24/2022    Discharge Recommendations:Eloisa Powers scored a 16 on the AM-PAC short mobility form. Current research shows that an AM-PAC score of 17 or less is typically not associated with a discharge to the patient's home setting. Based on the patient's AM-PAC score and their current functional mobility deficits, it is recommended that the patient have 5-7 sessions per week of Physical Therapy at d/c to increase the patient's independence. At this time, this patient demonstrates the endurance, and/or tolerance for 3 hours of therapy each day, with a treatment frequency of 5-7x/wk. Please see assessment section for further patient specific details. If patient discharges prior to next session this note will serve as a discharge summary. Please see below for the latest assessment towards goals. PT Equipment Recommendations  Equipment Needed:  (walker if home)    Assessment   Assessment: Pt continues to demo slow, steady progress but still demo mobility well below her reported baseline of independent at home without AD. Pt has steps to enter home,  lives 3 hours away from [place] and still with drain/wound vac. Pt is pursuing ARU at discharge and would greatly benefit from continued skilled IP PT at discharge to maximize her functional independence prior to d/c home. Treatment Diagnosis: mobility impairment due to L thigh sarcoma removal  Patient Education: Pt educated on PT role, importance of OOB mobility, need to call for assist to get up, WBAT L with KI for comfort/support and she verbalized understanding. We also discussed challenges home discharge might present with her spouse.   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 has a past medical history of Cancer ([facility] [address] History of radiation therapy, and PONV (postoperative nausea and vomiting). has a past surgical history that includes Leg Surgery (Left, ); Breast biopsy (2014);  section, low transverse; Colonoscopy (N/A, 3/30/2018); Colonoscopy (2018); Leg biopsy excision (Left, 2021); Leg biopsy excision (Left, 3/21/2022); tissue transfer (Left, 3/21/2022); Leg Surgery (Left, 3/21/2022); and Skin graft (Left, 3/21/2022). Restrictions  Position Activity Restriction  Other position/activity restrictions: ambulate pt, up with assist, FWBAT; Will have significant quad weakness use knee immobilizer     Subjective   General  Chart Reviewed: Yes  Additional Pertinent Hx: 61y.o. year old female status post wide excision of left thigh sarcoma, complex closure . (3/21). Pmhx: L thigh sarcoma. Family / Caregiver Present: Yes ()  Subjective  Subjective: Pt found supine in bed and agreeable to PT. Pt states she is hopeful to go to rehab.   Pain Screening  Patient Currently in Pain: Denies         Orientation  Orientation  Overall Orientation Status: Within Functional Limits       Objective   Bed mobility  Supine to Sit: Minimal assistance (for L LE)  Scooting: Minimal assistance (for L LE)     Transfers  Sit to Stand: Contact guard assistance (x2 trials)  Stand to sit: Contact guard assistance (x2 trials)     Ambulation 1  Device: Standard Walker  Other Apparatus:  (pt noted to have splint ace wrapped on posterior of leg so KI not needed this am)  Quality of Gait: slow cuong with PWB L (Foot flat); step to gait pattern leading L; pt fatigued quickly (wound vac in tow)  Distance: 10 ft, 40 ft        Exercises  Comments: B AP x20 reclined in chair                               AM-PAC Score  AM-PAC Inpatient Mobility Raw Score : 16 (22 1024)  AM-PAC Inpatient T-Scale Score : 40.78 (03/24/22 1024)  Mobility Inpatient CMS 0-100% Score: 54.16 (03/24/22 1024)  Mobility Inpatient CMS G-Code Modifier : CK (03/24/22 1024)          Goals  Short term goals  Time Frame for Short term goals: discharge  Short term goal 1: sit to/from supine supervision  ongoing  Short term goal 2: sit to/from stand supervision WBAT L  ongoing  Short term goal 3: ambulate 25 ft with walker WBAT L supervision  ongoing  Short term goal 4: up/down 2 steps without rail supervision WBAT L  ongoing  Patient Goals   Patient goals : return home    Plan    Plan  Times per week: 5-7  Current Treatment Recommendations: Balance Training,Functional Mobility Training,Gait Training,Transfer Training,Stair training  Safety Devices  Type of devices: Left in chair,Call light within reach,Chair alarm in place,Nurse notified (pt reclined)     Therapy Time   Individual Concurrent Group Co-treatment   Time In 0945         Time Out 1023         Minutes 38           Timed Code Treatment Minutes: 38      Total Treatment Minutes:  1463 Yadira Pérez PT

## 2022-03-24 NOTE — PLAN OF CARE
Problem: Nausea/Vomiting:  Goal: Absence of nausea/vomiting  Description: Absence of nausea/vomiting  Outcome: Ongoing  Note: Patient tolerating diet and no reports of N/V during shift     Problem: Falls - Risk of:  Goal: Will remain free from falls  Description: Will remain free from falls  Outcome: Ongoing  Note: Patient calls out when needed to get up with assistance with x2 with a walker. Ambulated well to bathroom and back to bed.      Problem: Falls - Risk of:  Goal: Absence of physical injury  Description: Absence of physical injury  Outcome: Ongoing

## 2022-03-24 NOTE — PROGRESS NOTES
Occupational Therapy  Facility/Department: [facility]  Daily Treatment Note  NAME: Sharon Atkinson  : 1955  MRN: [id number]    Date of Service: 3/24/2022    Discharge Recommendations: Sharon Atkinson scored a 18/24 on the AM-PAC ADL Inpatient form. Current research shows that an AM-PAC score of 17 or less is typically not associated with a discharge to the patient's home setting. Based on the patient's AM-PAC score and their current ADL deficits, it is recommended that the patient have 5-7 sessions per week of Occupational Therapy at d/c to increase the patient's independence. At this time, this patient demonstrates the endurance, and/or tolerance for 3 hours of therapy each day, with a treatment frequency of 5-7x/wk. Please see assessment section for further patient specific details. If patient discharges prior to next session this note will serve as a discharge summary. Please see below for the latest assessment towards goals. Assessment      Assessment: Pt is showing progress in funct mob and transfers. Still needing A for mgmt with LLE during transfers. Would still need A for all parts of ADLs, funct mob and transfers. Would benefit from cont OT to maximize independence. 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 has a past medical history of Cancer ([address] History of radiation therapy, and PONV (postoperative nausea and vomiting). has a past surgical history that includes Leg Surgery (Left, ); Breast biopsy (2014);   section, low transverse; Colonoscopy (N/A, 3/30/2018); Colonoscopy (03/30/2018); Leg biopsy excision (Left, 11/22/2021); Leg biopsy excision (Left, 3/21/2022); tissue transfer (Left, 3/21/2022); Leg Surgery (Left, 3/21/2022); and Skin graft (Left, 3/21/2022). Restrictions  Position Activity Restriction  Other position/activity restrictions: ambulate pt, up with assist, FWBAT; Will have significant quad weakness use knee immobilizer  Subjective   General  Chart Reviewed: Yes  Family / Caregiver Present: Yes ()  Referring Practitioner: Dr. Joycelyn Dumont  Diagnosis: Pt admitted with soft sarcoma L thigh, s/p Wide excision left thigh sarcoma, with complex closure, with wound vac  Subjective  Subjective: Pt in chair upon entry, agreeable to work with OT. Pain Assessment  Pain Assessment: 0-10  Pain Level: 3 (RN aware)  Vital Signs  Patient Currently in Pain: Yes   Orientation    Objective    ADL  Feeding: Independent  Grooming: Stand by assistance (SBA for stance at sink for oral care and washing face)  Toileting: Stand by assistance (SBA for stance for clothing mgmt. Able to complete pericare sitting)      Balance  Sitting Balance: Supervision  Standing Balance: Stand by assistance  Standing Balance  Activity: toileting, funct mob, transfers      Functional Mobility  Functional - Mobility Device: Standard Walker  Activity: To/from bathroom; Other (~10 feet in hallway)  Assist Level: Stand by assistance  Functional Mobility Comments: A needed for line mgmt      Toilet Transfers  Toilet - Technique: Ambulating  Equipment Used: Standard toilet  Toilet Transfer: Minimal assistance  Toilet Transfers Comments: Min A for LLE mgmt      Bed mobility  Scooting: Supervision (scooting back in chair)      Transfers  Sit to stand: Stand by assistance  Stand to sit: Minimal assistance (for LLE mgmt)            Plan  If pt discharges prior to next treatment session, this note will serve as discharge summary.      Plan  Times per week: 5-7  Current Treatment Recommendations: Balance Training,Functional Mobility Training,Endurance Training,Self-Care / ADL,Safety Education & Training    AM-PAC Score        AM-PAC Inpatient Daily Activity Raw Score: 18 (03/24/22 1313)  AM-PAC Inpatient ADL T-Scale Score : 38.66 (03/24/22 1313)  ADL Inpatient CMS 0-100% Score: 46.65 (03/24/22 1313)  ADL Inpatient CMS G-Code Modifier : CK (03/24/22 1313)    Goals (as determined and assessed by primary OT)    Short term goals  Time Frame for Short term goals: discharge  Short term goal 1: pt to transfer to commode with CGA (ongoing)  Short term goal 2: pt to participate in LE dressing assessment-3/23 pt needs mod A, updated goal-pt to don brief with Edouard- ongoing  Short term goal 3: pt to stand for 2-4 minutes with SBA, while doing functional /ADL tasks (MET 3/24); revised goal: increase standing tolerance to 7 minutes for ADL, SBA (not met)  Short term goal 4: pt to manage toileting with SBA (MET 3/24); revised goal: toileting w/ Supervision (not met)  Patient Goals   Patient goals : to have less pain, walk       Therapy Time   Individual Concurrent Group Co-treatment   Time In 1235         Time Out 1315         Minutes 40         Timed Code Treatment Minutes: 40 Minutes       KEAGAN Jameson/L    Addendum: 2/4 goals met and revised. Continue per POC.   Chantelle Quiroz OTR/L #[id number]

## 2022-03-24 NOTE — PROGRESS NOTES
Progress Note  Physical Medicine and Rehabilitation    Patient: [facility]  [id number]  Date: 3/24/2022      Chief Complaint: here for surgery. 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 Post op she reports feeling okay. Cecil Higginbotham to go home. Has mild pain in LLE. Has difficulty moving LLE and fatigue Tolerating diet without N, V, CP, SOB, Abd pain. Overnight she has spiked a fever. Awaiting further workup. LLE weak. Canot lift it. Interval History  Reports she was up with therapy yesterday and is now partiallly weightbearing on LLE. Was able to ambulate with walker. Denies N, V, CP, SOB, Abd pain, dizziness, numbness or tingling in extremeties. Prior Level of Function:  Independent for mobility, ADLs, and IADLs    Current Level of Function:   Using She required increased time/exerted increased effort to do bed mobility and transfer to chair with Edouard of 2      Pertinent Social History:  Support:   Home set-up: [employer]. 2 steps to get inside. Worked in doctors office.      Past Medical History:   Diagnosis Date    Cancer [facility])     SARCOMA LEFT THIGH    History of radiation therapy     PONV (postoperative nausea and vomiting)        Past Surgical History:   Procedure Laterality Date    BREAST BIOPSY  2014     SECTION, LOW TRANSVERSE       &     COLONOSCOPY N/A 3/30/2018    COLONOSCOPY WITH BIOPSY and photos performed by Alverto Nguyen MD at [facility]  2018    Small polyp upper part of the right colon--sessile serrated adenoma, redundant colon and spasms    LEG BIOPSY EXCISION Left 2021    INCISIONAL BIOPSY LEFT THIGH MASS performed by Karyle Craze, MD at [facility] 3/21/2022    WIDE EXCISION LEFT THIGH SARCOMA performed by Karyle Craze, MD at [facility]     LLE, TUMOR ON SHIN    LEG SURGERY Left 3/21/2022    . performed by Sourav Benitez MD at [facility] Left 3/21/2022    . performed by Sourav Benitez MD at [facility] Left 3/21/2022    LEFT LEG RECONSTRUCITON WITH GASTROCNEMIUS, RECTUS FEMORIS FLAP, POSSIBLE DEEP INFERIOR EPIGASTRIC ARTERY  FLAP POSSIBLE ADJACENT TISSUE TRANSFER POSSIBLE SPLIT THICKNESS SKIN GRAFT performed by Sourav Benitez MD at [facility] History   Problem Relation Age of Onset    Cancer Mother         parotid    Thyroid Disease Mother     Other Father         PKD    Hypertension Father     Heart Attack Maternal Grandmother     Heart Attack Maternal Grandfather     Other Paternal Grandmother         PKD    Cancer Maternal Aunt         breast    Cancer Maternal Aunt         pancreas       Social History     Socioeconomic History    Marital status:      Spouse name: None    Number of children: None    Years of education: None    Highest education level: None   Occupational History    None   Tobacco Use    Smoking status: Never Smoker    Smokeless tobacco: Never Used   Vaping Use    Vaping Use: Never used   Substance and Sexual Activity    Alcohol use: Yes     Comment: Socially    Drug use: No    Sexual activity: None   Other Topics Concern    None   Social History Narrative    None     Social Determinants of Health     Financial Resource Strain: Unknown    Difficulty of Paying Living Expenses: Patient refused   Food Insecurity: Unknown    Worried About Running Out of Food in the Last Year: Patient refused    Ran Out of Food in the Last Year: Patient refused   Transportation Needs:     Lack of Transportation (Medical): Not on file    Lack of Transportation (Non-Medical):  Not on file   Physical Activity:     Days of Exercise per Week: Not on file    Minutes of Exercise per Session: Not on file   Stress:     Feeling of Stress : Not on file   Social Connections:     Frequency of Communication with Friends and Family: Not on file    Frequency of Social Gatherings with Friends and Family: Not on file    Attends Evangelical Services: Not on file    Active Member of Clubs or Organizations: Not on file    Attends Club or Organization Meetings: Not on file    Marital Status: Not on file   Intimate Partner Violence:     Fear of Current or Ex-Partner: Not on file    Emotionally Abused: Not on file    Physically Abused: Not on file    Sexually Abused: Not on file   Housing Stability:     Unable to Pay for Housing in the Last Year: Not on file    Number of [place] in the Last Year: Not on file    Unstable Housing in the Last Year: Not on file           REVIEW OF SYSTEMS:   CONSTITUTIONAL: negative for fevers, chills, diaphoresis, appetite change, night sweats, unexpected weight change, fatigue  EYES: negative for blurred vision, eye discharge, visual disturbance and icterus  HEENT: negative for hearing loss, tinnitus, ear drainage, sinus pressure, nasal congestion, epistaxis and snoring  RESPIRATORY: Negative for hemoptysis, cough, sputum production  CARDIOVASCULAR: negative for chest pain, palpitations, exertional chest pressure/discomfort, syncope, edema  GASTROINTESTINAL: negative for nausea, vomiting, diarrhea, blood in stool, abdominal pain, constipation  GENITOURINARY: negative for frequency, dysuria, urinary incontinence, decreased urine volume, and hematuria  HEMATOLOGIC/LYMPHATIC: negative for easy bruising, bleeding and lymphadenopathy  ALLERGIC/IMMUNOLOGIC: negative for recurrent infections, angioedema, anaphylaxis and drug reactions  ENDOCRINE: negative for weight changes and diabetic symptoms including polyuria, polydipsia and polyphagia  MUSCULOSKELETAL: negative for pain, joint swelling, decreased range of motion  NEUROLOGICAL: negative for headaches, slurred speech, LLE weakness  PSYCHIATRIC/BEHAVIORAL: negative for hallucinations, behavioral problems, confusion and agitation. Physical Examination:  Vitals:   Patient Vitals for the past 24 hrs:   BP Temp Temp src Pulse Resp SpO2   03/24/22 0827 (!) 95/58 97.9 °F (36.6 °C) Axillary 72 18 95 %   03/24/22 0316 94/61 97.9 °F (36.6 °C) -- 72 16 96 %   03/24/22 0002 100/64 98.2 °F (36.8 °C) Oral 72 16 96 %   03/23/22 1915 101/62 98.3 °F (36.8 °C) Oral 89 16 95 %   03/23/22 1538 98/61 97.9 °F (36.6 °C) Axillary 77 16 95 %   03/23/22 1108 (!) 95/56 98.5 °F (36.9 °C) Oral 86 18 95 %     Const: Alert. WDWN. No distress  Eyes: Conjunctiva noninjected, no icterus noted; pupils equal, round, and reactive to light. HENT: Atraumatic, normocephalic; Oral mucosa moist  Neck: Trachea midline, neck supple. No thyromegaly noted. CV: Regular rate and rhythm, no murmur rub or gallop noted  Resp: Lungs clear to auscultation bilaterally, no rales wheezes or ronchi, no retractions. Respirations unlabored. GI: Soft, nontender, nondistended. Normal bowel sounds. No palpable masses. Skin: Normal temperature and turgor. LLE bandage in place with drains. Ext: No significant edema appreciated. No varicosities. MSK: No joint tenderness, erythema, warmth noted. AROM intact. Neuro: Alert, oriented, appropriate. No cranial nerve deficits appreciated. Sensation intact to light touch. Motor examination reveals normal strength in RUE, RLE, LUE diffusely. LLE in bandage. Is able to wiggle her toes. Unable to lift LLE due to immobilizer No abnormalities with finger/nose or heel/shin noted. Reflexes normal and symmetric.   Psych: Stable mood, normal judgement, normal affect     Lab Results   Component Value Date    WBC 5.2 03/24/2022    HGB 8.2 (L) 03/24/2022    HCT 25.2 (L) 03/24/2022    MCV 80.9 03/24/2022     03/24/2022     Lab Results   Component Value Date    INR 1.21 (H) 03/21/2022    INR 1.4 03/02/2022    PROTIME 13.8 (H) 03/21/2022    PROTIME 16.6 (H) 03/02/2022     Lab Results   Component Value Date    CREATININE <0.5 (L) 03/24/2022 BUN 8 03/24/2022     03/24/2022    K 3.8 03/24/2022    CL 99 03/24/2022    CO2 30 03/24/2022     No results found for: ALT, AST, GGT, ALKPHOS, BILITOT    Most recent echocardiogram revealed:  Not available. Most recent EKG revealed:  NSR. Non specific ST-T changes. Most recent imaging studies revealed:      XR CHEST PORTABLE   Final Result      Clear lungs. Normal cardiomediastinal silhouette. Assessment:  1. 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 Also has drain in LLE. -Is on Ancef. Drain management as per Gen Surg. Therapy restricted due to drains, immobilzer  -She is on oxycodone, and dilaudid pain panel. hasnt required IV pain meds in >48 hrs.   -Heparin for DVT ppx as per Gen Surg. 2. Fever  -Workup including CXR, UA as per Gen Surg. Remains on Ancef    Impairments- Decreased functional mobility, Decreased ADLs    Recommendations: Will admit to ARU when medically stable. Jessica Perez MD  PGY-3, Internal Medicine      3/24/2022  8:34 AM      This patient has been seen, examined, and discussed with the resident. This note has been altered to reflect my own examination findings, impression, and recommendations.        Sunita Bernard D.O. M.P.H  PM&R  3/24/2022  12:17 PM

## 2022-03-24 NOTE — DISCHARGE INSTR - COC
Continuity of Care Form    Patient Name: Rahseeda Meehan   :  1955  MRN:  [id number]    Admit date:  3/21/2022  Discharge date:  3/25/22    Code Status Order: Full Code   Advance Directives:   [facility] Documentation       Date/Time Healthcare Directive Type of Healthcare Directive Copy in [address] Agent's Name Healthcare Agent's Phone Number    [phone] Yes, patient has an advance directive for healthcare treatment Durable power of  for health care;Living will Yes, copy in chart Spouse Ivone Weston [phone]            Admitting Physician:  Hector Heart MD  PCP: Chio More, APRN - CNP    Discharging Nurse: [facility] Unit/Room#: 2611/9688-72  Discharging Unit Phone Number: [phone]    Emergency Contact:   Extended Emergency Contact Information  Primary Emergency Contact: Mejia Powers  Address: [address] [place] [address] Phone: [phone]  Work Phone: [phone]  Mobile Phone: [phone]  Relation: Spouse  Hearing or visual needs: None  Other needs: None  Preferred language: English   needed?  No  Secondary Emergency Contact: Clare Cordell  [place] Phone: [phone]  Mobile Phone: [phone]  Relation: Child    Past Surgical History:  Past Surgical History:   Procedure Laterality Date    BREAST BIOPSY  2014     SECTION, LOW TRANSVERSE       &     COLONOSCOPY N/A 3/30/2018    COLONOSCOPY WITH BIOPSY and photos performed by Elizabeth Dey MD at [address] [place] [address]  2018    Small polyp upper part of the right colon--sessile serrated adenoma, redundant colon and spasms    LEG BIOPSY EXCISION Left 2021    INCISIONAL BIOPSY LEFT THIGH MASS performed by Rojas Colunga MD at [address] [place] [address] Left 3/21/2022    WIDE EXCISION LEFT THIGH SARCOMA performed by Rojas Colnuga MD at [address] [place] [address] 1979    LLE, TUMOR ON SHIN    LEG SURGERY Left 3/21/2022    . performed by Jolie Woods MD at [address] Left 3/21/2022    .  performed by Jolie Woods MD at [address] Left 3/21/2022    LEFT LEG RECONSTRUCITON WITH GASTROCNEMIUS, RECTUS FEMORIS FLAP, POSSIBLE DEEP INFERIOR EPIGASTRIC ARTERY  FLAP POSSIBLE ADJACENT TISSUE TRANSFER POSSIBLE SPLIT THICKNESS SKIN GRAFT performed by Jolie Woods MD at [facility] OR       Immunization History:   Immunization History   Administered Date(s) Administered    COVID-19Everett Ano, Primary or Immunocompromised, PF, 100mcg/0.5mL 01/06/2021, 02/03/2021, 12/08/2021    Influenza A (Q2W3-75) Vaccine PF IM 11/03/2009    Influenza Virus Vaccine 11/02/2017, 10/09/2018, 10/15/2019, 10/19/2020    Influenza, Omari Fry, IM, (6 mo and older Fluzone, Flulaval, Fluarix and 3 yrs and older Afluria) 10/07/2021    Tdap (Boostrix, Adacel) 07/28/2015       Active Problems:  Patient Active Problem List   Diagnosis Code    Dyspareunia in female N94.10    Post-menopause atrophic vaginitis N95.2    Serrated adenoma of colon D12.6    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    Mass of muscle of left lower extremity M62.89    Sarcoma ([address] C49.9       Isolation/Infection:   Isolation            No Isolation          Patient Infection Status       Infection Onset Added Last Indicated Last Indicated By Review Planned Expiration Resolved Resolved By    None active    Resolved    COVID-19 (Rule Out) 11/19/21 11/19/21 11/19/21 COVID-19 (Ordered)   11/20/21 Rule-Out Test Resulted            Nurse Assessment:  Last Vital Signs: BP (!) 95/58   Pulse 72   Temp 97.9 °F (36.6 °C) (Axillary)   Resp 18   Ht 5' 3\" (1.6 m)   Wt 137 lb 12.8 oz (62.5 kg)   LMP 01/01/2010 (Approximate)   SpO2 95%   BMI 24.41 kg/m²     Last documented pain score (0-10 scale): Pain Level: 1  Last Weight:   Wt Readings from Last 1 Encounters:   03/21/22 137 lb 12.8 oz (62.5 kg)     Mental Status:  oriented and alert    IV Access:  - None    Nursing Mobility/ADLs:  Walking   Assisted  Transfer  Assisted  Bathing  Assisted  Dressing  Assisted  Toileting  Assisted  Feeding  Independent  Med Admin  Independent  Med Delivery   whole    Wound Care Documentation and Therapy:  Negative Pressure Wound Therapy Anterior;Distal;Left;Upper (Active)   Wound Type Surgical 03/23/22 1915   Unit Type V. A.C Ultra  03/23/22 1915   Cycle Continuous 03/23/22 1915   Target Pressure (mmHg) 125 03/23/22 1915   Canister changed? No 03/23/22 1915   Output (ml) 0 ml 03/23/22 1915   Wound Assessment Other (Comment) 03/23/22 1915   Number of days: 2        Elimination:  Continence: Bowel: Yes  Bladder: Yes  Urinary Catheter: Removal Date 3/23/22    Colostomy/Ileostomy/Ileal Conduit: No       Date of Last BM: 3/25/22    Intake/Output Summary (Last 24 hours) at 3/24/2022 0835  Last data filed at 3/24/2022 0347  Gross per 24 hour   Intake 256.97 ml   Output 2170 ml   Net -1913.03 ml     I/O last 3 completed shifts: In: 36 [P.O.:60; I.V.:217; IV Piggyback:50]  Out: 8234 [Urine:3500; Drains:70]    Safety Concerns: At Risk for Falls    Impairments/Disabilities:      None and limited mobility d/t splint and surgery site on L leg    Nutrition Therapy:  Current Nutrition Therapy:   - Oral Diet:  General    Routes of Feeding: Oral  Liquids: No Restrictions  Daily Fluid Restriction: no  Last Modified Barium Swallow with Video (Video Swallowing Test): not done    Treatments at the Time of Hospital Discharge:   Respiratory Treatments: n/a  Oxygen Therapy:  is not on home oxygen therapy.   Ventilator:    - No ventilator support    Rehab Therapies: Physical Therapy and Occupational Therapy  Weight Bearing Status/Restrictions: No weight bearing restrictions  Other Medical Equipment (for information only, NOT a DME order):  walker and braces soft splint underneath dressing  Other Treatments: ***    Patient's personal belongings (please select all that are sent with patient):  None    RN SIGNATURE:  Electronically signed by Nila Desir RN on 3/25/22 at 1:09 PM EDT    CASE MANAGEMENT/SOCIAL WORK SECTION    Inpatient Status Date: ***    Readmission Risk Assessment Score:  Readmission Risk              Risk of Unplanned Readmission:  11           Discharging to Facility/ Agency   Name:   Address:  Phone:  Fax:    Dialysis Facility (if applicable)   Name:  Address:  Dialysis Schedule:  Phone:  Fax:    / signature: {Esignature:[id number]}    PHYSICIAN SECTION    Prognosis: Good    Condition at Discharge: Stable    Rehab Potential (if transferring to Rehab): Good    Recommended Labs or Other Treatments After Discharge: PT/OT  Keep prevena intact-it will be removed in the office  Follow up with DR. Cynthia Yoo in one week. Call for appointment [phone]  [facility]  Continue antibiotics until drain removed    Physician Certification: I certify the above information and transfer of Ian Jalloh  is necessary for the continuing treatment of the diagnosis listed and that she requires Acute Rehab for less 30 days.      Update Admission H&P: No change in H&P    PHYSICIAN SIGNATURE:  Electronically signed by ANAIS Posada CNP on 3/24/22 at 8:36 AM EDT

## 2022-03-24 NOTE — PROGRESS NOTES
Plastic Surgery   Daily Progress Note  Patient: Wolfgang Garber      CC: Sarcoma of LLE    SUBJECTIVE:   No acute issues overnight, states pain is well-controlled. Afebrile, HDS. Continues to work with PT/OT. ROS:   A 14 point review of systems was conducted, significant findings as noted above. All other systems negative. OBJECTIVE:    PHYSICAL EXAM:    Vitals:    03/23/22 1538 03/23/22 1915 03/24/22 0002 03/24/22 0316   BP: 98/61 101/62 100/64 94/61   Pulse: 77 89 72 72   Resp: 16 16 16 16   Temp: 97.9 °F (36.6 °C) 98.3 °F (36.8 °C) 98.2 °F (36.8 °C) 97.9 °F (36.6 °C)   TempSrc: Axillary Oral Oral    SpO2: 95% 95% 96% 96%   Weight:       Height:           General appearance: alert, no acute distress, grooming appropriate  Eyes: No scleral icterus, EOM grossly intact  Neck: trachea midline, no JVD, no lymphadenopathy, neck supple  Chest/Lungs: normal effort with no accessory muscle use on RA  Cardiovascular: RRR, brisk capillary refill distally  Abdomen: Soft, non-tender, non-distended, no rebound, guarding, or rigidity. : Alegria in place with clear, yellow urine  Skin: warm and dry, no rashes  Extremities: no edema, no cyanosis; Left leg wrapped with Ace dressing, hemovac from upper leg with minimal sanguinous output, distal pulses palpable; Prevena in place holding adequate suction  Neuro: A&Ox3, no focal deficits, sensation intact    LABS:   Recent Labs     03/23/22  0549 03/24/22  0524   WBC 8.9 5.2   HGB 9.0* 8.2*   HCT 26.5* 25.2*   MCV 80.2 80.9    294        Recent Labs     03/22/22  0602 03/23/22  0549   * 137   K 4.0 4.3    99   CO2 27 28   PHOS  --  2.6   BUN 6* 9   CREATININE 0.6 0.6      No results for input(s): AST, ALT, ALB, BILIDIR, BILITOT, ALKPHOS in the last 72 hours. No results for input(s): LIPASE, AMYLASE in the last 72 hours.      Recent Labs     03/21/22  0641   INR 1.21*   APTT 36.1      No results for input(s): CKTOTAL, CKMB, CKMBINDEX, TROPONINI in the last 72 hours.      ASSESSMENT & PLAN:   This is a 61y.o. year old female status post wide excision of left thigh sarcoma, complex closure . (3/21) POD #3    - Continue general diet  - Continue Prevena and Ace bandage to LLE  - Continue drain to suction  - Continue Ancef  - Path reviewed  - PT/OT  - Follow up medicine consult  -  following, anticipate discharge to ARU for further rehab    I saw and independently examined the patient today. I agree with the history of present illness, past medical/surgical histories, family history, social history, medication list and allergies as listed. The review of systems is as noted above. My physical exam confirms the findings listed above. Review of labs, pathology reports, radiology reports and medical records confirm the findings noted above. I edited the note where appropriate in italics, strikethrough font, or underline. Afebrile overnight. Drain with serosang output. Will take splint down tomorrow for evaluation of the lower extremity - patient states that pain has significantly diminished and she is feeling well overall. If remains afebrile, hemodynamically stable, and exam satisfactory, ok from [facility] standpoint for transfer to ARU.     Cristina Rosas MD  [address] [place] [address] [address] Reconstructive Surgery  [phone]  03/24/22

## 2022-03-24 NOTE — CARE COORDINATION
SW received a call from [address] [place] [address], pre-cert has been approved but they will not be able to accept Pt until tomorrow. Pt will also need a rapid covid. SW spoke to Pt at bedside and informed her that pre-cert has been approved and the ARU will take her tomorrow once a bed becomes available. WILLIAM notified RN.     LUIS ALBERTO Alejandre, HÉCTOR  Social Work/Case Management   ICU/PCU - [facility].   Ph: [phone]  Electronically signed by LUIS ALBERTO Alejandre, HÉCTOR on 3/24/2022 at 2:48 PM

## 2022-03-25 ENCOUNTER — HOSPITAL ENCOUNTER (INPATIENT)
Age: 67
LOS: 8 days | Discharge: HOME HEALTH | DRG: 949 | End: 2022-04-02
Attending: PHYSICAL MEDICINE & REHABILITATION | Admitting: PHYSICAL MEDICINE & REHABILITATION
Payer: COMMERCIAL

## 2022-03-25 VITALS
OXYGEN SATURATION: 96 % | TEMPERATURE: 98 F | HEIGHT: 63 IN | DIASTOLIC BLOOD PRESSURE: 65 MMHG | RESPIRATION RATE: 18 BRPM | BODY MASS INDEX: 24.41 KG/M2 | WEIGHT: 137.8 LBS | HEART RATE: 84 BPM | SYSTOLIC BLOOD PRESSURE: 102 MMHG

## 2022-03-25 DIAGNOSIS — C49.22 SARCOMA OF LEFT THIGH (HCC): Primary | ICD-10-CM

## 2022-03-25 PROBLEM — R53.81 DEBILITY: Status: ACTIVE | Noted: 2022-03-25

## 2022-03-25 LAB
ALBUMIN SERPL-MCNC: 2.9 G/DL (ref 3.4–5)
ANION GAP SERPL CALCULATED.3IONS-SCNC: 7 MMOL/L (ref 3–16)
BASOPHILS ABSOLUTE: 0 K/UL (ref 0–0.2)
BASOPHILS RELATIVE PERCENT: 0.9 %
BUN BLDV-MCNC: 8 MG/DL (ref 7–20)
CALCIUM SERPL-MCNC: 8.7 MG/DL (ref 8.3–10.6)
CHLORIDE BLD-SCNC: 103 MMOL/L (ref 99–110)
CO2: 31 MMOL/L (ref 21–32)
CREAT SERPL-MCNC: 0.5 MG/DL (ref 0.6–1.2)
EOSINOPHILS ABSOLUTE: 0.1 K/UL (ref 0–0.6)
EOSINOPHILS RELATIVE PERCENT: 2.9 %
GFR AFRICAN AMERICAN: >60
GFR NON-AFRICAN AMERICAN: >60
GLUCOSE BLD-MCNC: 98 MG/DL (ref 70–99)
HCT VFR BLD CALC: 25.1 % (ref 36–48)
HEMOGLOBIN: 8.2 G/DL (ref 12–16)
LYMPHOCYTES ABSOLUTE: 0.8 K/UL (ref 1–5.1)
LYMPHOCYTES RELATIVE PERCENT: 16.8 %
MAGNESIUM: 2.2 MG/DL (ref 1.8–2.4)
MCH RBC QN AUTO: 26.3 PG (ref 26–34)
MCHC RBC AUTO-ENTMCNC: 32.5 G/DL (ref 31–36)
MCV RBC AUTO: 80.7 FL (ref 80–100)
MONOCYTES ABSOLUTE: 0.5 K/UL (ref 0–1.3)
MONOCYTES RELATIVE PERCENT: 9.6 %
NEUTROPHILS ABSOLUTE: 3.5 K/UL (ref 1.7–7.7)
NEUTROPHILS RELATIVE PERCENT: 69.8 %
PDW BLD-RTO: 13.6 % (ref 12.4–15.4)
PHOSPHORUS: 3.2 MG/DL (ref 2.5–4.9)
PLATELET # BLD: 317 K/UL (ref 135–450)
PMV BLD AUTO: 7.9 FL (ref 5–10.5)
POTASSIUM SERPL-SCNC: 3.9 MMOL/L (ref 3.5–5.1)
RBC # BLD: 3.1 M/UL (ref 4–5.2)
SODIUM BLD-SCNC: 141 MMOL/L (ref 136–145)
WBC # BLD: 5 K/UL (ref 4–11)

## 2022-03-25 PROCEDURE — 2580000003 HC RX 258: Performed by: STUDENT IN AN ORGANIZED HEALTH CARE EDUCATION/TRAINING PROGRAM

## 2022-03-25 PROCEDURE — 6360000002 HC RX W HCPCS: Performed by: PHYSICAL MEDICINE & REHABILITATION

## 2022-03-25 PROCEDURE — 85025 COMPLETE CBC W/AUTO DIFF WBC: CPT

## 2022-03-25 PROCEDURE — 6370000000 HC RX 637 (ALT 250 FOR IP): Performed by: PHYSICAL MEDICINE & REHABILITATION

## 2022-03-25 PROCEDURE — 36415 COLL VENOUS BLD VENIPUNCTURE: CPT

## 2022-03-25 PROCEDURE — 99232 SBSQ HOSP IP/OBS MODERATE 35: CPT | Performed by: PHYSICAL MEDICINE & REHABILITATION

## 2022-03-25 PROCEDURE — 1280000000 HC REHAB R&B

## 2022-03-25 PROCEDURE — 94761 N-INVAS EAR/PLS OXIMETRY MLT: CPT

## 2022-03-25 PROCEDURE — 6360000002 HC RX W HCPCS: Performed by: STUDENT IN AN ORGANIZED HEALTH CARE EDUCATION/TRAINING PROGRAM

## 2022-03-25 PROCEDURE — 2580000003 HC RX 258: Performed by: ORTHOPAEDIC SURGERY

## 2022-03-25 PROCEDURE — 6370000000 HC RX 637 (ALT 250 FOR IP): Performed by: STUDENT IN AN ORGANIZED HEALTH CARE EDUCATION/TRAINING PROGRAM

## 2022-03-25 PROCEDURE — 83735 ASSAY OF MAGNESIUM: CPT

## 2022-03-25 PROCEDURE — 80069 RENAL FUNCTION PANEL: CPT

## 2022-03-25 PROCEDURE — 94150 VITAL CAPACITY TEST: CPT

## 2022-03-25 PROCEDURE — 2580000003 HC RX 258: Performed by: PHYSICAL MEDICINE & REHABILITATION

## 2022-03-25 RX ORDER — SODIUM CHLORIDE 9 MG/ML
25 INJECTION, SOLUTION INTRAVENOUS PRN
Status: DISCONTINUED | OUTPATIENT
Start: 2022-03-25 | End: 2022-04-02 | Stop reason: HOSPADM

## 2022-03-25 RX ORDER — SODIUM CHLORIDE 0.9 % (FLUSH) 0.9 %
5-40 SYRINGE (ML) INJECTION PRN
Status: DISCONTINUED | OUTPATIENT
Start: 2022-03-25 | End: 2022-04-01

## 2022-03-25 RX ORDER — ONDANSETRON 4 MG/1
4 TABLET, ORALLY DISINTEGRATING ORAL EVERY 8 HOURS PRN
Status: DISCONTINUED | OUTPATIENT
Start: 2022-03-25 | End: 2022-04-02 | Stop reason: HOSPADM

## 2022-03-25 RX ORDER — POLYETHYLENE GLYCOL 3350 17 G/17G
17 POWDER, FOR SOLUTION ORAL DAILY PRN
Status: DISCONTINUED | OUTPATIENT
Start: 2022-03-25 | End: 2022-04-02 | Stop reason: HOSPADM

## 2022-03-25 RX ORDER — SODIUM CHLORIDE 0.9 % (FLUSH) 0.9 %
5-40 SYRINGE (ML) INJECTION 2 TIMES DAILY
Status: DISCONTINUED | OUTPATIENT
Start: 2022-03-25 | End: 2022-04-02 | Stop reason: HOSPADM

## 2022-03-25 RX ORDER — ACETAMINOPHEN 500 MG
1000 TABLET ORAL EVERY 6 HOURS
Status: CANCELLED | OUTPATIENT
Start: 2022-03-25

## 2022-03-25 RX ORDER — SODIUM CHLORIDE 0.9 % (FLUSH) 0.9 %
5-40 SYRINGE (ML) INJECTION EVERY 12 HOURS SCHEDULED
Status: DISCONTINUED | OUTPATIENT
Start: 2022-03-25 | End: 2022-04-02 | Stop reason: HOSPADM

## 2022-03-25 RX ORDER — ONDANSETRON 4 MG/1
4 TABLET, ORALLY DISINTEGRATING ORAL EVERY 8 HOURS PRN
Status: CANCELLED | OUTPATIENT
Start: 2022-03-25

## 2022-03-25 RX ORDER — DOCUSATE SODIUM 100 MG/1
100 CAPSULE, LIQUID FILLED ORAL 2 TIMES DAILY
Status: DISCONTINUED | OUTPATIENT
Start: 2022-03-25 | End: 2022-04-02 | Stop reason: HOSPADM

## 2022-03-25 RX ORDER — POLYETHYLENE GLYCOL 3350 17 G/17G
17 POWDER, FOR SOLUTION ORAL DAILY PRN
Status: CANCELLED | OUTPATIENT
Start: 2022-03-25

## 2022-03-25 RX ORDER — OXYCODONE HYDROCHLORIDE 5 MG/1
10 TABLET ORAL EVERY 4 HOURS PRN
Status: DISCONTINUED | OUTPATIENT
Start: 2022-03-25 | End: 2022-04-02 | Stop reason: HOSPADM

## 2022-03-25 RX ORDER — DOCUSATE SODIUM 100 MG/1
100 CAPSULE, LIQUID FILLED ORAL 2 TIMES DAILY
Status: CANCELLED | OUTPATIENT
Start: 2022-03-25

## 2022-03-25 RX ORDER — ONDANSETRON 2 MG/ML
4 INJECTION INTRAMUSCULAR; INTRAVENOUS EVERY 6 HOURS PRN
Status: DISCONTINUED | OUTPATIENT
Start: 2022-03-25 | End: 2022-04-02 | Stop reason: HOSPADM

## 2022-03-25 RX ORDER — OXYCODONE HYDROCHLORIDE 5 MG/1
5 TABLET ORAL EVERY 4 HOURS PRN
Status: DISCONTINUED | OUTPATIENT
Start: 2022-03-25 | End: 2022-04-02 | Stop reason: HOSPADM

## 2022-03-25 RX ORDER — OXYCODONE HYDROCHLORIDE 5 MG/1
5 TABLET ORAL EVERY 4 HOURS PRN
Status: CANCELLED | OUTPATIENT
Start: 2022-03-25

## 2022-03-25 RX ORDER — ONDANSETRON 2 MG/ML
4 INJECTION INTRAMUSCULAR; INTRAVENOUS EVERY 6 HOURS PRN
Status: CANCELLED | OUTPATIENT
Start: 2022-03-25

## 2022-03-25 RX ORDER — ACETAMINOPHEN 325 MG/1
650 TABLET ORAL EVERY 4 HOURS PRN
Status: DISCONTINUED | OUTPATIENT
Start: 2022-03-25 | End: 2022-04-02 | Stop reason: HOSPADM

## 2022-03-25 RX ORDER — OXYCODONE HYDROCHLORIDE 5 MG/1
10 TABLET ORAL EVERY 4 HOURS PRN
Status: CANCELLED | OUTPATIENT
Start: 2022-03-25

## 2022-03-25 RX ORDER — ACETAMINOPHEN 325 MG/1
650 TABLET ORAL EVERY 4 HOURS PRN
Status: CANCELLED | OUTPATIENT
Start: 2022-03-25

## 2022-03-25 RX ORDER — CEPHALEXIN 500 MG/1
500 CAPSULE ORAL 4 TIMES DAILY
Qty: 40 CAPSULE | Refills: 0 | Status: ON HOLD | OUTPATIENT
Start: 2022-03-25 | End: 2022-03-25

## 2022-03-25 RX ORDER — ACETAMINOPHEN 500 MG
1000 TABLET ORAL EVERY 6 HOURS
Status: DISCONTINUED | OUTPATIENT
Start: 2022-03-25 | End: 2022-04-02 | Stop reason: HOSPADM

## 2022-03-25 RX ADMIN — CEFAZOLIN SODIUM 2000 MG: 10 INJECTION, POWDER, FOR SOLUTION INTRAVENOUS at 10:49

## 2022-03-25 RX ADMIN — ACETAMINOPHEN 1000 MG: 500 TABLET ORAL at 10:46

## 2022-03-25 RX ADMIN — SODIUM CHLORIDE, PRESERVATIVE FREE 10 ML: 5 INJECTION INTRAVENOUS at 20:11

## 2022-03-25 RX ADMIN — DOCUSATE SODIUM 100 MG: 100 CAPSULE, LIQUID FILLED ORAL at 20:10

## 2022-03-25 RX ADMIN — HEPARIN SODIUM 5000 UNITS: 5000 INJECTION INTRAVENOUS; SUBCUTANEOUS at 14:28

## 2022-03-25 RX ADMIN — CEFAZOLIN SODIUM 2000 MG: 10 INJECTION, POWDER, FOR SOLUTION INTRAVENOUS at 03:20

## 2022-03-25 RX ADMIN — CEFAZOLIN SODIUM 2000 MG: 10 INJECTION, POWDER, FOR SOLUTION INTRAVENOUS at 20:14

## 2022-03-25 RX ADMIN — HEPARIN SODIUM 5000 UNITS: 5000 INJECTION INTRAVENOUS; SUBCUTANEOUS at 06:06

## 2022-03-25 RX ADMIN — ACETAMINOPHEN 1000 MG: 500 TABLET ORAL at 17:33

## 2022-03-25 RX ADMIN — DOCUSATE SODIUM 100 MG: 100 CAPSULE, LIQUID FILLED ORAL at 08:40

## 2022-03-25 RX ADMIN — SODIUM CHLORIDE, PRESERVATIVE FREE 10 ML: 5 INJECTION INTRAVENOUS at 10:49

## 2022-03-25 ASSESSMENT — PAIN SCALES - GENERAL
PAINLEVEL_OUTOF10: 0
PAINLEVEL_OUTOF10: 1
PAINLEVEL_OUTOF10: 0

## 2022-03-25 ASSESSMENT — PAIN DESCRIPTION - LOCATION
LOCATION: LEG
LOCATION: LEG

## 2022-03-25 ASSESSMENT — PAIN DESCRIPTION - ORIENTATION
ORIENTATION: LEFT
ORIENTATION: LEFT

## 2022-03-25 ASSESSMENT — PAIN DESCRIPTION - PAIN TYPE
TYPE: SURGICAL PAIN
TYPE: SURGICAL PAIN

## 2022-03-25 NOTE — PROGRESS NOTES
Occupational Therapy  HOLD    Awaiting for the doctor to address dressing/splint. Will HOLD this morning and follow up as schedule allows.     Gege Reynaga OTR/L #[id number]

## 2022-03-25 NOTE — PLAN OF CARE
ARU PATIENT TREATMENT PLAN  [address], [address]    : 1955  Acct #: [de-identified]  MRN: [id number]  PHYSICIAN:  Jun Lane, DO  Primary Problem    Patient Active Problem List   Diagnosis    Dyspareunia in female    Post-menopause atrophic vaginitis    Serrated adenoma of colon    Sarcoma of left thigh ([facility]    Mass of muscle of left lower extremity    Sarcoma ([facility]    Debility       Rehabilitation Diagnosis:  16.0, Debility (non-cardiac, non-pulmonary  ADMIT DATE:3/25/2022    Patient Goals: to have a successful healing process  Admitting Impairments: Decreased functional mobility ; Decreased endurance;Decreased strength;Decreased balance;Decreased safe awareness; Increased pain;Decreased high-level IADLs  Activity Restrictions: FWBAT; KI prn; per plastic surgery on 3/26- pt should only sponge bath until wound vac and drain are discontinued  Participation Limitations: None   CARE PLAN   NURSING:  [address]ari while on this unit will:  [x] Be continent of bowel and bladder     [x] Have an adequate number of bowel movements  [x] Urinate with no urinary retention >300ml in bladder  [] Complete bladder protocol with roman removal  [x] Maintain O2 SATs at >90_%  [x] Have pain managed while on ARU       [] Be pain free by discharge   [x] Have no skin breakdown while on ARU  [x] Have improved skin integrity via wound measurements  [x] Have no signs/symptoms of infection at the wound site  [x] Be free from injury during hospitalization   [x] Complete education with patient/family with understanding demonstrated for:  [x] Adjustment   [] Other:     Nursing Interventions will include:  [x] bowel/bladder training   [x] education for medical assistive devices   [x] medication education   [] O2 saturation management   [x] energy conservation   [x] stress management techniques   [x] fall prevention   [x] alarms protocol [x] seating and positioning   [x] skin/wound care   [x] pressure relief instruction   [x] dressing changes     [x] infection protection   [x] DVT prophylaxis  [x] assistance with in room safety with transfers to bed, toilet, wheelchair, shower   [x] bathroom activities and hygiene  [] Other:    Patient/Caregiver Education for:  [x] Disease/sustained injury/management     [x] Medication Use  [] Surgical intervention  [x] Safety  [x] Body mechanics and or joint protection  [x] Health maintenance     [] Other:     PHYSICAL THERAPY:  Goals:                  Short term goals  Time Frame for Short term goals: 7-10 days  Short term goal 1: pt will perform bed mobility tasks mod I  Short term goal 2: pt will perform functional transfers with LRAD and mod I  Short term goal 3: pt will ambulate 150' with LRAD and Mod I  Short term goal 4: pt will negotiate 4 steps with RHR and mod I               These goals were reviewed with this patient at the time of assessment and Molina Benoit is in agreement.      Plan of Care: Pt to be seen 5 out of 7 days per week per ARU protocol (90 minutes with PT)                  Current Treatment Recommendations: Balance Training,Functional Mobility Training,Gait Training,Transfer Training,Stair training,Strengthening,Endurance Training,Neuromuscular Re-education,Patient/Caregiver Education & Training,Safety Education & Training,Home Exercise Program    OCCUPATIONAL THERAPY:  Goals:             Short term goals  Time Frame for Short term goals: STG=LTG :  Long term goals  Time Frame for Long term goals : 7-10 days  Long term goal 1: Pt will complete LE dressing MOD I  Long term goal 2: Pt will complete toileting MOD I  Long term goal 3: Pt will complete bathing tasks MOD I  Long term goal 4: Pt will maintain stance for up to 10 mins to complete light meal prep task  Long term goal 5: Pt will I'ly manage LLE in order to complete safe functional transfers to the toilet, bed, chair :  These goals were reviewed with this patient at the time of assessment and Yoly Page is in agreement    Plan of Care:  Pt to be seen 5 out of 7 days per week per ARU protocol (90 minutes with OT)  Patient Education: pt verbalized understanding    SPEECH THERAPY: Goals will be left blank if speech is not following this patient. Goals:                                                                             Plan of Care:  Pt to be seen 5 out of 7 days per week per ARU protocol (0 minutes with SLP)    Therapy Treatments will include:  [x]  therapeutic exercises    [x]  gait training     [x]  neuromuscular re-ed                            [x]  transfer training             [x] community reintegration    [x] bed mobility                          []  w/c mobility and training  [x]  self care    [x]home mgmt    []  cognitive training            [x]  energy conservation        []  dysphagia tx    []  speech/language/communication therapy   []  group therapy    [x]  patient/family education    [] Other:    CASE MANAGEMENT:  Goals: Assist patient/family with discharge planning, patient/family counseling, and coordination with insurance during ARU stay.     Admission Period/Goal QM SCORES  QM Admit/Goal Score   Eating CARE Score: 6 / Discharge Goal: Independent   Oral Hygiene   / Discharge Goal: Independent   Shower/Bathing CARE Score: 4 / Discharge Goal: Independent   UB Dressing CARE Score: 5 / Discharge Goal: Independent   LB Dressing CARE Score: 4 / Discharge Goal: Independent   Putting on/off Footwear CARE Score: 5 / Discharge Goal: Independent   Toileting Hygiene CARE Score: 4 / Discharge Goal: Independent   Bladder Continence Bladder Continence: Always continent    Bowel Continence Bowel Continence: Always continent    Toilet Transfers CARE Score: 3 / Discharge Goal: Independent   Shower/Bathe Self  CARE Score: 4 / Discharge Goal: Independent   Rolling Left and Right CARE Score: 3 / Discharge Goal: Independent   Sit to Lying CARE Score: 3 / Discharge Goal: Independent   Lying to Sitting on Bedside CARE Score: 3 / Discharge Goal: Independent   Sit to Stand CARE Score: 4 / Discharge Goal: Independent   Chair/Bed to Chair Transfer CARE Score: 4 / Discharge Goal: Independent   Car Transfers CARE Score: 4 / Discharge Goal: Independent   Walk 10 Feet CARE Score: 1 / Discharge Goal: Independent   Walk 50 Feet with Two Turns CARE Score: 1 / Discharge Goal: Independent   Walk 150 Feet CARE Score: 88 / Discharge Goal: Independent   Walk 10 Feet on Uneven Surfaces CARE Score: 1 / Discharge Goal: Independent   1 Step (Curb) CARE Score: 1 / Discharge Goal: Independent   4 Steps CARE Score: 4 / Discharge Goal: Independent   12 Steps CARE Score: 9 / Discharge Goal: Not Applicable   Picking up Object from Floor CARE Score: 4 / Discharge Goal: Independent   Wheel 50 Feet with 2 Turns   /     Type         [] Manual        [] Motorized        [x] N/A   Wheel 150 Feet   /     Type         [] Manual        [] Motorized        [x] N/A        Christian Kapadia will be seen a minimum of 3 hours of therapy per day, a minimum of 5 out of 7 days per week (please see above for specific treatment plan per PT/OT/SLP). [] In this rare instance due to the nature of this patient's medical involvement, this patient will be seen 15 hours per week (900 minutes within a 7day period). In addition, dietician/nutritionist may monitor calorie count as well as intake and collaboratively work with SLP on dietary upgrades. Neuropsychology/Psychology may evaluate and provide necessary support.     Medical issues being managed closely and that require 24hour availability of a physician:  [] Swallowing Precautions  [x] Bowel/Bladder Fx  [] Weight bearing precautions  [x] Wound Care    [x] Pain Mgmt   [x] Infection Protection  [x] DVT Prophylaxis   [x] Fall Precautions  [x] Fluid/Electrolyte/Nutrition Balance  [] Voice Protection   [] Respiratory  [] Other:    Medical Prognosis: [x] Good  [] Fair    [] Guarded   Total expected IRF days 9  Anticipated discharge destination:   [] Home Independently   [] Home Modified Independent  [x] Home with supervision    []SNF     [] Other                                           Physician anticipated functional outcomes: Pt will progress to a level of MOD I for all ADLs, transfers and mobility to allow for a safe return home. IPOC brief synthesis: 62 yo F 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 Post op she reports feeling okay. Lashonda Pen to go home. Has mild pain in LLE. Has difficulty moving LLE and fatigue Tolerating diet without N, V, CP, SOB, Abd pain. This initial ARU patient treatment plan of care, together with the IPOC & the Education plan, form the foundation for the patient's plan of care. Weekly patient care conferences are held to evaluate progress towards the initial treatment plan & goals.     I have reviewed this initial plan of care and agree with its contents:    Title   Name    Date    Time    Physician: MERI CoombsPDulceH  PM&R  3/28/2022  10:59 AM        Case Mgmt: Sima Hernandez, LSW  3/28/22 0933    OT: Dany Guerrero OTR/L  3/26/2022  1236     PT: Soni Trinh, PT, DPT 3/26/22 1400    RN: John Park RN 03/25/22 6462    ST:    ARU Supervisor: Victor Hugo Guerrero PT, DPT 3/28/2022 @ 5245    Other:

## 2022-03-25 NOTE — PROGRESS NOTES
Physician Progress Note      PATIENTNoni Card  CSN #:                  [id number]  :                       1955  ADMIT DATE:       3/21/2022 5:56 AM  [address] [place] DATE:        3/25/2022 3:37 PM  RESPONDING  PROVIDER #:        Carli Allen          QUERY TEXT:    Pt admitted 3/21 with Sarcoma, s/p resection on 3/21. Pt noted to have Preop   HGB: 11.3, & HGB on 3/24: 8.2. If possible, please document in the progress   notes and discharge summary if you are evaluating and/or treating any of the   following: The medical record reflects the following:  Risk Factors: resection sarcoma with complex closure with advancement flap  Clinical Indicators: Pre-op HGB on 3/2/22: 11.3, HGB trend since OR: 8.9- 9-   8.2. Total EBL: 175. IVF discontinued 3/22 @ 0300  Treatment: Daily H&H monitoring  Options provided:  -- Postoperative acute blood loss anemia  -- Precipitous drop in Hemoglobin and Hematocrit  -- Other - I will add my own diagnosis  -- Disagree - Not applicable / Not valid  -- Disagree - Clinically unable to determine / Unknown  -- Refer to Clinical Documentation Reviewer    PROVIDER RESPONSE TEXT:    This patient has postoperative acute blood loss anemia. Query created by:  Zakiya Dejesus on 3/24/2022 1:37 PM      Electronically signed by:  Carli Allen 3/25/2022 5:08 PM

## 2022-03-25 NOTE — PROGRESS NOTES
Physical Therapy Attempt    PT called RN to try to see pt for treatment and RN stating that the dressing and splint has been removed and they are waiting for the doctor to come and direct them in materials to use to rewrap it. Therefore therapy cannot see at this time. PT to try back at later time/date as able per POC.   Lydia Gar, DPT

## 2022-03-25 NOTE — PROGRESS NOTES
NURSING ASSESSMENT: ARU ADMISSION  The  [address] Dx/Hx: [place] [facility] [R53.81]   :1955  DN:[id number]  Date of Admit: 3/25/2022  Room #: 3113/3113-01    Subjective:   Patient admitted to [email or web address] from 5S via wheelchair. Alert and oriented x4. Oriented to room and call light system. Oriented to rehab routine and therapy schedules. Informed about care conferences and ordering of meals. Drug / Medication Review:   Medications were reviewed by RN at time of admission  [x]  No potential or actual clinically significant medication issues were noted.      []   Yes, a clinically significant medication issue was identified                 []  Adverse Drug Event:                  []  Allergy:                  []  Side Effect:                  []  Ineffective Therapy:                  []  Drug Interaction:                 []  Duplicated Therapy:                 []  Untreated Indication:                  []  Non-adherence:                 []  Other:                  Nursing/Pharmacy contacted the physician:     Date:              Time:                  Actions recommended by physician were completed:   Date :            Time:    4 Eyes Skin Assessment   The patient is being assessed for: Admission     I agree that 2 RN's have performed a thorough Head to Toe Skin Assessment on the patient. ALL assessment sites listed below have been assessed. Areas assessed by both nurses:   [x]   Head, Face, and Ears   [x]   Shoulders, Back, and Chest, Abdomen  [x]   Arms, Elbows, and Hands   [x]   Coccyx, Sacrum, and Ischium  [x]   Legs, Feet, and Heel     Does the Patient have Skin Breakdown?    Scattered bruising  Redness to buttocks - blanchable  Surgical incision LLE - DONNA d/t surgical dressing         Antwan Prevention initiated:  Yes   Wound Care Orders initiated:  No       WO nurse consulted for Pressure Injury (Stage 3,4, Unstageable, DTI, NWPT, Complex wounds)and New or Established Ostomies:  N/A    Primary Nurse eSignature: Dilshad Webber RN 03/25/22 [id number]  Co-signer eSignature:    Electronically signed by Augustin Conti RN on 3/26/2022 at 1:35 AM

## 2022-03-25 NOTE — PROGRESS NOTES
Progress Note  Physical Medicine and Rehabilitation    Patient: Alvaro Benton  [id number]  Date: 3/25/2022      Chief Complaint: here for surgery. 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 Post op she reports feeling okay. Corlis Hilt to go home. Has mild pain in LLE. Has difficulty moving LLE and fatigue Tolerating diet without N, V, CP, SOB, Abd pain. Overnight she has spiked a fever. Awaiting further workup. LLE weak. Canot lift it. Interval History  Doing well today. Plan to admit to ARU. Denies N, V, CP, SOB, Abd pain, dizziness, numbness or tingling in extremeties. Prior Level of Function:  Independent for mobility, ADLs, and IADLs    Current Level of Function:   Using She required increased time/exerted increased effort to do bed mobility and transfer to chair with Edouard of 2      Pertinent Social History:  Support:   Home set-up: Condo. 2 steps to get inside. Worked in doctors office. Past Medical History:   Diagnosis Date    Cancer [facility])     SARCOMA LEFT THIGH    History of radiation therapy     PONV (postoperative nausea and vomiting)        Past Surgical History:   Procedure Laterality Date    BREAST BIOPSY  2014     SECTION, LOW TRANSVERSE       &     COLONOSCOPY N/A 3/30/2018    COLONOSCOPY WITH BIOPSY and photos performed by Eddie Mark MD at [address] [place] [address]  2018    Small polyp upper part of the right colon--sessile serrated adenoma, redundant colon and spasms    LEG BIOPSY EXCISION Left 2021    INCISIONAL BIOPSY LEFT THIGH MASS performed by Julianne Rowe MD at [facility] 3/21/2022    WIDE EXCISION LEFT THIGH SARCOMA performed by Julianne Rowe MD at [facility]     LLE, TUMOR ON SHIN    LEG SURGERY Left 3/21/2022    .  performed by Naeem Pate MD at [address] [place] [address] SKIN GRAFT Left 3/21/2022    . performed by Florentino Jason MD at [address] [facility] Left 3/21/2022    LEFT LEG RECONSTRUCITON WITH GASTROCNEMIUS, RECTUS FEMORIS FLAP, POSSIBLE DEEP INFERIOR EPIGASTRIC ARTERY  FLAP POSSIBLE ADJACENT TISSUE TRANSFER POSSIBLE SPLIT THICKNESS SKIN GRAFT performed by Florentino Jason MD at [facility] History   Problem Relation Age of Onset    Cancer Mother         parotid    Thyroid Disease Mother     Other Father         PKD    Hypertension Father     Heart Attack Maternal Grandmother     Heart Attack Maternal Grandfather     Other Paternal Grandmother         PKD    Cancer Maternal Aunt         breast    Cancer Maternal Aunt         pancreas       Social History     Socioeconomic History    Marital status:      Spouse name: None    Number of children: None    Years of education: None    Highest education level: None   Occupational History    None   Tobacco Use    Smoking status: Never Smoker    Smokeless tobacco: Never Used   Vaping Use    Vaping Use: Never used   Substance and Sexual Activity    Alcohol use: Yes     Comment: Socially    Drug use: No    Sexual activity: None   Other Topics Concern    None   Social History Narrative    None     Social Determinants of Health     Financial Resource Strain: Unknown    Difficulty of Paying Living Expenses: Patient refused   Food Insecurity: Unknown    Worried About Running Out of Food in the Last Year: Patient refused    Ran Out of Food in the Last Year: Patient refused   Transportation Needs:     Lack of Transportation (Medical): Not on file    Lack of Transportation (Non-Medical):  Not on file   Physical Activity:     Days of Exercise per Week: Not on file    Minutes of Exercise per Session: Not on file   Stress:     Feeling of Stress : Not on file   Social Connections:     Frequency of Communication with Friends and Family: Not on file    Frequency of Social Gatherings with Friends and Family: Not on file    Attends Voodoo Services: Not on file    Active Member of Clubs or Organizations: Not on file    Attends Club or Organization Meetings: Not on file    Marital Status: Not on file   Intimate Partner Violence:     Fear of Current or Ex-Partner: Not on file    Emotionally Abused: Not on file    Physically Abused: Not on file    Sexually Abused: Not on file   Housing Stability:     Unable to Pay for Housing in the Last Year: Not on file    Number of [place] in the Last Year: Not on file    Unstable Housing in the Last Year: Not on file           REVIEW OF SYSTEMS:   CONSTITUTIONAL: negative for fevers, chills, diaphoresis, appetite change, night sweats, unexpected weight change, fatigue  EYES: negative for blurred vision, eye discharge, visual disturbance and icterus  HEENT: negative for hearing loss, tinnitus, ear drainage, sinus pressure, nasal congestion, epistaxis and snoring  RESPIRATORY: Negative for hemoptysis, cough, sputum production  CARDIOVASCULAR: negative for chest pain, palpitations, exertional chest pressure/discomfort, syncope, edema  GASTROINTESTINAL: negative for nausea, vomiting, diarrhea, blood in stool, abdominal pain, constipation  GENITOURINARY: negative for frequency, dysuria, urinary incontinence, decreased urine volume, and hematuria  HEMATOLOGIC/LYMPHATIC: negative for easy bruising, bleeding and lymphadenopathy  ALLERGIC/IMMUNOLOGIC: negative for recurrent infections, angioedema, anaphylaxis and drug reactions  ENDOCRINE: negative for weight changes and diabetic symptoms including polyuria, polydipsia and polyphagia  MUSCULOSKELETAL: negative for pain, joint swelling, decreased range of motion  NEUROLOGICAL: negative for headaches, slurred speech, LLE weakness  PSYCHIATRIC/BEHAVIORAL: negative for hallucinations, behavioral problems, confusion and agitation.      Physical Examination:  Vitals:   Patient Vitals for the past 24 hrs:   BP Temp Temp src Pulse Resp SpO2   03/25/22 0815 103/62 98.2 °F (36.8 °C) Oral 80 18 96 %   03/25/22 0325 102/62 98.4 °F (36.9 °C) Oral 80 16 95 %   03/24/22 2343 101/65 99.1 °F (37.3 °C) Oral 87 16 96 %   03/24/22 2038 99/62 98.4 °F (36.9 °C) Oral 91 16 96 %   03/24/22 1447 -- -- -- -- 18 96 %   03/24/22 1442 (!) 89/48 98.6 °F (37 °C) Oral 88 18 96 %   03/24/22 1137 (!) 90/48 98.3 °F (36.8 °C) Oral 74 18 96 %     Const: Alert. WDWN. No distress  Eyes: Conjunctiva noninjected, no icterus noted; pupils equal, round, and reactive to light. HENT: Atraumatic, normocephalic; Oral mucosa moist  Neck: Trachea midline, neck supple. No thyromegaly noted. CV: Regular rate and rhythm, no murmur rub or gallop noted  Resp: Lungs clear to auscultation bilaterally, no rales wheezes or ronchi, no retractions. Respirations unlabored. GI: Soft, nontender, nondistended. Normal bowel sounds. No palpable masses. Skin: Normal temperature and turgor. LLE bandage in place with drains. Ext: No significant edema appreciated. No varicosities. MSK: No joint tenderness, erythema, warmth noted. AROM intact. Neuro: Alert, oriented, appropriate. No cranial nerve deficits appreciated. Sensation intact to light touch. Motor examination reveals normal strength in RUE, RLE, LUE diffusely. LLE in bandage. Is able to wiggle her toes. Unable to lift LLE due to immobilizer No abnormalities with finger/nose or heel/shin noted. Reflexes normal and symmetric.   Psych: Stable mood, normal judgement, normal affect     Lab Results   Component Value Date    WBC 5.0 03/25/2022    HGB 8.2 (L) 03/25/2022    HCT 25.1 (L) 03/25/2022    MCV 80.7 03/25/2022     03/25/2022     Lab Results   Component Value Date    INR 1.21 (H) 03/21/2022    INR 1.4 03/02/2022    PROTIME 13.8 (H) 03/21/2022    PROTIME 16.6 (H) 03/02/2022     Lab Results   Component Value Date    CREATININE 0.5 (L) 03/25/2022    BUN 8 03/25/2022     03/25/2022    K 3.9 03/25/2022  03/25/2022    CO2 31 03/25/2022     No results found for: ALT, AST, GGT, ALKPHOS, BILITOT    Most recent echocardiogram revealed:  Not available. Most recent EKG revealed:  NSR. Non specific ST-T changes. Most recent imaging studies revealed:      XR CHEST PORTABLE   Final Result      Clear lungs. Normal cardiomediastinal silhouette. Assessment:  1. 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 Also has drain in LLE. -Is on Ancef. Drain management as per Gen Surg. Therapy restricted due to drains, immobilzer  -She is on oxycodone, and dilaudid pain panel. hasnt required IV pain meds in >48 hrs.   -Heparin for DVT ppx as per Gen Surg. 2. Fever  -Workup including CXR, UA as per Gen Surg. Remains on Ancef    Impairments- Decreased functional mobility, Decreased ADLs    Recommendations:    Admit to ARU today.          Deborah Rogers D.O. M.P.H  PM&R  3/25/2022  10:50 AM

## 2022-03-25 NOTE — CARE COORDINATION
Case Management Assessment            Discharge Note                    Date / Time of Note: 3/25/2022 12:07 PM                  Discharge Note Completed by: Aj Disla RN    Patient Name: Brad Carlisle   YOB: 1955  Diagnosis: Soft tissue sarcoma of thigh, left ([facility] [address] [C49.22]  Sarcoma ([facility] [address] [C49.9]   Date / Time: 3/21/2022  5:56 AM    Current PCP: Marie Soliman, [address] [place] [address] patient: No    Hospitalization in the last 30 days: No    Advance Directives:  Code Status: Full Code  [place] DNR form completed and on chart: Not Indicated    Financial:  Payor: Jovita Patel / Plan: Lazara Holguin [address] / Product Type: *No Product type* /      Pharmacy:    [address] [place] [address] 15 [facility], [address] D[address]AZ [address] [place] [address]  Phone: [phone] Fax: [phone]    [facility] [address] [place] [address] [phone] Jannsyd Echols [phone]  [address] [facility] [place] [address]  Phone: [phone] Fax: [phone]      Assistance purchasing medications?:    Assistance provided by Case Management: None at this time    Does patient want to participate in local refill/ meds to beds program?:      Meds To Beds General Rules:  1. Can ONLY be done Monday- Friday between 8:30am-5pm  2. Prescription(s) must be in pharmacy by 3pm to be filled same day  3. Copy of patient's insurance/ prescription drug card and patient face sheet must be sent along with the prescription(s)  4. Cost of Rx cannot be added to hospital bill. If financial assistance is needed, please contact unit  or ;  or  CANNOT provide pharmacy voucher for patients co-pays  5.  Patients can then  the prescription on their way out of the hospital at discharge, or pharmacy can deliver to the bedside if staff is available. (payment due at time of pick-up or delivery - cash, check, or card accepted)     Able to afford home medications/ co-pay costs: Yes    ADLS:  Current PT AM-PAC Score: 16 /24  Current OT AM-PAC Score: 18 /24      DISCHARGE Disposition: Inpatient Rehab: [facility]  Phone: [phone] Fax:      LOC at discharge: Acute Rehab  REGLA Completed: Yes    Notification completed in HENS/PAS?:  Not Applicable    IMM Completed:   Not Indicated    Transportation:  Transportation PLAN for discharge: hospital        Additional CM Notes: Pt from home with , plans to DC to [facility] today, +auth from [employer] back Emir Lim able to take today. Plans to DC home with Maurice 80 from [employer](she works there) once stable from [address] for Transition of Care is related to the following treatment goals of Soft tissue sarcoma of thigh, left ([facility] [address] [C49.22]  Sarcoma ([facility] [address] [C49.9]    The Patient and/or patient representative Eric Collins and her family were provided with a choice of provider and agrees with the discharge plan Yes    Freedom of choice list was provided with basic dialogue that supports the patient's individualized plan of care/goals and shares the quality data associated with the providers.  Yes    Care Transitions patient: Yes    Yadira Farrar RN  [facility]  Case Management Department  Ph: [phone]  Fax: [phone]

## 2022-03-25 NOTE — PROGRESS NOTES
Patient remains A&Ox4, VSS with soft BP (99/62 & 101/65); up with x1 assist using front wheel walker to bathroom and tolerating well. Wound Vac remains in place with no output noted. Accordion drain in place, site remains CD&I with low output. Patient denies any pain, no N/V. All safety precautions are in place, bed alarm on, call light and table are within reach. No other needs at this time. Will continue to monitor patient for needs and changes.

## 2022-03-25 NOTE — PLAN OF CARE
Problem: Falls - Risk of:  Goal: Will remain free from falls  Description: Will remain free from falls  Outcome: Ongoing     Problem: Pain:  Goal: Pain level will decrease  Description: Pain level will decrease  Outcome: Ongoing     Problem: Mobility - Impaired:  Goal: Mobility will improve  Description: Mobility will improve  Outcome: Ongoing

## 2022-03-25 NOTE — PROGRESS NOTES
Plastic Surgery   Daily Progress Note  Patient: Miguel Bombdarlene      CC: Sarcoma of LLE    SUBJECTIVE:   Patient rested well overnight. Remains afebrile and HDS. Denies any abdominal pain. ROS:   A 14 point review of systems was conducted, significant findings as noted above. All other systems negative. OBJECTIVE:    PHYSICAL EXAM:    Vitals:    03/24/22 1447 03/24/22 2038 03/24/22 2343 03/25/22 0325   BP:  99/62 101/65 102/62   Pulse:  91 87 80   Resp: 18 16 16 16   Temp:  98.4 °F (36.9 °C) 99.1 °F (37.3 °C) 98.4 °F (36.9 °C)   TempSrc:  Oral Oral Oral   SpO2: 96% 96% 96% 95%   Weight:       Height:           General appearance: alert, no acute distress, grooming appropriate  Eyes: No scleral icterus, EOM grossly intact  Neck: trachea midline, no JVD, no lymphadenopathy, neck supple  Chest/Lungs: normal effort with no accessory muscle use on RA  Cardiovascular: RRR, brisk capillary refill distally  Abdomen: Soft, non-tender, non-distended, no rebound, guarding, or rigidity. : Alegria in place with clear, yellow urine  Skin: warm and dry, no rashes  Extremities: no edema, no cyanosis; Left leg wrapped with Ace dressing, hemovac from upper leg with minimal sanguinous output, distal pulses palpable; Prevena in place holding adequate suction  Neuro: A&Ox3, no focal deficits, sensation intact    LABS:   Recent Labs     03/24/22  0524 03/25/22  0521   WBC 5.2 5.0   HGB 8.2* 8.2*   HCT 25.2* 25.1*   MCV 80.9 80.7    317        Recent Labs     03/23/22  0549 03/24/22  0524    140   K 4.3 3.8   CL 99 99   CO2 28 30   PHOS 2.6 3.4   BUN 9 8   CREATININE 0.6 <0.5*      No results for input(s): AST, ALT, ALB, BILIDIR, BILITOT, ALKPHOS in the last 72 hours. No results for input(s): LIPASE, AMYLASE in the last 72 hours. No results for input(s): PROT, INR, APTT in the last 72 hours. No results for input(s): CKTOTAL, CKMB, CKMBINDEX, TROPONINI in the last 72 hours.       ASSESSMENT & PLAN:   This is a 61 y.o. year old female status post wide excision of left thigh sarcoma, complex closure . (3/21) POD #4    - Continue general diet  - Continue Prevena and Ace bandage to LLE, will take down dressing to evaluate, and rewrap  - Continue drain to suction  - Continue Ancef  - Path reviewed  - PT/OT  - Follow up medicine consult  - Will discharge today to ARU once bed is available    Blas Meng DO  PGY1, General Surgery  03/25/22  6:25 AM  [phone]

## 2022-03-26 PROCEDURE — 6360000002 HC RX W HCPCS: Performed by: PHYSICAL MEDICINE & REHABILITATION

## 2022-03-26 PROCEDURE — 97116 GAIT TRAINING THERAPY: CPT

## 2022-03-26 PROCEDURE — 99222 1ST HOSP IP/OBS MODERATE 55: CPT | Performed by: PHYSICAL MEDICINE & REHABILITATION

## 2022-03-26 PROCEDURE — 97530 THERAPEUTIC ACTIVITIES: CPT

## 2022-03-26 PROCEDURE — 1280000000 HC REHAB R&B

## 2022-03-26 PROCEDURE — 2580000003 HC RX 258: Performed by: PHYSICAL MEDICINE & REHABILITATION

## 2022-03-26 PROCEDURE — 97535 SELF CARE MNGMENT TRAINING: CPT

## 2022-03-26 PROCEDURE — 97110 THERAPEUTIC EXERCISES: CPT

## 2022-03-26 PROCEDURE — 97166 OT EVAL MOD COMPLEX 45 MIN: CPT

## 2022-03-26 PROCEDURE — 97162 PT EVAL MOD COMPLEX 30 MIN: CPT

## 2022-03-26 PROCEDURE — 6370000000 HC RX 637 (ALT 250 FOR IP): Performed by: PHYSICAL MEDICINE & REHABILITATION

## 2022-03-26 RX ORDER — PANTOPRAZOLE SODIUM 40 MG/1
40 TABLET, DELAYED RELEASE ORAL
Status: DISCONTINUED | OUTPATIENT
Start: 2022-03-27 | End: 2022-04-02 | Stop reason: HOSPADM

## 2022-03-26 RX ADMIN — ENOXAPARIN SODIUM 40 MG: 40 INJECTION SUBCUTANEOUS at 08:54

## 2022-03-26 RX ADMIN — DOCUSATE SODIUM 100 MG: 100 CAPSULE, LIQUID FILLED ORAL at 08:53

## 2022-03-26 RX ADMIN — BISACODYL 5 MG: 5 TABLET, COATED ORAL at 08:53

## 2022-03-26 RX ADMIN — DOCUSATE SODIUM 100 MG: 100 CAPSULE, LIQUID FILLED ORAL at 20:47

## 2022-03-26 RX ADMIN — SODIUM CHLORIDE, PRESERVATIVE FREE 5 ML: 5 INJECTION INTRAVENOUS at 20:46

## 2022-03-26 RX ADMIN — SODIUM CHLORIDE 25 ML: 9 INJECTION, SOLUTION INTRAVENOUS at 05:13

## 2022-03-26 RX ADMIN — CEFAZOLIN SODIUM 2000 MG: 10 INJECTION, POWDER, FOR SOLUTION INTRAVENOUS at 05:14

## 2022-03-26 RX ADMIN — SODIUM CHLORIDE, PRESERVATIVE FREE 10 ML: 5 INJECTION INTRAVENOUS at 08:55

## 2022-03-26 RX ADMIN — SODIUM CHLORIDE, PRESERVATIVE FREE 10 ML: 5 INJECTION INTRAVENOUS at 05:14

## 2022-03-26 RX ADMIN — ACETAMINOPHEN 1000 MG: 500 TABLET ORAL at 23:35

## 2022-03-26 RX ADMIN — SODIUM CHLORIDE 25 ML: 9 INJECTION, SOLUTION INTRAVENOUS at 12:15

## 2022-03-26 RX ADMIN — ACETAMINOPHEN 1000 MG: 500 TABLET ORAL at 17:05

## 2022-03-26 RX ADMIN — SODIUM CHLORIDE, PRESERVATIVE FREE 10 ML: 5 INJECTION INTRAVENOUS at 08:56

## 2022-03-26 RX ADMIN — CEFAZOLIN SODIUM 2000 MG: 10 INJECTION, POWDER, FOR SOLUTION INTRAVENOUS at 12:23

## 2022-03-26 RX ADMIN — CEFAZOLIN SODIUM 2000 MG: 10 INJECTION, POWDER, FOR SOLUTION INTRAVENOUS at 20:34

## 2022-03-26 ASSESSMENT — PAIN DESCRIPTION - DESCRIPTORS: DESCRIPTORS: TIGHTNESS

## 2022-03-26 ASSESSMENT — PAIN DESCRIPTION - ORIENTATION: ORIENTATION: LEFT

## 2022-03-26 ASSESSMENT — PAIN DESCRIPTION - FREQUENCY: FREQUENCY: INTERMITTENT

## 2022-03-26 ASSESSMENT — PAIN DESCRIPTION - ONSET: ONSET: ON-GOING

## 2022-03-26 ASSESSMENT — PAIN SCALES - GENERAL
PAINLEVEL_OUTOF10: 0
PAINLEVEL_OUTOF10: 0
PAINLEVEL_OUTOF10: 2
PAINLEVEL_OUTOF10: 2

## 2022-03-26 ASSESSMENT — PAIN DESCRIPTION - PAIN TYPE: TYPE: SURGICAL PAIN

## 2022-03-26 ASSESSMENT — PAIN DESCRIPTION - LOCATION: LOCATION: LEG

## 2022-03-26 NOTE — H&P
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 Post op she reports feeling okay. Deberah Hippo to go home. Has mild pain in LLE. Has difficulty moving LLE and fatigue Tolerating diet without N, V, CP, SOB, Abd pain. Prior Level of Function:  Independent for mobility, ADLs, and IADLs    Current Level of Function: Mod Assist     Pertinent Social History:  Support:   Home set-up: Condo. 2 steps to get inside. Worked in doctors office.        Past Medical History:   Diagnosis Date    Cancer [facility])     SARCOMA LEFT THIGH    History of radiation therapy     PONV (postoperative nausea and vomiting)      Fall in past year: yes    Past Surgical History:   Procedure Laterality Date    BREAST BIOPSY  2014     SECTION, LOW TRANSVERSE       &     COLONOSCOPY N/A 3/30/2018    COLONOSCOPY WITH BIOPSY and photos performed by Franc Rivera MD at [facility]  2018    Small polyp upper part of the right colon--sessile serrated adenoma, redundant colon and spasms    LEG BIOPSY EXCISION Left 2021    INCISIONAL BIOPSY LEFT THIGH MASS performed by Sahil Pelaez MD at [place] Left 3/21/2022    WIDE EXCISION LEFT THIGH SARCOMA performed by Sahil Pelaez MD at [facility] Left     LLE, TUMOR ON SHIN    LEG SURGERY Left 3/21/2022    . performed by Valeria Smith MD at [facility] Left 3/21/2022    .  performed by Valeria Smith MD at [facility] Left 3/21/2022    LEFT LEG RECONSTRUCITON WITH GASTROCNEMIUS, RECTUS FEMORIS FLAP, POSSIBLE DEEP INFERIOR EPIGASTRIC ARTERY  FLAP POSSIBLE ADJACENT TISSUE TRANSFER POSSIBLE SPLIT THICKNESS SKIN GRAFT performed by Alyce Del Rio MD at [address] Surgery in past 100 days: yes    Family History   Problem Relation Age of Onset    Cancer Mother         parotid    Thyroid Disease Mother     Other Father         PKD    Hypertension Father     Heart Attack Maternal Grandmother     Heart Attack Maternal Grandfather     Other Paternal Grandmother         PKD    Cancer Maternal Aunt         breast    Cancer Maternal Aunt         pancreas       Social History     Socioeconomic History    Marital status:      Spouse name: Not on file    Number of children: Not on file    Years of education: Not on file    Highest education level: Not on file   Occupational History    Not on file   Tobacco Use    Smoking status: Never Smoker    Smokeless tobacco: Never Used   Vaping Use    Vaping Use: Never used   Substance and Sexual Activity    Alcohol use: Yes     Comment: Socially    Drug use: No    Sexual activity: Not on file   Other Topics Concern    Not on file   Social History Narrative    Not on file     Social Determinants of Health     Financial Resource Strain: Unknown    Difficulty of Paying Living Expenses: Patient refused   Food Insecurity: Unknown    Worried About [facility] in the Last Year: Patient refused    [address] [facility] [address] in the Last Year: Patient refused   Transportation Needs:     Lack of Transportation (Medical): Not on file    Lack of Transportation (Non-Medical):  Not on file   Physical Activity:     Days of Exercise per Week: Not on file    Minutes of Exercise per Session: Not on file   Stress:     Feeling of Stress : Not on file   Social Connections:     Frequency of Communication with Friends and Family: Not on file    Frequency of Social Gatherings with Friends and Family: Not on file    Attends Islam Services: Not on file   Ford Paredes Member of Clubs or Organizations: Not on file    Attends Club or Organization Meetings: Not on file    Marital Status: Not on file   Intimate Partner Violence:     Fear of Current or Ex-Partner: Not on file    Emotionally Abused: Not on file    Physically Abused: Not on file    Sexually Abused: Not on file   Housing Stability:     Unable to Pay for Housing in the Last Year: Not on file    Number of Places Lived in the Last Year: Not on file    Unstable Housing in the Last Year: Not on file       No Known Allergies      Current Facility-Administered Medications   Medication Dose Route Frequency Provider Last Rate Last Admin    ondansetron (ZOFRAN-ODT) disintegrating tablet 4 mg  4 mg Oral Q8H PRN Ino L Heis, DO        Or    ondansetron (ZOFRAN) injection 4 mg  4 mg IntraVENous Q6H PRN Ino L Heis, DO        oxyCODONE (ROXICODONE) immediate release tablet 5 mg  5 mg Oral Q4H PRN Ino L Heis, DO        Or    oxyCODONE (ROXICODONE) immediate release tablet 10 mg  10 mg Oral Q4H PRN Ino L Heis, DO        acetaminophen (TYLENOL) tablet 650 mg  650 mg Oral Q4H PRN Ino L Heis, DO        bisacodyl (DULCOLAX) EC tablet 5 mg  5 mg Oral Daily Ino L Heis, DO   5 mg at 03/26/22 0853    enoxaparin (LOVENOX) injection 40 mg  40 mg SubCUTAneous Daily Ino L Heis, DO   40 mg at 03/26/22 0854    magnesium hydroxide (MILK OF MAGNESIA) 400 MG/5ML suspension 30 mL  30 mL Oral Daily PRN Ino L Heis, DO        polyethylene glycol (GLYCOLAX) packet 17 g  17 g Oral Daily PRN Ino L Heis, DO        acetaminophen (TYLENOL) tablet 1,000 mg  1,000 mg Oral Q6H Ino L Heis, DO   1,000 mg at 03/25/22 1733    ceFAZolin (ANCEF) 2000 mg in dextrose 5 % 50 mL IVPB  2,000 mg IntraVENous Q8H Ino L Heis, DO   Stopped at 03/26/22 0551    docusate sodium (COLACE) capsule 100 mg  100 mg Oral BID Ino L Heis, DO   100 mg at 03/26/22 0853    sodium chloride flush 0.9 % injection 5-40 mL  5-40 mL IntraVENous PRN Bernell Arroyo Heis, DO        sodium chloride flush 0.9 % injection 5-40 mL  5-40 mL IntraVENous BID Ino L Heis, DO   10 mL at 03/26/22 0856    sodium chloride flush 0.9 % injection 5-40 mL  5-40 mL IntraVENous 2 times per day Ino L Heis, DO   10 mL at 03/26/22 0855    sodium chloride flush 0.9 % injection 5-40 mL  5-40 mL IntraVENous PRN Ino L Heis, DO   10 mL at 03/26/22 0514    0.9 % sodium chloride infusion  25 mL IntraVENous PRN Ino L Heis,  mL/hr at 03/26/22 0513 25 mL at 03/26/22 0513         REVIEW OF SYSTEMS:   CONSTITUTIONAL: negative for fevers, chills, diaphoresis, appetite change, night sweats, unexpected weight change, fatigue  EYES: negative for blurred vision, eye discharge, visual disturbance and icterus  HEENT: negative for hearing loss, tinnitus, ear drainage, sinus pressure, nasal congestion, epistaxis and snoring  RESPIRATORY: Negative for hemoptysis, cough, sputum production  CARDIOVASCULAR: negative for chest pain, palpitations, exertional chest pressure/discomfort, syncope, edema  GASTROINTESTINAL: negative for nausea, vomiting, diarrhea, blood in stool, abdominal pain, constipation  GENITOURINARY: negative for frequency, dysuria, urinary incontinence, decreased urine volume, and hematuria  HEMATOLOGIC/LYMPHATIC: negative for easy bruising, bleeding and lymphadenopathy  ALLERGIC/IMMUNOLOGIC: negative for recurrent infections, angioedema, anaphylaxis and drug reactions  ENDOCRINE: negative for weight changes and diabetic symptoms including polyuria, polydipsia and polyphagia  MUSCULOSKELETAL: negative for pain, joint swelling, decreased range of motion  NEUROLOGICAL: negative for headaches, slurred speech, LLE weakness  PSYCHIATRIC/BEHAVIORAL: negative for hallucinations, behavioral problems, confusion and agitation. All pertinent positives are noted in the HPI.     Physical Examination:  Vitals:   Patient Vitals for the past 24 hrs:   BP Temp Temp src Pulse Resp SpO2 Height Weight   03/26/22 0845 105/63 98.5 °F (36.9 °C) Oral 83 16 98 % -- --   03/26/22 0519 -- 98.5 °F (36.9 °C) Oral -- -- -- -- --   03/25/22 2133 -- -- -- -- -- -- 5' 3\" (1.6 m) 137 lb 12.6 oz (62.5 kg)   03/25/22 2006 (!) 102/54 98.1 °F (36.7 °C) Oral 78 18 96 % -- --   03/25/22 1756 -- -- -- -- 20 95 % -- --   03/25/22 1553 (!) 96/55 97.9 °F (36.6 °C) Oral 86 18 96 % -- --       Const: Alert. WDWN. No distress  Eyes: Conjunctiva noninjected, no icterus noted; pupils equal, round, and reactive to light. HENT: Atraumatic, normocephalic; Oral mucosa moist  Neck: Trachea midline, neck supple. No thyromegaly noted. CV: Regular rate and rhythm, no murmur rub or gallop noted  Resp: Lungs clear to auscultation bilaterally, no rales wheezes or ronchi, no retractions. Respirations unlabored. GI: Soft, nontender, nondistended. Normal bowel sounds. No palpable masses. Skin: Normal temperature and turgor. LLE bandage in place with drains. Ext: No significant edema appreciated. No varicosities. MSK: No joint tenderness, erythema, warmth noted. AROM intact. Neuro: Alert, oriented, appropriate. No cranial nerve deficits appreciated. Sensation intact to light touch. Motor examination reveals normal strength in RUE, RLE, LUE diffusely. LLE in bandage. Is able to wiggle her toes. Unable to lift LLE due to immobilizer No abnormalities with finger/nose or heel/shin noted. Reflexes normal and symmetric.   Psych: Stable mood, normal judgement, normal affect       Lab Results   Component Value Date    WBC 5.0 03/25/2022    HGB 8.2 (L) 03/25/2022    HCT 25.1 (L) 03/25/2022    MCV 80.7 03/25/2022     03/25/2022     Lab Results   Component Value Date    INR 1.21 (H) 03/21/2022    INR 1.4 03/02/2022    PROTIME 13.8 (H) 03/21/2022    PROTIME 16.6 (H) 03/02/2022     Lab Results   Component Value Date    CREATININE 0.5 (L) 03/25/2022    BUN 8 03/25/2022     03/25/2022    K 3.9 03/25/2022     03/25/2022    CO2 31 03/25/2022     No results found for: ALT, AST, GGT, ALKPHOS, BILITOT      No orders to display           The above laboratory data have been reviewed. The above imaging data have been reviewed. The above medical testing have been reviewed. Body mass index is 24.41 kg/m². POST ADMISSION PHYSICIAN EVALUATION  The patient has agreed to being admitted to our comprehensive inpatient rehabilitation facility and can tolerate the intensity of service consisting of at least:  --180 minutes of therapy a day, 5 out of 7 days a week. OR  --15 hours of intensive therapy within a 7 consecutive day period. The patient/family has a good understanding of our discharge process and will benefit from an interdisciplinary inpatient rehabilitation program. The patient has potential to make improvement and is in need of at least two of the following multidisciplinary therapies including but not limited to physical, occupational, respiratory, and speech, nutritional services, wound care, and prosthetics and orthotics. Given the patients complex condition and risk of further medical complications, rehabilitation services cannot be safely provided at a lower level of care such as a skilled nursing facility. All of the goals listed below were reviewed with the patient and he/she is in agreement. I have compared the patients medical and functional status at the time of the preadmission screening and the same on this date, and there are no significant changes. By signing this document, I acknowledge that I have personally performed a full physical examination on this patient within 24 hours of admission to this inpatient rehabilitation facility and have determined the patient to be able to tolerate the above course of treatment at an intensive level for a reasonable period of time.  I will be completing a detailed individualized  Plan of Care for this patient by day four of the patients stay based upon the Preadmission Screen, this Post-Admission Evaluation, and the therapy evaluations. Barriers: Decreased functional mobility, medical comorbidities  Services Required: PT, OT  Goals: mod I  Prognosis: Good  Anticipated Dispo: home  ELOS: TBD    Rehabilitation Diagnosis:   16.0, Debility (non-cardiac, non-pulmonary      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 Also has drain in LLE. -Is on Ancef. Drain management as per Gen Surg. Therapy restricted due to drains, immobilzer  -She is on oxycodone, and dilaudid pain panel. hasnt required IV pain meds in >48 hrs.   -Heparin for DVT ppx as per Gen Surg.      2. Fever  -Workup including CXR, UA as per Gen Surg. Remains on Ancef  - improved     Impairments: Decreased functional mobility, Decreased ADLs    Bladder - high risk retention - Monitor PVRs, SC prn >300cc    Bowel - high risk constipation - colace BID, PRN miralax and MoM. follow bowel movements. Enema or suppository if needed.      Safety - fall precautions    PPx  DVT: lovenox  GI: pantoprazole    FULL CODE    Teofilo Cain D.O. M.P.H  PM&R  3/26/2022  11:06 AM

## 2022-03-26 NOTE — PLAN OF CARE
Problem: Falls - Risk of:  Goal: Will remain free from falls  Description: Will remain free from falls  Outcome: Ongoing     Problem: Falls - Risk of:  Goal: Absence of physical injury  Description: Absence of physical injury  Outcome: Ongoing      Fall risk precautions in place. Call light within reach. Bed in lowest position and locked. Instructed to call for assistance ambulating.

## 2022-03-26 NOTE — PROGRESS NOTES
Plastic Surgery   Daily Progress Note  Patient: Juanito Thurston      CC: Sarcoma of LLE    SUBJECTIVE:   Patient rested well overnight. Remains afebrile and HDS. Denies any pain. Complaining of some swelling to left foot. ROS:   A 14 point review of systems was conducted, significant findings as noted above. All other systems negative. OBJECTIVE:    PHYSICAL EXAM:    Vitals:    03/25/22 1756 03/25/22 2006 03/25/22 2133 03/26/22 0519   BP:  (!) 102/54     Pulse:  78     Resp: 20 18     Temp:  98.1 °F (36.7 °C)  98.5 °F (36.9 °C)   TempSrc:  Oral  Oral   SpO2: 95% 96%     Weight:   137 lb 12.6 oz (62.5 kg)    Height:   5' 3\" (1.6 m)        General appearance: alert, no acute distress, grooming appropriate  Eyes: No scleral icterus, EOM grossly intact  Neck: trachea midline, no JVD, no lymphadenopathy, neck supple  Chest/Lungs: normal effort with no accessory muscle use on RA  Cardiovascular: RRR, brisk capillary refill distally  Abdomen: Soft, non-tender, non-distended, no rebound, guarding, or rigidity. Skin: warm and dry, no rashes  Extremities: no edema, no cyanosis; Left leg wrapped with Ace dressing, hemovac from upper leg with minimal sanguinous output, distal pulses palpable; Prevena in place holding adequate suction  Neuro: A&Ox3, no focal deficits, sensation intact    LABS:   Recent Labs     03/24/22  0524 03/25/22  0521   WBC 5.2 5.0   HGB 8.2* 8.2*   HCT 25.2* 25.1*   MCV 80.9 80.7    317        Recent Labs     03/24/22  0524 03/25/22  0521    141   K 3.8 3.9   CL 99 103   CO2 30 31   PHOS 3.4 3.2   BUN 8 8   CREATININE <0.5* 0.5*      No results for input(s): AST, ALT, ALB, BILIDIR, BILITOT, ALKPHOS in the last 72 hours. No results for input(s): LIPASE, AMYLASE in the last 72 hours. No results for input(s): PROT, INR, APTT in the last 72 hours. No results for input(s): CKTOTAL, CKMB, CKMBINDEX, TROPONINI in the last 72 hours.       ASSESSMENT & PLAN:   This is a 61y.o. year old female status post wide excision of left thigh sarcoma, complex closure . (3/21) POD #5    - Continue general diet  - Continue Prevena and Ace bandage to LLE, extended wrap from the foot up to assist with L foot swelling.  - Continue drain to suction  - Continue Omer Vizcaino DO  PGY1, General Surgery  03/26/22  8:25 AM  [phone]

## 2022-03-26 NOTE — PROGRESS NOTES
Received bedside shift report and assumed care for this patient. Patient AxOx4  . There are no signs of distress or discomfort. Patient denies pain. Call light and personal belongings are within reach. Bed is in low and locked position with bed alarm on. Will continue to monitor.

## 2022-03-26 NOTE — PROGRESS NOTES
Occupational Therapy   Occupational Therapy Initial Assessment/Treatment  Date: 3/26/2022   Patient Name: Lisa Goss  MRN: [id number]     : 1955    Date of Service: 3/26/2022    Discharge Recommendations:  Home with assist PRN,Home with Home health OT  OT Equipment Recommendations  Equipment Needed: Yes  Mobility Devices: ADL Assistive Devices  ADL Assistive Devices: Shower Chair with back  Other: Pt does not own any bathroom equipment but may benefit from shower chair once she is cleared to shower    Assessment   Performance deficits / Impairments: Decreased functional mobility ; Decreased ADL status; Decreased high-level IADLs;Decreased strength;Decreased balance  Assessment: Pt is a 62 yo female who presents to [facility] 2/2 L thigh sarcoma. Prior to admission pt was highly independent w/ functional transfers, mobility, and ADLs and pt was working full time. Pt is now limited by LLE weakness and pain and is requiring use of a RW. Pt is limited by presence of splint to LLE and she is unable to flex her knee. Pt is functioning below her baseline and benefits from OT in order to maximize functional independence. Treatment Diagnosis: impaired mobility and ADLs  Prognosis: Good  Decision Making: Medium Complexity    OT Education: OT Role;Plan of Care;Orientation;Precautions; ADL Adaptive Strategies;Transfer Training  Patient Education: pt verbalized understanding  REQUIRES OT FOLLOW UP: Yes  Activity Tolerance  Activity Tolerance: Patient Tolerated treatment well           Patient Diagnosis(es): There were no encounter diagnoses. has a past medical history of Cancer ([facility] [address] History of radiation therapy, and PONV (postoperative nausea and vomiting). has a past surgical history that includes Leg Surgery (Left, ); Breast biopsy (2014);  section, low transverse; Colonoscopy (N/A, 3/30/2018); Colonoscopy (2018); Leg biopsy excision (Left, 2021);  Leg biopsy excision (Left, 3/21/2022); tissue transfer (Left, 3/21/2022); Leg Surgery (Left, 3/21/2022); and Skin graft (Left, 3/21/2022). 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 Pt admitted to ARU 3/25  Family / Caregiver Present: No  Referring Practitioner: Dr. Martha Giles DO  Diagnosis: L thigh sarcoma resectopm  Subjective  Subjective: Pt was semi supine in bed upon arrival. \"I'd love to wash my hair\". Pt was pleasant and agreeable to OT evaluation  General Comment  Comments: Pt was worried about edema to LLE and stated it looks worse today than yesterday. Plastic surgery team present at EOS and applied additional ace wrap. OT encouraged elevation,  Vital Signs  Level of Consciousness: Alert (0)  Social/Functional History  Social/Functional History  Lives With: Spouse  Type of Home:  (condo)  Home Layout: One level,Able to Live on Main level with bedroom/bathroom  Home Access: Stairs to enter with rails  Entrance Stairs - Number of Steps: Pt has 2-3 steps w/ B handrails from garage and 2 steps from main entrace w/o rails. Pt also has a back patio that would be one step up into her home.   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 Pt reports she lives 3 hours away from [place]. Objective   Vision: Within Functional Limits  Hearing: Within functional limits      Orientation  Overall Orientation Status: Within Normal Limits    Observation/Palpation  Observation: Wound vac and drain present to LLE. LLE ace wrapped and splinted. Edema noted L foot. IV LUE    Balance  Sitting Balance: Independent  Standing Balance: Contact guard assistance (CGA-SBA)  Standing Balance  Time: ~8 mins total  Activity: functional mobility to/from bathroom; stance for ADLs  Functional Mobility  Functional - Mobility Device: Rolling Walker  Activity: To/from bathroom  Assist Level: Contact guard assistance  Functional Mobility Comments: Pt ambulated w/ RW w/ CGA but progressed quickly to SBA. OT assisted w/ management of wound vac  Toilet Transfers  Toilet - Technique: Ambulating  Equipment Used: Standard toilet  Toilet Transfer: Minimal assistance  Toilet Transfers Comments: Min A needed to lift LLE and support it under a foot stool when sitting. Pt reported it is very uncomfortable to sit w/ LLE dangling  Shower Transfers  Shower - Transfer From: Rebeca Nissen - Transfer Type: To and From  Shower - Transfer To: Transfer tub bench  Shower - Technique: Ambulating  Shower Transfers: Minimal assistance  Shower Transfers Comments: Min A needed to lift LLE and support it under a foot stool when sitting. Pt reported it is very uncomfortable to sit w/ LLE dangling. Pt sat on TTB to complete sponge bath    ADL  Feeding: Independent (Breakfast delivered)  Grooming: Setup (Pt combed hair seated on TTB)  UE Bathing: Setup;Supervision (Pt was seated on TTB w/ BLEs elevated on foot stool and outside of the shower. Pt's LLE was wrapped in plastic bag to prevent from getting wet. Pt washed 4/4 components seated)  LE Bathing: Contact guard assistance (Pt washed RLE seated on TTB.  Pt washed amanda area and buttocks by weight shifting on bench and dried buttocks in stance w/ CGA.)  UE Dressing: Setup ([address] only. Pt's  is bringing clothes today)  LE Dressing: Setup;Verbal cueing;Contact guard assistance (Pt threaded BLEs into underwear seated on TTB and pulled over hips w/ CGA. Pt donned socks to BLEs seated on TTB)  Toileting: Contact guard assistance (Pt completed amanda care seated I'ly. Pants management completed in stance w/ CGA)  Additional Comments: Pt was unable to get a shower due to presence of wound vac and drain however pt sat on TTB w/ BLEs facing out and was able to wash her hair w/ assist from OT. Tone RUE  RUE Tone: Normotonic  Tone LUE  LUE Tone: Normotonic  Coordination  Movements Are Fluid And Coordinated: Yes       Bed mobility  Supine to Sit: Minimal assistance (Min A needed to control LLE while exiting bed)  Transfers  Sit to stand: Contact guard assistance (from EOB > RW)  Stand to sit: Minimal assistance (Min A needed to lift LLE and support it under a foot stool when sitting. Pt reported it is very uncomfortable to sit w/ LLE dangling)       Cognition  Overall Cognitive Status: WNL        Sensation  Overall Sensation Status: WNL        LUE AROM (degrees)  LUE AROM : WNL  RUE AROM (degrees)  RUE AROM : WNL      ADDENDUM [phone]  Pt seated in chair upon entry, pt reports no pain and agreeable to therapy session. Pt's  and daughter present. Pt sit to stand CGA, pt ambulated with rw CGA ~18 ft to bathroom toilet. Pt toilet transfer Min A to assist with LLE. Pt toileted CGA and then stood at sink ~5 min at SBA to wash hands and complete oral care. Pt ambulated ~12 ft to recliner, to see if it was more comfortable. Pt stand to sit Min A, for assist with LLE, in recliner. After seated rest break, pt ascertained that the recliner wasn't more comfortable than the chair she was previously sitting in. Pt sit to stand CGA and pt ambulated CGA with rw ~12 ft to chair. Pt stand to sit Min A for assist with LLE.  Call light in

## 2022-03-27 LAB
BLOOD CULTURE, ROUTINE: NORMAL
CULTURE, BLOOD 2: NORMAL

## 2022-03-27 PROCEDURE — 6370000000 HC RX 637 (ALT 250 FOR IP): Performed by: PHYSICAL MEDICINE & REHABILITATION

## 2022-03-27 PROCEDURE — 2580000003 HC RX 258: Performed by: PHYSICAL MEDICINE & REHABILITATION

## 2022-03-27 PROCEDURE — 99232 SBSQ HOSP IP/OBS MODERATE 35: CPT | Performed by: PHYSICAL MEDICINE & REHABILITATION

## 2022-03-27 PROCEDURE — 6360000002 HC RX W HCPCS: Performed by: PHYSICAL MEDICINE & REHABILITATION

## 2022-03-27 PROCEDURE — 1280000000 HC REHAB R&B

## 2022-03-27 RX ADMIN — SODIUM CHLORIDE, PRESERVATIVE FREE 6 ML: 5 INJECTION INTRAVENOUS at 09:00

## 2022-03-27 RX ADMIN — CEFAZOLIN SODIUM 2000 MG: 10 INJECTION, POWDER, FOR SOLUTION INTRAVENOUS at 03:59

## 2022-03-27 RX ADMIN — CEFAZOLIN SODIUM 2000 MG: 10 INJECTION, POWDER, FOR SOLUTION INTRAVENOUS at 14:00

## 2022-03-27 RX ADMIN — ACETAMINOPHEN 1000 MG: 500 TABLET ORAL at 05:58

## 2022-03-27 RX ADMIN — SODIUM CHLORIDE, PRESERVATIVE FREE 5 ML: 5 INJECTION INTRAVENOUS at 20:46

## 2022-03-27 RX ADMIN — SODIUM CHLORIDE, PRESERVATIVE FREE 5 ML: 5 INJECTION INTRAVENOUS at 09:00

## 2022-03-27 RX ADMIN — PANTOPRAZOLE SODIUM 40 MG: 40 TABLET, DELAYED RELEASE ORAL at 05:59

## 2022-03-27 RX ADMIN — ENOXAPARIN SODIUM 40 MG: 40 INJECTION SUBCUTANEOUS at 09:49

## 2022-03-27 RX ADMIN — ACETAMINOPHEN 1000 MG: 500 TABLET ORAL at 13:45

## 2022-03-27 RX ADMIN — ACETAMINOPHEN 1000 MG: 500 TABLET ORAL at 21:57

## 2022-03-27 RX ADMIN — CEFAZOLIN SODIUM 2000 MG: 10 INJECTION, POWDER, FOR SOLUTION INTRAVENOUS at 20:39

## 2022-03-27 ASSESSMENT — PAIN DESCRIPTION - ONSET
ONSET: ON-GOING

## 2022-03-27 ASSESSMENT — PAIN DESCRIPTION - FREQUENCY
FREQUENCY: INTERMITTENT

## 2022-03-27 ASSESSMENT — PAIN SCALES - GENERAL
PAINLEVEL_OUTOF10: 1
PAINLEVEL_OUTOF10: 2
PAINLEVEL_OUTOF10: 0
PAINLEVEL_OUTOF10: 1
PAINLEVEL_OUTOF10: 1

## 2022-03-27 ASSESSMENT — PAIN DESCRIPTION - DESCRIPTORS
DESCRIPTORS: TIGHTNESS
DESCRIPTORS: TIGHTNESS;SHARP

## 2022-03-27 ASSESSMENT — PAIN DESCRIPTION - PAIN TYPE
TYPE: SURGICAL PAIN
TYPE: SURGICAL PAIN

## 2022-03-27 ASSESSMENT — PAIN DESCRIPTION - LOCATION
LOCATION: LEG

## 2022-03-27 ASSESSMENT — PAIN DESCRIPTION - ORIENTATION
ORIENTATION: LEFT

## 2022-03-27 NOTE — PROGRESS NOTES
Department of Physical Medicine & Rehabilitation  Progress Note    Patient Identification:  Slick Sanchez  [id number]  : 1955  Admit date: 3/25/2022    Chief Complaint: Debility    Subjective:   No acute events overnight. Patient seen this am. Doing well overnight. Continue to have wound vac and drain placed. Will discuss with surgery tomorrow. Plan to continue therapy tomorrow. ROS: No f/c, n/v, cp     Objective:  Patient Vitals for the past 24 hrs:   BP Temp Temp src Pulse Resp SpO2   22 101/67 98.2 °F (36.8 °C) Oral 72 16 96 %     Const: Alert. No distress, pleasant. HEENT: Normocephalic, atraumatic. Normal sclera/conjunctiva. MMM. CV: Regular rate and rhythm. Resp: No respiratory distress. Lungs CTAB. Abd: Soft, nontender, nondistended, NABS+   Ext: No edema. Neuro: Alert, oriented, appropriately interactive. Psych: Cooperative, appropriate mood and affect    Laboratory data: Available via EMR. Last 24 hour lab  No results found for this or any previous visit (from the past 24 hour(s)). Therapy progress:  PT  Position Activity Restriction  Other position/activity restrictions: ambulate pt, up with assist, FWBAT; KI prn; per plastic surgery on 3/26- pt should only sponge bath until wound vac and drain are discontinued  Objective     Sit to Stand: Contact guard assistance (with and without AD from recliner, wc, toilet, and EOB)  Stand to sit: Contact guard assistance  Bed to Chair: Contact guard assistance (from wc to recliner without AD)  Device: [address]: Contact guard assistance  Distance: 10'x2 + 100' + short distances within gym  OT  PT Equipment Recommendations  Equipment Needed: Yes  Mobility Devices: Dave Rankin: Rolling  Toilet - Technique: Ambulating  Equipment Used: Standard toilet  Toilet Transfers Comments: Min A needed to lift LLE and support it under a foot stool when sitting.  Pt reported it is very uncomfortable to sit w/ LLE dangling  Assessment        SLP          Body mass index is 24.41 kg/m². 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 Also has drain in LLE. -Is on Ancef. Drain management as per Gen Surg. Therapy restricted due to drains, immobilzer  -She is on oxycodone, and dilaudid pain panel. hasnt required IV pain meds in >48 hrs.   -Heparin for DVT ppx as per Gen Surg.      2. Fever  -Workup including CXR, UA as per Gen Surg.  Remains on Ancef  - improved     Rehab Progress: Improving  Anticipated Dispo: home  Services/DME: VIC  ELOS: VIC Gordon D.O. M.P.H  PM&R  3/27/2022  10:19 AM

## 2022-03-27 NOTE — PLAN OF CARE
Problem: Falls - Risk of:  Goal: Will remain free from falls  Description: Will remain free from falls  3/26/2022 2315 by Latisha Umaña RN  Outcome: Ongoing  Note: Client remains free from falls, bed/chair alarm in place, door open, encouraged to use call light for needs, call light is within reach, bed locked in lowest position,  Will continue to monitor. Problem: Pain:  Goal: Control of acute pain  Description: Control of acute pain  3/26/2022 2315 by Latisha Umaña RN  Outcome: Ongoing  Note: Pt voices pain needs appropriately, pain is assessed during shift.        Problem: Mobility - Impaired:  Goal: Mobility will improve  Description: Mobility will improve  3/26/2022 2315 by Latisha Umaña RN  Outcome: Ongoing  Note: Mobility to improve through active participation with therapy

## 2022-03-28 LAB
ANION GAP SERPL CALCULATED.3IONS-SCNC: 11 MMOL/L (ref 3–16)
BASOPHILS ABSOLUTE: 0 K/UL (ref 0–0.2)
BASOPHILS RELATIVE PERCENT: 0.7 %
BUN BLDV-MCNC: 8 MG/DL (ref 7–20)
CALCIUM SERPL-MCNC: 8.7 MG/DL (ref 8.3–10.6)
CHLORIDE BLD-SCNC: 104 MMOL/L (ref 99–110)
CO2: 25 MMOL/L (ref 21–32)
CREAT SERPL-MCNC: 0.6 MG/DL (ref 0.6–1.2)
EOSINOPHILS ABSOLUTE: 0.1 K/UL (ref 0–0.6)
EOSINOPHILS RELATIVE PERCENT: 1.6 %
GFR AFRICAN AMERICAN: >60
GFR NON-AFRICAN AMERICAN: >60
GLUCOSE BLD-MCNC: 94 MG/DL (ref 70–99)
HCT VFR BLD CALC: 28.8 % (ref 36–48)
HEMOGLOBIN: 9.6 G/DL (ref 12–16)
LYMPHOCYTES ABSOLUTE: 1 K/UL (ref 1–5.1)
LYMPHOCYTES RELATIVE PERCENT: 14.9 %
MCH RBC QN AUTO: 27.6 PG (ref 26–34)
MCHC RBC AUTO-ENTMCNC: 33.2 G/DL (ref 31–36)
MCV RBC AUTO: 83.1 FL (ref 80–100)
MONOCYTES ABSOLUTE: 0.6 K/UL (ref 0–1.3)
MONOCYTES RELATIVE PERCENT: 9.3 %
NEUTROPHILS ABSOLUTE: 4.8 K/UL (ref 1.7–7.7)
NEUTROPHILS RELATIVE PERCENT: 73.5 %
PDW BLD-RTO: 14.4 % (ref 12.4–15.4)
PLATELET # BLD: 354 K/UL (ref 135–450)
PMV BLD AUTO: 7.8 FL (ref 5–10.5)
POTASSIUM REFLEX MAGNESIUM: 3.8 MMOL/L (ref 3.5–5.1)
RBC # BLD: 3.46 M/UL (ref 4–5.2)
SODIUM BLD-SCNC: 140 MMOL/L (ref 136–145)
WBC # BLD: 6.5 K/UL (ref 4–11)

## 2022-03-28 PROCEDURE — 6370000000 HC RX 637 (ALT 250 FOR IP): Performed by: PHYSICAL MEDICINE & REHABILITATION

## 2022-03-28 PROCEDURE — 80048 BASIC METABOLIC PNL TOTAL CA: CPT

## 2022-03-28 PROCEDURE — 6360000002 HC RX W HCPCS: Performed by: PHYSICAL MEDICINE & REHABILITATION

## 2022-03-28 PROCEDURE — 97116 GAIT TRAINING THERAPY: CPT

## 2022-03-28 PROCEDURE — 85025 COMPLETE CBC W/AUTO DIFF WBC: CPT

## 2022-03-28 PROCEDURE — 97530 THERAPEUTIC ACTIVITIES: CPT

## 2022-03-28 PROCEDURE — 1280000000 HC REHAB R&B

## 2022-03-28 PROCEDURE — 97116 GAIT TRAINING THERAPY: CPT | Performed by: PHYSICAL THERAPIST

## 2022-03-28 PROCEDURE — 99231 SBSQ HOSP IP/OBS SF/LOW 25: CPT | Performed by: PHYSICAL MEDICINE & REHABILITATION

## 2022-03-28 PROCEDURE — 2580000003 HC RX 258: Performed by: PHYSICAL MEDICINE & REHABILITATION

## 2022-03-28 PROCEDURE — 97110 THERAPEUTIC EXERCISES: CPT | Performed by: PHYSICAL THERAPIST

## 2022-03-28 PROCEDURE — 97535 SELF CARE MNGMENT TRAINING: CPT

## 2022-03-28 RX ADMIN — BISACODYL 5 MG: 5 TABLET, COATED ORAL at 10:42

## 2022-03-28 RX ADMIN — CEFAZOLIN SODIUM 2000 MG: 10 INJECTION, POWDER, FOR SOLUTION INTRAVENOUS at 19:06

## 2022-03-28 RX ADMIN — SODIUM CHLORIDE, PRESERVATIVE FREE 10 ML: 5 INJECTION INTRAVENOUS at 10:41

## 2022-03-28 RX ADMIN — ENOXAPARIN SODIUM 40 MG: 40 INJECTION SUBCUTANEOUS at 10:42

## 2022-03-28 RX ADMIN — PANTOPRAZOLE SODIUM 40 MG: 40 TABLET, DELAYED RELEASE ORAL at 05:19

## 2022-03-28 RX ADMIN — ACETAMINOPHEN 1000 MG: 500 TABLET ORAL at 10:41

## 2022-03-28 RX ADMIN — CEFAZOLIN SODIUM 2000 MG: 10 INJECTION, POWDER, FOR SOLUTION INTRAVENOUS at 10:41

## 2022-03-28 RX ADMIN — CEFAZOLIN SODIUM 2000 MG: 10 INJECTION, POWDER, FOR SOLUTION INTRAVENOUS at 04:04

## 2022-03-28 RX ADMIN — ACETAMINOPHEN 1000 MG: 500 TABLET ORAL at 16:51

## 2022-03-28 RX ADMIN — DOCUSATE SODIUM 100 MG: 100 CAPSULE, LIQUID FILLED ORAL at 23:20

## 2022-03-28 RX ADMIN — ACETAMINOPHEN 1000 MG: 500 TABLET ORAL at 23:20

## 2022-03-28 RX ADMIN — DOCUSATE SODIUM 100 MG: 100 CAPSULE, LIQUID FILLED ORAL at 10:42

## 2022-03-28 RX ADMIN — SODIUM CHLORIDE, PRESERVATIVE FREE 10 ML: 5 INJECTION INTRAVENOUS at 20:34

## 2022-03-28 RX ADMIN — ACETAMINOPHEN 1000 MG: 500 TABLET ORAL at 05:19

## 2022-03-28 ASSESSMENT — PAIN SCALES - GENERAL
PAINLEVEL_OUTOF10: 0
PAINLEVEL_OUTOF10: 2
PAINLEVEL_OUTOF10: 0
PAINLEVEL_OUTOF10: 2
PAINLEVEL_OUTOF10: 1
PAINLEVEL_OUTOF10: 0

## 2022-03-28 ASSESSMENT — PAIN DESCRIPTION - LOCATION
LOCATION: FOOT
LOCATION: FOOT

## 2022-03-28 ASSESSMENT — PAIN DESCRIPTION - FREQUENCY
FREQUENCY: CONTINUOUS
FREQUENCY: CONTINUOUS

## 2022-03-28 ASSESSMENT — PAIN DESCRIPTION - ONSET
ONSET: ON-GOING
ONSET: ON-GOING

## 2022-03-28 ASSESSMENT — PAIN DESCRIPTION - PAIN TYPE
TYPE: ACUTE PAIN
TYPE: ACUTE PAIN

## 2022-03-28 ASSESSMENT — PAIN DESCRIPTION - DESCRIPTORS
DESCRIPTORS: TENDER;THROBBING
DESCRIPTORS: TENDER;THROBBING

## 2022-03-28 ASSESSMENT — PAIN DESCRIPTION - ORIENTATION
ORIENTATION: LEFT
ORIENTATION: LEFT

## 2022-03-28 ASSESSMENT — PAIN DESCRIPTION - DIRECTION
RADIATING_TOWARDS: HEEL
RADIATING_TOWARDS: HEEL

## 2022-03-28 NOTE — PLAN OF CARE
Problem: Falls - Risk of:  Goal: Will remain free from falls  Description: Will remain free from falls  3/28/2022 1454 by Adelaide Knutson RN  Outcome: Ongoing     Problem: Pain:  Goal: Pain level will decrease  Description: Pain level will decrease  Outcome: Ongoing     Problem: Mobility - Impaired:  Goal: Mobility will improve  Description: Mobility will improve  3/28/2022 1454 by Adelaide Knutson RN  Outcome: Ongoing     Problem: Skin Integrity:  Goal: Absence of new skin breakdown  Description: Absence of new skin breakdown  3/28/2022 1454 by Adelaide Knutson RN  Outcome: Ongoing

## 2022-03-28 NOTE — CONSULTS
Nutrition Note       NUTRITION RECOMMENDATIONS:   1. PO Diet: Continue current- regular diet   2. ONS: Modify current- Discontinue Ensure, Start Magic Cup QD     NUTRITION ASSESSMENT:  Nutritional summary & status: Pt consult, pt new to ARU. Pt is nutritionally stable with po intakes of % at meals and no reported wt loss. Pt sttaed UBW is 140#. Pt dislikes Ensure shakes, RD to discontinue and start Magic Cup in place to help aid in wound healing. RD will continue to monitor pt through admission. Admission/PMH: Pt admitted with Lt thigh sarcoma resection with complex closure, flap and wound vac placement. PMHx: cancer, radiation therapy     MALNUTRITION ASSESSMENT  Context of Malnutrition: Acute Illness   Malnutrition Status: No malnutrition    NUTRITION DIAGNOSIS   Increased nutrient needs related to increase demand for energy/nutrients as evidenced by wounds    [address] and/or Nutrient Delivery:  Continue Current Diet,Modify Oral Nutrition Supplement  Nutrition Education/Counseling:  No recommendation at this time      The patient will still be monitored per nutrition standards of care. Consult dietitian if nutrition interventions essential to patient care is needed.      Imelda Yanezon  Office:  [phone]

## 2022-03-28 NOTE — PLAN OF CARE
Problem: Nutrition  Goal: Optimal nutrition therapy  Outcome: Ongoing  Note: Nutrition Problem #1: Increased nutrient needs  Intervention: Food and/or Nutrient Delivery: Continue Current Diet,Modify Oral Nutrition Supplement  Nutritional Goals: Pt will continue to consume 75% of meals and ONS

## 2022-03-28 NOTE — PROGRESS NOTES
Occupational Therapy  Facility/Department: [facility] ACUTE REHAB UNIT  Daily Treatment Note  NAME: Brice Cordon  : 1955  MRN: [id number]    Date of Service: 3/28/2022    Discharge Recommendations:  Home with assist PRN,Home with Home health OT  OT Equipment Recommendations  Equipment Needed: Yes  ADL Assistive Devices: Shower Chair with back  Other: Pt does not own any bathroom equipment but may benefit from shower chair once she is cleared to shower    Assessment   Performance deficits / Impairments: Decreased functional mobility ; Decreased ADL status; Decreased high-level IADLs;Decreased strength;Decreased balance  Assessment: Pt progressing well this session demonstrating good endurance for sponge bathing infront of sink and therapist assist for washing hair. Transfers and mobility requiring CGA to SBA. Pt requiring increased asssist for LE dressing this session due to tight space and line mangment. Pt continues to benefit from inpt OT. Continue OT per POC. Treatment Diagnosis: impaired mobility and ADLs  Prognosis: Good  OT Education: OT Role;Plan of Care;ADL Adaptive Strategies;Transfer Training  Patient Education: line managment - pt verb understanding  REQUIRES OT FOLLOW UP: Yes  Activity Tolerance  Activity Tolerance: Patient Tolerated treatment well  Safety Devices  Safety Devices in place: Yes  Type of devices: Left in chair;Nurse notified;Call light within reach; Chair alarm in place;Gait belt         Patient Diagnosis(es): There were no encounter diagnoses. has a past medical history of Cancer ([facility] [address] History of radiation therapy, and PONV (postoperative nausea and vomiting). has a past surgical history that includes Leg Surgery (Left, ); Breast biopsy (2014);  section, low transverse; Colonoscopy (N/A, 3/30/2018); Colonoscopy (2018); Leg biopsy excision (Left, 2021); Leg biopsy excision (Left, 3/21/2022); tissue transfer (Left, 3/21/2022);  Leg Surgery (Left, 3/21/2022); and Skin graft (Left, 3/21/2022). Restrictions  Position Activity Restriction  Other position/activity restrictions: ambulate pt, up with assist, FWBAT; YOSSI prn; per plastic surgery on 3/26- pt should only sponge bath until wound vac and drain are discontinued       Subjective   General  Chart Reviewed: Yes  Patient assessed for rehabilitation services?: Yes  Additional Pertinent Hx: 62 yo F 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 Pt admitted to ARU 3/25  Response to previous treatment: Patient with no complaints from previous session  Family / Caregiver Present: No  Referring Practitioner: Dr. Martha Giles DO  Diagnosis: L thigh sarcoma resectopm  Subjective  Subjective: Pt seated in recliner chair upon arival and agreeable to OT session. General Comment  Vital Signs  Patient Currently in Pain: Denies          Objective    ADL  Grooming: Setup (Pt washing hands and face in stance and combing hair seated on TTB)  UE Bathing: Setup;Supervision (UE washing completed seated on TTB seated infront of sink)  LE Bathing: Contact guard assistance (to SBA washing buttocks in stance at sink)  UE Dressing: Setup (seated on TTB infront of sink donning shirt)  LE Dressing: Minimal assistance (increased assist due to tight space and lines on drain and wound vac)  Additional Comments: Pt washing up seated on TTB infront of sink. Pt assisted to wash hair seated on TTB requiring Mod A.         Balance  Sitting Balance: Independent  Standing Balance:  (CGA to SBA for stance during ADL tasks at sink, dressing tasks and transfers)  Standing Balance  Activity: Pt completing functional mobility to and from bathroom, transfers, stance at sink for ADLs and LE dressing  Comment: Pt with steady movement requiring increased time for LLE positioning and line managment  Functional Mobility  Functional - Mobility Device: Rolling Walker  Activity: To/from bathroom  Functional Mobility Comments: Pt initially requiring CGA with RW then progressing to SBA once gaining confidence. Pt stating \"I am sore from therapy this morning\"  Shower Transfers  Shower - Transfer From: Santa Reason - Transfer Type: To and From  Shower - Transfer To: Transfer tub bench  Shower - Technique: Ambulating  Shower Transfers: Contact Guard  Shower Transfers Comments: Pt initially sitting on TTB in shower facing away from water for washing hair. Pt covered with therapy assist for washing hair and maintaing shower restrictions. Transfers  Sit to stand: Contact guard assistance;Stand by assistance (CGA from recliner chair then SBA from TTB multiple times during ADL washing up tasks)  Stand to sit: Contact guard assistance     ADDENDUM [id number]  Pt seated in chair upon entry. Pt very pleasant and agreeable to therapy session, pt reports pain of 1 and declined pain meds. Pt requesting to toilet. Pt sit to stand SBA and pt ambulated SBA ~18 ft to bathroom toilet. Pt toilet transfer SBA and toileted SBA. Pt no longer needs assist with LLE for transfers, pt attempted use of leg , but felt better moving LLE with both hands. Pt in stance at sink ~3 min at Supervision to wash hands and complete oral care. Pt ambulated back to chair with rw at [facility]. Pt stand to sit SBA. After seated rest break, pt sit to stand SBA and ambulated several ft to EOB SBA with rw. Pt stand to sit SBA. Pt attempted use of leg  to assist with LLE into bed but abandoned and used both hands. Pt SBA for supine to sit and sit to supine. Pt is going to get  to measure height of bed at home so she can practice with correct height in ARU. Pt sit to stand SBA and ambulated to sofa ~6 ft with rw at [facility]. Pt plans on long sitting on sofa at home and pt educated that best direction to sit is with LLE against sofa back for safest transfers. Pt completed transfer to/from sofa with long sitting at SBA.  Pt ambulated ~6ft from sofa to chair with rw at SBA. Pt stand to sit SBA. Call light in reach and chair alarm on.      Plan  If pt discharges prior to next treatment, this note will serve as discharge summary  Plan  Times per week: 5x a week for 90 mins daily  Current Treatment Recommendations: Balance Training,Functional Mobility Training,Endurance Training,Self-Care / ADL,Safety Education & Training,Strengthening,Pain Management,Equipment Evaluation, Education, & procurement,Home Management Training,Patient/Caregiver Education & Training          Goals (as determined and assessed by primary OT)  Short term goals  Time Frame for Short term goals: STG=LTG  Long term goals  Time Frame for Long term goals : 7-10 days  Long term goal 1: Pt will complete LE dressing MOD I - ongoing  Long term goal 2: Pt will complete toileting MOD I - ongoing  Long term goal 3: Pt will complete bathing tasks MOD I - ongoing  Long term goal 4: Pt will maintain stance for up to 10 mins to complete light meal prep task - ongoing  Long term goal 5: Pt will I'ly manage LLE in order to complete safe functional transfers to the toilet, bed, chair - ongoing  Patient Goals   Patient goals : to have less pain, walk       Therapy Time   Individual Concurrent Group Co-treatment   Time In 1045         Time Out 1145         Minutes 60         Timed Code Treatment Minutes: [address], R Shannan Sierra [address] (first session)    Therapy Time   Individual Concurrent Group Co-treatment   Time In 1330         Time Out 1400         Minutes 30         Timed Code Treatment Minutes: [facility], [address] (Treatment and documentation for 2nd therapy session)

## 2022-03-28 NOTE — PROGRESS NOTES
Physical Therapy  Facility/Department: [facility] ACUTE REHAB UNIT  Daily Treatment Note  NAME: Denis Frost  : 1955  MRN: [id number]    Date of Service: 3/28/2022    Discharge Recommendations:  24 hour supervision or assist,Outpatient PT   PT Equipment Recommendations  Equipment Needed: Yes  Mobility Devices: Jeremiah Mendez: Rolling    Assessment   Body structures, Functions, Activity limitations: Decreased functional mobility ; Decreased endurance;Decreased strength;Decreased balance;Decreased safe awareness; Increased pain  Assessment: Pt tolerated session well. Pt increased to SBA for mobility and demonstrated significant improvement for activity tolernace. Pt remains below baseline and requries continued skilled services in order to increase independence with mobility and facilitate a safe dc home. Treatment Diagnosis: mobility impairment due to L thigh sarcoma removal  Prognosis: Good  Decision Making: Medium Complexity  PT Education: Goals;PT Role;Plan of Care;General Safety; Functional Mobility Training;Transfer Training;Disease Specific Education;Precautions;Gait Training; Adaptive Device Training; Injury Prevention  Patient Education: pt verbalized understanding  REQUIRES PT FOLLOW UP: Yes  Activity Tolerance  Activity Tolerance: Patient limited by endurance; Patient Tolerated treatment well     Patient Diagnosis(es): There were no encounter diagnoses. has a past medical history of Cancer ([facility] [address] History of radiation therapy, and PONV (postoperative nausea and vomiting). has a past surgical history that includes Leg Surgery (Left, ); Breast biopsy (2014);  section, low transverse; Colonoscopy (N/A, 3/30/2018); Colonoscopy (2018); Leg biopsy excision (Left, 2021); Leg biopsy excision (Left, 3/21/2022); tissue transfer (Left, 3/21/2022); Leg Surgery (Left, 3/21/2022); and Skin graft (Left, 3/21/2022).     Restrictions  Position Activity Restriction  Other position/activity restrictions: ambulate pt, up with assist, FWBAT; KI prn; per plastic surgery on 3/26- pt should only sponge bath until wound vac and drain are discontinued  Subjective   General  Chart Reviewed: Yes  Additional Pertinent Hx: 61y.o. year old female status post wide excision of left thigh sarcoma, complex closure . (3/21). Pmhx: L thigh sarcoma. Family / Caregiver Present: No  Referring Practitioner: Ariana, DO  Subjective  Subjective: Pt supine in bed alert and agreeable to session . Pt reports no pain at this time ands eager to get out of bed  Pain Screening  Patient Currently in Pain: Denies  Vital Signs  Patient Currently in Pain: Denies       Orientation  Orientation  Overall Orientation Status: Within Functional Limits  Cognition      Objective   Bed mobility  Supine to Sit: Stand by assistance  Comment: drain attached to pt's pocket , need sassist for wound vac management  Transfers  Sit to Stand: Stand by assistance (Pt sit <> stand from EOB ,chair, and toilet with RW and cues for hand placement .)  Stand to sit: Stand by assistance  Ambulation  Ambulation?: Yes  More Ambulation?: No  Ambulation 1  Surface: level tile  Device: Rolling Walker  Assistance: Stand by assistance  Quality of Gait: slow cuong, steady gait, dec stance time on LLE, hip circumduction on L for toe clearance, increased WB BUEs but able to correct with VCs  Gait Deviations: Slow Cuong;Decreased step length;Decreased step height  Distance: 80 ft , 355ft , short distances in gym  Stairs  # Steps : 8  Stairs Height: 4\"  Rails: Right ascending  Comment: step to pattern, up with RLE, down with LLE, cues for hand placement and sequencing                  Other Activities: Other (see comment)  Comment: Toilet transfer including amanda care , pant management, and standing at sink to wash hands and brush teeth with SBA abd RW. Pt wtih no LOB noted   Second session: Pt sitting in BS chair when PT arrived. Pt agreeable to therapy.    Pt instructed to kari R shoe prior to walking which pt demo good sitting balance and achieved Indly  Bed mobility( performed only on mat table)  Supine to Sit: Stand by assistance( VC for energy efficient technique including maneuvering LLE)   Sit > supine; Reviewed maneuvering the LLE using BUES to assist . Pt tends to sit most of the transfr  Transfers  Sit to Stand: Stand by assistance (Pt sit <> stand from Vertro, mat table and armed chair with RW and cues for hand placement .)  Stand to sit: Stand by assistance  Ambulation 1( adjusted RW height and exchanged with smaller RW)   Surface: level tile  Device: Rolling Walker  Assistance: Stand by assistance  Quality of Gait: steady gait, dec stance time on LLE,  increased WB BUEs but able to correct with VCs  Gait Deviations: Slow Maricel;Decreased step length;Decreased step height  Distance: 100' x2, 300' x1,  PT instructed pt with the following ex performed in supine to BLES:  1. AP/ QS/ GS  ( combination isometrics)  2.  1/2 Bridges with hold of \"3\"  With RLE  3  Hip abd with knee extended  4. SLRs (rossy increments of 5 reps x2) )    Rossy 10 reps of each  PT provided significant education to pt with pt appearing anxious/ apprehensive about moving the LLE. This included the process of healing with the increased circulation. Reinforcement will be req   Pt returned to room and sat in BS chair. Phone and call light placed within reach.  Chair alarm reactivated           G-Code     OutComes Score                                                     AM-PAC Score             Goals  Short term goals  Time Frame for Short term goals: 7-10 days-- all onlin  Short term goal 1: pt will perform bed mobility tasks mod I  Short term goal 2: pt will perform functional transfers with LRAD and mod I  Short term goal 3: pt will ambulate 150' with LRAD and Mod I  Short term goal 4: pt will negotiate 4 steps with RHR and mod I  Long term goals  Time Frame for Long term goals : LTG= STG  Patient Goals Patient goals : to have a successful healing process    Plan    Plan  Times per week: 5x/week 90 min/day  Current Treatment Recommendations: Balance Training,Functional Mobility Training,Gait Training,Transfer Training,Stair training,Strengthening,Endurance Training,Neuromuscular Re-education,Patient/Caregiver Education & Training,Safety Education & Training,Home Exercise Program  Safety Devices  Type of devices: Gait belt,Nurse notified,Chair alarm in place,Left in chair,Call light within reach     Therapy Time   Individual Concurrent Group Co-treatment   Time In 0830         Time Out 0900         Minutes 30           Second Session Therapy Time:   Individual Concurrent Group Co-treatment   Time In 0930         Time Out 1030         Minutes 60           Total Treatment Minutes: 30+60=90        Anna Dunbar, PT   [facility],[address] [place] [address] ( treating therapist for 2nd session)

## 2022-03-28 NOTE — PROGRESS NOTES
Shift assessment complete. VSS. A&Ox4. Denies pain at this time. Fall precautions in place. Patient up to chair after therapy session, chair alarm utilized, call light within reach.

## 2022-03-28 NOTE — PROGRESS NOTES
Department of Physical Medicine & Rehabilitation  Progress Note    Patient Identification:  Brice Cordon  [id number]  : 1955  Admit date: 3/25/2022    Chief Complaint: Debility    Subjective:   Feels well. Drains to be removed in the next few days. Improving in therapy. Using a walker in PT. No new complaints. ROS: No f/c, n/v, cp     Objective:  Patient Vitals for the past 24 hrs:   BP Temp Temp src Pulse Resp SpO2   22 0800 102/68 98 °F (36.7 °C) Oral 78 16 96 %   22 2045 101/63 98.4 °F (36.9 °C) Oral 75 16 97 %     Const: Alert. No distress, pleasant. HEENT: Normocephalic, atraumatic. Normal sclera/conjunctiva. MMM. CV: Regular rate and rhythm. Resp: No respiratory distress. Lungs CTAB. Abd: Soft, nontender, nondistended, NABS+   Ext: No edema. Neuro: Alert, oriented, appropriately interactive. Psych: Cooperative, appropriate mood and affect    Laboratory data: Available via EMR. Last 24 hour lab  No results found for this or any previous visit (from the past 24 hour(s)). Therapy progress:  PT  Position Activity Restriction  Other position/activity restrictions: ambulate pt, up with assist, FWBAT; KI prn; per plastic surgery on 3/26- pt should only sponge bath until wound vac and drain are discontinued  Objective     Sit to Stand: Contact guard assistance (with and without AD from recliner, wc, toilet, and EOB)  Stand to sit: Contact guard assistance  Bed to Chair: Contact guard assistance (from wc to recliner without AD)  Device: [address]: Contact guard assistance  Distance: 10'x2 + 100' + short distances within gym  OT  PT Equipment Recommendations  Equipment Needed: Yes  Mobility Devices: Prudence Janine: Rolling  Toilet - Technique: Ambulating  Equipment Used: Standard toilet  Toilet Transfers Comments: Min A needed to lift LLE and support it under a foot stool when sitting.  Pt reported it is very uncomfortable to sit w/ LLE dangling  Assessment SLP          Body mass index is 24.41 kg/m². 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 Also has drain in LLE. -Is on Ancef. Drain management as per Gen Surg. Therapy restricted due to drains, immobilzer  -She is on oxycodone, and dilaudid pain panel. hasnt required IV pain meds in >48 hrs.   -Heparin for DVT ppx as per Gen Surg.      2. Fever  -Workup including CXR, UA as per Gen Surg.  Remains on Ancef  - improved     Rehab Progress: Improving  Anticipated Dispo: home  Services/DME: VIC  ELOS: VIC Zapata D.O. M.P.H  PM&R  3/28/2022  10:59 AM

## 2022-03-28 NOTE — PATIENT CARE CONFERENCE
Inpatient Rehabilitation  Weekly Team Conference Note  The [address]  [phone]  Patient Name: Hill Jauregui        MRN: [id number]    : 1955  (61 y.o.)  Gender: female   Referring Practitioner: DO Ariana  Diagnosis: L thigh sarcoma  The team conference for this patient was held on 3/28/2022 at 10:30am by:  Stefano Lane DO    Current/Goal QM SCORES  QM Current/Goal Score   Eating CARE Score: 6 / Discharge Goal: Independent   Oral Hygiene CARE Score: 4 / Discharge Goal: Independent   Shower/Bathing CARE Score: 4 / Discharge Goal: Independent   UB Dressing CARE Score: 5 / Discharge Goal: Independent   LB Dressing CARE Score: 3 / Discharge Goal: Independent   Putting on/off Footwear CARE Score: 5 / Discharge Goal: Independent   Toileting Hygiene CARE Score: 4 / Discharge Goal: Independent   Bladder Continence Bladder Continence: Always continent    Bowel Continence Bowel Continence: Always continent    Toilet Transfers CARE Score: 4 / Discharge Goal: Independent   Shower/Bathe Self  CARE Score: 4 / Discharge Goal: Independent   Rolling Left and Right CARE Score: 3 / Discharge Goal: Independent   Sit to Lying CARE Score: 3 / Discharge Goal: Independent   Lying to Sitting on Bedside CARE Score: 3 / Discharge Goal: Independent   Sit to Stand CARE Score: 4 / Discharge Goal: Independent   Chair/Bed to Chair Transfer CARE Score: 4 / Discharge Goal: Independent   Car Transfers CARE Score: 4 / Discharge Goal: Independent   Walk 10 Feet CARE Score: 1 / Discharge Goal: Independent   Walk 50 Feet with Two Turns CARE Score: 1 / Discharge Goal: Independent   Walk 150 Feet CARE Score: 88 / Discharge Goal: Independent   Walk 10 Feet on Uneven Surfaces CARE Score: 1 / Discharge Goal: Independent   1 Step (Curb) CARE Score: 1 / Discharge Goal: Independent   4 Steps CARE Score: 4 / Discharge Goal: Independent   12 Steps CARE Score: 9 / Discharge Goal: Not Applicable   Picking up Object from Floor CARE Score: 4 / Discharge Goal: Independent     NURSING:  A&Ox: Level of Consciousness: Alert (0)  Orientation Level: Oriented X4  Reddy Fall Risk Score: Total Score: 45  Admission BIMS: 15  Wounds/Incisions/Ulcers: Surgical incision to Lt hip/LLE  Medication Review: reviewed with patient  Pain: scheduled Tylenol, PRN pain medication  Consultations: none  Imaging:   No orders to display     Active Comorbid Conditions: Cancer  Systems Review:   Renal: WNL, Dialysis:  Type: na, Frequency: na  Neurological: WNL  Musculoskeletal: BLE weakness  Respiratory: WNL  Cardiac/Circulatory/Peripheral Vascular: WNL  Abnormal/Relevant Test Results:   Abnormal/Relevant Lab Values:   CBC: No results for input(s): WBC, HGB, HCT, MCV, PLT in the last 72 hours. BMP: No results for input(s): NA, K, CL, CO2, PHOS, BUN, CREATININE, CA in the last 72 hours. PT/INR: No results for input(s): PROTIME, INR in the last 72 hours. APTT: No results for input(s): APTT in the last 72 hours. Liver Profile:  No results found for: AST, ALT, ALB, BILIDIR, BILITOT, ALKPHOS, GGT, 5NUC  Lab Results   Component Value Date    CHOL 211 07/15/2021    HDL 56 07/15/2021    TRIG 85 07/15/2021     No results for input(s): WBC, HGB, HCT, PLT, MCV in the last 72 hours. No results for input(s): NA, K, CL, CO2, PHOS, BUN, CREATININE, CA in the last 72 hours. No results for input(s): AST, ALT, ALB, BILIDIR, BILITOT, ALKPHOS in the last 72 hours. No results for input(s): MG in the last 72 hours. No results for input(s): WBC, HGB, HCT, PLT in the last 72 hours. No results for input(s): NA, K, CL, CO2, BUN, CREATININE, CALCIUM, PHOS in the last 72 hours. Invalid input(s): MAGNES  No results for input(s): AST, ALT, BILIDIR, BILITOT, ALKPHOS in the last 72 hours. No results for input(s): INR in the last 72 hours. No results for input(s): Yamila Belts in the last 72 hours.     PHYSICAL THERAPY:  Bed Mobility: Scooting: Supervision    Transfers:  Sit to Stand: Stand by assistance (Pt sit <> stand from EOB ,chair, and toilet with RW and cues for hand placement .)  Stand to sit: Stand by assistance  Bed to Chair: Contact guard assistance (from wc to recliner without AD)    Ambulation 1  Surface: level tile  Device: [address]: Stand by assistance  Quality of Gait: slow cuong, steady gait, dec stance time on LLE, hip circumduction on L for toe clearance, increased WB BUEs but able to correct with VCs  Gait Deviations: Slow Cuong,Decreased step length,Decreased step height  Distance: 80 ft , 355ft , short distances in gym    Stairs  # Steps : 8  Stairs Height: 4\"  Rails: Right ascending  Curbs: 6\"  Device: Rolling walker  Comment: step to pattern, up with RLE, down with LLE, cues for hand placement and sequencing    OCCUPATIONAL THERAPY:  ADL  Feeding: Independent (Breakfast delivered)  Grooming: Setup (Pt washing hands and face in stance and combing hair seated on TTB)  UE Bathing: Setup,Supervision (UE washing completed seated on TTB seated infront of sink)  LE Bathing: Contact guard assistance (to SBA washing buttocks in stance at sink)  UE Dressing: Setup (seated on TTB infront of sink donning shirt)  LE Dressing: Minimal assistance (increased assist due to tight space and lines on drain and wound vac)  Toileting: Contact guard assistance (Pt completed amanda care seated I'ly. Pants management completed in stance w/ CGA)  Additional Comments: Pt washing up seated on TTB infront of sink. Pt assisted to wash hair seated on TTB requiring Mod A. Toilet Transfers: Toilet Transfers  Toilet - Technique: Ambulating  Equipment Used: Standard toilet  Toilet Transfer: Minimal assistance  Toilet Transfers Comments: Min A needed to lift LLE and support it under a foot stool when sitting.  Pt reported it is very uncomfortable to sit w/ LLE dangling    Tub/ShowerTransfers:  Shower Transfers  Shower - Transfer From: Walker  Shower - Transfer Type: To and From  Shower - Transfer To: Transfer tub bench  Shower - Technique: Ambulating  Shower Transfers: Contact Guard  Shower Transfers Comments: Pt initially sitting on TTB in shower facing away from water for washing hair. Pt covered with therapy assist for washing hair and maintaing shower restrictions. NUTRITION:  Please see nutrition note for details. Weight: 137 lb 12.6 oz (62.5 kg) / Body mass index is 24.41 kg/m². Diet Level/Order: ADULT DIET; Regular  ADULT ORAL NUTRITION SUPPLEMENT; Breakfast, Lunch, Dinner; Standard High Calorie/High Protein Oral Supplement  Supplements:   Average Consumption per meal: PO Meals Eaten (%): 76 - 100%    CASE MANAGEMENT:  Psychosocial/Behavioral Issues: none  Assessment:  Pt will be returning home with spouse. They live in [place], [place]. Pt works for [employer]. Skilled Home Care will need to be arranged through Pt's [facility]. Patient/Family Education provided by team:  Role of PT/OT, safe transfers, safety with functional mobility     Patient Goals for Rehab stay:  1. get stronger      Rehab Team Goals for patient for the Upcoming Week:  1. increase independence w/ ADLs  2. Increase independence w/ functional transfers    Barriers to Progress/Attainment of Goals & Efforts/Interventions to remove Barriers:  1. LLE pain    Discharge Plan:  Estimated Length of Stay: 9 days  Destination: home health and discharge home with supervision  Services at Discharge: [facility], Occupational Therapy and Nursing 3x week  Equipment at Discharge: shower chair once cleared to shower   Community Resources:   Factors facilitating achievement of predicted outcomes:  Motivated, Cooperative and Pleasant  Barriers to the achievement of predicted outcomes: Lower extremity weakness    Special Needs in the Upcoming Week:   [x] Family/Caregiver Education  [] Home visit  []Therapeutic Pass [] Consults:_______   [] Family/Caregiver Training  [] Stroke Risk Factor Education     [] Other;_______     TEAM SUMMARY: Will continue with current poc & goals until anticipated d/c date of 4/2/2022.     MD:   Stroke Risk Factors:   [] N/A for this patient [] HTN  []  Diabetes  [] Hyperlipidemia  []Obesity BMI >25  [] Atrial Fibrillation [] Smoker (current)  [] Smoker (quit in last 12 months)  [] Sleep Apnea [] Other:     Risk for Readmission: Low 9%    Justification for Continued Stay:   Criteria for continued IRF stay:  Based on my medical assessment of the patient and review of information from the interdisciplinary team, as part of this weekly team conference, the patient continues to meet the following criteria for IRF level of care:  [x] The patient requires 24-hour rehabilitation nursing care   [x] The patient requires an intensive rehabilitation therapy program  [x] The patient requires active and ongoing intervention of multiple therapy disciplines  [x] The patient requires continued physician supervision by a rehabilitation physician  [x] The patient requires an intensive and coordinated interdisciplinary team approach to the delivery of rehabilitative care    Medical Necessity-continued close physician medical management is required for:   [] Cardiac/Circulatory dysfunction  [] Respiratory/Pulmonary dysfunction  [] Integumentary complications  [] Peripheral Vascular dysfunction  [x] Musculoskeletal dysfunction  [] Neurological dysfunction d/t:  [] CVA  [] SCI  [] TBI  [] Other: __________  [] Renal dysfunction  [] Hematologic dysfunction    [] Endocrine disorders  [] GI disorders     [] Genito-Urinary dysfunction    Assessment/Plan:  [x] The patient is making good progression towards their LTG's, is actively participating in, and has a reasonable expectation to continue to benefit from the intensive rehabilitation program.  [] The estimated discharge date has been changed from initial team conference due to:   [] The estimated discharge destination has been changed from initial team conference due to:     Rehab Team Members in attendance for Team Conference:  ARU Supervisor/PPS Coordinator:  Patricia Thorne, PT, DPT    Therapy Manager/ARU :  Radha Cosme, PT, DPT    Nursing Manager:  Blake Barrow, RN    Social Work:  [facility]    Nursing:  Beni Ace, RN  Wilfrid Vera, KRISTIN    Therapy:  Emely Masters, PT  Chasidy Carrion, PT, DPT  Jeremiah Butt PT, DPT     Sharmaine Ahmadi, OTR/L  Angie Larson, OTR/L  Isabella Littlejohn, OTR/L    Nutrition:  Coco Parekh, NATALY LD      I approve the established interdisciplinary plan of care as documented within the medical record of Durwin Stains. MD Signature Felipa Schlatter, RONDA.O. M.P.H  PM&R  3/29/2022  1:31 PM

## 2022-03-28 NOTE — PROGRESS NOTES
Perfect serve sent to Dr. Pablo Alonzo at this time in regards to patient complaint of splint causing pain to back of L heel.

## 2022-03-28 NOTE — PLAN OF CARE
Problem: Falls - Risk of:  Goal: Will remain free from falls  Description: Will remain free from falls  Outcome: Ongoing  Note: Client remains free from falls, bed/chair alarm in place, door open, encouraged to use call light for needs, call light is within reach, bed locked in lowest position,  Will continue to monitor. Problem: Pain:  Goal: Control of acute pain  Description: Control of acute pain  Outcome: Ongoing  Note: Pt voices pain needs appropriately, pain is assessed during shift. Problem: Mobility - Impaired:  Goal: Mobility will improve  Description: Mobility will improve  Outcome: Ongoing  Note: Mobility to improve through active participation with therapy      Problem: Skin Integrity:  Goal: Absence of new skin breakdown  Description: Absence of new skin breakdown  Outcome: Ongoing  Note:  See Antwan scale. Encourage/assist pt to turn and reposition every two hours and as needed. Heels elevated off bed. Protective barrier placed as needed. Patient kept clean and dry. Pillows used for positioning. Will continue to monitor for skin breakdown.

## 2022-03-29 PROCEDURE — 97535 SELF CARE MNGMENT TRAINING: CPT

## 2022-03-29 PROCEDURE — 97110 THERAPEUTIC EXERCISES: CPT

## 2022-03-29 PROCEDURE — 2580000003 HC RX 258: Performed by: PHYSICAL MEDICINE & REHABILITATION

## 2022-03-29 PROCEDURE — 6360000002 HC RX W HCPCS: Performed by: PHYSICAL MEDICINE & REHABILITATION

## 2022-03-29 PROCEDURE — 97116 GAIT TRAINING THERAPY: CPT

## 2022-03-29 PROCEDURE — 99233 SBSQ HOSP IP/OBS HIGH 50: CPT | Performed by: PHYSICAL MEDICINE & REHABILITATION

## 2022-03-29 PROCEDURE — 1280000000 HC REHAB R&B

## 2022-03-29 PROCEDURE — 97530 THERAPEUTIC ACTIVITIES: CPT

## 2022-03-29 PROCEDURE — 6370000000 HC RX 637 (ALT 250 FOR IP): Performed by: PHYSICAL MEDICINE & REHABILITATION

## 2022-03-29 RX ADMIN — ACETAMINOPHEN 1000 MG: 500 TABLET ORAL at 12:26

## 2022-03-29 RX ADMIN — ENOXAPARIN SODIUM 40 MG: 40 INJECTION SUBCUTANEOUS at 08:58

## 2022-03-29 RX ADMIN — SODIUM CHLORIDE, PRESERVATIVE FREE 10 ML: 5 INJECTION INTRAVENOUS at 22:08

## 2022-03-29 RX ADMIN — ACETAMINOPHEN 1000 MG: 500 TABLET ORAL at 05:41

## 2022-03-29 RX ADMIN — PANTOPRAZOLE SODIUM 40 MG: 40 TABLET, DELAYED RELEASE ORAL at 05:42

## 2022-03-29 RX ADMIN — ACETAMINOPHEN 1000 MG: 500 TABLET ORAL at 18:55

## 2022-03-29 RX ADMIN — ACETAMINOPHEN 1000 MG: 500 TABLET ORAL at 23:22

## 2022-03-29 RX ADMIN — DOCUSATE SODIUM 100 MG: 100 CAPSULE, LIQUID FILLED ORAL at 21:26

## 2022-03-29 RX ADMIN — CEFAZOLIN SODIUM 2000 MG: 10 INJECTION, POWDER, FOR SOLUTION INTRAVENOUS at 12:29

## 2022-03-29 RX ADMIN — CEFAZOLIN SODIUM 2000 MG: 10 INJECTION, POWDER, FOR SOLUTION INTRAVENOUS at 18:53

## 2022-03-29 RX ADMIN — DOCUSATE SODIUM 100 MG: 100 CAPSULE, LIQUID FILLED ORAL at 08:58

## 2022-03-29 RX ADMIN — SODIUM CHLORIDE, PRESERVATIVE FREE 10 ML: 5 INJECTION INTRAVENOUS at 08:59

## 2022-03-29 RX ADMIN — CEFAZOLIN SODIUM 2000 MG: 10 INJECTION, POWDER, FOR SOLUTION INTRAVENOUS at 03:31

## 2022-03-29 ASSESSMENT — PAIN DESCRIPTION - ONSET: ONSET: ON-GOING

## 2022-03-29 ASSESSMENT — PAIN SCALES - GENERAL
PAINLEVEL_OUTOF10: 0
PAINLEVEL_OUTOF10: 1

## 2022-03-29 ASSESSMENT — PAIN DESCRIPTION - ORIENTATION: ORIENTATION: LEFT

## 2022-03-29 ASSESSMENT — PAIN DESCRIPTION - FREQUENCY: FREQUENCY: CONTINUOUS

## 2022-03-29 ASSESSMENT — PAIN - FUNCTIONAL ASSESSMENT: PAIN_FUNCTIONAL_ASSESSMENT: ACTIVITIES ARE NOT PREVENTED

## 2022-03-29 ASSESSMENT — PAIN DESCRIPTION - DESCRIPTORS: DESCRIPTORS: SORE

## 2022-03-29 ASSESSMENT — PAIN DESCRIPTION - LOCATION: LOCATION: FOOT;LEG

## 2022-03-29 ASSESSMENT — PAIN DESCRIPTION - PAIN TYPE: TYPE: ACUTE PAIN

## 2022-03-29 NOTE — DISCHARGE SUMMARY
Physician Discharge Summary     Patient ID:  Rhiannon Arreola  [id number]  61 y.o.  1955    Admit date: 3/21/2022    Discharge date and time: 3/25/2022  3:37 PM     Admitting Physician: Marian Estrada MD     Discharge Physician: same    Admission Diagnoses: Soft tissue sarcoma of thigh, left ([facility] [address] [C49.22]  Sarcoma ([facility] [address] [C49.9]    Discharge Diagnoses: same    Admission Condition: fair    Discharged Condition: stable    Indication for Admission: surgery and post op care    Hospital Course: 61year old female admitted with soft tissue sarcoma of left thigh who underwent wide excision left thigh sarcoma, left leg reconstruction with gastrocnemius, rectus femoris flap, complex closure of the left leg including knee, advancement of the medial thigh and application of prevena incisional wound vacuum device. She was placed in a knee immobilizer and had a hemovac drain in place. She was admitted to post op surgical floor in stable condition for pain management and physical therapy and occupational therapy. She was closely observed over the next couple of days and was referred to acute rehab. She was accepted and was discharged to acute rehab on 3/25 in stable condition. Disposition: ARU    Patient Instructions: Activity: pt/ot; immobilizer to knee   Diet: regular diet  Wound Care: keep wound clean and dry    Follow-up with Primo Watson in two weeks.     Signed:  ANAIS Oneill CNP  3/29/2022  10:24 AM

## 2022-03-29 NOTE — PROGRESS NOTES
Pt A & O. VSS. Pt x 1 walker. All safety precautions in place. Bed alarm activated. Non skid socks on. C/o mild discomfort to LLE as a result of splint. Mepilex in place. Pt states that elevating extremity is effective. No complaints of nausea voiced. Cont with wound vac to LLE and accordian drain to Lt hip. Cont on IV ATB. Will cont to monitor.      Vitals:    03/28/22 2214   BP: 100/63   Pulse: 73   Resp: 18   Temp: 98.3 °F (36.8 °C)   SpO2: 96%

## 2022-03-29 NOTE — PROGRESS NOTES
Physical Therapy  Facility/Department: [facility] ACUTE REHAB UNIT  Daily Treatment Note  NAME: Tino Woods  : 1955  MRN: [id number]    Date of Service: 3/29/2022    Discharge Recommendations:  24 hour supervision or assist,Home with Home health PT   PT Equipment Recommendations  Equipment Needed: Yes  Mobility Devices: Emily Bongo: Rolling    Assessment   Body structures, Functions, Activity limitations: Decreased functional mobility ; Decreased endurance;Decreased strength;Decreased balance;Decreased safe awareness; Increased pain  Assessment: Pt tolerated well and increased to superivsion for gait, transfers and stairs . Pt is limted by LLE strength, activity tolerance and balance. Pt remains below baseline and requries continued skilled PT to increase functional mobility and maximize rehab potential  Treatment Diagnosis: mobility impairment due to L thigh sarcoma removal  Prognosis: Good  Decision Making: Medium Complexity  PT Education: Goals;PT Role;Plan of Care;General Safety; Functional Mobility Training;Transfer Training;Disease Specific Education;Precautions;Gait Training; Adaptive Device Training; Injury Prevention;Home Exercise Program  Patient Education: pt verbalized understanding  REQUIRES PT FOLLOW UP: Yes  Activity Tolerance  Activity Tolerance: Patient limited by endurance; Patient Tolerated treatment well     Patient Diagnosis(es): There were no encounter diagnoses. has a past medical history of Cancer ([facility] [address] History of radiation therapy, and PONV (postoperative nausea and vomiting). has a past surgical history that includes Leg Surgery (Left, ); Breast biopsy (2014);  section, low transverse; Colonoscopy (N/A, 3/30/2018); Colonoscopy (2018); Leg biopsy excision (Left, 2021); Leg biopsy excision (Left, 3/21/2022); tissue transfer (Left, 3/21/2022); Leg Surgery (Left, 3/21/2022); and Skin graft (Left, 3/21/2022).     Restrictions  Position Activity Restriction  Other position/activity restrictions: ambulate pt, up with assist, FWBAT; KI prn; per plastic surgery on 3/26- pt should only sponge bath until wound vac and drain are discontinued  Subjective   General  Chart Reviewed: Yes  Additional Pertinent Hx: 61y.o. year old female status post wide excision of left thigh sarcoma, complex closure . (3/21). Pmhx: L thigh sarcoma. Family / Caregiver Present: No  Referring Practitioner: Ariana, DO  Subjective  Subjective: Pt seated in chair alert and agreeable to session. pt reports no pain at this time. Pain Screening  Patient Currently in Pain: Denies  Vital Signs  Patient Currently in Pain: Denies       Orientation  Orientation  Overall Orientation Status: Within Functional Limits  Cognition      Objective   Bed mobility  Supine to Sit: Supervision  Sit to Supine: Supervision  Comment: on flat mat table without rails  Transfers  Sit to Stand: Supervision (Pt sit <> stand from recliner, chair, and EOM with supervision and RW. Inital cues for hand placement)  Stand to sit: Supervision  Ambulation  Ambulation?: Yes  More Ambulation?: Yes  Ambulation 1  Surface: level tile  Device: Rolling Walker  Assistance: Supervision  Quality of Gait: slow cuong, steady gait, dec stance time on LLE, hip circumduction on L for toe clearance, increased WB BUEs but able to correct with VCs  Gait Deviations: Slow Cuong;Decreased step length;Decreased step height  Distance: 275ft , 120ft , 80 ft , short distances in gym .   Ambulation 2  Surface - 2: uneven;ramp  Device 2: Rolling Walker  Assistance 2: Stand by assistance  Quality of Gait 2: same as above  Gait Deviations: Slow Cuong;Decreased step height  Distance: 35 ft ascend and descend  Stairs/Curb  Stairs?: Yes  Stairs  # Steps : 8  Stairs Height: 4\"  Rails: Right ascending  Assistance: Supervision  Comment: step to pattern, up with RLE, down with LLE, cues for hand placement and sequencing     Balance  Posture: Good  Comments: Static balance standing no AD holding physio ball and rotating side to side 2x10 bilaterally to increase balance and WB on LLE. Pt CGA throughout with VC to weight shift to left. Pt then performed static balance on uneven surface ( airex ) 2x 45 s ea ( eyes open, narrow MARY ELLEN, staggard stance BLE) . Pt CGA throughout with 1 LOB laterally with min A to correct. Exercises  Bridging: Other(comment) (RLE only 1x 15)  Straight Leg Raise: in gravity eliminated on powder board LLE 2x10 and supine RLE 1x 15  Quad Sets: 2x 10 BLE  Gluteal Sets: 2x10 BLE  Hip Extension/Leg Presses: in sidelying on powder board LLE 1x15  Hip Abduction: supine 1x 15 BLE  Ankle Pumps: x20 BLE  Comments: cues for form and multiple rest breaks to complete. Pt given written HEP and educated to continue as HEP daily. Pt verbalized understanding       Second Session: Pt seated in recliner alert and agreeable to session . Pt reports left leg pain 0-1/10. Pt sit <> stand from recliner and chair with supervision and RW. Pt ambulated 1 x 75 ft and 1 x 200 ft and short distances in gym with supervision and RW. Cues for decreased WB on BUEs and posture. Pt performed standing toe taps on 4 in cone with RW and CGA 2x10 BLE for hip flexion strength and increased WB LLE. Pt then performed dynamic balance stepping forward, back, lateral and diagonal BLE in numbered grid with no AD and CGA. Pt followed numbered pattern given by PT for cognitive component. Pt with 2 LOB to left min A to correct. Pt performed supine <> sit x 2 reps with bed elevated to 27\" to simulate home set up without rails and supervision. VC for sequencing and how to elevate LLE into bed with out assist from another person. Pt performed x 2 reps with supervision. Pt left seated in recliner with alarm on call light in reach and all needs met.                   G-Code     OutComes Score                                                     AM-PAC Score             Goals  Short term goals  Time Frame for Short term goals: 7-10 days-- all onlin  Short term goal 1: pt will perform bed mobility tasks mod I  Short term goal 2: pt will perform functional transfers with LRAD and mod I  Short term goal 3: pt will ambulate 150' with LRAD and Mod I  Short term goal 4: pt will negotiate 4 steps with RHR and mod I  Long term goals  Time Frame for Long term goals : LTG= STG  Patient Goals   Patient goals : to have a successful healing process    Plan    Plan  Times per week: 5x/week 90 min/day  Current Treatment Recommendations: Balance Training,Functional Mobility Training,Gait Training,Transfer Training,Stair training,Strengthening,Endurance Training,Neuromuscular Re-education,Patient/Caregiver Education & Training,Safety Education & Training,Home Exercise Program  Safety Devices  Type of devices: Gait belt,Nurse notified,Chair alarm in place,Left in chair,Call light within reach     Therapy Time   Individual Concurrent Group Co-treatment   Time In 0730         Time Out 0830         Minutes 60              Second Session Therapy Time:   Individual Concurrent Group Co-treatment   Time In 1130         Time Out 1200         Minutes 30           Timed Code Treatment Minutes:  60+30    Total Treatment Minutes:  90      Minda Morel, PT

## 2022-03-29 NOTE — PROGRESS NOTES
Vital signs WNL. A&Ox4. Denies pain. Both legs elevated in chair. Fall precautions in place. Call light and personal items in reach and chair alarm on. Pt has no complaints. Will continue to monitor.

## 2022-03-29 NOTE — PROGRESS NOTES
Occupational Therapy  Facility/Department: [facility] ACUTE REHAB UNIT  Daily Treatment Note  NAME: Suha Suresh  : 1955  MRN: [id number]    Date of Service: 3/29/2022    Discharge Recommendations:  Home with assist PRN,Home with Home health OT  OT Equipment Recommendations  ADL Assistive Devices: Shower Chair with back  Other: Pt does not own any bathroom equipment but may benefit from shower chair once she is cleared to shower. Pt reported she has a friend who is going to give her a shower chair    Assessment   Performance deficits / Impairments: Decreased functional mobility ; Decreased ADL status; Decreased high-level IADLs;Decreased strength;Decreased balance  Assessment: Pt continues to make great progress in OT as evident by pt ability to manage LLE I'ly onto foot stool and to lift it onto 27'' height bed today. Pt is ambulating w/ RW w/ SBA-spvn and she is having less LLE pain. Pt is highly motivated and continues to benefit from OT. Cont OT per POC  Treatment Diagnosis: impaired mobility and ADLs    OT Education: Precautions; ADL Adaptive Strategies;Transfer Training  Patient Education: bed mobility- pt verb understanding  REQUIRES OT FOLLOW UP: Yes  Activity Tolerance  Activity Tolerance: Patient Tolerated treatment well  Safety Devices  Safety Devices in place: Yes  Type of devices: Call light within reach; Left in chair;Chair alarm in place         Patient Diagnosis(es): There were no encounter diagnoses. has a past medical history of Cancer ([facility] [address] History of radiation therapy, and PONV (postoperative nausea and vomiting). has a past surgical history that includes Leg Surgery (Left, ); Breast biopsy (2014);  section, low transverse; Colonoscopy (N/A, 3/30/2018); Colonoscopy (2018); Leg biopsy excision (Left, 2021); Leg biopsy excision (Left, 3/21/2022); tissue transfer (Left, 3/21/2022);  Leg Surgery (Left, 3/21/2022); and Skin graft (Left, 3/21/2022). 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 Pt admitted to ARU 3/25  Response to previous treatment: Patient with no complaints from previous session  Family / Caregiver Present: No  Referring Practitioner: Dr. Dick Felix DO  Diagnosis: L thigh sarcoma  Subjective  Subjective: Pt seated in recliner chair upon arrival and agreeable to OT session. General Comment  Comments: Madalyn Franz Vital Signs  Patient Currently in Pain: Denies   Orientation     Objective    ADL  Grooming: Supervision (Pt washed her hands and completed oral care in stance at sink w/ spvn)  Toileting: Stand by assistance (Pt completed pants management in stance w/ SBA. Pt complete rear amanda care I'ly)          Balance  Sitting Balance: Independent  Standing Balance: Stand by assistance  Standing Balance  Time: ~15 mins total  Activity: functional mobility to/from bathroom + functional mobility in hallway; stance for ADLs  Functional Mobility  Functional - Mobility Device: Rolling Walker  Activity: To/from bathroom; Other  Assist Level: Stand by assistance  Functional Mobility Comments: Pt ambulated w/ RW over 150ft w/ SBA progressing to spvn  Toilet Transfers  Toilet - Technique: Ambulating  Equipment Used: Standard toilet  Toilet Transfer: Stand by assistance  Toilet Transfers Comments: Pt was able to lift LLE onto foot stool I'ly today. Bed mobility  Supine to Sit: Supervision  Sit to Supine: Supervision  Comment: Pt reported her bed at home is 27''. Pt's hospital bed was elevated to this height to practice.  Pt was able to lift her LLE onto the bed w/o assistance needed w/ spvn and w/ bed flat w/o hand rails. Transfers  Sit to stand: Stand by assistance  Stand to sit: Stand by assistance  Transfer Comments: Pt practiced sit to stand transfers to/from recliner chair by lifting LLE onto foot stool I'ly. Pt reported she has an ottoman at home that she can use and she plans to have something for her bathroom too so she can prop it up while on the toilet. Cognition  Overall Cognitive Status: WNL                          2nd session: Pt was seated in recliner chair upon arrival. Pt was pleasant and agreeable to OT. Functional transfers:  Sit <> stand- completed from recliner chair, mat table, and dining room chair w/ spvn. Pt was able to I'ly lift LLE onto foot stool today. Toilet- completed to/from standard height toilet w/ spvn. Pt was able to I'ly lift LLE onto foot stool. Functional mobility:  Pt ambulated w/ RW to/from dining room, therapy gym, and bathroom w/ spvn and no LOB. OT assisted w/ wound vac. Bed mobility:  Sit > long sitting on mat table- completed w/ spvn. Pt was able to lift LLE onto mat table w/ + time needed    IADLs:  Pt participated in safe item transport and retrieval in the dining room to simulate morning routine of making coffee and retrieving a snack. Pt ambulated w/ RW and retrieved items from the cabinets, refrigerator, and microwave. Pt completed all retrieval w/ spvn. Pt slid items across the countertop to practice safe transport and she completed this w/ spvn. Pt reported she will have her  carry the water to her coffee pot since the sink is not located in the same space as the main countertop. OT educated pt regarding use of walker basket or walker tray for alternative transport options. Pt used basket but stated she really doesn't think it will be useful since her  will be around.  OT also educated pt about a grocery bag to tie onto RW for smaller items such as cell phone, snacks, etc. Pt maintained stance for item retrieval for over 10 mins and required rest break following task. Pt participated in simulate laundry task since pt reported she will likely have her  carry the laundry basket to the washing machine and she will assist w/ loading. Clothes were placed beside pt and pt loaded clothes into washing machine and then removed from machine w/ spvn. Pt demonstrated no LOB during task. Following removal of clothes from machine, pt transported clothes in RW basket to dresser, where pt folded clothes in stance and returned clothes to low dresser shelf. Pt completed w/ spvn. ADLs:  Toileting- pants management completed in stance w/ spvn. Olivia care completed seated I'ly  Grooming- pt washed hands in stance at sink w/ spvn    UE exercise:  Pt participated in seated UE exercises on mat table to increase UE strength and overall activity tolerance for ADL and IADL participation. Pt completed the following: horizontal abduction, shoulder diagonals, and shoulder flexion 2 x 10 reps each. Fall prevention:  OT educated pt regarding fall prevention such as removal of loose rugs, use of shower chair, non skid bath mat, and keeping cell phone nearby at all times in event of a fall. Pt verb understanding     Pt was left in recliner chair at EOS w/ chair alarm on and call light within reach.                      Plan   Plan  Times per week: 5x a week for 90 mins daily  Current Treatment Recommendations: Balance Training,Functional Mobility Training,Endurance Training,Self-Care / ADL,Safety Education & Training,Strengthening,Pain Management,Equipment Evaluation, Education, & procurement,Home Management Training,Patient/Caregiver Education & Training  G-Code     OutComes Score                                                  AM-PAC Score             Goals  Short term goals  Time Frame for Short term goals: STG=LTG  Long term goals  Time Frame for Long term goals : 7-10 days  Long term goal 1: Pt will complete LE dressing MOD I - ongoing  Long term goal 2: Pt will complete toileting MOD I - ongoing  Long term goal 3: Pt will complete bathing tasks MOD I - ongoing  Long term goal 4: Pt will maintain stance for up to 10 mins to complete light meal prep task - ongoing  Long term goal 5: Pt will I'ly manage LLE in order to complete safe functional transfers to the toilet, bed, chair - ongoing  Patient Goals   Patient goals : to have less pain, walk       Therapy Time   Individual Concurrent Group Co-treatment   Time In 1000         Time Out 1030         Minutes 30         Timed Code Treatment Minutes: 30 Minutes       Second Session Therapy Time:   Individual Concurrent Group Co-treatment   Time In 1315         Time Out 1415         Minutes 60           Timed Code Treatment Minutes:  60    Total Treatment Minutes:  30+60= [address], OT

## 2022-03-29 NOTE — PROGRESS NOTES
Department of Physical Medicine & Rehabilitation  Progress Note    Patient Identification:  Nati Fishman  [id number]  : 1955  Admit date: 3/25/2022    Chief Complaint: Debility    Subjective:   States that she has less pain than before her surgery. Improving daily. She still has her drain placed which has minimal output. Eating and sleeping well. Plan for team conference today and likely DC later this week. ROS: No f/c, n/v, cp     Objective:  Patient Vitals for the past 24 hrs:   BP Temp Temp src Pulse Resp SpO2   22 0845 111/68 98.1 °F (36.7 °C) Oral 82 16 100 %   22 2214 100/63 98.3 °F (36.8 °C) Oral 73 18 96 %     Const: Alert. No distress, pleasant. HEENT: Normocephalic, atraumatic. Normal sclera/conjunctiva. MMM. CV: Regular rate and rhythm. Resp: No respiratory distress. Lungs CTAB. Abd: Soft, nontender, nondistended, NABS+   Ext: No edema. Neuro: Alert, oriented, appropriately interactive. Psych: Cooperative, appropriate mood and affect    Laboratory data: Available via EMR.    Last 24 hour lab  Recent Results (from the past 24 hour(s))   Basic Metabolic Panel w/ Reflex to MG    Collection Time: 22  4:19 PM   Result Value Ref Range    Sodium 140 136 - 145 mmol/L    Potassium reflex Magnesium 3.8 3.5 - 5.1 mmol/L    Chloride 104 99 - 110 mmol/L    CO2 25 21 - 32 mmol/L    Anion Gap 11 3 - 16    Glucose 94 70 - 99 mg/dL    BUN 8 7 - 20 mg/dL    CREATININE 0.6 0.6 - 1.2 mg/dL    GFR Non-African American >60 >60    GFR African American >60 >60    Calcium 8.7 8.3 - 10.6 mg/dL   CBC auto differential    Collection Time: 22  4:19 PM   Result Value Ref Range    WBC 6.5 4.0 - 11.0 K/uL    RBC 3.46 (L) 4.00 - 5.20 M/uL    Hemoglobin 9.6 (L) 12.0 - 16.0 g/dL    Hematocrit 28.8 (L) 36.0 - 48.0 %    MCV 83.1 80.0 - 100.0 fL    MCH 27.6 26.0 - 34.0 pg    MCHC 33.2 31.0 - 36.0 g/dL    RDW 14.4 12.4 - 15.4 %    Platelets 223 851 - 569 K/uL    MPV 7.8 5.0 - 10.5 fL Neutrophils % 73.5 %    Lymphocytes % 14.9 %    Monocytes % 9.3 %    Eosinophils % 1.6 %    Basophils % 0.7 %    Neutrophils Absolute 4.8 1.7 - 7.7 K/uL    Lymphocytes Absolute 1.0 1.0 - 5.1 K/uL    Monocytes Absolute 0.6 0.0 - 1.3 K/uL    Eosinophils Absolute 0.1 0.0 - 0.6 K/uL    Basophils Absolute 0.0 0.0 - 0.2 K/uL       Therapy progress:  PT  Position Activity Restriction  Other position/activity restrictions: ambulate pt, up with assist, FWBAT; KI prn; per plastic surgery on 3/26- pt should only sponge bath until wound vac and drain are discontinued  Objective     Sit to Stand: Stand by assistance (Pt sit <> stand from EOB ,chair, and toilet with RW and cues for hand placement .)  Stand to sit: Stand by assistance  Bed to Chair: Contact guard assistance (from wc to recliner without AD)  Device: [address]: Stand by assistance  Distance: 80 ft , 355ft , short distances in gym  OT  PT Equipment Recommendations  Equipment Needed: Yes  Mobility Devices: Zofia January: Rolling  Toilet - Technique: Ambulating  Equipment Used: Standard toilet  Toilet Transfers Comments: Min A needed to lift LLE and support it under a foot stool when sitting. Pt reported it is very uncomfortable to sit w/ LLE dangling  Assessment        SLP          Body mass index is 24.41 kg/m². 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 Also has drain in LLE. -Is on Ancef. Drain management as per Gen Surg. Therapy restricted due to drains, immobilzer  -She is on oxycodone, and dilaudid pain panel. hasnt required IV pain meds in >48 hrs.   -Heparin for DVT ppx as per Gen Surg.      2. Fever  -Workup including CXR, UA as per Gen Surg. Remains on Ancef  - improved     Team conference was held today on the patient and discussed directly with the patient utilizing their entire treatment team. Please see separate team note for details.  Total treatment time for today's care >35 min. >50% of time spent counseling with patient and coordinating care.        Rehab Progress: Improving  Anticipated Dispo: home  Services/DME: VIC  ELOS: MERI VitalP.H  PM&R  3/29/2022  10:10 AM

## 2022-03-29 NOTE — PLAN OF CARE
Problem: Falls - Risk of:  Goal: Will remain free from falls  Description: Will remain free from falls  3/29/2022 0344 by Isaias Shaikh RN  Outcome: Ongoing  Note: Pt is a High fall risk. Explained fall risk precautions to pt and rationale behind their use to keep the patient safe. Belongings are in reach. Pt encouraged to notify staff for any and all assistance. Staff present in tx suite throughout entirety of pts treatment to monitor and protect from falls. Assistance provided when ambulating to restroom utilizing Stay With Me. Problem: Pain:  Goal: Pain level will decrease  Description: Pain level will decrease  3/29/2022 0344 by Isaias Shaikh RN  Outcome: Ongoing  Note: No complaints of pain voiced. Problem: Skin Integrity:  Goal: Absence of new skin breakdown  Description: Absence of new skin breakdown  3/29/2022 0344 by Isaias Shaikh RN  Outcome: Ongoing  Note: No evidence of skin breakdown.

## 2022-03-29 NOTE — PROGRESS NOTES
Shift assessment complete. VSS. A&Ox4. Denies pain at this time. LLE elevated while patient is up in chair. Fall precautions in place. Patient up to chair for breakfast, chair alarm utilized, call light within reach. Patient with no complaints at this time. Morning medication administered without complication.      Vitals:    03/29/22 0845   BP: 111/68   Pulse: 82   Resp: 16   Temp: 98.1 °F (36.7 °C)   SpO2: 100%

## 2022-03-30 LAB
ANION GAP SERPL CALCULATED.3IONS-SCNC: 6 MMOL/L (ref 3–16)
BASOPHILS ABSOLUTE: 0.2 K/UL (ref 0–0.2)
BASOPHILS RELATIVE PERCENT: 4 %
BUN BLDV-MCNC: 10 MG/DL (ref 7–20)
CALCIUM SERPL-MCNC: 9.1 MG/DL (ref 8.3–10.6)
CHLORIDE BLD-SCNC: 104 MMOL/L (ref 99–110)
CO2: 30 MMOL/L (ref 21–32)
CREAT SERPL-MCNC: 0.6 MG/DL (ref 0.6–1.2)
EOSINOPHILS ABSOLUTE: 0.1 K/UL (ref 0–0.6)
EOSINOPHILS RELATIVE PERCENT: 2 %
GFR AFRICAN AMERICAN: >60
GFR NON-AFRICAN AMERICAN: >60
GLUCOSE BLD-MCNC: 90 MG/DL (ref 70–99)
HCT VFR BLD CALC: 27.7 % (ref 36–48)
HEMATOLOGY PATH CONSULT: NO
HEMOGLOBIN: 9.1 G/DL (ref 12–16)
LYMPHOCYTES ABSOLUTE: 1 K/UL (ref 1–5.1)
LYMPHOCYTES RELATIVE PERCENT: 18 %
MCH RBC QN AUTO: 27.2 PG (ref 26–34)
MCHC RBC AUTO-ENTMCNC: 32.7 G/DL (ref 31–36)
MCV RBC AUTO: 83 FL (ref 80–100)
MONOCYTES ABSOLUTE: 0.6 K/UL (ref 0–1.3)
MONOCYTES RELATIVE PERCENT: 11 %
NEUTROPHILS ABSOLUTE: 3.4 K/UL (ref 1.7–7.7)
NEUTROPHILS RELATIVE PERCENT: 65 %
PDW BLD-RTO: 14.7 % (ref 12.4–15.4)
PLATELET # BLD: 306 K/UL (ref 135–450)
PLATELET SLIDE REVIEW: ABNORMAL
PMV BLD AUTO: 7.6 FL (ref 5–10.5)
POTASSIUM REFLEX MAGNESIUM: 4.3 MMOL/L (ref 3.5–5.1)
RBC # BLD: 3.33 M/UL (ref 4–5.2)
SODIUM BLD-SCNC: 140 MMOL/L (ref 136–145)
WBC # BLD: 5.3 K/UL (ref 4–11)

## 2022-03-30 PROCEDURE — 97530 THERAPEUTIC ACTIVITIES: CPT

## 2022-03-30 PROCEDURE — 97110 THERAPEUTIC EXERCISES: CPT

## 2022-03-30 PROCEDURE — 80048 BASIC METABOLIC PNL TOTAL CA: CPT

## 2022-03-30 PROCEDURE — 97116 GAIT TRAINING THERAPY: CPT

## 2022-03-30 PROCEDURE — 1280000000 HC REHAB R&B

## 2022-03-30 PROCEDURE — 36415 COLL VENOUS BLD VENIPUNCTURE: CPT

## 2022-03-30 PROCEDURE — 6360000002 HC RX W HCPCS: Performed by: PHYSICAL MEDICINE & REHABILITATION

## 2022-03-30 PROCEDURE — 97535 SELF CARE MNGMENT TRAINING: CPT

## 2022-03-30 PROCEDURE — 99232 SBSQ HOSP IP/OBS MODERATE 35: CPT | Performed by: PHYSICAL MEDICINE & REHABILITATION

## 2022-03-30 PROCEDURE — 85025 COMPLETE CBC W/AUTO DIFF WBC: CPT

## 2022-03-30 PROCEDURE — 6370000000 HC RX 637 (ALT 250 FOR IP): Performed by: PHYSICAL MEDICINE & REHABILITATION

## 2022-03-30 PROCEDURE — 2580000003 HC RX 258: Performed by: PHYSICAL MEDICINE & REHABILITATION

## 2022-03-30 RX ADMIN — ACETAMINOPHEN 1000 MG: 500 TABLET ORAL at 17:39

## 2022-03-30 RX ADMIN — ACETAMINOPHEN 1000 MG: 500 TABLET ORAL at 05:47

## 2022-03-30 RX ADMIN — DOCUSATE SODIUM 100 MG: 100 CAPSULE, LIQUID FILLED ORAL at 09:03

## 2022-03-30 RX ADMIN — SODIUM CHLORIDE, PRESERVATIVE FREE 10 ML: 5 INJECTION INTRAVENOUS at 20:56

## 2022-03-30 RX ADMIN — CEFAZOLIN SODIUM 2000 MG: 10 INJECTION, POWDER, FOR SOLUTION INTRAVENOUS at 12:31

## 2022-03-30 RX ADMIN — SODIUM CHLORIDE, PRESERVATIVE FREE 10 ML: 5 INJECTION INTRAVENOUS at 09:02

## 2022-03-30 RX ADMIN — PANTOPRAZOLE SODIUM 40 MG: 40 TABLET, DELAYED RELEASE ORAL at 05:47

## 2022-03-30 RX ADMIN — ENOXAPARIN SODIUM 40 MG: 40 INJECTION SUBCUTANEOUS at 09:03

## 2022-03-30 RX ADMIN — CEFAZOLIN SODIUM 2000 MG: 10 INJECTION, POWDER, FOR SOLUTION INTRAVENOUS at 19:11

## 2022-03-30 RX ADMIN — DOCUSATE SODIUM 100 MG: 100 CAPSULE, LIQUID FILLED ORAL at 20:56

## 2022-03-30 RX ADMIN — CEFAZOLIN SODIUM 2000 MG: 10 INJECTION, POWDER, FOR SOLUTION INTRAVENOUS at 03:19

## 2022-03-30 ASSESSMENT — PAIN DESCRIPTION - ORIENTATION: ORIENTATION: LEFT

## 2022-03-30 ASSESSMENT — PAIN SCALES - GENERAL
PAINLEVEL_OUTOF10: 0
PAINLEVEL_OUTOF10: 0
PAINLEVEL_OUTOF10: 2
PAINLEVEL_OUTOF10: 0
PAINLEVEL_OUTOF10: 1

## 2022-03-30 ASSESSMENT — PAIN DESCRIPTION - DESCRIPTORS: DESCRIPTORS: ACHING;DULL

## 2022-03-30 ASSESSMENT — PAIN DESCRIPTION - FREQUENCY: FREQUENCY: CONTINUOUS

## 2022-03-30 ASSESSMENT — PAIN - FUNCTIONAL ASSESSMENT: PAIN_FUNCTIONAL_ASSESSMENT: ACTIVITIES ARE NOT PREVENTED

## 2022-03-30 ASSESSMENT — PAIN DESCRIPTION - LOCATION: LOCATION: FOOT;LEG

## 2022-03-30 ASSESSMENT — PAIN DESCRIPTION - ONSET: ONSET: ON-GOING

## 2022-03-30 ASSESSMENT — PAIN DESCRIPTION - PAIN TYPE: TYPE: ACUTE PAIN

## 2022-03-30 NOTE — PROGRESS NOTES
Occupational Therapy  Facility/Department: [facility] ACUTE REHAB UNIT  Daily Treatment Note  NAME: Lorna Hay  : 1955  MRN: [id number]    Date of Service: 3/30/2022    Discharge Recommendations:  Home with assist PRN,Home with Home health OT  OT Equipment Recommendations  Equipment Needed: Yes  ADL Assistive Devices: Shower Chair with back  Other: Pt does not own any bathroom equipment but may benefit from shower chair once she is cleared to shower. Pt reported she has a friend who is going to give her a shower chair    Assessment   Performance deficits / Impairments: Decreased functional mobility ; Decreased ADL status; Decreased high-level IADLs;Decreased strength;Decreased balance  Assessment: Pt continues to progress with therapy, Supervison for functional transfers and mobilty with pt able to manage LLE independently. Pt Supervision for LB ADLs. Pt continues to benefit from intensive inpt therapy to maximize functional independence and safety. Continue with POC. Treatment Diagnosis: impaired mobility and ADLs  Prognosis: Good  OT Education: OT Role;Plan of Care;Home Exercise Program  Patient Education: verb understanding  REQUIRES OT FOLLOW UP: Yes  Activity Tolerance  Activity Tolerance: Patient Tolerated treatment well  Safety Devices  Safety Devices in place: Yes  Type of devices: Call light within reach; Left in chair;Chair alarm in place         Patient Diagnosis(es): There were no encounter diagnoses. has a past medical history of Cancer ([facility] [address] History of radiation therapy, and PONV (postoperative nausea and vomiting). has a past surgical history that includes Leg Surgery (Left, ); Breast biopsy (2014);  section, low transverse; Colonoscopy (N/A, 3/30/2018); Colonoscopy (2018); Leg biopsy excision (Left, 2021); Leg biopsy excision (Left, 3/21/2022); tissue transfer (Left, 3/21/2022);  Leg Surgery (Left, 3/21/2022); and Skin graft (Left, 3/21/2022). 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 Pt admitted to ARU 3/25  Response to previous treatment: Patient with no complaints from previous session  Family / Caregiver Present: No  Referring Practitioner: Dr. Machelle Saunders DO  Diagnosis: L thigh sarcoma  Subjective  Subjective: Pt seated in chair upon entry, very pleasant and agreeable to therapy session. General Comment  Comments: Pt Supervision for all transfers and functional mobility. Pt ambulated ~18ft to bathroom and sat on wc backed up to shower. Pt's hair washed by this writer with pt leaning head back into shower. Pt then seated at sink in wc washed UB (Mod I) and BLEs. Pt in stance Supervision to wash amanda area/buttocks. Pt donned personal bra/shirt setup. Pt threaded brief/shorts seated and pulled up in stance Supervision. Pt seated completed oral care/comb hair. Pt ambulated back to chair with rw ~18 ft. Pt with seated rest break in chair. Pt then practiced long sitting transfer in sofa (as pt will be doing at home). Pt ambulated ~8ft x 2 trials and performed long sitting sofa transfer at Supervision. Pt with UB strengthing exercisese in stance (Supervision) with theraband: 12 reps x 3 sets of each with seated rest breaks - right/left shoulder diagonals, right/left triceps press and chest pulls. Call light in reach and chair alarm on.   Vital Signs  Patient Currently in Pain: Denies   Orientation  Orientation  Overall Orientation Status: Within Normal Limits  Objective    ADL  Grooming: Modified independent  (seated at sink)  UE Bathing: Modified independent   LE Bathing:  (LLE not washed due to medical)  UE Dressing: Setup  LE Dressing: Supervision        Balance  Standing Balance: Supervision  Standing Balance  Time: ~15 min total  Activity: to/from bathroom, LB ADLs, to/from sofa with transfer, standing UB exercises  Functional Mobility  Functional - Mobility Device: Rolling Walker  Activity: To/from bathroom; Other  Assist Level: Supervision     Transfers  Sit to stand: Supervision  Stand to sit: Supervision                       Cognition  Overall Cognitive Status: WNL                             First Session (8608-1218): Pt seated in recliner upon arrival, pleasant and agreeable to therapy. When asked about LLE pain, pt stated \"It's not bad at all right now. \"    Functional Mobility: Pt ambulated to/from bathroom, therapy gym, and dining room w/ RW and spvn. Pt required assistance to manage wound vac during ambulation. Functional Transfers: Pt completed sit >< stand transfers from recliner >< RW and standard chair >< RW with spvn. Pt completed toilet transfer from RW >< standard toilet w/ use of R GB. Pt was able to i'ly manage LLE onto ottoman stool after sitting down. ADLs: Pt continent of bowel and bladder seated on toilet. Pt completed pericare i'ly and pants mgmt in stance w/ spvn. Pt performed hand hygiene in stance at sink i'ly. Item Retrieval / Environmental Navigation: Pt completed the following activity to address safety with item retrieval from floor level and navigating around obstacles. Pt instructed to ambulate through 20 ft obstacle course x2 rounds, using reacher to retrieve 12 socks placed on the ground during the first round and 10 articles of clothing in the second round. Pt transported socks and clothes using a walker basket in order to maintain safe 2 UE grasp on RW during ambulation. Pt maneuvered around 5 large obstacles, side-stepping through tight spaces and safely pushed lighter items out of her way.  Pt demo'd good stability with no loss of balance. Pt stated all pathways in her condo are free of clutter and furniture, and her  will be able to assist with moving any potential obstacles out of her way. Pt stood at tabletop ~3 mins to fold clothes and placed clothes in low clothing cabinet shelf. Pt completed entire activity w/ SBA-spvn and one short seated rest break between each round. Pt educated that if something falls on the ground she is unable to obtain with a reacher, she can either sit in a chair to bend down and retrieve it or ask her  for assistance. Pt verbalized understanding of education. Pt left seated in recliner at EOS w/ call light w/ in reach, chair alarm on, all needs met.                    Plan   Plan  Times per week: 5x a week for 90 mins daily  Current Treatment Recommendations: Balance Training,Functional Mobility Training,Endurance Training,Self-Care / ADL,Safety Education & Training,Strengthening,Pain Management,Equipment Evaluation, Education, & procurement,Home Management Training,Patient/Caregiver Education & Training  G-Code     OutComes Score                                                  AM-PAC Score             Goals  Short term goals  Time Frame for Short term goals: STG=LTG  Long term goals  Time Frame for Long term goals : 7-10 days  Long term goal 1: Pt will complete LE dressing MOD I - ongoing  Long term goal 2: Pt will complete toileting MOD I - ongoing  Long term goal 3: Pt will complete bathing tasks MOD I - ongoing  Long term goal 4: Pt will maintain stance for up to 10 mins to complete light meal prep task - ongoing  Long term goal 5: Pt will I'ly manage LLE in order to complete safe functional transfers to the toilet, bed, chair - ongoing  Patient Goals   Patient goals : to have less pain, walk       Therapy Time   Individual Second  Individual First Group Co-treatment   Time In 1100  1000       Time Out 1200  1030       Minutes 60  30       90 Minutes Total KEAGAN Johnson/L [id number] (Treatment and documentation for second therapy session)    KRISH Ferrell (Treatment and documentation of first therapy session)

## 2022-03-30 NOTE — PLAN OF CARE
Problem: Pain:  Description: Pain management should include both nonpharmacologic and pharmacologic interventions. Goal: Pain level will decrease  Description: Pain level will decrease  Outcome: Met This Shift  Note: Patient states her pain level is 0 today. Goal: Control of acute pain  Description: Control of acute pain  3/30/2022 1027 by Diaz Cooper RN  Outcome: Met This Shift  Note: Patient states her pain level is 0 today. 3/30/2022 0457 by Mario Kramer RN  Outcome: Ongoing  Goal: Control of chronic pain  Description: Control of chronic pain  Outcome: Met This Shift  Note: Patient states her pain level is 0 today. Problem: Falls - Risk of:  Goal: Will remain free from falls  Description: Will remain free from falls  3/30/2022 1027 by Diaz Cooper RN  Outcome: Ongoing  Note: Patient will work on ambulation with PT/OT and use ambulatory aids when walking for safety. 3/30/2022 0457 by Mario Kramer RN  Outcome: Ongoing     Problem: Mobility - Impaired:  Goal: Mobility will improve  Description: Mobility will improve  Outcome: Ongoing  Note: Patient will continue to work with PT/OT and use ambulatory aides for ambulation. Problem: Skin Integrity:  Goal: Absence of new skin breakdown  Description: Absence of new skin breakdown  3/30/2022 0457 by Mario Kramer RN  Outcome: Ongoing     Problem: Nutrition  Goal: Optimal nutrition therapy  Outcome: Ongoing  Note: Patient will eat % of all meals this shift. Problem: Mobility - Impaired:  Goal: Mobility will improve  Description: Mobility will improve  Outcome: Ongoing  Note: Patient will continue to work with PT/OT and use ambulatory aides for ambulation.       Problem: Skin Integrity:  Goal: Absence of new skin breakdown  Description: Absence of new skin breakdown  3/30/2022 0457 by Mario Kramer RN  Outcome: Ongoing     Problem: Nutrition  Goal: Optimal nutrition therapy  Outcome: Ongoing  Note: Patient will eat % of all meals this shift.

## 2022-03-30 NOTE — PROGRESS NOTES
Physical Therapy  Facility/Department: [facility]  Daily Treatment Note  NAME: Lisa Goss  : 1955  MRN: [id number]    Date of Service: 3/30/2022    Discharge Recommendations:  24 hour supervision or assist,Home with Home health PT   PT Equipment Recommendations  Equipment Needed: Yes  Mobility Devices: Berneice Leicester: Rolling    Assessment   Body structures, Functions, Activity limitations: Decreased functional mobility ; Decreased endurance;Decreased strength;Decreased balance;Decreased safe awareness; Increased pain  Assessment: Pt tolerated session well. Pt started ambulation with cane and CGA. Pt also demonstrated increased LLE strength during exercises . 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 has a past medical history of Cancer ([address] History of radiation therapy, and PONV (postoperative nausea and vomiting). has a past surgical history that includes Leg Surgery (Left, ); Breast biopsy (2014);  section, low transverse; Colonoscopy (N/A, 3/30/2018); Colonoscopy (2018); Leg biopsy excision (Left, 2021); Leg biopsy excision (Left, 3/21/2022); tissue transfer (Left, 3/21/2022); Leg Surgery (Left, 3/21/2022); and Skin graft (Left, 3/21/2022).     Restrictions  Position Activity Restriction  Other position/activity restrictions: ambulate pt, up with assist, FWBAT; KI prn; per plastic surgery on 3/26- pt should only sponge bath until wound vac and drain are discontinued  Subjective   General  Chart Reviewed: Yes  Additional Pertinent Hx: 61y.o. year old female status post wide excision of left thigh sarcoma, complex closure . (3/21). Pmhx: L thigh sarcoma. Family / Caregiver Present: No  Referring Practitioner: Ariana, DO  Subjective  Subjective: Pt seated in chair alert and agreeable to session. pt reports no pain at this time. But very tired .  Pt did not sleep well  Pain Screening  Patient Currently in Pain: Denies  Vital Signs  Patient Currently in Pain: Denies       Orientation  Orientation  Overall Orientation Status: Within Functional Limits  Cognition      Objective   Bed mobility  Rolling to Right: Supervision  Supine to Sit: Supervision  Sit to Supine: Supervision  Comment: on flat mat table without rails  Transfers  Sit to Stand: Supervision (pt sit <> stand from recliner, EOM , and multiple reps from chair)  Stand to sit: Supervision  Ambulation  Ambulation?: Yes  More Ambulation?: Yes  Ambulation 1  Surface: level tile  Device: Rolling Walker  Assistance: Supervision  Quality of Gait: hip circumduction on L for toe clearance, increased WB BUEs but able to correct with VCs  Gait Deviations: Decreased step height  Distance: 100 ft X 2 , short distances in gym  Ambulation 2  Surface - 2: level tile  Device 2: Single point cane  Assistance 2: Contact guard assistance  Quality of Gait 2: decreased cuong ,hip circumduction on L for toe clearance,VC for sequencing with cane  Gait Deviations: Slow Cuong;Decreased step height  Distance: 20 ft , 75 ft ,100ft  Stairs/Curb  Stairs?: Yes  Stairs  # Steps : 9  Stairs Height: 6\"  Rails: Bilateral  Assistance: Supervision  Comment: step to pattern, up with RLE, down with LLE     Balance  Posture: Good  Comments: Static balance on uneven surface ( airex) reaching outside [place] for bean bags and tossing to target . Pt is CGA for balance with no LOB . Dynamic balance ambulating forward , backward and lateral with no AD 3x 10 ft each direction with CGA . Pt with no LOB noted. Exercises  Bridging: Other(comment) (RLE only 1x 15)  Straight Leg Raise: supine AAROM- AROM 2x5 LLE and 2x10 RLE  Quad Sets: 2x 10 BLE  Gluteal Sets: 2x10 BLE  Hip Extension/Leg Presses: in sidelying  LLE 1x15  Hip Abduction: supine 1x 15 BLE  Ankle Pumps: x20 BLE  Comments: cues for form and multiple rest breaks to complete. Pt given written HEP and educated to continue as HEP daily. Pt verbalized understanding          Second Session : Pt seated in recliner alert and agreeable to session . Pt reports no pain at this time. Pt sit <> stand from recliner and chair with supervision and RW. Pt ambulated 2 x 500 + ft including in and out of elevators and 50 ft on uneven surface ( pavers in patio) with supervision and RW. Cues for posture and decreased WB on BUEs. Pt demonstrates circumduction gait LLE due to no  knee extension in splint . Pt  Performed standing balance tandem stance reaching outside [place] 2x20 each hand with CGA . Pt with 1 LOB to left min A to correct. Standing exercises 2x10 BLE with BUE on RW for balance calf raises, hip flexion, hip abd, and hip ext. Pt left seated in recliner with alarm on , call light in reach, and all needs met.                G-Code     OutComes Score                                                     AM-PAC Score             Goals  Short term goals  Time Frame for Short term goals: 7-10 days-- all onlin  Short term goal 1: pt will perform bed mobility tasks mod I  Short term goal 2: pt will perform functional transfers with LRAD and mod I  Short term goal 3: pt will ambulate 150' with LRAD and Mod I  Short term goal 4: pt will negotiate 4 steps with RHR and mod I  Long term goals  Time Frame for Long term goals : LTG= STG  Patient Goals Patient goals : to have a successful healing process    Plan    Plan  Times per week: 5x/week 90 min/day  Current Treatment Recommendations: Balance Training,Functional Mobility Training,Gait Training,Transfer Training,Stair training,Strengthening,Endurance Training,Neuromuscular Re-education,Patient/Caregiver Education & Training,Safety Education & Training,Home Exercise Program  Safety Devices  Type of devices: Gait belt,Nurse notified,Chair alarm in place,Left in chair,Call light within reach     Therapy Time   Individual Concurrent Group Co-treatment   Time In 0730         Time Out 0830         Minutes 60               Second Session Therapy Time:   Individual Concurrent Group Co-treatment   Time In 8568         Time Out 1345         Minutes 30           Timed Code Treatment Minutes:  60+30    Total Treatment Minutes:  90    Abdi Mcfarland, PT

## 2022-03-30 NOTE — PLAN OF CARE
Problem: Falls - Risk of:  Goal: Will remain free from falls  Description: Will remain free from falls  Outcome: Ongoing   No falls at this time. Pt uses call light. Bed/chair alarm in use this shift and call light was within reach to promote pt safety. Problem: Pain:  Goal: Control of acute pain  Description: Control of acute pain  Outcome: Ongoing   Pt verbalized having left foot and leg pain. Pt was medicated with scheduled Tylenol. Medication, rest and positioning was effective for pain control. Problem: Skin Integrity:  Goal: Absence of new skin breakdown  Description: Absence of new skin breakdown  Outcome: Ongoing   No new skin issues noted.      Electronically signed by Nanci Mann RN on 3/30/2022 at 5:00 AM

## 2022-03-30 NOTE — PROGRESS NOTES
Department of Physical Medicine & Rehabilitation  Progress Note    Patient Identification:  Hitesh Baptiste  [id number]  : 1955  Admit date: 3/25/2022    Chief Complaint: Debility    Subjective:   Patient seen at bedside this AM. She still has her wound vac and drain placed which has minimal output. Eating and sleeping well. Tolerating therapy well. ROS: No f/c, n/v, cp     Objective:  Patient Vitals for the past 24 hrs:   BP Temp Temp src Pulse Resp SpO2   22 0856 (!) 108/58 98.2 °F (36.8 °C) Oral 72 16 98 %   22 2120 105/63 98.2 °F (36.8 °C) Oral 80 16 97 %     Const: Alert. No distress, pleasant. HEENT: Normocephalic, atraumatic. Normal sclera/conjunctiva. MMM. CV: Regular rate and rhythm. Resp: No respiratory distress. Lungs CTAB. Abd: Soft, nontender, nondistended, NABS+   Ext: No edema. Neuro: Alert, oriented, appropriately interactive. Psych: Cooperative, appropriate mood and affect    Laboratory data: Available via EMR.    Last 24 hour lab  Recent Results (from the past 24 hour(s))   Basic Metabolic Panel w/ Reflex to MG    Collection Time: 22  6:00 AM   Result Value Ref Range    Sodium 140 136 - 145 mmol/L    Potassium reflex Magnesium 4.3 3.5 - 5.1 mmol/L    Chloride 104 99 - 110 mmol/L    CO2 30 21 - 32 mmol/L    Anion Gap 6 3 - 16    Glucose 90 70 - 99 mg/dL    BUN 10 7 - 20 mg/dL    CREATININE 0.6 0.6 - 1.2 mg/dL    GFR Non-African American >60 >60    GFR African American >60 >60    Calcium 9.1 8.3 - 10.6 mg/dL   CBC auto differential    Collection Time: 22  6:00 AM   Result Value Ref Range    WBC 5.3 4.0 - 11.0 K/uL    RBC 3.33 (L) 4.00 - 5.20 M/uL    Hemoglobin 9.1 (L) 12.0 - 16.0 g/dL    Hematocrit 27.7 (L) 36.0 - 48.0 %    MCV 83.0 80.0 - 100.0 fL    MCH 27.2 26.0 - 34.0 pg    MCHC 32.7 31.0 - 36.0 g/dL    RDW 14.7 12.4 - 15.4 %    Platelets 704 901 - 507 K/uL    MPV 7.6 5.0 - 10.5 fL    PLATELET SLIDE REVIEW Decreased     Path Consult No     Neutrophils % 65.0 %    Lymphocytes % 18.0 %    Monocytes % 11.0 %    Eosinophils % 2.0 %    Basophils % 4.0 %    Neutrophils Absolute 3.4 1.7 - 7.7 K/uL    Lymphocytes Absolute 1.0 1.0 - 5.1 K/uL    Monocytes Absolute 0.6 0.0 - 1.3 K/uL    Eosinophils Absolute 0.1 0.0 - 0.6 K/uL    Basophils Absolute 0.2 0.0 - 0.2 K/uL       Therapy progress:  PT  Position Activity Restriction  Other position/activity restrictions: ambulate pt, up with assist, FWBAT; KI prn; per plastic surgery on 3/26- pt should only sponge bath until wound vac and drain are discontinued  Objective     Sit to Stand: Supervision (pt sit <> stand from recliner, EOM , and multiple reps from chair)  Stand to sit: Supervision  Bed to Chair: Contact guard assistance (from wc to recliner without AD)  Device: [address]: Supervision  Distance: 100 ft X 2 , short distances in gym  OT  PT Equipment Recommendations  Equipment Needed: Yes  Mobility Devices: Steve Deniz: Rolling  Toilet - Technique: Ambulating  Equipment Used: Standard toilet  Toilet Transfers Comments: Pt was able to lift LLE onto foot stool I'ly today. Assessment        SLP          Body mass index is 24.41 kg/m². 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 Also has drain in LLE. -Is on Ancef. Drain management as per Gen Surg. Therapy restricted due to drains, immobilzer  -She is on oxycodone, and dilaudid pain panel. hasnt required IV pain meds in >48 hrs.   -Heparin for DVT ppx as per Gen Surg.      2. Fever  -Workup including CXR, UA as per Gen Surg. Remains on Ancef  - improved       Rehab Progress: Improving  Anticipated Dispo: home  Services/DME: TBD  ELOS: VIC Stewart DO, PGY-1   PM&R  3/30/2022  10:49 AM     Patient has been staffed and discussed with attending Physician, Dr. Venecia Lenz DO. This patient has been seen, examined, and discussed with the resident.  This note has been altered to reflect my own examination findings, impression, and recommendations.      Kimberli Aguirre D.O. M.P.H  PM&R  3/30/2022  11:12 AM

## 2022-03-30 NOTE — PROGRESS NOTES
SHIFT: 1900 - 0730  Pt this shift was stable. Alert and oriented. Denied having chest pain or SOB. Pt verbalized having left foot and leg pain. Pt using scheduled Tylenol, rest and positioning. Pt was continent of bladder using bathroom. Medications given this shift were reviewed with pt regarding use, dose and side effects. Dressing to L Leg was dry and intact and with wound vac. No output  Noted in wound vac. Hemovac was with 1 ml of odorless, bloody drainage. Pt slept well most of shift and had an uneventful night. Pt call light was with in reach and chair alarm was on to promote pt safety.     Electronically signed by Roma Vee RN on 3/30/2022 at 8:38 AM

## 2022-03-31 PROCEDURE — 2580000003 HC RX 258: Performed by: PHYSICAL MEDICINE & REHABILITATION

## 2022-03-31 PROCEDURE — 97530 THERAPEUTIC ACTIVITIES: CPT

## 2022-03-31 PROCEDURE — 99232 SBSQ HOSP IP/OBS MODERATE 35: CPT | Performed by: PHYSICAL MEDICINE & REHABILITATION

## 2022-03-31 PROCEDURE — 97110 THERAPEUTIC EXERCISES: CPT

## 2022-03-31 PROCEDURE — 97116 GAIT TRAINING THERAPY: CPT

## 2022-03-31 PROCEDURE — 1280000000 HC REHAB R&B

## 2022-03-31 PROCEDURE — 6360000002 HC RX W HCPCS: Performed by: PHYSICAL MEDICINE & REHABILITATION

## 2022-03-31 PROCEDURE — 6370000000 HC RX 637 (ALT 250 FOR IP): Performed by: PHYSICAL MEDICINE & REHABILITATION

## 2022-03-31 RX ADMIN — ACETAMINOPHEN 1000 MG: 500 TABLET ORAL at 06:10

## 2022-03-31 RX ADMIN — SODIUM CHLORIDE, PRESERVATIVE FREE 10 ML: 5 INJECTION INTRAVENOUS at 07:48

## 2022-03-31 RX ADMIN — CEFAZOLIN SODIUM 2000 MG: 10 INJECTION, POWDER, FOR SOLUTION INTRAVENOUS at 19:02

## 2022-03-31 RX ADMIN — CEFAZOLIN SODIUM 2000 MG: 10 INJECTION, POWDER, FOR SOLUTION INTRAVENOUS at 03:44

## 2022-03-31 RX ADMIN — ENOXAPARIN SODIUM 40 MG: 40 INJECTION SUBCUTANEOUS at 07:48

## 2022-03-31 RX ADMIN — PANTOPRAZOLE SODIUM 40 MG: 40 TABLET, DELAYED RELEASE ORAL at 06:10

## 2022-03-31 RX ADMIN — SODIUM CHLORIDE, PRESERVATIVE FREE 10 ML: 5 INJECTION INTRAVENOUS at 12:08

## 2022-03-31 RX ADMIN — DOCUSATE SODIUM 100 MG: 100 CAPSULE, LIQUID FILLED ORAL at 21:11

## 2022-03-31 RX ADMIN — ACETAMINOPHEN 1000 MG: 500 TABLET ORAL at 00:08

## 2022-03-31 RX ADMIN — SODIUM CHLORIDE, PRESERVATIVE FREE 10 ML: 5 INJECTION INTRAVENOUS at 21:11

## 2022-03-31 RX ADMIN — CEFAZOLIN SODIUM 2000 MG: 10 INJECTION, POWDER, FOR SOLUTION INTRAVENOUS at 11:57

## 2022-03-31 RX ADMIN — ACETAMINOPHEN 1000 MG: 500 TABLET ORAL at 11:56

## 2022-03-31 RX ADMIN — DOCUSATE SODIUM 100 MG: 100 CAPSULE, LIQUID FILLED ORAL at 07:47

## 2022-03-31 RX ADMIN — ACETAMINOPHEN 1000 MG: 500 TABLET ORAL at 17:41

## 2022-03-31 ASSESSMENT — PAIN DESCRIPTION - DESCRIPTORS
DESCRIPTORS: ACHING;DULL
DESCRIPTORS: ACHING;DULL

## 2022-03-31 ASSESSMENT — PAIN SCALES - GENERAL
PAINLEVEL_OUTOF10: 0
PAINLEVEL_OUTOF10: 1
PAINLEVEL_OUTOF10: 1
PAINLEVEL_OUTOF10: 0
PAINLEVEL_OUTOF10: 1
PAINLEVEL_OUTOF10: 0
PAINLEVEL_OUTOF10: 0
PAINLEVEL_OUTOF10: 1

## 2022-03-31 ASSESSMENT — PAIN DESCRIPTION - LOCATION
LOCATION: LEG;FOOT
LOCATION: LEG;FOOT

## 2022-03-31 ASSESSMENT — PAIN DESCRIPTION - FREQUENCY
FREQUENCY: CONTINUOUS
FREQUENCY: CONTINUOUS

## 2022-03-31 ASSESSMENT — PAIN DESCRIPTION - PAIN TYPE: TYPE: ACUTE PAIN

## 2022-03-31 ASSESSMENT — PAIN - FUNCTIONAL ASSESSMENT
PAIN_FUNCTIONAL_ASSESSMENT: ACTIVITIES ARE NOT PREVENTED
PAIN_FUNCTIONAL_ASSESSMENT: ACTIVITIES ARE NOT PREVENTED

## 2022-03-31 ASSESSMENT — PAIN DESCRIPTION - ORIENTATION
ORIENTATION: LEFT
ORIENTATION: LEFT

## 2022-03-31 ASSESSMENT — PAIN DESCRIPTION - ONSET
ONSET: ON-GOING
ONSET: ON-GOING

## 2022-03-31 NOTE — PROGRESS NOTES
Department of Physical Medicine & Rehabilitation  Progress Note    Patient Identification:  Adelaide Negron  [id number]  : 1955  Admit date: 3/25/2022    Chief Complaint: Debility    Subjective:   Patient seen at bedside this AM. She still has her wound vac and drain placed which has minimal output. Tolerating therapy well and having daily BMs and no issues with urination. Plan for DC Saturday. ROS: No f/c, n/v, cp     Objective:  Patient Vitals for the past 24 hrs:   BP Temp Temp src Pulse Resp SpO2   22 0743 99/62 98 °F (36.7 °C) Oral 77 16 95 %   22 2043 105/66 97.3 °F (36.3 °C) Oral 82 17 96 %     Const: Alert. No distress, pleasant. HEENT: Normocephalic, atraumatic. Normal sclera/conjunctiva. MMM. CV: Regular rate and rhythm. Resp: No respiratory distress. Lungs CTAB. Abd: Soft, nontender, nondistended, NABS+   Ext: Edema in LLE. Neuro: Alert, oriented, appropriately interactive. Psych: Cooperative, appropriate mood and affect    Laboratory data: Available via EMR. Last 24 hour lab  No results found for this or any previous visit (from the past 24 hour(s)). Therapy progress:  PT  Position Activity Restriction  Other position/activity restrictions: ambulate pt, up with assist, FWBAT; KI prn; per plastic surgery on 3/26- pt should only sponge bath until wound vac and drain are discontinued  Objective     Sit to Stand: Supervision (pt sit <> stand from recliner, EOM , and multiple reps from chair)  Stand to sit: Supervision  Bed to Chair: Contact guard assistance (from wc to recliner without AD)  Device: [address]: Supervision  Distance: 100 ft X 2 , short distances in gym  OT  PT Equipment Recommendations  Equipment Needed: Yes  Mobility Devices: Morris Hair: Rolling  Toilet - Technique: Ambulating  Equipment Used: Standard toilet  Toilet Transfers Comments: Pt was able to lift LLE onto foot stool I'ly today.   Assessment        SLP          Body mass index is 24.41 kg/m². 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 Also has drain in LLE. -Is on Ancef. Drain management as per Gen Surg. Therapy restricted due to drains, immobilzer  -She is on oxycodone, and dilaudid pain panel. hasnt required IV pain meds in >48 hrs.   -Heparin for DVT ppx as per Gen Surg.      2. Fever  -Workup including CXR, UA as per Gen Surg. Remains on Ancef  - improved       Rehab Progress: Improving  Anticipated Dispo: home  Services/DME: [facility]BRONDA  ELOS: VIC Stewart DO, PGY-1   PM&R  3/31/2022  10:28 AM     Patient has been staffed and discussed with attending Physician, Dr. Ruthie Santos DO. This patient has been seen, examined, and discussed with the resident. This note has been altered to reflect my own examination findings, impression, and recommendations.      Ruthie Santos D.O. M.P.H  PM&R  3/31/2022  10:28 AM

## 2022-03-31 NOTE — PLAN OF CARE
Problem: Falls - Risk of:  Goal: Will remain free from falls  Description: Will remain free from falls  3/31/2022 1410 by Juliette Parker RN  Outcome: Met This Shift  Note: Patient calls for assistance, use of gait belt and walker, assist x 1.  3/31/2022 0159 by Chris Cote RN  Outcome: Ongoing     Problem: Pain:  Goal: Pain level will decrease  Description: Pain level will decrease  Outcome: Met This Shift  Note: Scheduled Tylenol administered during lunch break, patient rates pain 2/10. Problem: Nutrition  Goal: Optimal nutrition therapy  Outcome: Met This Shift  Note: Patient appetite for lunch was good.

## 2022-03-31 NOTE — PROGRESS NOTES
Occupational Therapy  Facility/Department: [facility] ACUTE REHAB UNIT  Daily Treatment Note  NAME: Sheri Brown  : 1955  MRN: [id number]    Date of Service: 3/31/2022    Discharge Recommendations:  Home with assist PRN,Home with Home health OT  OT Equipment Recommendations  ADL Assistive Devices: Shower Chair with back  Other: Pt does not own any bathroom equipment but may benefit from shower chair once she is cleared to shower. Pt reported she has a friend who is going to give her a shower chair    Assessment   Performance deficits / Impairments: Decreased functional mobility ; Decreased ADL status; Decreased high-level IADLs;Decreased strength;Decreased balance  Assessment: Pt continues to make great progress in OT as evident by pt ability to ambulate extended distances to simulate community re-entry task of grocery shopping w/ spvn. Pt completed all functional transfers MOD I today and she has demonstrated ability to I'ly lift LLE. Pt is progressing to MOD I for all ADLs and IADL tasks and pt is showing great improvement w/ activity tolerance. Pt is motivated and continues to benefit from OT. Cont OT per POC  Treatment Diagnosis: impaired mobility and ADLs    OT Education: Home Exercise Program;Transfer Training;IADL Safety  Patient Education: verb understanding  REQUIRES OT FOLLOW UP: Yes  Activity Tolerance  Activity Tolerance: Patient Tolerated treatment well  Safety Devices  Safety Devices in place: Yes  Type of devices: Call light within reach; Left in chair;Chair alarm in place         Patient Diagnosis(es): There were no encounter diagnoses. has a past medical history of Cancer ([facility] [address] History of radiation therapy, and PONV (postoperative nausea and vomiting). has a past surgical history that includes Leg Surgery (Left, ); Breast biopsy (2014);  section, low transverse; Colonoscopy (N/A, 3/30/2018); Colonoscopy (2018); Leg biopsy excision (Left, 2021);  Leg biopsy excision (Left, 3/21/2022); tissue transfer (Left, 3/21/2022); Leg Surgery (Left, 3/21/2022); and Skin graft (Left, 3/21/2022). 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 Pt admitted to ARU 3/25  Response to previous treatment: Patient with no complaints from previous session  Family / Caregiver Present: No  Referring Practitioner: Dr. Nathaniel Kuo DO  Diagnosis: L thigh sarcoma  Subjective  Subjective: Pt seated in chair upon entry, pt pleasant and agreeable to therapy session. General Comment  Comments: Carolann Dye Vital Signs  Patient Currently in Pain: Denies   Orientation  Orientation  Overall Orientation Status: Within Normal Limits  Objective    ADL  Grooming: Modified independent  (pt completed oral care in stance at sink)    Instrumental ADL's  Instrumental ADLs: Yes  Meal Prep  Meal Prep Level: Cane  Meal Prep Level of Assistance: Supervision  Meal Preparation: Pt ambulated to the dining room w/ SPC and spvn and retrieved cookies from the cabinets in preparation to bake cookies in second OT session today. Commmunity Re-entry  Community Re-Entry Level: Cane  Community Re-entry Level of Assistance: Supervision  Community Re-Entry: Pt participated in simulate community re-entry task of navigating around the hospital and to the gift shop while ambulating in busy hallways, narrow locations, over various uneven surfaces, and in/out of elevators to increase independence w/ returning to tasks such as grocery shopping. Pt ambulated w/ SPC over 1000ft w/ spvn while OT assisted w/ management of wound vac and w/c in tow.  Pt required several seated rest breaks due to fatigue but overall demonstrated safety when navigating in this environment. Pt completed all mobility w/ spvn w/o LOB and pt navigated operation of the elevator w/o OT assistance. Pt w/ no c/o pain during activity. Balance  Sitting Balance: Independent  Standing Balance: Supervision  Standing Balance  Time: ~45 mins total  Activity: functional mobility to/from bathroom + therapy gym + dining room; functional mobility to the gift shop; stance for ADLs and IADLs; balance activity  Functional Mobility  Functional - Mobility Device: Cane  Activity: To/from bathroom; To/From therapy gym; Other (dining room + to/from gift shop)  Assist Level: Supervision  Functional Mobility Comments: OT assisted w/ wound vac management       Transfers  Sit to stand: Modified independent  Stand to sit: Modified independent                         Cognition  Overall Cognitive Status: WNL                      Type of ROM/Therapeutic Exercise  Type of ROM/Therapeutic Exercise: Cane/Wand  Comment: Pt participated in standing UE exercises w/ 3lb dowel dario to increase UE strength and activity tolerance for ADL and IADL tasks as well as to improve safety in stance for these tasks. Pt completed the following:  Exercises  Scapular Protraction: Chest press x15 + forward rows x15  Shoulder Flexion: x15 + overhead press x15  Elbow Flexion: x15  Other: Trunk rotation x15.                2nd session:  Pt was seated in recliner chair upon arrival. Pt was pleasant and agreeable to OT. Pt denied pain. Functional transfers:  Sit <> stand- completed from recliner chair and dining room chair MOD I  Toilet- completed to/from standard height toilet MOD I    Functional mobility:  Pt ambulated w/ SPC w/ spvn to/from bathroom and to/from dining room w/o LOB. OT assisted w/ wound vac management. IADLs:  Pt engaged in meal prep task to address safety w/ return to tasks at d/c.  Pt baked cookies which included the following tasks: retrieval of supplies from cabinets and refrigerator, transportation of supplies to countertop, mixture of ingredients in stance, transport of cookies into oven, transport of dishes to the sink, and clean up following meal prep task. Pt completed all components of task in stance for ~15 mins MOD I. Pt demonstrated no LOB during activity and she safely used her cane and reached for objects during task. Pt w/ no c/o pain during activity but one rest break required following task. ADLs:  Toileting- pt completed amanda care seated I'ly. Pants management completed in stance MOD I. Pt left in her chair at EOS w/ chair alarm on and call light within reach.             Plan   Plan  Times per week: 5x a week for 90 mins daily  Current Treatment Recommendations: Balance Training,Functional Mobility Training,Endurance Training,Self-Care / ADL,Safety Education & Training,Strengthening,Pain Management,Equipment Evaluation, Education, & procurement,Home Management Training,Patient/Caregiver Education & Training  G-Code     OutComes Score                                                  AM-PAC Score             Goals  Short term goals  Time Frame for Short term goals: STG=LTG  Long term goals  Time Frame for Long term goals : 7-10 days  Long term goal 1: Pt will complete LE dressing MOD I - ongoing  Long term goal 2: Pt will complete toileting MOD I - goal met 3/31  Long term goal 3: Pt will complete bathing tasks MOD I - ongoing  Long term goal 4: Pt will maintain stance for up to 10 mins to complete light meal prep task - goal met 3/31  Long term goal 5: Pt will I'ly manage LLE in order to complete safe functional transfers to the toilet, bed, chair - goal met 3/31  Patient Goals   Patient goals : to have less pain, walk       Therapy Time   Individual Concurrent Group Co-treatment   Time In 1100         Time Out 1200         Minutes 60         Timed Code Treatment Minutes: 60 Minutes       Second Session Therapy Time:   Individual Concurrent Group Co-treatment   Time In  1345         Time Out  1415         Minutes  30           Timed Code Treatment Minutes:  30    Total Treatment Minutes:  60+30= 2841 Claudia Bailon, OT

## 2022-03-31 NOTE — PROGRESS NOTES
Vital signs stable. Oriented x 4. Assist x 1 with walker, Gait belt. Lunch time pain score 1/10, administered scheduled acetaminophen. Ancef IV administered per MAR. Call light and personal articles in reach. Alarms on for safety.

## 2022-03-31 NOTE — PROGRESS NOTES
SHIFT: 1900 - 0730  Pt this shift was stable. Alert and oriented. Denied having chest pain or SOB. Pt verbalized having left foot and leg pain. Pt using scheduled Tylenol, rest and positioning. Pt was continent of bladder using bathroom. Medications given this shift were reviewed with pt regarding use, dose and side effects. Dressing to L Leg was dry and intact and with wound vac. No output  Noted in wound vac. Hemovac had no drainage this shift. Pt slept well most of shift and had an uneventful night. Pt call light was with in reach and chair alarm was on to promote pt safety.     Electronically signed by Izabela Recinos RN on 3/31/2022 at 7:39 AM

## 2022-03-31 NOTE — PLAN OF CARE
Problem: Falls - Risk of:  Goal: Will remain free from falls  Description: Will remain free from falls  Outcome: Ongoing   Pt uses call light to call for nursing assistance. Bed and chair alarm in use and call light is within reach to promote pt safety. No falls at this time. Pt provided with safety instruction when ambulating with walker. Problem: Pain:  Goal: Control of acute pain  Description: Control of acute pain  Outcome: Ongoing   Pt verbalized having left hip and leg pain. Pt was medicated with scheduled Tylenol and keeping leg elevated. Current regimen is effective. Ongoing assessment of pain continued. Problem: Skin Integrity:  Goal: Absence of new skin breakdown  Description: Absence of new skin breakdown  Outcome: Ongoing   No new skin issues noted. Pt is able to reposition herself in bed and shift wt when up in chair. Surgical dressing to left leg is dry and intact.     Electronically signed by Brown Thomas RN on 3/31/2022 at 2:03 AM

## 2022-03-31 NOTE — PROGRESS NOTES
Physical Therapy  Facility/Department: [facility] ACUTE REHAB UNIT  Daily Treatment Note  NAME: Carlos Mcdonald  : 1955  MRN: [id number]    Date of Service: 3/31/2022    Discharge Recommendations:  24 hour supervision or assist,Home with Home health PT   PT Equipment Recommendations  Equipment Needed: No (Per pt has RW at home)    Assessment   Body structures, Functions, Activity limitations: Decreased functional mobility ; Decreased endurance;Decreased strength;Decreased balance;Decreased safe awareness; Increased pain  Assessment: Pt tolerated session well. Pt increased to supervision with cane and we stairs. Pt remains below baseline and limited by LLE weakness and balance. 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 has a past medical history of Cancer ([facility] [address] History of radiation therapy, and PONV (postoperative nausea and vomiting). has a past surgical history that includes Leg Surgery (Left, ); Breast biopsy (2014);  section, low transverse; Colonoscopy (N/A, 3/30/2018); Colonoscopy (2018); Leg biopsy excision (Left, 2021); Leg biopsy excision (Left, 3/21/2022); tissue transfer (Left, 3/21/2022); Leg Surgery (Left, 3/21/2022); and Skin graft (Left, 3/21/2022).     Restrictions  Position Activity Restriction  Other position/activity restrictions: ambulate pt, up with assist, FWBAT; YOSSI prn; per plastic surgery on 3/26- pt should only sponge bath until wound vac and drain are discontinued  Subjective   General  Chart Reviewed: Yes  Additional Pertinent Hx: 61y.o. year old female status post wide excision of left thigh sarcoma, complex closure . (3/21). Pmhx: L thigh sarcoma. Family / Caregiver Present: No  Referring Practitioner: Ariana,   Subjective  Subjective: Pt seated in chair alert and agreeable to session. pt reports no pain at this time. Pt would like to know plan for wound vac and drain. RN and MD aware and perfect served sx team.  Pain Screening  Patient Currently in Pain: Denies  Vital Signs  Patient Currently in Pain: Denies       Orientation  Orientation  Overall Orientation Status: Within Functional Limits  Cognition      Objective      Transfers  Sit to Stand: Supervision (Pt sit <> stand from recliner and multiple reps from chair with supervision and cane)  Stand to sit: Supervision  Ambulation  Ambulation?: Yes  More Ambulation?: Yes  Ambulation 1  Surface: level tile  Device: Single point cane  Assistance: Supervision  Quality of Gait: hip circumduction on L for toe clearance, increased WB BUEs but able to correct with VCs  Gait Deviations: Decreased step height  Distance: 100 ft, 220 ft, short distances in gym  Stairs/Curb  Stairs?: Yes  Stairs  # Steps : 12  Stairs Height: 6\"  Rails: Right ascending  Assistance: Supervision  Comment: step to pattern, up with RLE, down with LLE     Balance  Comments: Static balance tandom stance with 1 LE on maira disc 3x45 BLE with CGA- [place] to maintain balance. Pt with no overt LOB. Static balance reaching outside MARY ELLEN with 1 foot on maira disc 2x 10 reaching BLE. Pt CGA and 1 LOB stepping righting reaction to self correct. Comment: Toilet transfer including amanda care , pant management, and standing at sink to wash hands and brush teeth with supervision and cane . Pt wtih no LOB noted .  Dynamic balance / gait training ambulation up and down curb step , then ambulation while weaving threw cones , then side steps left , side steps right . All with cane and supervision x 3 reps. Pt with no LOB and cues for sequencing with cane and safety with turns. Second Session: Pt seated in recliner alert and agreeable to session. Pt reports no pain at this time. Pt sit <> stand from recliner , chair, and EOM with cane and supervision. Pt ambulated 2 x 150 ft with cane and supervision . Pt with cues for posture and step height RLE. Pt supine <> sit on flat mat table with supervision. ++ time due to LLE weakness and using BUE to assist LLE into bed. Pt performed supine exercises 2x10 BLE glut sets, quad sets, ankle pumps, hip abd/add, sidelying LLE only hip ext and  SLR except LLE SLR 2x5 AAROM . Cues for form and breathing. Multiple rest breaks to complete . Pt left seated in recliner with alarm on, LLE elevated / supported on stool , call light in reach, and RN aware.       G-Code     OutComes Score                                                     AM-PAC Score             Goals  Short term goals  Time Frame for Short term goals: 7-10 days-- all onlin  Short term goal 1: pt will perform bed mobility tasks mod I  Short term goal 2: pt will perform functional transfers with LRAD and mod I  Short term goal 3: pt will ambulate 150' with LRAD and Mod I  Short term goal 4: pt will negotiate 4 steps with RHR and mod I  Long term goals  Time Frame for Long term goals : LTG= STG  Patient Goals   Patient goals : to have a successful healing process    Plan    Plan  Times per week: 5x/week 90 min/day  Current Treatment Recommendations: Balance Training,Functional Mobility Training,Gait Training,Transfer Training,Stair training,Strengthening,Endurance Training,Neuromuscular Re-education,Patient/Caregiver Education & Training,Safety Education & Training,Home Exercise Program  Safety Devices  Type of devices: Gait belt,Nurse notified,Chair alarm in place,Left in chair,Call light within reach     Therapy Time   Individual Concurrent Group Co-treatment   Time In 0830         Time Out 0930         Minutes 60              Second Session Therapy Time:   Individual Concurrent Group Co-treatment   Time In 1245         Time Out 1315         Minutes 30           Timed Code Treatment Minutes:  60+30    Total Treatment Minutes:  80  ]    Anisa Biswas, PT

## 2022-03-31 NOTE — PROGRESS NOTES
Spoke with Parish Quevedo regarding hemovac and potential discharge, she states she will address with Dr Remedios Stapleton and let us know the plan

## 2022-03-31 NOTE — CARE COORDINATION
JEFFREY met with pt at bedside. Plan is to dc home with spouse on Saturday. Pt lives in [place]. She is a [employer] employee and believes she is on a waiting list for [facility] in her area. Pt states she has a walker at home, but has been using a cane here. JEFFREY spoke with Esther Horne at [facility]. She will work to see if [facility] in [place] can accept pt. JEFFREY sent perfect serve to Luis Miguel Cedillo NP asking if pt will be discharging with Prevena Wound Vac.     2:30 PM  CM rec'd message from Luis Miguel Cedillo NP. Dr. Rolo Kim will give plan for wound vac vs dressing changes. 4:18 PM  CM rec'd call from Esther Horne at [facility]. They do not have an office in pt's area. [facility] can accept. They will just need clarification of wound care orders.

## 2022-04-01 LAB
ANION GAP SERPL CALCULATED.3IONS-SCNC: 9 MMOL/L (ref 3–16)
BASOPHILS ABSOLUTE: 0 K/UL (ref 0–0.2)
BASOPHILS RELATIVE PERCENT: 0.9 %
BUN BLDV-MCNC: 10 MG/DL (ref 7–20)
CALCIUM SERPL-MCNC: 9.1 MG/DL (ref 8.3–10.6)
CHLORIDE BLD-SCNC: 102 MMOL/L (ref 99–110)
CO2: 28 MMOL/L (ref 21–32)
CREAT SERPL-MCNC: 0.5 MG/DL (ref 0.6–1.2)
EOSINOPHILS ABSOLUTE: 0.1 K/UL (ref 0–0.6)
EOSINOPHILS RELATIVE PERCENT: 2 %
GFR AFRICAN AMERICAN: >60
GFR NON-AFRICAN AMERICAN: >60
GLUCOSE BLD-MCNC: 100 MG/DL (ref 70–99)
HCT VFR BLD CALC: 30.2 % (ref 36–48)
HEMOGLOBIN: 9.7 G/DL (ref 12–16)
LYMPHOCYTES ABSOLUTE: 0.8 K/UL (ref 1–5.1)
LYMPHOCYTES RELATIVE PERCENT: 16.5 %
MCH RBC QN AUTO: 27.1 PG (ref 26–34)
MCHC RBC AUTO-ENTMCNC: 32.2 G/DL (ref 31–36)
MCV RBC AUTO: 84.1 FL (ref 80–100)
MONOCYTES ABSOLUTE: 0.4 K/UL (ref 0–1.3)
MONOCYTES RELATIVE PERCENT: 9.1 %
NEUTROPHILS ABSOLUTE: 3.4 K/UL (ref 1.7–7.7)
NEUTROPHILS RELATIVE PERCENT: 71.5 %
PDW BLD-RTO: 16.7 % (ref 12.4–15.4)
PLATELET # BLD: 287 K/UL (ref 135–450)
PMV BLD AUTO: 7.3 FL (ref 5–10.5)
POTASSIUM REFLEX MAGNESIUM: 4.2 MMOL/L (ref 3.5–5.1)
RBC # BLD: 3.59 M/UL (ref 4–5.2)
SODIUM BLD-SCNC: 139 MMOL/L (ref 136–145)
WBC # BLD: 4.7 K/UL (ref 4–11)

## 2022-04-01 PROCEDURE — 6360000002 HC RX W HCPCS: Performed by: PHYSICAL MEDICINE & REHABILITATION

## 2022-04-01 PROCEDURE — 1280000000 HC REHAB R&B

## 2022-04-01 PROCEDURE — 97110 THERAPEUTIC EXERCISES: CPT

## 2022-04-01 PROCEDURE — 97116 GAIT TRAINING THERAPY: CPT

## 2022-04-01 PROCEDURE — 6370000000 HC RX 637 (ALT 250 FOR IP): Performed by: PHYSICAL MEDICINE & REHABILITATION

## 2022-04-01 PROCEDURE — 80048 BASIC METABOLIC PNL TOTAL CA: CPT

## 2022-04-01 PROCEDURE — 6370000000 HC RX 637 (ALT 250 FOR IP): Performed by: NURSE PRACTITIONER

## 2022-04-01 PROCEDURE — 97535 SELF CARE MNGMENT TRAINING: CPT

## 2022-04-01 PROCEDURE — 99232 SBSQ HOSP IP/OBS MODERATE 35: CPT | Performed by: PHYSICAL MEDICINE & REHABILITATION

## 2022-04-01 PROCEDURE — 85025 COMPLETE CBC W/AUTO DIFF WBC: CPT

## 2022-04-01 PROCEDURE — 2580000003 HC RX 258: Performed by: PHYSICAL MEDICINE & REHABILITATION

## 2022-04-01 PROCEDURE — 97530 THERAPEUTIC ACTIVITIES: CPT

## 2022-04-01 PROCEDURE — 36415 COLL VENOUS BLD VENIPUNCTURE: CPT

## 2022-04-01 RX ORDER — GINSENG 100 MG
CAPSULE ORAL DAILY
Status: DISCONTINUED | OUTPATIENT
Start: 2022-04-01 | End: 2022-04-02 | Stop reason: HOSPADM

## 2022-04-01 RX ADMIN — DOCUSATE SODIUM 100 MG: 100 CAPSULE, LIQUID FILLED ORAL at 20:46

## 2022-04-01 RX ADMIN — CEFAZOLIN SODIUM 2000 MG: 10 INJECTION, POWDER, FOR SOLUTION INTRAVENOUS at 03:50

## 2022-04-01 RX ADMIN — ACETAMINOPHEN 1000 MG: 500 TABLET ORAL at 12:22

## 2022-04-01 RX ADMIN — ACETAMINOPHEN 1000 MG: 500 TABLET ORAL at 06:47

## 2022-04-01 RX ADMIN — ENOXAPARIN SODIUM 40 MG: 40 INJECTION SUBCUTANEOUS at 08:13

## 2022-04-01 RX ADMIN — DOCUSATE SODIUM 100 MG: 100 CAPSULE, LIQUID FILLED ORAL at 08:13

## 2022-04-01 RX ADMIN — PANTOPRAZOLE SODIUM 40 MG: 40 TABLET, DELAYED RELEASE ORAL at 06:47

## 2022-04-01 RX ADMIN — ACETAMINOPHEN 1000 MG: 500 TABLET ORAL at 20:47

## 2022-04-01 RX ADMIN — ACETAMINOPHEN 1000 MG: 500 TABLET ORAL at 00:09

## 2022-04-01 RX ADMIN — BACITRACIN: 500 OINTMENT TOPICAL at 11:06

## 2022-04-01 ASSESSMENT — PAIN DESCRIPTION - ORIENTATION
ORIENTATION: LEFT
ORIENTATION: LEFT

## 2022-04-01 ASSESSMENT — PAIN SCALES - GENERAL
PAINLEVEL_OUTOF10: 0
PAINLEVEL_OUTOF10: 1
PAINLEVEL_OUTOF10: 0
PAINLEVEL_OUTOF10: 0
PAINLEVEL_OUTOF10: 1
PAINLEVEL_OUTOF10: 0

## 2022-04-01 ASSESSMENT — PAIN DESCRIPTION - LOCATION
LOCATION: FOOT;LEG
LOCATION: FOOT;LEG

## 2022-04-01 ASSESSMENT — PAIN DESCRIPTION - DIRECTION: RADIATING_TOWARDS: HEEL

## 2022-04-01 ASSESSMENT — PAIN DESCRIPTION - FREQUENCY
FREQUENCY: CONTINUOUS
FREQUENCY: CONTINUOUS

## 2022-04-01 ASSESSMENT — PAIN DESCRIPTION - DESCRIPTORS
DESCRIPTORS: ACHING;SORE
DESCRIPTORS: ACHING;SORE

## 2022-04-01 ASSESSMENT — PAIN DESCRIPTION - ONSET
ONSET: ON-GOING
ONSET: ON-GOING

## 2022-04-01 ASSESSMENT — PAIN DESCRIPTION - PAIN TYPE
TYPE: ACUTE PAIN
TYPE: ACUTE PAIN

## 2022-04-01 NOTE — PLAN OF CARE
Problem: Falls - Risk of:  Goal: Will remain free from falls  Description: Will remain free from falls  3/31/2022 2101 by Fara Pearson RN  Outcome: Ongoing  Note: Patient now using gait belt, cane, and stand-by assist with good technique. 3/31/2022 1410 by Fara Pearson RN  Outcome: Met This Shift  Note: Patient calls for assistance, use of gait belt and walker, assist x 1. Problem: Pain:  Goal: Pain level will decrease  Description: Pain level will decrease  3/31/2022 2101 by Fara Pearson RN  Outcome: Ongoing  Note: Scheduled acetaminophen administered, patient reports low pain level. 3/31/2022 1410 by Fara Pearson RN  Outcome: Met This Shift  Note: Scheduled Tylenol administered during lunch break, patient rates pain 2/10.

## 2022-04-01 NOTE — PROGRESS NOTES
Oriented x 4. Assist X 1, use of gait belt and cane, standby assist to bathroom. Ancef IV antibiotic administered per MAR. Vital signs stable. Scheduled  acetaminophen administered per MAR; pain score 1/10. Call light, phone, personal articles in reach. Alarms on for safety.

## 2022-04-01 NOTE — PROGRESS NOTES
Patient seen and examined. Prevena dressing removed  Incision line as pictured:        Hemavac removed. Bacitracin applied to incision followed by non-adherent dressing, kerlix and acewrap.   Knee immobilizer reapplied

## 2022-04-01 NOTE — CARE COORDINATION
CTN spoke to patient. Patient does not want to use [facility] due to [employer] stating they are not in network. CTN faxed and called orders to [facility] per patient request. [phone] phone, [phone] fax. [facility] has accepted this referral.  Still waiting on clarification on Wound orders and that Dr Stacy Alford will sign wound orders.   Electronically signed by Brea Plaza LPN on 2/6/0240 at 24:88 AM      FAXED UPDATED  Orders to [facility]  Electronically signed by Brea Plaza LPN on 0/5/8676 at 54:01 PM

## 2022-04-01 NOTE — PROGRESS NOTES
Department of Physical Medicine & Rehabilitation  Progress Note    Patient Identification:  Jona Souza  [id number]  : 1955  Admit date: 3/25/2022    Chief Complaint: Debility    Subjective:   Concerned about home health plan as her insurance does not cover a certain home health company. Awaiting Dr. Guillermo Mae follow up on drain. Participating well in therapy and showing improvement. Wound vac and drain still in place. ROS: No f/c, n/v, cp     Objective:  Patient Vitals for the past 24 hrs:   BP Temp Temp src Pulse Resp SpO2   22 0800 106/69 98.2 °F (36.8 °C) Oral 75 16 96 %   22 2105 102/66 97.6 °F (36.4 °C) Oral 82 16 96 %     Const: Alert. No distress, pleasant. HEENT: Normocephalic, atraumatic. Normal sclera/conjunctiva. MMM. CV: Regular rate and rhythm. Resp: No respiratory distress. Lungs CTAB. Abd: Soft, nontender, nondistended, NABS+   Ext: Edema in LLE. Neuro: Alert, oriented, appropriately interactive. Psych: Cooperative, appropriate mood and affect    Laboratory data: Available via EMR.    Last 24 hour lab  Recent Results (from the past 24 hour(s))   Basic Metabolic Panel w/ Reflex to MG    Collection Time: 22  7:40 AM   Result Value Ref Range    Sodium 139 136 - 145 mmol/L    Potassium reflex Magnesium 4.2 3.5 - 5.1 mmol/L    Chloride 102 99 - 110 mmol/L    CO2 28 21 - 32 mmol/L    Anion Gap 9 3 - 16    Glucose 100 (H) 70 - 99 mg/dL    BUN 10 7 - 20 mg/dL    CREATININE 0.5 (L) 0.6 - 1.2 mg/dL    GFR Non-African American >60 >60    GFR African American >60 >60    Calcium 9.1 8.3 - 10.6 mg/dL   CBC auto differential    Collection Time: 22  7:40 AM   Result Value Ref Range    WBC 4.7 4.0 - 11.0 K/uL    RBC 3.59 (L) 4.00 - 5.20 M/uL    Hemoglobin 9.7 (L) 12.0 - 16.0 g/dL    Hematocrit 30.2 (L) 36.0 - 48.0 %    MCV 84.1 80.0 - 100.0 fL    MCH 27.1 26.0 - 34.0 pg    MCHC 32.2 31.0 - 36.0 g/dL    RDW 16.7 (H) 12.4 - 15.4 %    Platelets 274 755 - 216 K/uL    MPV 7.3 5.0 - 10.5 fL    Neutrophils % 71.5 %    Lymphocytes % 16.5 %    Monocytes % 9.1 %    Eosinophils % 2.0 %    Basophils % 0.9 %    Neutrophils Absolute 3.4 1.7 - 7.7 K/uL    Lymphocytes Absolute 0.8 (L) 1.0 - 5.1 K/uL    Monocytes Absolute 0.4 0.0 - 1.3 K/uL    Eosinophils Absolute 0.1 0.0 - 0.6 K/uL    Basophils Absolute 0.0 0.0 - 0.2 K/uL       Therapy progress:  PT  Position Activity Restriction  Other position/activity restrictions: ambulate pt, up with assist, FWBAT; KI prn; per plastic surgery on 3/26- pt should only sponge bath until wound vac and drain are discontinued  Objective     Sit to Stand: Supervision (Pt sit <> stand from recliner and multiple reps from chair with supervision and cane)  Stand to sit: Supervision  Bed to Chair: Contact guard assistance (from wc to recliner without AD)  Device: Single point cane  Assistance: Supervision  Distance: 100 ft, 220 ft, short distances in gym  OT  PT Equipment Recommendations  Equipment Needed: No (Per pt has RW at home)  Mobility Devices: Theotis Dai: Rolling  Toilet - Technique: Ambulating  Equipment Used: Standard toilet  Toilet Transfers Comments: Pt was able to lift LLE onto foot stool I'ly today. Assessment        SLP          Body mass index is 24.41 kg/m². 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 Also has drain in LLE. -Is on Ancef. Drain management as per Gen Surg. Therapy restricted due to drains, immobilzer  -She is on oxycodone, and dilaudid pain panel. hasnt required IV pain meds in >48 hrs.   -Heparin for DVT ppx as per Gen Surg.      2. Fever  -Workup including CXR, UA as per Gen Surg.  Remains on Ancef  - improved       Rehab Progress: Improving  Anticipated Dispo: home  Services/DME: [facility] with outpatient Rx  ELOS: 4/2/22        MERI InterianoPDulceH  PM&R  4/1/2022  8:36 AM

## 2022-04-01 NOTE — PLAN OF CARE
Problem: Falls - Risk of:  Goal: Will remain free from falls  4/1/2022 0358 by Audrey Hoffmann RN  Outcome: Ongoing  Note: Patient is alert and oriented. Ace wrap and splint in place to left left. Fall precautions in place. Bed is in low and locked position. Bed alarm set. Call light and bedside table within reach. She has called appropriately for assistance prior to getting up. Problem: Pain:  Goal: Control of acute pain  4/1/2022 0358 by Audrey Hoffmann RN  Outcome: Ongoing  Note: Patient has reported intermittent left leg pain. She is receiving scheduled Tylenol with control of pain     Problem: Mobility - Impaired:  Goal: Mobility will improve  4/1/2022 0358 by Audrey Hoffmann RN  Outcome: Ongoing  Note: Patient has been ambulated with use of gait belt and walker. Weight bearing as tolerated on left leg.  Tolerated well

## 2022-04-01 NOTE — PROGRESS NOTES
Physical Therapy  Facility/Department: [address] [place] [address]  Discharge Summary / Daily Treatment Note  NAME: Christian Kapadia  : 1955  MRN: [id number]    Date of Service: 2022    Discharge Recommendations:  24 hour supervision or assist,Home with Home health PT   PT Equipment Recommendations  Equipment Needed: No (pt has RW at home)    Assessment   Body structures, Functions, Activity limitations: Decreased functional mobility ; Decreased endurance;Decreased strength;Decreased balance;Decreased safe awareness; Increased pain  Assessment: Pt tolerated session well. Pt is now mod I with all mobility with RW and KI. Pt met all goals with use of RW and is to dc home tomorrow with 24 hr assist and HHPT . 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 has a past medical history of Cancer ([facility] [address] History of radiation therapy, and PONV (postoperative nausea and vomiting). has a past surgical history that includes Leg Surgery (Left, ); Breast biopsy (2014);  section, low transverse; Colonoscopy (N/A, 3/30/2018); Colonoscopy (2018); Leg biopsy excision (Left, 2021); Leg biopsy excision (Left, 3/21/2022); tissue transfer (Left, 3/21/2022); Leg Surgery (Left, 3/21/2022); and Skin graft (Left, 3/21/2022).     Restrictions  Position Activity Restriction  Other position/activity restrictions: ambulate pt, up with assist, FWBAT; - now that drains and wound vac removed pt is allowed to shower but do not scrub and only pat dry incision. Pt should wear KI when up and walking/transferring. AROM knee as tolerated  Subjective   General  Chart Reviewed: Yes  Additional Pertinent Hx: 61y.o. year old female status post wide excision of left thigh sarcoma, complex closure . (3/21). Pmhx: L thigh sarcoma. Family / Caregiver Present: No  Referring Practitioner: Ariana, DO  Subjective  Subjective: Pt supine in bed with LLE redressed . Wound Vac and drain removemed  Pain Screening  Patient Currently in Pain: Denies  Vital Signs  Patient Currently in Pain: Denies       Orientation  Orientation  Overall Orientation Status: Within Functional Limits  Cognition      Objective   Bed mobility  Rolling to Left: Modified independent  Rolling to Right: Modified independent  Supine to Sit: Modified independent  Sit to Supine: Modified independent  Comment: on flat bed withotu rails , ++ time and use of BUEs to assist LLE  Transfers  Sit to Stand: Modified independent (Pt sit <> stand from EOB, recliner, and chair with mod I and RW .)  Stand to sit: Modified independent  Bed to Chair: Modified independent (c RW)  Car Transfer: Modified independent (in and out of car simulator as if were getting into back seat ti simulate home plan.  Pt performed with mod I and no cues needed)  Comment: KI for all transfers  Ambulation  Ambulation?: Yes  More Ambulation?: Yes  Ambulation 1  Surface: level tile  Device: Rolling Walker  Assistance: Modified Independent  Quality of Gait: hip circumduction on L for toe clearance  Gait Deviations: Decreased step height  Distance: 500 + ft x 2 , 173 ft , short distances in gym  Ambulation 2  Surface - 2: ramp  Device 2: [address] [facility] 2: Modified Independent  Quality of Gait 2: same as above  Gait Deviations: Decreased step height  Distance: 75 ft ascend and descend  Stairs/Curb  Stairs?: Yes  Stairs  # Steps : 12  Stairs Height: 6\"  Rails: Right ascending  Curbs: 8\"  Device: Rolling walker  Assistance: Modified independent   Comment: step to pattern, up with RLE, down with LLE  Wheelchair Activities  Wheelchair Parts Management: No  Propulsion: No     Balance  Comments: Pt performed picking object up from ground x 3 reps with use of RW and mod I . No LOB       Second Session: Pt seated in recliner alert and agreeable to session. Pt reports no pain at this time. Pt sit <> stand from recliner and EOM with mod I and cane. Pt ambulated 2x 120 ft with cane, KI, and supervision. Pt supine <> sit on flat mat table with mod I . Pt performed supine HEP 2x 10 BLE all with KI on hip abd, hip add, ankle pumps, RLE only bridges, SLR ( AAROM LLE) , then without KI quad sets, heel slides pain free range. Pt able to get 25 degrees knee flexion LLE. Pt has HEP HO and educated to continued HEP at dc . Pt agreeable. Pt left seated in chair with alarm on ,call light in reach, and all needs met.                   G-Code     OutComes Score                                                     AM-PAC Score             Goals  Short term goals  Time Frame for Short term goals: 7-10 days--  Short term goal 1: pt will perform bed mobility tasks mod I-- goal met 4/1  Short term goal 2: pt will perform functional transfers with LRAD and mod I -- goal met 4/  Short term goal 3: pt will ambulate 150' with LRAD and Mod I -- goal met 4/  Short term goal 4: pt will negotiate 4 steps with RHR and mod I -- goal met 4/  Long term goals  Time Frame for Long term goals : LTG= STG  Patient Goals   Patient goals : to have a successful healing process    Plan    Plan  Times per week: 5x/week 90 min/day  Current Treatment Recommendations: Balance Training,Functional Mobility Training,Gait Training,Transfer Training,Stair training,Strengthening,Endurance Training,Neuromuscular Re-education,Patient/Caregiver Education & Training,Safety Education & [employer] Exercise Program  Safety Devices  Type of devices: Nurse notified,Chair alarm in place,Left in chair,Call light within reach     Therapy Time   Individual Concurrent Group Co-treatment   Time In 1100         Time Out 1200         Minutes 60              Second Session Therapy Time:   Individual Concurrent Group Co-treatment   Time In 1245         Time Out 1315         Minutes 30           Timed Code Treatment Minutes:  60+30    Total Treatment Minutes:  90      Jermain Mejía, PT

## 2022-04-01 NOTE — PROGRESS NOTES
Patient assessment completed, medications given. Fall precautions are in place, call light and bedside table are within reach, patient is able to utilize call light appropriately. Denies c/o at this time.  Kyle Ruffin mgr is aware to see patient this AM for concerns

## 2022-04-01 NOTE — CARE COORDINATION
Case Management Assessment            Discharge Note                    Date / Time of Note: 4/1/2022 2:11 PM                  Discharge Note Completed by: HÉCTOR Roca    Patient Name: Gisselle Ledezma   YOB: 1955  Diagnosis: Debility [R53.81]   Date / Time: 3/25/2022  3:50 PM    Current PCP: Natalia Soliman, Rogelio [facility] patient: No    Hospitalization in the last 30 days: No    Advance Directives:  Code Status: Full Code  [place] DNR form completed and on chart: Not Indicated    Financial:  Payor: Lucia Stanley 150 / Plan: Ivonne Curtis [address] [place] [address] Road / Product Type: *No Product type* /      Pharmacy:    [address] [place] [address], [address] [place] [address] Dr DÍAZ [address] [place] [address]  Phone: [phone] Fax: [phone]    [facility] [phone] Miles Sánchez [phone]  [address] [place] [address]  Phone: [phone] Fax: [phone]      Assistance purchasing medications?: Potential Assistance Purchasing Medications: No  Assistance provided by Case Management: None at this time    Does patient want to participate in local refill/ meds to beds program?:      Meds To Beds General Rules:  1. Can ONLY be done Monday- Friday between 8:30am-5pm  2. Prescription(s) must be in pharmacy by 3pm to be filled same day  3. Copy of patient's insurance/ prescription drug card and patient face sheet must be sent along with the prescription(s)  4. Cost of Rx cannot be added to hospital bill. If financial assistance is needed, please contact unit  or ;  or  CANNOT provide pharmacy voucher for patients co-pays  5.  Patients can then  the prescription on their way out of the hospital at discharge, or pharmacy can deliver to the bedside if staff is available. (payment due at time of pick-up or delivery - cash, check, or card accepted) Able to afford home medications/ co-pay costs: Yes    ADLS:  Current PT AM-PAC Score:   /24  Current OT AM-PAC Score:   /24      DISCHARGE Disposition: Home with [address]: [address]     LOC at discharge: Not Applicable  REGLA Completed: Yes    Notification completed in HENS/PAS?:  Not Applicable    IMM Completed:   Not Indicated    Transportation:  Transportation PLAN for discharge: family   Mode of Transport: [address] ordered at discharge: Yes  [address]: [address]   Orders faxed: Yes by Niles Pollack Medical Equipment:  DME Provider:   Equipment obtained during hospitalization:     Home Oxygen and Respiratory Equipment:  Oxygen needed at discharge?: No  Home [address]: Not Applicable  Portable tank available for discharge?: Not Indicated    Dialysis:  Dialysis patient: No    Additional CM Notes:  SW met with Pt at bedside and discussed above DC plan - Pt is in agreement. Spouse will provide transport home tomorrow. Emotional support provided. The Plan for Transition of Care is related to the following treatment goals of Debility [R53.81]    The Patient and/or patient representative Pastora Waldron and her family were provided with a choice of provider and agrees with the discharge plan Yes    Freedom of choice list was provided with basic dialogue that supports the patient's individualized plan of care/goals and shares the quality data associated with the providers.  Yes    Care Transitions patient: No    Soo Yanez  Case Management Department  Ph: [phone]

## 2022-04-01 NOTE — PROGRESS NOTES
Occupational Therapy  Facility/Department: [place] [address] [facility]  Daily Treatment Note/Discharge Summary   NAME: Jona Souza  : 1955  MRN: [id number]    Date of Service: 2022    Discharge Recommendations:  Home with assist PRN,Home with Home health OT  OT Equipment Recommendations  ADL Assistive Devices: Shower Chair with back  Other: Pt does not own any bathroom equipment but may benefit from shower chair once she is cleared to shower. Pt reported she has a friend who is going to give her a shower chair    Assessment   Performance deficits / Impairments: Decreased functional mobility ; Decreased ADL status; Decreased high-level IADLs;Decreased strength;Decreased balance  Assessment: Pt has demonstrated great progress in OT since admission and has met 4/5 OT goals. Pt's wound vac and drain were removed today and since removal of these items pt now requires use of KI. Pt had increased difficulty donning her KI today however pt is MOD I for all other LE dressing components. Pt is now MOD I for all functional transfers and MOD I for bathing tasks w/ use of a shower chair. Pt plans to d/c home w/  assistance from her  and w/ plans to continue OT w/ [place] OT. Pt has a shower chair available at home. Treatment Diagnosis: impaired mobility and ADLs    OT Education: Precautions; Equipment;ADL Adaptive Strategies;Transfer Training  Patient Education: increased time educating how to don/doff KI and addressing d/c plans- pt verb understanding  REQUIRES OT FOLLOW UP: Yes  Activity Tolerance  Activity Tolerance: Patient Tolerated treatment well  Safety Devices  Safety Devices in place: Yes  Type of devices: Bed alarm in place; Left in bed;Call light within reach;Nurse notified         Patient Diagnosis(es): There were no encounter diagnoses. has a past medical history of Cancer ([facility] [address] History of radiation therapy, and PONV (postoperative nausea and vomiting).    has a past surgical history that includes Leg Surgery (Left, ); Breast biopsy (2014);  section, low transverse; Colonoscopy (N/A, 3/30/2018); Colonoscopy (2018); Leg biopsy excision (Left, 2021); Leg biopsy excision (Left, 3/21/2022); tissue transfer (Left, 3/21/2022); Leg Surgery (Left, 3/21/2022); and Skin graft (Left, 3/21/2022). Restrictions  Position Activity Restriction  Other position/activity restrictions: ambulate pt, up with assist, FWBAT; KI prn; per plastic surgery on 3/26- pt should only sponge bath until wound vac and drain are discontinued; per plastic surgery on - now that drains and wound vac removed pt is allowed to shower but do not scrub and only pat dry incision. Pt should wear KI when up and walking/transferring. 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 Pt admitted to ARU 3/25  Response to previous treatment: Patient with no complaints from previous session  Family / Caregiver Present: No  Referring Practitioner: Dr. Ward Palacio DO  Diagnosis: L thigh sarcoma  Subjective  Subjective: Pt was seated in recliner chair upon arrival w/ surgical team present. Pt was pleasant and agreeable to ADL w/ OT but reported being nervous about the incision  General Comment  Comments: Javan Glow Vital Signs  Patient Currently in Pain: Denies   Orientation  Orientation  Overall Orientation Status: Within Normal Limits  Objective    ADL  Grooming: Modified independent  (Pt completed oral care and combed her hair in stance at sink MOD I)  UE Bathing: Modified independent  (Pt washed and dried 4/4 components of UE bathing seated on TTB)  LE Bathing: Modified independent  (Pt washed and dried amanda area, buttocks, and BLEs seated on TTB.)  UE Dressing: Independent (Pt donned bra and tshirt I'ly)  LE Dressing: Minimal assistance;Setup;Verbal cueing; Increased time to complete (Pt threaded BLEs into underwear and shorts seated on TTB and managed clothes over hips in stance MOD I. Pt donned socks seated I'ly. Assistance needed to don/doff KI)  Toileting: Modified independent  (Pt completed pants management and amanda care MOD I)  Additional Comments: Per instructions from Dr. Franklin Howell this morning pt is allowed to shower now that drain and wound vac have been removed. Pt was nervous about the incision and the KI and required + time and education for comprehension of how to don properly. Pt returned to bed at [facility] 26 was adjusted in the bed          Balance  Sitting Balance: Independent  Standing Balance: Modified independent   Standing Balance  Time: ~10 mins total  Activity: functional mobility to/from bathroom; stance for ADLs  Functional Mobility  Functional - Mobility Device: Cane  Activity: To/from bathroom  Assist Level: Modified independent   Functional Mobility Comments: Pt ambulated w/ SPC w/o LOB. Toilet Transfers  Toilet - Technique: Ambulating  Equipment Used: Standard toilet  Toilet Transfer: Modified independent  Toilet Transfers Comments: Pt was able to lift LLE onto foot stool I'ly  Shower Transfers  Shower - Transfer From: [employer] - Transfer Type: To and From  Shower - Transfer To: Transfer tub bench  Shower - Technique: Ambulating  Shower Transfers: Modified independence  Shower Transfers Comments: Pt backed up to the TTB and propped LLE onto foot stool for increased comfort. Bed mobility  Sit to Supine: Independent    Transfers  Sit to stand: Modified independent  Stand to sit: Modified independent                       Cognition  Overall Cognitive Status: WNL                                      2nd session: Pt was seated in recliner chair upon arrival. Pt reported that surgery came to apply new dressing to LLE. Pt reported her KI is comfortable and she doesn't want to mess with it right now.  Pt and OT reviewed how to apply KI since pt still has some anxiety about the process. Pt took pictures of the KI while it was on to refer to in the future. Pt asked OT questions about what she should do for her showers at home regarding getting in/out of shower w/ KI and managing the dressings since the home RN plans to change dressings daily. OT explained that she could either bag her LLE to prevent dressings from getting wet or she could coordinate removal of dressings and shower prior to home RN coming to see pt. OT helped pt w/ creating a sequence/routine for how to manage application of KI prior to shower and how to doff/don when seated on shower chair. Pt verb understanding after explanation. Pt denied having any other questions or concerns at this time. Pt stated she did not want to ring the graduation bell. Pt was left seated in recliner chair w/ chair alarm on and call light within reach. Plan   Plan  Times per week: 5x a week for 90 mins daily  Current Treatment Recommendations: Balance Training,Functional Mobility Training,Endurance Training,Self-Care / ADL,Safety Education & Training,Strengthening,Pain Management,Equipment Evaluation, Education, & procurement,Home Management Training,Patient/Caregiver Education & Training  G-Code     OutComes Score                                                  AM-PAC Score             Goals  Short term goals  Time Frame for Short term goals: STG=LTG  Long term goals  Time Frame for Long term goals : 7-10 days  Long term goal 1: Pt will complete LE dressing MOD I - met for socks, shoes, shorts, and underwear 4/1.  Not met for Belita Glory term goal 2: Pt will complete toileting MOD I - goal met 3/31  Long term goal 3: Pt will complete bathing tasks MOD I - goal met 4/1  Long term goal 4: Pt will maintain stance for up to 10 mins to complete light meal prep task - goal met 3/31  Long term goal 5: Pt will I'ly manage LLE in order to complete safe functional transfers to the toilet, bed, chair - goal met 3/31  Patient Goals   Patient goals : to have less pain, walk       Therapy Time   Individual Concurrent Group Co-treatment   Time In 0900         Time Out 1015         Minutes 75         Timed Code Treatment Minutes: 75 Minutes       Second Session Therapy Time:   Individual Concurrent Group Co-treatment   Time In  1658         Time Out  8811          Minutes  20           Timed Code Treatment Minutes:  20    Total Treatment Minutes:  75+20= 95   John Kumar OT

## 2022-04-02 VITALS
OXYGEN SATURATION: 97 % | WEIGHT: 137.79 LBS | TEMPERATURE: 97.9 F | BODY MASS INDEX: 24.41 KG/M2 | HEART RATE: 78 BPM | SYSTOLIC BLOOD PRESSURE: 101 MMHG | HEIGHT: 63 IN | DIASTOLIC BLOOD PRESSURE: 60 MMHG | RESPIRATION RATE: 16 BRPM

## 2022-04-02 PROCEDURE — 6370000000 HC RX 637 (ALT 250 FOR IP): Performed by: PHYSICAL MEDICINE & REHABILITATION

## 2022-04-02 PROCEDURE — 99239 HOSP IP/OBS DSCHRG MGMT >30: CPT | Performed by: PHYSICAL MEDICINE & REHABILITATION

## 2022-04-02 PROCEDURE — 6360000002 HC RX W HCPCS: Performed by: PHYSICAL MEDICINE & REHABILITATION

## 2022-04-02 RX ORDER — OXYCODONE HYDROCHLORIDE 5 MG/1
5 TABLET ORAL EVERY 4 HOURS PRN
Qty: 30 TABLET | Refills: 0 | Status: SHIPPED | OUTPATIENT
Start: 2022-04-02 | End: 2022-04-05

## 2022-04-02 RX ORDER — POLYETHYLENE GLYCOL 3350 17 G/17G
17 POWDER, FOR SOLUTION ORAL 2 TIMES DAILY
Qty: 527 G | Refills: 1 | Status: SHIPPED | OUTPATIENT
Start: 2022-04-02 | End: 2022-05-02

## 2022-04-02 RX ORDER — ONDANSETRON 4 MG/1
4 TABLET, ORALLY DISINTEGRATING ORAL EVERY 8 HOURS PRN
Qty: 30 TABLET | Refills: 0 | Status: SHIPPED | OUTPATIENT
Start: 2022-04-02

## 2022-04-02 RX ADMIN — DOCUSATE SODIUM 100 MG: 100 CAPSULE, LIQUID FILLED ORAL at 08:45

## 2022-04-02 RX ADMIN — ACETAMINOPHEN 1000 MG: 500 TABLET ORAL at 12:04

## 2022-04-02 RX ADMIN — ENOXAPARIN SODIUM 40 MG: 40 INJECTION SUBCUTANEOUS at 08:45

## 2022-04-02 RX ADMIN — ACETAMINOPHEN 1000 MG: 500 TABLET ORAL at 05:33

## 2022-04-02 RX ADMIN — PANTOPRAZOLE SODIUM 40 MG: 40 TABLET, DELAYED RELEASE ORAL at 05:33

## 2022-04-02 RX ADMIN — BACITRACIN: 500 OINTMENT TOPICAL at 12:03

## 2022-04-02 ASSESSMENT — PAIN DESCRIPTION - ORIENTATION: ORIENTATION: LEFT

## 2022-04-02 ASSESSMENT — PAIN DESCRIPTION - DESCRIPTORS: DESCRIPTORS: ACHING;DULL

## 2022-04-02 ASSESSMENT — PAIN DESCRIPTION - ONSET: ONSET: ON-GOING

## 2022-04-02 ASSESSMENT — PAIN SCALES - GENERAL
PAINLEVEL_OUTOF10: 0
PAINLEVEL_OUTOF10: 1
PAINLEVEL_OUTOF10: 0

## 2022-04-02 ASSESSMENT — PAIN DESCRIPTION - PAIN TYPE: TYPE: ACUTE PAIN

## 2022-04-02 ASSESSMENT — PAIN DESCRIPTION - LOCATION: LOCATION: FOOT;LEG

## 2022-04-02 ASSESSMENT — PAIN - FUNCTIONAL ASSESSMENT: PAIN_FUNCTIONAL_ASSESSMENT: ACTIVITIES ARE NOT PREVENTED

## 2022-04-02 ASSESSMENT — PAIN DESCRIPTION - FREQUENCY: FREQUENCY: CONTINUOUS

## 2022-04-02 NOTE — PROGRESS NOTES
Patient provided discharge teaching and paperwork. Patient provided time to ask questions, given unit phone number for any follow-up questions. Patient discharged through main lobby via wheelchair with , no complication.

## 2022-04-02 NOTE — PROGRESS NOTES
Shift assessment complete. VSS. A&Ox4. Denies pain at this time. Fall precautions in place. Patient calls out for assistance, call light within reach. Patient up to chair, chair alarm utilized, call light within reach.        Vitals:    04/02/22 0844   BP: 101/60   Pulse: 78   Resp: 16   Temp: 97.9 °F (36.6 °C)   SpO2: 97%

## 2022-04-02 NOTE — PLAN OF CARE
Problem: Falls - Risk of:  Goal: Will remain free from falls  Description: Will remain free from falls  Outcome: Ongoing   No falls at this time. Pt uses call light to call for nursing assistance. Call light is within reach and bed and chair alarm in use to promote pt safety. Problem: Pain:  Goal: Control of acute pain  Description: Control of acute pain  Outcome: Ongoing   Pt verbalized having left foot and leg pain. Pt using rest, positioning and medication to promote comfort. Pt was medicated with scheduled Extra Strength Tylenol. Current regimen was effective for pain control. Ongoing assessment of pain continued. Problem: Skin Integrity:  Goal: Will show no infection signs and symptoms  Description: Will show no infection signs and symptoms  Outcome: Ongoing   Signs and symptoms of infection were reviewed with pt. Pt was asymptomatic of an infection this shift. Problem: Skin Integrity:  Goal: Absence of new skin breakdown  Description: Absence of new skin breakdown  Outcome: Ongoing   No new skin breakdown noted this shift. Knee immobilizer on left leg removed at HS.     Electronically signed by Sarika Ledezma RN on 4/2/2022 at 12:04 AM

## 2022-04-02 NOTE — DISCHARGE SUMMARY
Physical Medicine & Rehabilitation  Discharge Summary     Patient Identification:  Nataly Jimenez  : 1955  Admit date: 3/25/2022  Discharge date:  22  Attending provider: Artur Sears DO        Primary care provider: ANAIS Romero - CNP     Discharge Diagnoses:   Patient Active Problem List   Diagnosis    Dyspareunia in female    Post-menopause atrophic vaginitis    Serrated adenoma of colon    [facility]    Mass of muscle of left lower extremity    [facility]coma ([facility]    Debility       History of Present Illness/Acute Hospital Course:  62 yo F 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 Post op she reports feeling okay. Jarjinny Bartlettes to go home. Has mild pain in LLE. Has difficulty moving LLE and fatigue Tolerating diet without N, V, CP, SOB, Abd pain. Inpatient Rehabilitation Course:   Nataly Jimenez is a 61 y.o. female admitted to inpatient rehabilitation on 3/25/2022 with Debility . The patient participated in an aggressive multidisciplinary inpatient rehabilitation program involving 3 hours of therapy per day, at least 5 days per week. Impairments: Decreased functional mobility    Medical Management:  1. 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 Also has drain in LLE. -Is on Ancef. Drain management as per Gen Surg. Therapy restricted due to drains, immobilzer  -She is on oxycodone, and dilaudid pain panel. hasnt required IV pain meds in >48 hrs.   -Heparin for DVT ppx as per Gen Surg.      2. Fever  -Workup including CXR, UA as per Gen Surg. Remains on Ancef  - improved     Discharge Exam:  Constitutional: Alert, WDWN, Pleasant, no distress  Head: Normocephalic, atruamatic, MMM  Eyes: Conjunctiva noninjected, no icterus, no drainage  Pulm: CTA bilat. Respirations non-labored. CV: No murmurs noted. RRR. Abd: Soft, nontender.  NABS+  Ext: No edema, no varicosities  Neuro: Alert, bathing tasks w/ use of a shower chair. Pt plans to d/c home w/ 24/7 assistance from her  and w/ [facility]. Pt has a shower chair available at home. Speech therapy:         Significant Diagnostics:   Lab Results   Component Value Date    CREATININE 0.5 (L) 04/01/2022    BUN 10 04/01/2022     04/01/2022    K 4.2 04/01/2022     04/01/2022    CO2 28 04/01/2022       Lab Results   Component Value Date    WBC 4.7 04/01/2022    HGB 9.7 (L) 04/01/2022    HCT 30.2 (L) 04/01/2022    MCV 84.1 04/01/2022     04/01/2022       Disposition:  Home    Services:PT/OT  DME:walker     Discharge Condition: Stable    Follow-up:  See after visit summary from hospitalization    Discharge Medications:     Medication List      START taking these medications    ondansetron 4 MG disintegrating tablet  Commonly known as: ZOFRAN-ODT  Take 1 tablet by mouth every 8 hours as needed for Nausea or Vomiting     oxyCODONE 5 MG immediate release tablet  Commonly known as: ROXICODONE  Take 1 tablet by mouth every 4 hours as needed for Pain for up to 3 days. CONTINUE taking these medications    Colace 100 MG capsule  Generic drug: docusate sodium     polyethylene glycol 17 g packet  Commonly known as: GLYCOLAX  Take 17 g by mouth 2 times daily           Where to Get Your Medications      These medications were sent to [address], [address] [place] [id number]    Phone: [phone]   · ondansetron 4 MG disintegrating tablet  · oxyCODONE 5 MG immediate release tablet  · polyethylene glycol 17 g packet           I spent over 35 minutes on this discharge encounter between counseling, coordination of care, and medication reconciliation. To comply with [facility] TERRAFlorence fantasma R.II.4.1:   Discharge order placed in advance to facilitate patients discharge needs.       Ino Lane DO

## 2022-04-02 NOTE — PLAN OF CARE
Problem: Pain:  Goal: Control of acute pain  Description: Control of acute pain  4/2/2022 0755 by Baldomero Mello RN  Outcome: Met This Shift  Note: Pain controlled with scheduled Tylenol. Non-pharm measures of repositioning and immobilizer encouraged throughout shift. Problem: Falls - Risk of:  Goal: Will remain free from falls  Description: Will remain free from falls  4/2/2022 0755 by Baldomero Mello RN  Outcome: Ongoing  Note: Remains free from falls this shift. Fall precautions in place. Patient calls out for assistance, call light within reach. Problem: Skin Integrity:  Goal: Absence of new skin breakdown  Description: Absence of new skin breakdown  4/2/2022 0755 by Baldomero Mello RN  Outcome: Ongoing  Note: Skin assessment completed this shift. Patient repositions self throughout shift.

## 2022-04-02 NOTE — PROGRESS NOTES
SHIFT: 1900 - 0730  Pt this shift was stable. Alert and oriented. Denied having chest pain or SOB. Pt verbalized having left foot and leg pain. Pt medicated with scheduled Extra Strength Tylenol and is using rest and positioning to promote comfort. Pt was continent of bladder using bathroom. Medications given this shift were reviewed with pt regarding use, dose and side effects. Dressing to L Leg was dry and intact. Pt with left knee immobilizer when up. Pt slept well most of shift and had an uneventful night. Pt call light was with in reach and chair alarm was on to promote pt safety.     Electronically signed by Ladi Abarca RN on 4/2/2022 at 6:55 AM

## 2022-04-04 ENCOUNTER — TELEPHONE (OUTPATIENT)
Dept: SURGERY | Age: 67
End: 2022-04-04

## 2022-04-04 ENCOUNTER — CARE COORDINATION (OUTPATIENT)
Dept: OTHER | Facility: CLINIC | Age: 67
End: 2022-04-04

## 2022-04-04 RX ORDER — ACETAMINOPHEN 500 MG
1000 TABLET ORAL EVERY 6 HOURS PRN
Status: ON HOLD | COMMUNITY
End: 2022-10-02 | Stop reason: HOSPADM

## 2022-04-04 NOTE — CARE COORDINATION
[facility] Transitions Initial Follow Up Call    Call within 2 business days of discharge: Yes    Patient: Suha Suresh Patient : 1955   MRN: [id number]  Reason for Admission: Post op debility  Discharge Date: 22 RARS: Readmission Risk Score: 13.4 ( )      Last Discharge [address] [address]       Complaint Diagnosis Description Type Department Provider    3/25/22  Sarcoma of left thigh [facility]) Admission (Discharged) [address], DO           Spoke with: John Hummel patient    Facility: [facility]- acute [facility]hab    Non-face-to-face services provided:  Obtained and reviewed discharge summary and/or continuity of care documents  Assessment and support for treatment adherence and medication management-complete medication review performed with patient    Care Transitions 24 Hour Call    Do you have any ongoing symptoms?: No  Do you have a copy of your discharge instructions?: Yes  Do you have all of your prescriptions and are they filled?: No  Have you scheduled your follow up appointment?: No  Were you discharged with any Home Care or Post Acute Services: Yes  Post Acute Services: [address] [place] [address] Piter Castaneda (Comment: [facility] [phone])  Do you feel like you have everything you need to keep you well at home?: Yes  Care Transitions Interventions       Transitions of Care Initial Call    Was this an external facility discharge? No Discharge Facility: [facility] [address] to be reviewed by the provider   Additional needs identified to be addressed with provider: No  none             Method of communication with provider : chart routing- to notify of new CT episode      Advance Care Planning:   Does patient have an Advance Directive: decision maker updated. Was this a readmission?  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 Verified name and  with patient as identifiers. Provided introduction to self, and explanation of the CTN role. Patient reports doing ok since home. States  went to work today and she is home alone. Reports to AC that  assisted patient get immobilizer to LLE prior to leaving for work. States she is ambulating with walker without difficulty. Patient states washed hair in sick earlier today without difficulty. States took shower prior to rehab discharge but plans to wash self at sink until sutures removed. States has cane available but not using until therapy checks it out. [facility] nurse came yesterday; therapy coming Wednesday this week. Patient reports pain \"0-1\"/10 at present. States taking Tylenol PRN with most recent dose at bedtime last night. States slept better last night than previous night. States has not yet filled new RXs and that  will be picking up RXs today. Patient states not planning to fill Glycolax as she takes Colace daily. Patient reports not anticipating needing Oxycodone as most recent dose was second post op day but does plan to have on hand at home to use if needed. Patient states left knee site with some redness that developed prior to discharge from acute rehab. Reports took picture of site for monitoring. [facility] nurse changed dressing to LLE yesterday. ACM discussed need for dressing change daily. Patient states , patient's friend (who is nurse), and patient will be changing dressing on days [facility] nurse not present. States she has some wound care supplies at home and  will purchase additional wound care supplies from local store OTC when needed. ACM discussed option of getting patient set up with [employer] account to order wound care supplies. Patient declined offer as she states will purchase OTC.      Patient states she has follow up with Dr. Geo Kasper (rad onc) 4/19/2022 but may be rescheduling due to recent surgery. States she has completed radiation (25 treatments). States will not be doing chemotherapy. Patient states she called Dr. Earl Souza office to schedule follow up and working on getting follow up scheduled same day as Dr. Evonne Ventura. Denies needs for ACM at present. Agreeable to follow up calls from [facility]. CTN reviewed discharge instructions, medical action plan and red flags with patient who verbalized understanding. Patient given an opportunity to ask questions and does not have any further questions or concerns at this time. Were discharge instructions available to patient? Yes. Reviewed appropriate site of care based on symptoms and resources available to patient including: PCP  Specialist  Benefits related nurse triage line  Home health  [employer]. The patient agrees to contact the PCP office for questions related to their healthcare. Medication reconciliation was performed with patient, who verbalizes understanding of administration of home medications. Advised obtaining a 90-day supply of all daily and as-needed medications. Covid Risk Education     Educated patient about risk for severe COVID-19 due to risk factors according to CDC guidelines. ACM reviewed discharge instructions, medical action plan and red flag symptoms with the patient who verbalized understanding. Discussed COVID vaccination status: No. Education provided on COVID-19 vaccination as appropriate. Discussed exposure protocols and quarantine with CDC Guidelines. Patient was given an opportunity to verbalize any questions and concerns and agrees to contact ACM or health care provider for questions related to their healthcare. Reviewed and educated patient on any new and changed medications related to discharge diagnosis. Was patient discharged with a pulse oximeter?  No Discussed and confirmed pulse oximeter discharge instructions and when to notify provider or seek emergency care. [facility] provided contact information. Plan for follow-up call in 5-7 days based on severity of symptoms and risk factors. Plan for next call: follow up appointment-Dr. Suze Anderson     Goals      Conditions and Symptoms      I will schedule office visits, as directed by my provider. I will keep my appointment or reschedule if I have to cancel. I will notify my provider of any barriers to my plan of care. I will notify my provider of any symptoms that indicate a worsening of my condition. Barriers: none  Plan for overcoming my barriers: N/A  Confidence: 10/10  Anticipated Goal Completion Date: 5/4/2022 4/4/2022: Patient called Dr. Radha Villegas office prior to [employer] outreach this date to request follow up appt; waiting return call. [facility] discussed red flags and NAL with patient this date. Follow Up  Future Appointments   Date Time Provider Tonio Farley   4/19/2022  3:45 PM John Mejia MD [facility] [place]     Follow-up With  Details  Why  Contact Info   Enmanuel Burns MD    Ortho surgeon; patient waiting call back from office to schedule  [facility]  [facility] [address]  [address]   Sylvie Anderson MD    Plastic surgeon; patient waiting call back from office to schedule  [address] [facility]  [address]         Celsa Jacobs, MSN RN  Associate Care Manager  Phone: [phone]  Email: [email or web address]

## 2022-04-04 NOTE — TELEPHONE ENCOUNTER
Patient called to follow up regarding the previously mentioned appointment (on the same day) with Dr. Franklin Howell and Dr. Kenyatta Cyr regarding her recent discharge from the hospital.    Please contact at [phone]

## 2022-04-04 NOTE — TELEPHONE ENCOUNTER
I scheduled patient with us Wednesday and Dr. Shiela Lucero office states she will need to reach out to there scheduling department to schedule with them.

## 2022-04-11 ENCOUNTER — CARE COORDINATION (OUTPATIENT)
Dept: OTHER | Facility: CLINIC | Age: 67
End: 2022-04-11

## 2022-04-11 NOTE — CARE COORDINATION
[facility] Transitions Follow Up Call    2022    Patient: Garcia Del Cid  Patient : 1955   MRN: [id number]  Reason for Admission: Post op debility  Discharge Date: 22 RARS: Readmission Risk Score: 13.4 ( )         Spoke with: Fidel Sullivan, patient    Care Transitions Subsequent and Final Call    Subsequent and Final Calls  Do you have any ongoing symptoms?: No  Have your medications changed?: No  Do you have any questions related to your medications?: No  Do you currently have any active services?: Yes  Are you currently active with any services?: Home Health  Do you have any needs or concerns that I can assist you with?: No  Identified Barriers: None  Care Transitions Interventions  Other Interventions:         Care Transitions Follow Up Call    Needs to be reviewed by the provider   Additional needs identified to be addressed with provider: No  none             Method of communication with provider : none      Care Transition Nurse (CTN) contacted the patient by telephone to follow up after admission on 3/21/2022. Verified name and  with patient as identifiers. Patient states recovery going \"well\". Patient states received phone call from Dr. Ana Do office stating she does not need to schedule follow up as long as she is following up with Dr. Viet Vilchis. States she was told to call Dr. Gin RICHARDSON. Patient has follow up with Dr. Viet Vilchis 2022. States [facility] nurse came today; PT came today and will come again tomorrow. States PT increasing exercises and she is aware to take Tylenol more often if needed due to pain associated with PT. States still ambulating with walker due to being home alone during the day. Denies needs or concerns for ACM at present. Addressed changes since last contact: follow up appointment-Dr. Efren Shin   Discussed follow-up appointments.  If no appointment was previously scheduled, appointment scheduling offered: No.   Is follow up appointment scheduled within 7 days of discharge? No.    Advance Care Planning:   Does patient have an Advance Directive: decision maker previously updated with patient. CTN reviewed discharge instructions, medical action plan and red flags with patient and discussed any barriers to care and/or understanding of plan of care after discharge. Discussed appropriate site of care based on symptoms and resources available to patient including: PCP  Specialist  Benefits related nurse triage [phone]. The patient agrees to contact the PCP office for questions related to their healthcare. Patients top risk factors for readmission: medical condition-patient recently had sarcoma removed from left thigh  Interventions to address risk factors: Reinforced importance of follow up appts      Non[facility] follow up appointment(s): None    CTN provided contact information for future needs. Plan for follow-up call in 7-10 days based on severity of symptoms and risk factors. Plan for next call: follow up appointment-Dr. Alyssa Parsons 4/13/22     Goals      Conditions and Symptoms      I will schedule office visits, as directed by my provider. I will keep my appointment or reschedule if I have to cancel. I will notify my provider of any barriers to my plan of care. I will notify my provider of any symptoms that indicate a worsening of my condition. Barriers: none  Plan for overcoming my barriers: N/A  Confidence: 10/10  Anticipated Goal Completion Date: 5/4/2022 4/4/2022: Patient called Dr. Dawna Diggs office prior to [employer] outreach this date to request follow up appt; waiting return call. ACM discussed red flags and NAL with patient this date. 4/11/2022: Patient has follow up with Dr. Alyssa Parsons 4/13/22.                Follow Up  Future Appointments   Date Time Provider Tonio Farley   4/13/2022 10:30 AM Miguel Florez MD PLASTICS/REC [facility]   4/19/2022  3:45 PM Martina Aguirre MD [facility] [place]     Follow-up With Details  Why  Contact Info   Rima Deal MD    Patient states told to follow up PRN if concerns arise as long as following up with Dr. Soria Captain  [address] [address]     JOSUÉ Blackburn RN  Associate Care Manager  Phone: [phone]  Email: [email or web address]

## 2022-04-13 ENCOUNTER — OFFICE VISIT (OUTPATIENT)
Dept: SURGERY | Age: 67
End: 2022-04-13

## 2022-04-13 VITALS
SYSTOLIC BLOOD PRESSURE: 116 MMHG | HEART RATE: 79 BPM | OXYGEN SATURATION: 98 % | TEMPERATURE: 98.1 F | DIASTOLIC BLOOD PRESSURE: 69 MMHG | BODY MASS INDEX: 24.27 KG/M2 | HEIGHT: 63 IN | WEIGHT: 137 LBS

## 2022-04-13 DIAGNOSIS — Z09 POSTOP CHECK: Primary | ICD-10-CM

## 2022-04-13 PROCEDURE — 99024 POSTOP FOLLOW-UP VISIT: CPT | Performed by: SURGERY

## 2022-04-13 NOTE — PROGRESS NOTES
[facility]    PROCEDURE: 1) Complex closure of the left leg including knee (27.5 cm)                                                         2) Advancement of the medial thigh (wound: 15 x 3 cm; advancement flap: 15 x 4.8 cm)                                                         3) Application of Prevena incisional wound vacuum device  DATE: 3/21/22    Adrian Dawkins has been recovering well since her procedure. Pain has been well controlled without pain medications. EXAM    /69   Pulse 79   Temp 98.1 °F (36.7 °C) (Temporal)   Ht 5' 3\" (1.6 m)   Wt 137 lb (62.1 kg)   LMP 01/01/2010 (Approximate)   SpO2 98%   BMI 24.27 kg/m²     GEN: NAD  EXT: Incision healing well  Good contour / no hematoma / seroma    IMP: 61 y. o.female s/p closure of L thigh wound after excision of sarcoma  PLAN: Doing well overall. Sutures removed. We discussed that she has pressure changes on the knee and thus will likely need another type of knee immobilizer. Will return in 6 weeks.       Beckie Olszewski, MD  [address] [place] [address] [address] Reconstructive Surgery  [phone]  04/13/22

## 2022-04-18 ENCOUNTER — TELEPHONE (OUTPATIENT)
Dept: SURGERY | Age: 67
End: 2022-04-18

## 2022-04-18 NOTE — TELEPHONE ENCOUNTER
Vandana Kwong with Home health care called asking for a DME for pt. She was seen last week and it was mentioned she needs a new boot for immobilization. Amy Carvalho only had a phone number of [phone] no fax number was provided.

## 2022-04-18 NOTE — TELEPHONE ENCOUNTER
Pamela with [facility] returned the missed phone call regarding the DME order for the new knee brace.     Please call back at [phone]

## 2022-04-18 NOTE — TELEPHONE ENCOUNTER
Returned call to [facility] with Home health care . Message left in regards to DME for [employer].    Asked Christian Amin to return call to the office.  Felicia Dooley, BSN, RN

## 2022-04-18 NOTE — TELEPHONE ENCOUNTER
Returned call to Deanna Paulino with Home health care . Message left in regards to DME for [employer]. Asked Ammy Núñez to return call to the office.     Maycol Sanchez, YUNIORN, RN

## 2022-04-19 ENCOUNTER — HOSPITAL ENCOUNTER (OUTPATIENT)
Dept: RADIATION ONCOLOGY | Age: 67
Discharge: HOME | End: 2022-04-19
Attending: RADIOLOGY
Payer: COMMERCIAL

## 2022-04-19 VITALS
DIASTOLIC BLOOD PRESSURE: 71 MMHG | SYSTOLIC BLOOD PRESSURE: 111 MMHG | BODY MASS INDEX: 24.73 KG/M2 | RESPIRATION RATE: 16 BRPM | HEART RATE: 74 BPM | TEMPERATURE: 97.9 F | WEIGHT: 139.6 LBS | OXYGEN SATURATION: 99 %

## 2022-04-19 DIAGNOSIS — C49.22 SARCOMA OF LEFT THIGH (HCC): Primary | ICD-10-CM

## 2022-04-19 PROCEDURE — 99211 OFF/OP EST MAY X REQ PHY/QHP: CPT | Performed by: RADIOLOGY

## 2022-04-19 NOTE — TELEPHONE ENCOUNTER
Spoke with Sanjiv Caballero at [facility] in regards to ordering Donjoy T-ROM brace.  Orders faxed to Sanjiv Caballero [phone]    YUNIOR AyersN, RN

## 2022-04-19 NOTE — PROGRESS NOTES
Timmy Powers  4/19/2022  3:51 PM      Vitals:    04/19/22 1548   BP: 111/71   Pulse: 74   Resp: 16   Temp: 97.9 °F (36.6 °C)   SpO2: 99%    :  Patient Currently in Pain: No             Wt Readings from Last 1 Encounters:   04/19/22 139 lb 9.6 oz (63.3 kg)                Current Outpatient Medications:     acetaminophen (TYLENOL) 500 MG tablet, Take 1,000 mg by mouth every 6 hours as needed for Pain, Disp: , Rfl:     ondansetron (ZOFRAN-ODT) 4 MG disintegrating tablet, Take 1 tablet by mouth every 8 hours as needed for Nausea or Vomiting, Disp: 30 tablet, Rfl: 0    polyethylene glycol (GLYCOLAX) 17 g packet, Take 17 g by mouth 2 times daily, Disp: 527 g, Rfl: 1    docusate sodium (COLACE) 100 MG capsule, Take 100 mg by mouth as needed for Constipation, Disp: , Rfl:                FALLS RISK SCREEN  Instructions:  Assess the patient and enter the appropriate indicators that are present for fall risk identification. Total the numbers entered and assign a fall risk score from Table 2.  Reassess patient at a minimum every 12 weeks or with status change. Assessment   Date  4/19/2022     1. Mental Ability: confusion/cognitively impaired 0     2. Elimination Issues: incontinence, frequency 0       3. Ambulatory: use of assistive devices (walker, cane, off-loading devices),        attached to equipment (IV pole, oxygen) 2     4. Sensory Limitations: dizziness, vertigo, impaired vision 0     5. Age less than 65        0     6. Age 72 or greater 0     7. Medication: diuretics, strong analgesics, hypnotics, sedatives,        antihypertensive agents 0   8. Falls:  recent history of falls within the last 3 months (not to include slipping or        tripping) 0   TOTAL 2    If score of 4 or greater was education given? No           TABLE 2   Risk Score Risk Level Plan of Care   0-3 Little or  No Risk 1. Provide assistance as indicated for ambulation activities  2. Reorient confused/cognitively impaired patient  3. Chair/bed in low position, stretcher/bed with siderails up except when performing patient care activities  5. Educate patient/family/caregiver on falls prevention  6.  Reassess in 12 weeks or with any noted change in patient condition which places them at a risk for a fall   4-6 Moderate Risk 1. Provide assistance as indicated for ambulation activities  2. Reorient confused/cognitively impaired patient  3. Chair/bed in low position, stretcher/bed with siderails up except when performing patient care activities  4. Educate patient/family/caregiver on falls prevention     7 or   Higher High Risk 1. Place patient in easily observable treatment room  2. Patient attended at all times by family member or staff  3. Provide assistance as indicated for ambulation activities  4. Reorient confused/cognitively impaired patient  5. Chair/bed in low position, stretcher/bed with siderails up except when performing patient care activities  6. Educate patient/family/caregiver on falls prevention         PLAN: Patient is seen today in follow up. No  Major issues or concerns at this time. Pt will see Plastics and ortho at the end of May. No MO appt scheduled at this time. Dr. Nidia Dillard updated and examined pt. Per MD pt will follow up in July 2022.          David Mcmahan RN

## 2022-04-20 ENCOUNTER — HOSPITAL ENCOUNTER (OUTPATIENT)
Dept: PHYSICAL THERAPY | Age: 67
Setting detail: THERAPIES SERIES
Discharge: HOME | End: 2022-04-20
Payer: COMMERCIAL

## 2022-04-20 PROCEDURE — 97162 PT EVAL MOD COMPLEX 30 MIN: CPT

## 2022-04-20 PROCEDURE — 97110 THERAPEUTIC EXERCISES: CPT

## 2022-04-20 PROCEDURE — 97140 MANUAL THERAPY 1/> REGIONS: CPT

## 2022-04-20 NOTE — PROGRESS NOTES
[facility]            Radiation Oncology          [address] [facility], [address]        Andry Duval: [phone]        F: [phone]       [facility] [address] [place] [address]       Radiation Oncology   [address] [facility], [address] [facility]       Andry Dumasems: [phone]       F: [phone]       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 Be[place]  female with a history of high-grade liposarcoma of the left thigh who underwent neoadjuvant radiation therapy completing approximately 2 months ago follow-up visits approximately 1 month after undergoing her wide local excision. Patient has been recovering well from surgery noting that she went to inpatient rehab afterwards and had significant improvement in her mobility. Patient still has stiffness in her knee but they were able to preserve her joint at the time of surgery. Patient currently is doing home health but will be resuming with outpatient therapy soon for PT/lymphedema. Patient's surgical excision on 2021 revealed a 16.5 cm mass with greater than 50% necrosis, though there was a close surgical margin of 2-3 mm, giving a stage of pT4.   Patient did have a posttreatment MRI prior to surgery on 3/17/2022 which showed that the tumor had increased in size, which was demonstrated on her final pathology. Patient will be meeting with her surgeons in May to review her wound healing and discuss her rehab. MEDICATIONS:    Current Outpatient Medications:     acetaminophen (TYLENOL) 500 MG tablet, Take 1,000 mg by mouth every 6 hours as needed for Pain, Disp: , Rfl:     ondansetron (ZOFRAN-ODT) 4 MG disintegrating tablet, Take 1 tablet by mouth every 8 hours as needed for Nausea or Vomiting, Disp: 30 tablet, Rfl: 0    polyethylene glycol (GLYCOLAX) 17 g packet, Take 17 g by mouth 2 times daily, Disp: 527 g, Rfl: 1    docusate sodium (COLACE) 100 MG capsule, Take 100 mg by mouth as needed for Constipation, Disp: , Rfl:     ALLERGIES:  No Known Allergies      REVIEW OF SYSTEMS:    A full 14 point review of systems was performed and assessed and found to be negative except as noted above. PHYSICAL EXAMINATION:    CHAPERONE: Family/friend/companieon Present    ECO Symptomatic but completely ambulatory    VITAL SIGNS: /71   Pulse 74   Temp 97.9 °F (36.6 °C) (Oral)   Resp 16   Wt 139 lb 9.6 oz (63.3 kg)   LMP 2010 (Approximate)   SpO2 99%   BMI 24.73 kg/m²   GENERAL:  General appearance is that of a well-nourished, well-developed in no apparent distress. HEENT: Normocephalic, atraumatic, EOMI, moist mucosa, no erythema. NECK:  No adenopathy or a palpable thyroid mass, trachea is midline. LYMPHATICS: No cervical, supraclavicular adenopathy. HEART:  Normal rate and regular rhythm, normal heart sounds. LUNGS:  Pulmonary effort normal. Normal breath sounds. ABDOMEN:  Soft, nontender, non distended. EXTREMITIES:  No edema. (+) L Thigh scar extending below knee. Well healed no induration or erythema. Limited ROM of L knee. NEUROLOGICAL: Alert and oriented. Strength and sensation intact bilaterally. No focal deficits.    PSYCH: Mood normal, behavior normal.  SKIN: No erythema, no desquamation. LABS:  WBC   Date Value Ref Range Status   2022 4.7 4.0 - 11.0 K/uL Final     Segs Absolute   Date Value Ref Range Status   2022 5.28 1.50 - 8.10 k/uL Final     Neutrophils Absolute   Date Value Ref Range Status   2022 3.4 1.7 - 7.7 K/uL Final     Hemoglobin   Date Value Ref Range Status   2022 9.7 (L) 12.0 - 16.0 g/dL Final     Platelets   Date Value Ref Range Status   2022 287 135 - 450 K/uL Final     No results found for: , CEA  No results found for: PSA    IMAGIN22 MRI L Femur  Impression   Redemonstration of large heterogenous soft tissue mass within the anterior   compartment of the distal thigh with components of fatty tissues within the   medial aspect of the mass, likely corresponding to the provided history of   liposarcoma.  This appears to have increased in size when compared to prior   study now measuring approximately 11.0 x 8.8 x 13.9 cm.     21 MRI L Femur     Impression   1. Large heterogeneously enhancing intramuscular mass lesion in the left   vastus lateralis, left rectus femoris and left vastus intermedius muscle   measuring 8.9 x 8.0 x 6.8 cm in greatest dimensions with edema throughout the   quadriceps musculature.  Mild edema in the soft tissues along the anterior   aspect of the left knee and about the thigh.  No discrete organized fluid   collection.  Findings are highly concerning for soft tissue malignancy with   differential considerations including synovial sarcoma and malignant fibrous   histiocytoma.  Consultation with orthopedic oncologist is recommended prior   to any intervention.    2. Indeterminate 8 mm lesion in the mid/distal femoral diaphysis which could   represent a previously seen bone island from left femur radiographs of   2009.  Metastatic lesion not entirely excluded on the basis of this   study.  Further evaluation with nuclear medicine whole-body bone grade: GX) - Signed by John Mejia MD on 4/20/2022    Patient comes in today for follow-up visit approximately 2 months after completion of her neoadjuvant radiation therapy for her left thigh liposarcoma. Patient tolerated treatment well though there was significant edema at the end of her treatment. Patient did have a follow-up MRI that showed that the tumor had slightly increased in size though likely it was probably attributed to necrosis within the nodule mass. Patient did have a wide local excision though there was a close margin of 2-3 mm which is of concern. We will have patient's case reviewed at the multidisciplinary tumor conference this week. I did discuss with the patient and a referral will be made to medical oncology as she may be a candidate for adjuvant systemic therapy due to the close margin and this can be done in [place]. She will be referred to medical oncology in [place] for continuation of follow-up care there as well since it is closer to home. Patient is recommended to come back in 3 months for another follow up visit and exam, or can call to come see us sooner if symptoms change. Patient was in agreement with my recommendations. All questions were answered to their satisfaction. Patient was advised to contact us anytime should they have any questions or concerns. Electronically signed by John Mejia MD on 4/20/2022 at 11:42 AM        Medications Prescribed:   New Prescriptions    No medications on file       Orders:   Orders Placed This Encounter   Procedures   [address] [facility]       CC:  Patient Care Team:  ANAIS Anthony CNP as PCP - General (Family Nurse Practitioner)  ANAIS Crane CNP as PCP - [facility] Empaneled Provider  Celsa Jacobs RN as Ambulatory Care Manager     Total time spent on this case on the day of encounter is 30 minutes.  This time includes combination of one or more of the following - review of necessary tests, review of pertinent medical records from the EMR, performing medically appropriate examination and evaluation, counseling and educating the patient/family/caregiver, ordering necessary medical tests, procedures etc., documenting the clinical information in the electronic medical record, care coordination, referring and communicating with other health care providers and interpretation of results independently. This note is created with the assistance of a speech recognition program.  While intending to generate a document that actually reflects the content of the visit, the document can still have some errors including those of syntax and sound a like substitutions which may escape proof reading. It such instances, actual meaning can be extrapolated by contextual diversion.

## 2022-04-21 ENCOUNTER — CARE COORDINATION (OUTPATIENT)
Dept: OTHER | Facility: CLINIC | Age: 67
End: 2022-04-21

## 2022-04-21 NOTE — CARE COORDINATION
[facility] Transitions Follow Up Call    2022    Patient: Gisselle Ledezma  Patient : 1955   MRN: [id number]  Reason for Admission:  Post op debility  Discharge Date: 22 RARS: Readmission Risk Score: 13.4 ( )         Spoke with: Bennie Sotelo, patient    Care Transitions Subsequent and Final Call    Subsequent and Final Calls  Do you have any ongoing symptoms?: No  Have your medications changed?: No  Do you have any questions related to your medications?: No  Do you currently have any active services?: Yes  Are you currently active with any services?: Home Health, Outpatient/Community Services  Do you have any needs or concerns that I can assist you with?: No  Identified Barriers: None  Care Transitions Interventions  Other Interventions:         Care Transitions Follow Up Call    Needs to be reviewed by the provider   Additional needs identified to be addressed with provider: No  none             Method of communication with provider : none      Care Transition Nurse (CTN) contacted the patient by telephone to follow up after admission on 3/21/2022. Verified name and  with patient as identifiers. Patient states post op recovery going well. Had office visit with Dr. Ana Davila 22 and sutures removed. Next follow up is 22. States started using new brace to LLE yesterday. States had initial PT visit yesterday; next appt is tomorrow and will go 3x/week. States left knee with small open area from previous brace rubbing knee. Reports very little drainage from site and keeping covered with dry band aid. States working on protein for wound healing. Denies needs or concerns. ACM signing off. Addressed changes since last contact: follow up appointment-Dr. Ana Davila 22   Discussed follow-up appointments. If no appointment was previously scheduled, appointment scheduling offered: No.   Is follow up appointment scheduled within 7 days of discharge?  No.    Advance Care Planning:   Does patient have an Advance Directive: decision maker previously updated with patient. .     CTN reviewed discharge instructions, medical action plan and red flags with patient and discussed any barriers to care and/or understanding of plan of care after discharge. Discussed appropriate site of care based on symptoms and resources available to patient including: PCP  Specialist  [email or web address]. The patient agrees to contact the PCP office for questions related to their healthcare. Patients top risk factors for readmission: medical condition-recent surgery for sarcoma removal from left thigh  Interventions to address risk factors: Reinforced importance of follow up appts      Non-[facility] follow up appointment(s): None    CTN provided contact information for future needs. No further follow-up call indicated based on severity of symptoms and risk factors.     Follow Up  Future Appointments   Date Time Provider Tonio Farley   4/22/2022  3:45 PM Dareen Her, PT [facility] [place]   4/26/2022  5:00 PM Clearnce Hemp, PT [facility] [place]   4/27/2022  4:45 PM Juanita Delacruz [facility] [place]   4/27/2022  6:15 PM Mary Merchant MD tiff onc spe [facility]   4/29/2022  3:45 PM Dareen Her, PT [facility] [place]   5/2/2022  4:00 PM Clearnce Hemp, PT [facility] [place]   5/4/2022  4:00 PM Dareen Her, PT [facility] [place]   5/6/2022  4:15 PM Dareen Her, PT [facility] [place]   5/9/2022  4:30 PM Mg Oleary, PT [facility] [place]   5/11/2022  4:00 PM Dareen Her, PT [facility] [place]   5/13/2022  4:15 PM Dareen Her, PT [facility] [place]   5/16/2022  4:30 PM Juanita Delacruz [facility] [place]   5/18/2022  4:00 PM Dareen Her, PT [facility] [place]   5/20/2022  4:15 PM Dareen Her, PT [facility] [place]   5/23/2022 11:15 AM Miguel Florez MD PLASTICS/REC [facility]   5/25/2022  4:30 PM Juanita Delacruz [facility] [place]   5/27/2022  4:15 PM INDIRA Juárez PT [place]   5/31/2022 11:15 AM INDIRA Garza PT 6/1/2022  4:00 PM Allegan Andrew, PT [facility] [place]   6/3/2022  4:15 PM Allegan Andrew, PT [facility] [place]   7/18/2022  3:45 PM Marta Rhodes MD STVZ SUSAN Carranza, MSN RN  Associate Care Manager  Phone: [phone]  Email: [email or web address]

## 2022-04-22 ENCOUNTER — HOSPITAL ENCOUNTER (OUTPATIENT)
Dept: PHYSICAL THERAPY | Age: 67
Setting detail: THERAPIES SERIES
Discharge: HOME | End: 2022-04-22
Payer: COMMERCIAL

## 2022-04-22 PROCEDURE — 97110 THERAPEUTIC EXERCISES: CPT

## 2022-04-22 PROCEDURE — 97140 MANUAL THERAPY 1/> REGIONS: CPT

## 2022-04-25 NOTE — PROGRESS NOTES
Phone: [facility] [facility]          Fax: [phone]                      Outpatient Physical Therapy                                                                      Evaluation  Date: 2022  Patient: Adrian Dawkins  : 1955  [facility] #: [id number]  Referring Provider (secondary): Dr. Rosaura Serrano      Treatment Diagnosis: L LE pain, decreased ambulation tolerance  Onset Date: 22  PT Insurance Information: Medical [place]  Total # of Visits Approved: 18   Total # of Visits to Date: 1     Subjective  Subjective: Pt states she is getting around her house well with a knee immobilizer and quad cane. Pt denies pain at this time  Additional Pertinent Hx: sarcoma L LE    Observations:      Large L knee incision that is approximated and healing   Objective    Left AROM  Right AROM         AROM LLE (degrees)  L Knee Flexion 0-145: L knee 0-27          Left PROM  Right PROM                    Left Strength  Right Strength         Strength LLE  L Knee Flexion: 3-/5  L Knee Extension: 2/5            Muscle Length/Flexibility:      Strength LLE  L Knee Flexion: 3-/5  L Knee Extension: 2/5    Exercises:  Exercise 1: HEP heel slides, quad set, sttic flexio stretch  Exercise 2: heel slides x10  Exercise 3: quad set 10x10\"  Exercise 4: SAQ with A x10  Exercise 5: static flexion 5 min  Exercise 6: .    Manual:  Joint Mobilization: gentle L knee PROM to 30 degrees of flexion only  Soft Tissue Mobilizaton: . Functional Outcome Measures  Any of your usual work, housework, or school activities: Quite a Bit of Difficulty  Your usual hobbies, recreational, or sporting activities: Quite a Bit of Difficulty  Getting into or out of the bath: Moderate Difficulty  Walking between rooms: Moderate Difficulty  Putting on your shoes or socks:  Moderate Difficulty  Squatting: Extreme Difficulty or Unable to Perform Activity  Lifting an object, like a bag of groceries from the floor: Moderate Difficulty  Performing light activities around your home: Moderate Difficulty  Performing heavy activities around your home: Quite a Bit of Difficulty  Getting into or out of a car: Moderate Difficulty  Walking 2 blocks: Quite a Bit of Difficulty  Walking a mile: Extreme Difficulty or Unable to Perform Activity  Going up or down 10 stairs (about 1 flight of stairs): Extreme Difficulty or Unable to Perform Activity  Standing for 1 hour: Extreme Difficulty or Unable to Perform Activity  Sitting for 1 hour: A Little Bit of Difficulty  Running on even ground : Extreme Difficulty or Unable to Perform Activity  Running on uneven ground : Extreme Difficulty or Unable to Perform Activity  Making sharp turns while running fast : Extreme Difficulty or Unable to Perform Activity  Hopping: Extreme Difficulty or Unable to Perform Activity  Rolling over in bed: A Little Bit of Difficulty  LEFS Total Score: 22                Assessment  Assessment: Pt is a 61year old that underwent L knee surgery for a L LE sarcoma. Pt currently in a L knee immobilizer brace but was fitted for a L T-ROM brace that is currently locked from 0-30 degrees. Pt presents with a large incision that is approximated and healing well. Pt presents with only 27 degrees of active flexion. Pt has L quad stength of 2/5 but unable to perform SAQ or SLR. Pt will benefit from skilled PT in order to address these deficits. Therapy Prognosis: Good        Decision Making: Medium Complexity    Patient Education  Patient Education: educated on her POC and HEP  Pt verbalized/demonstrated good understanding:     [X] Yes         [] No, pt required further clarification.       Goals  Short Term Goals  Time Frame for Short term goals: 3 weeks  Short term goal 1: Pt will be educated on her POC and HEP-met  Short term goal 2: Pt will increase L knee AROM to 50 degrees flexion in order to normalize gait    Long Term Goals  Time Frame for Long term goals : 6 weeks  Long term goal 1: Pt will be safe and independent with her HEP  Long term goal 2: Pt will increase L knee flexion to 90 degrees in order to ambulation with normal gait pattern and without a brace when allowed by physician  Long term goal 3: Pt will increase L quad strength to 4-/5 in order to perform 5 SLR in order to increase functional strength  Long term goal 4: Pt will demonstrate no increase in girth measurements for edema in order to increase L knee AROM  Long term goal 5: Pt will return to 50% of normal activities including community ambulation      Patient goals : \"to get full function out of my L LE\"        Minutes Tracking:  Time In: 1600  Time Out: 1700  Minutes: 60  Timed Code Treatment Minutes: 1306 Dante Lebron PT, DPT    4/20/2022

## 2022-04-25 NOTE — PLAN OF CARE
[facility]           Phone: [phone]             Outpatient Physical Therapy  Fax: [phone]                                           Date: 2022  Patient: Padma Ansari : 1955 CSN #: [id number]   Referring Practitioner:  Referring Provider (secondary): Dr. Fredy Quiroz Referral Date:          [x] Plan of Care   [] Updated Plan of Care    Dates of Service to Include: 2022 to 22    Diagnosis:       Rehab (Treatment) Diagnosis:  L LE pain, decreased ambulation tolerance             Onset Date:  22    Attendance  Total # of Visits to Date: 1        Assessment  Assessment: Pt is a 61year old that underwent L knee surgery for a L LE sarcoma. Pt currently in a L knee immobilizer brace but was fitted for a L T-ROM brace that is currently locked from 0-30 degrees. Pt presents with a large incision that is approximated and healing well. Pt presents with only 27 degrees of active flexion. Pt has L quad stength of 2/5 but unable to perform SAQ or SLR. Pt will benefit from skilled PT in order to address these deficits.       Goals  Short Term Goals  Time Frame for Short term goals: 3 weeks  Short term goal 1: Pt will be educated on her POC and HEP-met  Short term goal 2: Pt will increase L knee AROM to 50 degrees flexion in order to normalize gait  Long Term Goals  Time Frame for Long term goals : 6 weeks  Long term goal 1: Pt will be safe and independent with her HEP  Long term goal 2: Pt will increase L knee flexion to 90 degrees in order to ambulation with normal gait pattern and without a brace when allowed by physician  Long term goal 3: Pt will increase L quad strength to 4-/5 in order to perform 5 SLR in order to increase functional strength  Long term goal 4: Pt will demonstrate no increase in girth measurements for edema in order to increase L knee AROM  Long term goal 5: Pt will return to 50% of normal activities including community ambulation     Prognosis  Therapy Prognosis: Good    Treatment Plan   Plan Frequency: 3x/wk  Plan weeks: 8 weeks  [x] HP/CP      [] Electrical Stim   [x] Therapeutic Exercise      [x] Gait Training  [] Aquatics   [] Ultrasound         [x] Patient Education/HEP   [x] Manual Therapy  [] Traction    [x] Neuro-sadie        [x] Soft Tissue Mobs            [] Home TENS  [] Iontophoresis    [] Orthotic casting/fitting      [] Dry Needling             Electronically signed by: Eliseo Naidu PT, DPT    Date: 4/20/2022      ______________________________________ Date: 4/20/2022   Physician Signature

## 2022-04-25 NOTE — PROGRESS NOTES
Phone: Brian           Fax: [phone]                           Outpatient Physical Therapy                                                                            Daily Note    Patient: Asaf Abreu : 1955  CSN #: [id number]   Referring Practitioner:  Referring Provider (secondary): Dr. Miguel Florez     Date: 2022    Diagnosis: L thigh soft tissue sarcoma  Treatment Diagnosis: L LE pain, decreased ambulation tolerance    Onset Date: 22  PT Insurance Information: [employer] [place]  Total # of Visits Approved: 18 Per Physician Order  Total # of Visits to Date: 2      Pre-Treatment Pain:  0/10       Exercises:  Exercise 1: HEP heel slides, quad set, static flexion stretch  Exercise 2: heel slides x10  Exercise 3: quad set 10x10\"  Exercise 4: SAQ with A x10  Exercise 5: static flexion 5 min    Manual:  Joint Mobilization: gentle L knee PROM to 30 degrees of flexion only      Assessment  Assessment: Continue to work on L knee AROM, PT helped with seated flexion, pt apprehensive and nervous but did complete the exercise. PT also educated on eccentric strength with a belt for SAQ and SLR. Patient Education  Patient Education: educated on static seated flexion and SLR with belt and SLR  Pt verbalized/demonstrated good understanding:     [x] Yes         [] No, pt required further clarification.        Post Treatment Pain:  2/10      Plan  Plan Frequency: 3x/wk  Plan weeks: 8 weeks       Goals  (Total # of Visits to Date: 2)      Short Term Goals  Time Frame for Short term goals: 3 weeks  Short term goal 1: Pt will be educated on her POC and HEP-met  Short term goal 2: Pt will increase L knee AROM to 50 degrees flexion in order to normalize gait    Long Term Goals  Time Frame for Long term goals : 6 weeks  Long term goal 1: Pt will be safe and independent with her HEP  Long term goal 2: Pt will increase L knee flexion to 90 degrees in order to ambulation with normal gait pattern and without a brace when allowed by physician  Long term goal 3: Pt will increase L quad strength to 4-/5 in order to perform 5 SLR in order to increase functional strength  Long term goal 4: Pt will demonstrate no increase in girth measurements for edema in order to increase L knee AROM  Long term goal 5: Pt will return to 50% of normal activities including community ambulation    Minutes Tracking:  Time In: 1545  Time Out: Dilma Vivas 81.  Minutes: 60  Timed Code Treatment Minutes: [address] [place], PT DPT     Date: 4/22/2022

## 2022-04-26 ENCOUNTER — HOSPITAL ENCOUNTER (OUTPATIENT)
Dept: PHYSICAL THERAPY | Age: 67
Setting detail: THERAPIES SERIES
Discharge: HOME | End: 2022-04-26
Payer: COMMERCIAL

## 2022-04-26 ENCOUNTER — HOSPITAL ENCOUNTER (OUTPATIENT)
Age: 67
Setting detail: SPECIMEN
Discharge: HOME | End: 2022-04-26

## 2022-04-26 PROCEDURE — 97110 THERAPEUTIC EXERCISES: CPT

## 2022-04-26 PROCEDURE — 97140 MANUAL THERAPY 1/> REGIONS: CPT

## 2022-04-27 ENCOUNTER — HOSPITAL ENCOUNTER (OUTPATIENT)
Dept: PHYSICAL THERAPY | Age: 67
Setting detail: THERAPIES SERIES
Discharge: HOME | End: 2022-04-27
Payer: COMMERCIAL

## 2022-04-27 ENCOUNTER — OFFICE VISIT (OUTPATIENT)
Dept: ONCOLOGY | Age: 67
End: 2022-04-27
Payer: COMMERCIAL

## 2022-04-27 VITALS
DIASTOLIC BLOOD PRESSURE: 74 MMHG | HEART RATE: 72 BPM | BODY MASS INDEX: 24.62 KG/M2 | RESPIRATION RATE: 18 BRPM | SYSTOLIC BLOOD PRESSURE: 118 MMHG | TEMPERATURE: 98.2 F | WEIGHT: 139 LBS

## 2022-04-27 DIAGNOSIS — C49.9 LIPOSARCOMA (HCC): Primary | ICD-10-CM

## 2022-04-27 LAB — SURGICAL PATHOLOGY REPORT: NORMAL

## 2022-04-27 PROCEDURE — 99205 OFFICE O/P NEW HI 60 MIN: CPT | Performed by: INTERNAL MEDICINE

## 2022-04-27 PROCEDURE — 97110 THERAPEUTIC EXERCISES: CPT

## 2022-04-27 PROCEDURE — 97140 MANUAL THERAPY 1/> REGIONS: CPT

## 2022-04-27 NOTE — PATIENT INSTRUCTIONS
REFER TO [facility] FOR SECOND OPINION  FURTHER RECOMMENDATIONS WILL FOLLOW

## 2022-04-27 NOTE — PROGRESS NOTES
Phone: Brian           Fax: [phone]                           Outpatient Physical Therapy                                                                            Daily Note    Patient: Sheri Brown : 1955  CSN #: [id number]   Referring Practitioner:  Referring Provider (secondary): Dr. Tera Mata     Date: 2022    Diagnosis: L thigh soft tissue sarcoma  Treatment Diagnosis: L LE pain, decreased ambulation tolerance    Onset Date: 22  PT Insurance Information: [employer]  Total # of Visits Approved: 18 Per Physician Order  Total # of Visits to Date: 3  No Show: 0  Canceled Appointment: 0      Pre-Treatment Pain:  0-2/10  Subjective: Stretching at home. No complaints of soreness following last session. Exercises:  Exercise 2: heel slides x10  Exercise 3: quad set 10x10\"  Exercise 5: static flexion 5 min  Exercise 6: eccentric SLR lowering    Manual:  Soft Tissue Mobilizaton: Manual STM to med and lat quad in slightly flexed position  Other: Very light MFD with one cup sliding to ITB      Assessment  Assessment: Pt arrives to PT with 25 degrees of left knee flexion with self stretching in supine. Pt able to achieve 30 degrees of knee flexion following treatment. Reviewed standing knee flexion stretch at stairs and sitting knee flexion stretch with significant education on avoiding compensation; pt voiced good understanding. Will continue to progress as pt tolerates. Activity Tolerance  Activity Tolerance: Patient tolerated treatment well    Patient Education  Patient Education: review of HEP  Pt verbalized/demonstrated good understanding:     [x] Yes         [] No, pt required further clarification.        Post Treatment Pain:  2/10      Plan  Plan Frequency: 3x/wk  Plan weeks: 8 weeks       Goals  (Total # of Visits to Date: 3)      Short Term Goals  Time Frame for Short term goals: 3 weeks  Short term goal 1: Pt will be educated on her POC and HEP-met  Short term goal 2: Pt will increase L knee AROM to 50 degrees flexion in order to normalize gait    Long Term Goals  Time Frame for Long term goals : 6 weeks  Long term goal 1: Pt will be safe and independent with her HEP  Long term goal 2: Pt will increase L knee flexion to 90 degrees in order to ambulation with normal gait pattern and without a brace when allowed by physician  Long term goal 3: Pt will increase L quad strength to 4-/5 in order to perform 5 SLR in order to increase functional strength  Long term goal 4: Pt will demonstrate no increase in girth measurements for edema in order to increase L knee AROM  Long term goal 5: Pt will return to 50% of normal activities including community ambulation    Minutes Tracking:  Time In: 1702  Time Out: [id number] Viridiana Savage  Minutes: 65  Timed Code Treatment Minutes: Vince Murrell 63, PT , DPT, CMPT      Date: 4/26/2022

## 2022-04-27 NOTE — PROGRESS NOTES
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 Cancer Staging  Sarcoma of left thigh [facility])  Staging form: Soft Tissue Sarcoma Of The Trunk And Extremities, AJCC 8th Edition  - Pathologic stage from 3/21/2022: Stage IIIB (ypT4, pN0, cM0, FNCLCC histologic grade: G3) - Signed by Matthew Sandoval MD on 4/20/2022  - Clinical: Stage IB (cT2, cN0, cM0, FNCLCC histologic grade: GX) - Signed by Matthew Sandoval MD on 4/20/2022      CURRENT THERAPY:         Status post neoadjuvant radiation therapy 50 Gy February 10, 2022  Status post left thigh sarcoma wide excision March 21, 2022  Plan for adjuvant chemotherapy treatment  BRIEF CASE HISTORY:      Ms. Fidel Sullivan is a very pleasant 61 y.o. female with history of multiple co morbidities as listed. Patient is referred for evaluation of further management of high-grade liposarcoma. The patient states that she had left thigh swelling around November 2021 which was getting larger so she was evaluated by orthopedics and she had biopsy which showed liposarcoma. The patient had no significant pain or stiffness. No other systemic symptoms. She had neoadjuvant treatment with radiation therapy in February 2022. Following recovery she had wide excision on March 21, 2022. Pathology results showed 16.5 cm mass with greater than 50% necrosis with close margin of less than 2 mm. Patient is healing well from her surgery and she is undergoing rehab. No complications. Patient seen for further management. No chest pain or shortness of breath. No weight loss or decreased appetite. No other complaints. Bg Alejandra PAST MEDICAL HISTORY: has a past medical history of Cancer ([address] History of radiation therapy, and PONV (postoperative nausea and vomiting). PAST SURGICAL HISTORY: has a past surgical history that includes Leg Surgery (Left, 1979);  Breast biopsy (2014);  section, low transverse; Colonoscopy (N/A, 3/30/2018); Colonoscopy (2018); Leg biopsy excision (Left, 2021); Leg biopsy excision (Left, 3/21/2022); tissue transfer (Left, 3/21/2022); Leg Surgery (Left, 3/21/2022); and Skin graft (Left, 3/21/2022). CURRENT MEDICATIONS:  has a current medication list which includes the following prescription(s): acetaminophen, docusate sodium, ondansetron, and polyethylene glycol. ALLERGIES:  has No Known Allergies. FAMILY HISTORY: Negative for any hematological or oncological conditions. SOCIAL HISTORY:  reports that she has never smoked. She has never used smokeless tobacco. She reports current alcohol use. She reports that she does not use drugs. REVIEW OF SYSTEMS:     · General: No weakness or fatigue. No unanticipated weight loss or decreased appetite. No fever or chills. · Eyes: No blurred vision, eye pain or double vision. · Ears: No hearing problems or drainage. No tinnitus. · Throat: No sore throat, problems with swallowing or dysphagia. · Respiratory: No cough, sputum or hemoptysis. No shortness of breath. No pleuritic chest pain. · Cardiovascular: No chest pain, orthopnea or PND. No lower extremity edema. No palpitation. · Gastrointestinal: No problems with swallowing. No abdominal pain or bloating. No nausea or vomiting. No diarrhea or constipation. No GI bleeding. · Genitourinary: No dysuria, hematuria, frequency or urgency. · Musculoskeletal: As above. · Dermatologic: No skin rashes or pruritus. No skin lesions or discolorations. · Psychiatric: No depression, anxiety, or stress or signs of schizophrenia. No change in mood or affect. · Hematologic: No history of bleeding tendency. No bruises or ecchymosis. No history of clotting problems. · Infectious disease: No fever, chills or frequent infections. · Endocrine: No polydipsia or polyuria. No temperature intolerance.   · Neurologic: No headaches or dizziness. No weakness or numbness of the extremities. No changes in balance, coordination,  memory, mentation, behavior. · Allergic/Immunologic: No nasal congestion or hives. No repeated infections. PHYSICAL EXAM:  The patient is not in acute distress. Vital signs: Blood pressure 118/74, pulse 72, temperature 98.2 °F (36.8 °C), temperature source Temporal, resp. rate 18, weight 139 lb (63 kg), last menstrual period 01/01/2010, not currently breastfeeding. General appearance - well appearing, not in pain or distress  Mental status - good mood, alert and oriented  Eyes - pupils equal and reactive, extraocular eye movements intact  Ears - bilateral TM's and external ear canals normal  Nose - normal and patent, no erythema, discharge or polyps  Mouth - mucous membranes moist, pharynx normal without lesions  Neck - supple, no significant adenopathy  Lymphatics - no palpable lymphadenopathy, no hepatosplenomegaly  Chest - clear to auscultation, no wheezes, rales or rhonchi, symmetric air entry  Heart - normal rate, regular rhythm, normal S1, S2, no murmurs, rubs, clicks or gallops  Abdomen - soft, nontender, nondistended, no masses or organomegaly  Neurological - alert, oriented, normal speech, no focal findings or movement disorder noted  Musculoskeletal - no joint tenderness, deformity or swelling  Extremities -left lower extremity s/p recent large lipoma excision with healing wound extending from the upper thigh to below the knee. No signs of infection.   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  Grade 3. PLAN: Records and labs and images were reviewed and discussed with the patient. For more than 60 minutes of face to face discussion, I explained to the patient the nature of soft tissue sarcomas and management plans. Due to the tumor size and being high grade, with close surgical margins, we offer treatment for liposarcoma using Anthracycline and Ifosfamide and possible use of Taxotere/ Gemzar as outpatient. Considering the nature of this cancer, I like her to be seen in a tertiary cancer center with multi disciplinary sarcoma clinic. She agreed to be seen in [facility]. She will continue rehab. Patient's questions were answered to the best of her satisfaction and she verbalized full understanding and agreement. [address] [place] [address] [facility]                            This note is created with the assistance of a speech recognition program.  While intending to generate a document that actually reflects the content of the visit, the document can still have some errors including those of syntax and sound a like substitutions which may escape proof reading. It such instances, actual meaning can be extrapolated by contextual diversion.

## 2022-04-28 ENCOUNTER — TELEPHONE (OUTPATIENT)
Dept: ONCOLOGY | Age: 67
End: 2022-04-28

## 2022-04-28 NOTE — TELEPHONE ENCOUNTER
Name: Brice Cordon  : 1955  MRN: [id number]    Oncology Navigation- Initial Note:    Intake-  Contact Type: Telephone    Diagnosis: Sarcoma    Home Disposition: Lives with other who is able to assist    Patient needs and barriers to care: Coordination of Care     Referral Source: Outside Provider    Interventions-     Continuum of Care: Diagnosis/Active Treatment    Notes: Call made to patient to establish navigation. Spoke to [employer]. Writer introduced self as navigator. [employer] denies any issues with transportation or social work needs. [employer] informed that Paul Meyer was working on out of network form for referral for [facility] and referral to [facility] had been  made. Patient informed to expect a call from [facility] for scheduling. Understanding verbalized. Writer's contact information given to [employer]. Patient encouraged to call with any questions, concerns, or needs.     Electronically signed by Germain Morales RN on 2022 at 1:39 PM

## 2022-04-28 NOTE — PROGRESS NOTES
Phone: Brian           Fax: [phone]                           Outpatient Physical Therapy                                                                            Daily Note    Patient: Lisa Goss : 1955  CSN #: [id number]   Referring Physician: No ref. provider found    Date: 2022    Diagnosis: L thigh soft tissue sarcoma  Treatment Diagnosis: L LE pain, decreased ambulation tolerance    Onset Date: 22  PT Insurance Information: [employer] [place]  Total # of Visits Approved: 18 Per Physician Order  Total # of Visits to Date: 4  No Show: 0  Canceled Appointment: 0      Pre-Treatment Pain:  2/10  Subjective: Pt tolerated last session well. States sorness only slightly increased    Exercises:  Exercise 3: quad set 10x10\"  Exercise 5: static flexion 15min    Manual:  Joint Mobilization: gentle L knee PROM to 30 degrees of flexion only/ patellar mobs  Soft Tissue Mobilizaton: Manual STM to med and lat quad in slightly flexed position          Assessment  Assessment: Pt flexioon 30 following static stretching and positioning, Pt still a little apprehensive with PROM. Patellar mobs in supine position tolerated. Myofascial to quads during PROM. HEP reviewed, will continue to progress as tolerated    Activity Tolerance  Activity Tolerance: Patient tolerated treatment well    Patient Education  Patient Education: HEP  Pt verbalized/demonstrated good understanding:     [x] Yes         [] No, pt required further clarification.        Post Treatment Pain:  2/10      Plan  Plan Frequency: 3x/wk  Plan weeks: 8 weeks       Goals  (Total # of Visits to Date: 4)      Short Term Goals  Time Frame for Short term goals: 3 weeks  Short term goal 1: Pt will be educated on her POC and HEP-met  Short term goal 2: Pt will increase L knee AROM to 50 degrees flexion in order to normalize gait    Long Term Goals  Time Frame for Long term goals : 6 weeks  Long term goal 1: Pt will be safe and independent with her HEP  Long term goal 2: Pt will increase L knee flexion to 90 degrees in order to ambulation with normal gait pattern and without a brace when allowed by physician  Long term goal 3: Pt will increase L quad strength to 4-/5 in order to perform 5 SLR in order to increase functional strength    Minutes Tracking:  Time In: 1700  Time Out: 1741  Minutes: 41  Timed Code Treatment Minutes: 40 Minutes       Gloria William     Date: 4/27/2022

## 2022-04-29 ENCOUNTER — HOSPITAL ENCOUNTER (OUTPATIENT)
Dept: PHYSICAL THERAPY | Age: 67
Setting detail: THERAPIES SERIES
Discharge: HOME | End: 2022-04-29
Payer: COMMERCIAL

## 2022-04-29 PROCEDURE — 97140 MANUAL THERAPY 1/> REGIONS: CPT

## 2022-04-29 PROCEDURE — 97110 THERAPEUTIC EXERCISES: CPT

## 2022-05-02 ENCOUNTER — HOSPITAL ENCOUNTER (OUTPATIENT)
Dept: PHYSICAL THERAPY | Age: 67
Setting detail: THERAPIES SERIES
Discharge: HOME | End: 2022-05-02
Payer: COMMERCIAL

## 2022-05-02 ENCOUNTER — TELEPHONE (OUTPATIENT)
Dept: ONCOLOGY | Age: 67
End: 2022-05-02

## 2022-05-02 PROCEDURE — 97110 THERAPEUTIC EXERCISES: CPT

## 2022-05-03 NOTE — PROGRESS NOTES
Phone: Brian           Fax: [phone]                           Outpatient Physical Therapy                                                                            Daily Note    Patient: Hill Jauregui : 1955  CSN #: [id number]   Referring Physician: No ref. provider found    Date: 2022    Diagnosis: L thigh soft tissue sarcoma  Treatment Diagnosis: L LE pain, decreased ambulation tolerance    Onset Date: 22  PT Insurance Information: [employer] [place]  Total # of Visits Approved: 18 Per Physician Order  Total # of Visits to Date: 5  No Show: 0  Canceled Appointment: 0      Pre-Treatment Pain:  0-110  Subjective: Pt without complaints of pain or left LE giving way. Trying to take some steps holding onto kitchen counter at home. Exercises:  Exercise 2: heel slides x10  Exercise 4: SAQ with A x10  Exercise 5: static flexion 15min  Exercise 7: bike 10 mins  Exercise 8: knee flexion at stairs  Exercise 9: prone knee flexion with therapist overpressure  Exercise 10: side stepping at counter with brace donned    Manual:  Soft Tissue Mobilizaton: Manual STM to med and lat quad in slightly flexed position  Other: Very light MFD with one cup sliding to ITB      Assessment  Assessment: Pt tolerates ex well. Added prone knee flexion this date with good pt rossy. Pt instructed in side stepping at counter with brace vs forward gait to avoid left LE giving way; pt voiced good understanding. Pt continues with significant tightness through left quad and IT band. Will continue to progress ROM and strength as pt tolerates. Activity Tolerance  Activity Tolerance: Patient tolerated treatment well    Patient Education  Patient Education: continue HEP  Pt verbalized/demonstrated good understanding:     [x] Yes         [] No, pt required further clarification.        Post Treatment Pain:  0-110      Plan  Plan Frequency: 3x/wk  Plan weeks: 8 weeks       Goals  (Total # of Visits to Date: 5)      Short Term Goals  Time Frame for Short term goals: 3 weeks  Short term goal 1: Pt will be educated on her POC and HEP-met  Short term goal 2: Pt will increase L knee AROM to 50 degrees flexion in order to normalize gait    Long Term Goals  Time Frame for Long term goals : 6 weeks  Long term goal 1: Pt will be safe and independent with her HEP  Long term goal 2: Pt will increase L knee flexion to 90 degrees in order to ambulation with normal gait pattern and without a brace when allowed by physician  Long term goal 3: Pt will increase L quad strength to 4-/5 in order to perform 5 SLR in order to increase functional strength  Long term goal 4: Pt will demonstrate no increase in girth measurements for edema in order to increase L knee AROM  Long term goal 5: Pt will return to 50% of normal activities including community ambulation    Minutes Tracking:  Time In: 1602  Time Out: 1657  Minutes: 55  Timed Code Treatment Minutes: [address], PT , DPT, CMPT      Date: 5/2/2022

## 2022-05-03 NOTE — PROGRESS NOTES
Phone: Brian           Fax: [phone]                           Outpatient Physical Therapy                                                                            Daily Note    Patient: Hill Jauregui : 1955  CSN #: [id number]   Referring Physician: No ref. provider found    Date: 2022    Diagnosis: L thigh soft tissue sarcoma  Treatment Diagnosis: L LE pain, decreased ambulation tolerance    Onset Date: 22  PT Insurance Information: [employer]  Total # of Visits Approved: 18 Per Physician Order         Pre-Treatment Pain:  1-2/10  Subjective: Pt reports she has been compliant with all her exercises    Exercises:  Exercise 1: HEP heel slides, quad set, static flexion stretch  Exercise 3: quad set 10x10\"  Exercise 4: SAQ with A x10  Exercise 5: static flexion 15min  Exercise 6: eccentric SLR lowering  Exercise 8: knee flexion at stairs    Manual:  Joint Mobilization: gentle L knee PROM to 30 degrees of flexion only/ patellar mobs  Soft Tissue Mobilizaton: Manual STM to med and lat quad in slightly flexed position  Other: Very light MFD with one cup sliding to ITB    Assessment  Assessment: Cupping to bilateral hamstring this date to decrease hamstring tightness to increase L knee ROM, Pt able to achieve 35 degrees flexionthis date, worked on static stretching and incresed quad control with SLR with strap. PT increase brace ROM to 40 flexion      Patient Education  Patient Education: increasing L knee flexion ROM to 40 with brace  Pt verbalized/demonstrated good understanding:     [x] Yes         [] No, pt required further clarification.        Post Treatment Pain:  10      Plan  Plan Frequency: 3x/wk  Plan weeks: 8 weeks       Goals  ( )      Short Term Goals  Time Frame for Short term goals: 3 weeks  Short term goal 1: Pt will be educated on her POC and HEP-met  Short term goal 2: Pt will increase L knee AROM to 50 degrees flexion in order to normalize gait*    Long Term Goals  Time Frame for Long term goals : 6 weeks  Long term goal 1: Pt will be safe and independent with her HEP  Long term goal 2: Pt will increase L knee flexion to 90 degrees in order to ambulation with normal gait pattern and without a brace when allowed by physician  Long term goal 3: Pt will increase L quad strength to 4-/5 in order to perform 5 SLR in order to increase functional strength  Long term goal 4: Pt will demonstrate no increase in girth measurements for edema in order to increase L knee AROM  Long term goal 5: Pt will return to 50% of normal activities including community ambulation    Minutes Tracking:  Time In: 1545  Time Out: 1642  Minutes: 57  Timed Code Treatment Minutes: [id number] Danya Mukherjee, PT DPT      Date: 4/29/2022

## 2022-05-04 ENCOUNTER — HOSPITAL ENCOUNTER (OUTPATIENT)
Dept: PHYSICAL THERAPY | Age: 67
Setting detail: THERAPIES SERIES
Discharge: HOME | End: 2022-05-04
Payer: COMMERCIAL

## 2022-05-04 LAB
Lab: NORMAL
REPORT: NORMAL
THIS TEST SENT TO: NORMAL

## 2022-05-04 PROCEDURE — 97140 MANUAL THERAPY 1/> REGIONS: CPT

## 2022-05-04 PROCEDURE — 97110 THERAPEUTIC EXERCISES: CPT

## 2022-05-06 ENCOUNTER — HOSPITAL ENCOUNTER (OUTPATIENT)
Dept: PHYSICAL THERAPY | Age: 67
Setting detail: THERAPIES SERIES
Discharge: HOME | End: 2022-05-06
Payer: COMMERCIAL

## 2022-05-06 ENCOUNTER — TELEPHONE (OUTPATIENT)
Dept: ONCOLOGY | Age: 67
End: 2022-05-06

## 2022-05-06 PROCEDURE — 97110 THERAPEUTIC EXERCISES: CPT

## 2022-05-06 PROCEDURE — 97140 MANUAL THERAPY 1/> REGIONS: CPT

## 2022-05-06 NOTE — TELEPHONE ENCOUNTER
Call received from patient who states that she contacted the insurance company and they had not received the out of network wavier. Patient notified that wavier is completed and needs a physicians signature. Patient informed that navigator will obtain signature on Wednesday and send waiver in urgent. Patient is scheduled with Dr. Cheryle Rear on 5/20/22 at [facility].     Berlin Crews RN

## 2022-05-06 NOTE — PROGRESS NOTES
Phone: Brian           Fax: [phone]                           Outpatient Physical Therapy                                                                            Daily Note    Patient: Brice Cordon : 1955  CSN #: [id number]   Referring Physician: No ref. provider found    Date: 2022    Diagnosis: L thigh soft tissue sarcoma  Treatment Diagnosis: L LE pain, decreased ambulation tolerance    Onset Date: 22  PT Insurance Information: [employer] [place]  Total # of Visits Approved: 18 Per Physician Order  Total # of Visits to Date: 6      Pre-Treatment Pain:  0/10  Subjective: Pt reports she has been trying to do a lot of static stretching    Exercises:  Exercise 1: HEP heel slides, quad set, static flexion stretch  Exercise 3: quad set 10x10\"  Exercise 5: static flexion 15min  Exercise 6: eccentric SLR lowering  Exercise 7: bike 10 mins  Exercise 8: knee flexion at stairs    Manual:  Joint Mobilization: gentle L knee PROM to 30 degrees of flexion only/ patellar mobs  Soft Tissue Mobilizaton: Manual STM to med and lat quad in slightly flexed position  Other: Very light MFD with one cup sliding to ITB      Assessment  Assessment: PT increased brace to 60 flexion hoping to normalize gait. Pt able to activate quad fro SLR with help of belt. Cotninue with cupping, manual STM/DTM and static stretching      Patient Education  Patient Education: continue with POC  Pt verbalized/demonstrated good understanding:     [x] Yes         [] No, pt required further clarification.        Post Treatment Pain:  2/10      Plan  Plan Frequency: 3x/wk  Plan weeks: 8 weeks       Goals  (Total # of Visits to Date: 6)      Short Term Goals  Time Frame for Short term goals: 3 weeks  Short term goal 1: Pt will be educated on her POC and HEP-met  Short term goal 2: Pt will increase L knee AROM to 50 degrees flexion in order to normalize gait    Long Term Goals  Time Frame for Long term goals : 6 weeks  Long term goal 1: Pt will be safe and independent with her HEP  Long term goal 2: Pt will increase L knee flexion to 90 degrees in order to ambulation with normal gait pattern and without a brace when allowed by physician  Long term goal 3: Pt will increase L quad strength to 4-/5 in order to perform 5 SLR in order to increase functional strength  Long term goal 4: Pt will demonstrate no increase in girth measurements for edema in order to increase L knee AROM  Long term goal 5: Pt will return to 50% of normal activities including community ambulation    Minutes Tracking:  Time In: 1600  Time Out: 1655  Minutes: 55  Timed Code Treatment Minutes: [address], PT DPT      Date: 5/4/2022

## 2022-05-09 ENCOUNTER — HOSPITAL ENCOUNTER (OUTPATIENT)
Dept: PHYSICAL THERAPY | Age: 67
Setting detail: THERAPIES SERIES
Discharge: HOME | End: 2022-05-09
Payer: COMMERCIAL

## 2022-05-09 PROCEDURE — 97110 THERAPEUTIC EXERCISES: CPT

## 2022-05-09 PROCEDURE — 97140 MANUAL THERAPY 1/> REGIONS: CPT

## 2022-05-09 NOTE — PROGRESS NOTES
Phone: Brian           Fax: [phone]                           Outpatient Physical Therapy                                                                            Daily Note    Patient: Denis Frost : 1955  CSN #: [id number]   Referring Physician: No ref. provider found    Date: 2022    Diagnosis: L thigh soft tissue sarcoma  Treatment Diagnosis: L LE pain, decreased ambulation tolerance    Onset Date: 22  PT Insurance Information: Medical [place]  Total # of Visits Approved: 18 Per Physician Order         Pre-Treatment Pain:  1-2/10       Exercises:  Exercise 1: HEP heel slides, quad set, static flexion stretch  Exercise 3: quad set 10x10\"  Exercise 4: SAQ with A x10  Exercise 5: static flexion 15min  Exercise 6: eccentric SLR lowering  Exercise 7: bike 10 mins  Exercise 8: knee flexion at stairs  Exercise 9: prone knee flexion with therapist overpressure  Exercise 10: squats at counter x10    Manual:  Joint Mobilization: gentle L knee PROM to 30 degrees of flexion only/ patellar mobs  Soft Tissue Mobilizaton: Manual STM to med and lat quad in slightly flexed position  Other: Very light MFD with one cup sliding to ITB      Assessment  Assessment: Pt passive knee flexion after 2# weight applied for 5 minutes was 38 degrees. [facility] Continue with cupping for myofascial release    Activity Tolerance  Activity Tolerance: Patient tolerated treatment well    Patient Education  *Patient Education: walking without brace  Pt verbalized/demonstrated good understanding:     [x] Yes         [] No, pt required further clarification.        Post Treatment Pain:  2/10      Plan  Plan Frequency: 3x/wk  Plan weeks: 8 weeks       Goals  ( )      Short Term Goals  Time Frame for Short term goals: 3 weeks  Short term goal 1: Pt will be educated on her POC and HEP-met  Short term goal 2: Pt will increase L knee AROM to 50 degrees flexion in order to normalize gait    Long Term Goals  Time Frame for Long term goals : 6 weeks  Long term goal 1: Pt will be safe and independent with her HEP  Long term goal 2: Pt will increase L knee flexion to 90 degrees in order to ambulation with normal gait pattern and without a brace when allowed by physician  Long term goal 3: Pt will increase L quad strength to 4-/5 in order to perform 5 SLR in order to increase functional strength  Long term goal 4: Pt will demonstrate no increase in girth measurements for edema in order to increase L knee AROM  Long term goal 5: Pt will return to 50% of normal activities including community ambulation    Minutes Tracking:  Time In: 1543  Time Out: 1640  Minutes: 57  Timed Code Treatment Minutes: 4200 Amena Colvin PT DPT     Date: 5/6/2022

## 2022-05-10 NOTE — PROGRESS NOTES
Phone: Brian           Fax: [phone]                           Outpatient Physical Therapy                                                                            Daily Note    Patient: Suha Suresh : 1955  CSN #: [id number]   Referring Physician: No ref. provider found    Date: 2022    Diagnosis: L thigh soft tissue sarcoma  Treatment Diagnosis: L LE pain, decreased ambulation tolerance    Onset Date: 22  Total # of Visits Approved: 18 Per Physician Order  Total # of Visits to Date: 9  No Show: 0  Canceled Appointment: 0      Pre-Treatment Pain:  2/10  Subjective: Trying to stretch a lot at home. States she is starting to feel frustrated and knee does not feel like it is getting better. Exercises:  Exercise 7: bike 12 mins  Exercise 8: knee flexion at stairs  Exercise 9: prone knee flexion with therapist overpressure    Manual:  [employer]: contract/relax and co-contract sitting EOB to increase knee flexion  Soft Tissue Mobilizaton: Manual STM to med and lat quad in slightly flexed position  Other: Very light MFD with one cup sliding to ITB      Assessment  Assessment: Pt ROM is slowly progressing. Pt able to achieve 46 degrees of left knee flexion with self stretching this date following treatment. Pt is to maintain left single leg standing with knee unlocked. Will continue to progress as pt tolerates. Activity Tolerance  Activity Tolerance: Patient tolerated treatment well    Patient Education  Patient Education: continue stretching and HEP  Pt verbalized/demonstrated good understanding:     [x] Yes         [] No, pt required further clarification.        Post Treatment Pain:  2/10      Plan  Plan Frequency: 3x/wk  Plan weeks: 8 weeks       Goals  (Total # of Visits to Date: 5)      Short Term Goals  Time Frame for Short term goals: 3 weeks  Short term goal 1: Pt will be educated on her POC and HEP-met  Short term goal 2: Pt will increase L knee AROM to 50 degrees flexion in order to normalize gait    Long Term Goals  Time Frame for Long term goals : 6 weeks  Long term goal 1: Pt will be safe and independent with her HEP  Long term goal 2: Pt will increase L knee flexion to 90 degrees in order to ambulation with normal gait pattern and without a brace when allowed by physician  Long term goal 3: Pt will increase L quad strength to 4-/5 in order to perform 5 SLR in order to increase functional strength  Long term goal 4: Pt will demonstrate no increase in girth measurements for edema in order to increase L knee AROM  Long term goal 5: Pt will return to 50% of normal activities including community ambulation    Minutes Tracking:  Time In: 1616  Time Out: 1721  Minutes: 65  Timed Code Treatment Minutes: [address] [place], PT , DPT, CMPT      Date: 5/9/2022

## 2022-05-11 ENCOUNTER — HOSPITAL ENCOUNTER (OUTPATIENT)
Dept: PHYSICAL THERAPY | Age: 67
Setting detail: THERAPIES SERIES
Discharge: HOME | End: 2022-05-11
Payer: COMMERCIAL

## 2022-05-11 ENCOUNTER — TELEPHONE (OUTPATIENT)
Dept: ONCOLOGY | Age: 67
End: 2022-05-11

## 2022-05-11 PROCEDURE — 97140 MANUAL THERAPY 1/> REGIONS: CPT

## 2022-05-11 PROCEDURE — 97110 THERAPEUTIC EXERCISES: CPT

## 2022-05-11 NOTE — TELEPHONE ENCOUNTER
Out of network waiver completed and sent with supporting documentation to [email or web address]. Conformation received. Nelly Petty notified that waiver was sent. Understanding verbalized.     Carlita Mena RN

## 2022-05-13 ENCOUNTER — HOSPITAL ENCOUNTER (OUTPATIENT)
Dept: PHYSICAL THERAPY | Age: 67
Setting detail: THERAPIES SERIES
Discharge: HOME | End: 2022-05-13
Payer: COMMERCIAL

## 2022-05-13 PROCEDURE — 97110 THERAPEUTIC EXERCISES: CPT

## 2022-05-13 PROCEDURE — 97140 MANUAL THERAPY 1/> REGIONS: CPT

## 2022-05-13 NOTE — PROGRESS NOTES
Phone: Brina           Fax: [phone]                           Outpatient Physical Therapy                                                                            Daily Note    Patient: Juanito Thurston : 1955  CSN #: [id number]   Referring Physician: No ref. provider found    Date: 2022    Diagnosis: L thigh soft tissue sarcoma  Treatment Diagnosis: L LE pain, decreased ambulation tolerance    Onset Date: 22  PT Insurance Information: Medical [place]  Total # of Visits Approved: 18 Per Physician Order  Total # of Visits to Date: 10  No Show: 0  Canceled Appointment: 0      Pre-Treatment Pain:  0/10  Subjective: Patient denies pain, just has stiffness. Exercises:  Exercise 2: heel slides x10  Exercise 7: bike 12 mins  Exercise 8: knee flexion at stairs [id number]  Exercise 9: prone knee flexion with therapist overpressure    Manual:  Muscle Energy: contract/relax and co-contract sitting EOB to increase knee flexion  Soft Tissue Mobilizaton: Manual STM to med and lat quad in slightly flexed position  Other: Very light MFD with one cup sliding to ITB    Assessment  Assessment: Patient met STM for L knee flexion to 50* s/p stretching and manual techniques to improve scar tissue mobility and decrease quad tone. Will continue. Activity Tolerance  Activity Tolerance: Patient tolerated treatment well    Patient Education  Exercise technique    Pt verbalized/demonstrated good understanding:     [x] Yes         [] No, pt required further clarification.        Post Treatment Pain:  0/10      Plan  Plan Frequency: 3x/wk  Plan weeks: 8 weeks       Goals  (Total # of Visits to Date: 8)      Short Term Goals  Time Frame for Short term goals: 3 weeks  Short term goal 1: Pt will be educated on her POC and HEP-met  Short term goal 2: Pt will increase L knee AROM to 50 degrees flexion in order to normalize gait-met/cont    Long Term Goals  Time Frame for Long term goals : 6 weeks  Long term goal 1: Pt will be safe and independent with her HEP  Long term goal 2: Pt will increase L knee flexion to 90 degrees in order to ambulation with normal gait pattern and without a brace when allowed by physician  Long term goal 3: Pt will increase L quad strength to 4-/5 in order to perform 5 SLR in order to increase functional strength  Long term goal 4: Pt will demonstrate no increase in girth measurements for edema in order to increase L knee AROM  Long term goal 5: Pt will return to 50% of normal activities including community ambulation    Minutes Tracking:  Time In: 1604  Time Out: [phone]  Minutes: 51  Timed Code Treatment Minutes: 69 [facility] Kaila, PT , DPT     Date: 5/13/2022

## 2022-05-16 ENCOUNTER — TELEPHONE (OUTPATIENT)
Dept: ONCOLOGY | Age: 67
End: 2022-05-16

## 2022-05-16 ENCOUNTER — HOSPITAL ENCOUNTER (OUTPATIENT)
Dept: PHYSICAL THERAPY | Age: 67
Setting detail: THERAPIES SERIES
Discharge: HOME | End: 2022-05-16
Payer: COMMERCIAL

## 2022-05-16 PROCEDURE — 97140 MANUAL THERAPY 1/> REGIONS: CPT

## 2022-05-16 PROCEDURE — 97110 THERAPEUTIC EXERCISES: CPT

## 2022-05-16 NOTE — TELEPHONE ENCOUNTER
Call made to patient to informed her that e-mail was received that the out of network waiver had been approved. Mary Goetz verbalized understanding and thanked writer for an update. Mary Goetz denies other needs at this time.       Triston Nicolas RN

## 2022-05-17 NOTE — PROGRESS NOTES
Phone: Brian           Fax: [phone]                           Outpatient Physical Therapy                                                                            Daily Note    Patient: Tino Woods : 1955  CSN #: [id number]   Referring Physician: No ref. provider found    Date: 2022    Diagnosis: L thigh soft tissue sarcoma       Onset Date: 22  PT Insurance Information: Medical [place]  Total # of Visits Approved: 18 Per Physician Order  Total # of Visits to Date: 6  No Show: 0  Canceled Appointment: 0      Pre-Treatment Pain:  2/10  Subjective: Pt denies pain, states she see's Both Dr's next week    Exercises:  Exercise 5: static flexion 15min  Exercise 6: eccentric SLR lowering  Exercise 7: bike 12 mins  Exercise 8: knee flexion at stairs [id number]  Exercise 9: prone knee flexion with therapist overpressure  Exercise 10: squats at counter x10    Manual:  Joint Mobilization: Patellar mobs  Soft Tissue Mobilizaton: Static stretching in seated and prone positions       Assessment  Assessment: Pt is tolerating manual stretching much better in supine and prone positions. Flexion 50 deg following static stretching. Exercise to pt tolerance. Pt has  appt next week. Will continue to progress as tolrated    Activity Tolerance  Activity Tolerance: Patient tolerated treatment well    Patient Education  Patient Education: HEP and home stretching  Pt verbalized/demonstrated good understanding:     [x] Yes         [] No, pt required further clarification.        Post Treatment Pain:  2/10      Plan  Plan Frequency: 3x/wk  Plan weeks: 8 weeks       Goals  (Total # of Visits to Date: 6)      Short Term Goals  Time Frame for Short term goals: 3 weeks  Short term goal 1: Pt will be educated on her POC and HEP-met  Short term goal 2: Pt will increase L knee AROM to 50 degrees flexion in order to normalize gait-met/cont    Long Term Goals  Time Frame for Long term goals : 6 weeks  Long term goal 1: Pt will be safe and independent with her HEP  Long term goal 2: Pt will increase L knee flexion to 90 degrees in order to ambulation with normal gait pattern and without a brace when allowed by physician  Long term goal 3: Pt will increase L quad strength to 4-/5 in order to perform 5 SLR in order to increase functional strength  Long term goal 4: Pt will demonstrate no increase in girth measurements for edema in order to increase L knee AROM  Long term goal 5: Pt will return to 50% of normal activities including community ambulation    Minutes Tracking:  Time In: 1630  Time Out: 1722  Minutes: 52  Timed Code Treatment Minutes: 49 Minutes       Andrés Delacruz PPTA     Date: 5/16/2022

## 2022-05-17 NOTE — PROGRESS NOTES
Phone: Brian           Fax: [phone]                           Outpatient Physical Therapy                                                                            Daily Note    Patient: Brice Cordon : 1955  CSN #: [id number]   Referring Physician: No ref. provider found    Date: 2022    Diagnosis: L thigh soft tissue sarcoma  Treatment Diagnosis: L LE pain, decreased ambulation tolerance    Onset Date: 22  PT Insurance Information: Medical [place]  Total # of Visits Approved: 18 Per Physician Order         Pre-Treatment Pain:  0/10  Subjective: Pt reports she is doing well and doesn't have pain    Exercises:  Exercise 1: HEP heel slides, quad set, static flexion stretch  Exercise 2: heel slides x10  Exercise 5: static flexion 15min  Exercise 7: bike 12 mins  Exercise 8: knee flexion at stairs [id number]  Exercise 9: prone knee flexion with therapist overpressure  Exercise 10: squats at counter x10    Manual:  Joint Mobilization: Patellar mobs  Soft Tissue Mobilizaton: Static stretching in seated and prone positions  Other: Very light MFD with one cup sliding to ITB      Assessment  Assessment: Continue with cupping to quad, IT band and hamstring to reduce tone, patella mobs performed in order to increase mobility. Pt able to complete 2x5 SLR this date    Activity Tolerance  Activity Tolerance: Patient tolerated treatment well    Patient Education  *Patient Education: walking without brace  Pt verbalized/demonstrated good understanding:     [x] Yes         [] No, pt required further clarification.        Post Treatment Pain:  0/10      Plan  Plan Frequency: 3x/wk  Plan weeks: 8 weeks       Goals  ( )      Short Term Goals  Time Frame for Short term goals: 3 weeks  Short term goal 1: Pt will be educated on her POC and HEP-met  Short term goal 2: Pt will increase L knee AROM to 50 degrees flexion in order to normalize gait-met/cont    Long Term Goals  Time Frame for Long term goals : 6 weeks  Long term goal 1: Pt will be safe and independent with her HEP  Long term goal 2: Pt will increase L knee flexion to 90 degrees in order to ambulation with normal gait pattern and without a brace when allowed by physician  Long term goal 3: Pt will increase L quad strength to 4-/5 in order to perform 5 SLR in order to increase functional strength  Long term goal 4: Pt will demonstrate no increase in girth measurements for edema in order to increase L knee AROM  Long term goal 5: Pt will return to 50% of normal activities including community ambulation        Dennis Thurman PT DPT      Date: 5/11/2022

## 2022-05-18 ENCOUNTER — HOSPITAL ENCOUNTER (OUTPATIENT)
Dept: PHYSICAL THERAPY | Age: 67
Setting detail: THERAPIES SERIES
Discharge: HOME | End: 2022-05-18
Payer: COMMERCIAL

## 2022-05-18 PROCEDURE — 97140 MANUAL THERAPY 1/> REGIONS: CPT

## 2022-05-18 PROCEDURE — 97110 THERAPEUTIC EXERCISES: CPT

## 2022-05-20 ENCOUNTER — APPOINTMENT (OUTPATIENT)
Dept: PHYSICAL THERAPY | Age: 67
End: 2022-05-20
Payer: COMMERCIAL

## 2022-05-20 NOTE — PROGRESS NOTES
Phone: Brian           Fax: [phone]                           Outpatient Physical Therapy                                                                            Daily Note    Patient: Amy Huffman : 1955  CSN #: [id number]   Referring Physician: No ref. provider found    Date: 2022    Diagnosis: L thigh soft tissue sarcoma  Treatment Diagnosis: L LE pain, decreased ambulation tolerance    Onset Date: 22  PT Insurance Information: Medical [place]  Total # of Visits Approved: 18 Per Physician Order  Total # of Visits to Date: 12      Pre-Treatment Pain:  0/10  Subjective: Pt denies pain at this time    Exercises:  Exercise 1: HEP heel slides, quad set, static flexion stretch  Exercise 2: heel slides x10  Exercise 5: static flexion 15min  Exercise 6: eccentric SLR lowering  Exercise 7: bike 12 mins  Exercise 8: knee flexion at stairs [id number]  Exercise 9: prone knee flexion with therapist overpressure  Exercise 10: squats at counter x10    Manual:  Joint Mobilization: Patellar mobs  Soft Tissue Mobilizaton: Static stretching in seated and prone positions  Other: Very light MFD with one cup sliding to ITB    Assessment  Assessment: Continue with manual passive stretching supine and prone with cupping to wuads and hamstring to reduce tone. Pt required assist with SLR this date    Activity Tolerance  Activity Tolerance: Patient tolerated treatment well    Patient Education  Patient Education: educated on progress  Pt verbalized/demonstrated good understanding:     [x] Yes         [] No, pt required further clarification.        Post Treatment Pain:  0/10      Plan  Plan Frequency: 3x/wk  Plan weeks: 8 weeks       Goals  (Total # of Visits to Date: 15)      Short Term Goals  Time Frame for Short term goals: 3 weeks  Short term goal 1: Pt will be educated on her POC and HEP-met  Short term goal 2: Pt will increase L knee AROM to 50 degrees flexion in order to normalize gait-met/cont    Long Term Goals  Time Frame for Long term goals : 6 weeks  Long term goal 1: Pt will be safe and independent with her HEP  Long term goal 2: Pt will increase L knee flexion to 90 degrees in order to ambulation with normal gait pattern and without a brace when allowed by physician  Long term goal 3: Pt will increase L quad strength to 4-/5 in order to perform 5 SLR in order to increase functional strength  Long term goal 4: Pt will demonstrate no increase in girth measurements for edema in order to increase L knee AROM  Long term goal 5: Pt will return to 50% of normal activities including community ambulation    Minutes Tracking:  Time In: 1556  Time Out: [address]  Minutes: 49  Timed Code Treatment Minutes: 101 [facility] Harish, INDIRA DPT      Date: 5/18/2022

## 2022-05-23 ENCOUNTER — HOSPITAL ENCOUNTER (OUTPATIENT)
Dept: PHYSICAL THERAPY | Age: 67
Setting detail: THERAPIES SERIES
Discharge: HOME | End: 2022-05-23
Payer: COMMERCIAL

## 2022-05-23 PROCEDURE — 97140 MANUAL THERAPY 1/> REGIONS: CPT

## 2022-05-23 PROCEDURE — 97110 THERAPEUTIC EXERCISES: CPT

## 2022-05-23 NOTE — PROGRESS NOTES
Phone: Brian           Fax: [phone]                           Outpatient Physical Therapy                                                                            Daily Note    Patient: Lorna Hay : 1955  CSN #: [id number]   Referring Physician: No ref. provider found    Date: 2022    Diagnosis: L thigh soft tissue sarcoma       Onset Date: 22  PT Insurance Information: Medical [place]  Total # of Visits Approved: 18 Per Physician Order  Total # of Visits to Date: 13  No Show: 0  Canceled Appointment: 0      Pre-Treatment Pain:  1/10  Subjective: States pain is minimal, c/o more tightness  L knee    Exercises:  Exercise 5: static flexion 15min  Exercise 7: bike 12 mins  Exercise 8: knee flexion at stairs [id number]  Exercise 10: squats at counter x10  Exercise 11: FSU 6\" block 10-15x    Manual:  Soft Tissue Mobilizaton: Static stretching in seated and prone positions         Assessment  Assessment: PROM well tolerated in seated and prone positions. Mob patella with increased mobility noted. Flexion 50 deg. Pt to see Dr tomorrow    Activity Tolerance  Activity Tolerance: Patient tolerated treatment well    Patient Education  Patient Education: HEP  Pt verbalized/demonstrated good understanding:     [x] Yes         [] No, pt required further clarification.        Post Treatment Pain:  1/10      Plan  Plan Frequency: 3x/wk  Plan weeks: 8 weeks       Goals  (Total # of Visits to Date: 15)      Short Term Goals  Time Frame for Short term goals: 3 weeks  Short term goal 1: Pt will be educated on her POC and HEP-met  Short term goal 2: Pt will increase L knee AROM to 50 degrees flexion in order to normalize gait-met/cont    Long Term Goals  Time Frame for Long term goals : 6 weeks  Long term goal 1: Pt will be safe and independent with her HEP  Long term goal 2: Pt will increase L knee flexion to 90 degrees in order to ambulation with normal gait pattern and without a brace when allowed by physician  Long term goal 3: Pt will increase L quad strength to 4-/5 in order to perform 5 SLR in order to increase functional strength  Long term goal 4: Pt will demonstrate no increase in girth measurements for edema in order to increase L knee AROM  Long term goal 5: Pt will return to 50% of normal activities including community ambulation    Minutes Tracking:  Time In: 0830  Time Out: 0915  Minutes: 45  Timed Code Treatment Minutes: 43 Minutes       Nasreen William     Date: 5/23/2022

## 2022-05-24 ENCOUNTER — OFFICE VISIT (OUTPATIENT)
Dept: SURGERY | Age: 67
End: 2022-05-24

## 2022-05-24 VITALS
HEART RATE: 78 BPM | BODY MASS INDEX: 24.45 KG/M2 | SYSTOLIC BLOOD PRESSURE: 118 MMHG | OXYGEN SATURATION: 99 % | WEIGHT: 138 LBS | HEIGHT: 63 IN | TEMPERATURE: 98 F | DIASTOLIC BLOOD PRESSURE: 67 MMHG

## 2022-05-24 DIAGNOSIS — Z09 POSTOP CHECK: Primary | ICD-10-CM

## 2022-05-24 PROCEDURE — 99024 POSTOP FOLLOW-UP VISIT: CPT | Performed by: SURGERY

## 2022-05-24 NOTE — PROGRESS NOTES
[facility]    PROCEDURE: 1) Complex closure of the left leg including knee (27.5 cm)                                                         2) Advancement of the medial thigh (wound: 15 x 3 cm; advancement flap: 15 x 4.8 cm)                                                         3) Application of Prevena incisional wound vacuum device  DATE: 3/21/22    Sheri Brown has been recovering well since her procedure. Pain has been well controlled without pain medications. EXAM    /67   Pulse 78   Temp 98 °F (36.7 °C)   Ht 5' 3\" (1.6 m)   Wt 138 lb (62.6 kg)   LMP 01/01/2010 (Approximate)   SpO2 99%   BMI 24.45 kg/m²     GEN: NAD  EXT: Incision healing well  Good contour / no hematoma / seroma    IMP: 61 y. o.female s/p closure of L thigh wound after excision of sarcoma  PLAN: Doing well overall. She has no limitations from a plastic surgery standpoint and can proceed with any PT intervention required to improve her function. Of note, she had limited function prior to surgery. Will then return in 6 months for evaluation.       Unruly Saravia MD  [address] [place] [address] [address] Reconstructive Surgery  [phone]  05/24/22

## 2022-05-25 ENCOUNTER — TELEPHONE (OUTPATIENT)
Dept: ONCOLOGY | Age: 67
End: 2022-05-25

## 2022-05-25 ENCOUNTER — HOSPITAL ENCOUNTER (OUTPATIENT)
Dept: PHYSICAL THERAPY | Age: 67
Setting detail: THERAPIES SERIES
Discharge: HOME | End: 2022-05-25
Payer: COMMERCIAL

## 2022-05-25 PROCEDURE — 97140 MANUAL THERAPY 1/> REGIONS: CPT

## 2022-05-25 PROCEDURE — 97110 THERAPEUTIC EXERCISES: CPT

## 2022-05-25 NOTE — TELEPHONE ENCOUNTER
VM received from patient. Writer returned call to make sure Jazmin Felix had what she needed. Jazmin Felix states that she saw Dr. Elizabeth Birch on 5/20/22 with the recommendation of surveillance CT every 3 months for 2 years. Patient notes that she also had a visit with Dr. Diya Peace yesterday. Patient notes that neither Dr. Elizabeth Birch or the surgeon recommended chemotherapy at this time. Per Jazmin Felix Dr. Romi Purcell office will be ordering surveillance scans. Patient denies any further needs at this time.       Gisele Jaime RN

## 2022-05-26 NOTE — PROGRESS NOTES
Phone: Brian           Fax: [phone]                           Outpatient Physical Therapy                                                                            Daily Note    Patient: Adrian Dawkins : 1955  CSN #: [id number]   Referring Physician: No ref. provider found    Date: 2022    Diagnosis: L thigh soft tissue sarcoma  Treatment Diagnosis: L LE pain, decreased ambulation tolerance    Onset Date: 22  PT Insurance Information: Medical [place]  Total # of Visits Approved: 18 Per Physician Order  Total # of Visits to Date: 14  No Show: 0  Canceled Appointment: 0      Pre-Treatment Pain:  0/10  Subjective: Pt with no new pain compaints. States the Dr said she can progress with therapy as tolerated. Pt can also d/c brace and start to drive    Exercises:  Exercise 7: bike 12 mins  Exercise 8: knee flexion at stairs [id number]  Exercise 9: prone knee flexion with therapist overpressure  Exercise 10: squats at counter x10  Exercise 11: FSU 6\" block 10-15x    Manual:  Joint Mobilization: Patellar mobs  Soft Tissue Mobilizaton: Static stretching in seated and prone positions       Assessment  Assessment: No new pain compalints. Exercise as tolerated. Majority of treatment time spent working on PROM of Left knee. Stattic stretching in seated, prone and supine positions. Patellar mobility is improving. Swelling remains mild/moderate. Reviewed home stretching as tolerated. Activity Tolerance  Activity Tolerance: Patient tolerated treatment well    Patient Education  Patient Education: Stretching for home use  Pt verbalized/demonstrated good understanding:     [x] Yes         [] No, pt required further clarification.        Post Treatment Pain:  0/10      Plan  Plan Frequency: 3x/wk  Plan weeks: 8 weeks       Goals  (Total # of Visits to Date: 15)      Short Term Goals  Time Frame for Short term goals: 3 weeks  Short term goal 1: Pt will be educated on her POC and HEP-met  Short term goal 2: Pt will increase L knee AROM to 50 degrees flexion in order to normalize gait-met/cont    Long Term Goals  Time Frame for Long term goals : 6 weeks  Long term goal 1: Pt will be safe and independent with her HEP  Long term goal 2: Pt will increase L knee flexion to 90 degrees in order to ambulation with normal gait pattern and without a brace when allowed by physician  Long term goal 3: Pt will increase L quad strength to 4-/5 in order to perform 5 SLR in order to increase functional strength  Long term goal 4: Pt will demonstrate no increase in girth measurements for edema in order to increase L knee AROM    Minutes Tracking:  Time In: 6151  Time Out: 3270  Minutes: 55  Timed Code Treatment Minutes: 53 Minutes          Andrés Delacruz PTA     Date: 5/25/2022

## 2022-05-27 ENCOUNTER — HOSPITAL ENCOUNTER (OUTPATIENT)
Dept: PHYSICAL THERAPY | Age: 67
Setting detail: THERAPIES SERIES
Discharge: HOME | End: 2022-05-27
Payer: COMMERCIAL

## 2022-05-27 PROCEDURE — 97110 THERAPEUTIC EXERCISES: CPT

## 2022-05-27 PROCEDURE — 97140 MANUAL THERAPY 1/> REGIONS: CPT

## 2022-05-31 ENCOUNTER — HOSPITAL ENCOUNTER (OUTPATIENT)
Dept: PHYSICAL THERAPY | Age: 67
Setting detail: THERAPIES SERIES
Discharge: HOME | End: 2022-05-31
Payer: COMMERCIAL

## 2022-05-31 ENCOUNTER — TELEPHONE (OUTPATIENT)
Dept: ONCOLOGY | Age: 67
End: 2022-05-31

## 2022-05-31 PROCEDURE — 97140 MANUAL THERAPY 1/> REGIONS: CPT

## 2022-05-31 PROCEDURE — 97110 THERAPEUTIC EXERCISES: CPT

## 2022-05-31 NOTE — TELEPHONE ENCOUNTER
Contacted Patient too see if she was interested in making a F/U appt. Here in the  [facility] of the [place] office and she stated that she has an MRI and CT scheduledand she will F/U with Dr. Morgan Shrestha to find out those results and she will go from there to see what the results are. I had informed her that if she needs us to give us call and she agreed that she would.

## 2022-06-01 ENCOUNTER — HOSPITAL ENCOUNTER (OUTPATIENT)
Dept: PHYSICAL THERAPY | Age: 67
Setting detail: THERAPIES SERIES
Discharge: HOME | End: 2022-06-01
Payer: COMMERCIAL

## 2022-06-01 PROCEDURE — 97140 MANUAL THERAPY 1/> REGIONS: CPT

## 2022-06-01 PROCEDURE — 97110 THERAPEUTIC EXERCISES: CPT

## 2022-06-01 NOTE — PROGRESS NOTES
Goals  Time Frame for Long term goals : 6 weeks  Long term goal 1: Pt will be safe and independent with her HEP  Long term goal 2: Pt will increase L knee flexion to 90 degrees in order to ambulation with normal gait pattern and without a brace when allowed by physician  Long term goal 3: Pt will increase L quad strength to 4-/5 in order to perform 5 SLR in order to increase functional strength  Long term goal 4: Pt will demonstrate no increase in girth measurements for edema in order to increase L knee AROM  Long term goal 5: Pt will return to 50% of normal activities including community ambulation    Minutes Tracking:  Time In: 1106  Time Out: 8200 Vamsi Roblero  Minutes: 58  Timed Code Treatment Minutes: [address]ler[address] E, PT DPT      Date: 5/31/2022

## 2022-06-03 ENCOUNTER — HOSPITAL ENCOUNTER (OUTPATIENT)
Dept: PHYSICAL THERAPY | Age: 67
Setting detail: THERAPIES SERIES
Discharge: HOME | End: 2022-06-03
Payer: COMMERCIAL

## 2022-06-03 PROCEDURE — 97140 MANUAL THERAPY 1/> REGIONS: CPT

## 2022-06-03 PROCEDURE — 97110 THERAPEUTIC EXERCISES: CPT

## 2022-06-03 NOTE — PROGRESS NOTES
Phone: Brian           Fax: [phone]                           Outpatient Physical Therapy                                                                            Daily Note    Patient: Hitesh Baptiste : 1955  CSN #: [id number]   Referring Physician: No ref. provider found    Date: 2022    Diagnosis: L thigh soft tissue sarcoma  Treatment Diagnosis: L LE pain, decreased ambulation tolerance    Onset Date: 22  PT Insurance Information: Medical [place]  Total # of Visits Approved: 24 Per Physician Order  Total # of Visits to Date: 12      Pre-Treatment Pain:  0/10  Subjective: Pt states she is going to try to go back to work     Exercises:  Exercise 1: HEP heel slides, quad set, static flexion stretch  Exercise 5: static flexion 15min  Exercise 6: eccentric SLR lowering  Exercise 7: bike 12 mins  Exercise 8: knee flexion at stairs [id number]  Exercise 9: prone knee flexion with therapist overpressure  Exercise 10: squats at counter x10    Manual:  Joint Mobilization: Patellar mobs  Soft Tissue Mobilizaton: Static stretching in seated and prone positions  Other: Very light MFD with one cup sliding to ITB      Assessment  Assessment: Progressed to SAQ with strap increased difficulty with active quad activation. Continue with passive knee flexion with overpressure and cupping for myofascial relief. ROM perform sitting, supine and prone    Activity Tolerance  Activity Tolerance: Patient tolerated treatment well    Patient Education  Patient Education: educated on strengthening  Pt verbalized/demonstrated good understanding:     [x] Yes         [] No, pt required further clarification.        Post Treatment Pain:  0/10      Plan  Plan Frequency: 3x/wk  Plan weeks: 8 weeks       Goals  (Total # of Visits to Date: 12)      Short Term Goals  Time Frame for Short term goals: 3 weeks  Short term goal 1: Pt will be educated on her POC and HEP-met  Short term goal 2: Pt will increase L knee AROM to 50 degrees flexion in order to normalize gait-met/cont    Long Term Goals  Time Frame for Long term goals : 6 weeks  Long term goal 1: Pt will be safe and independent with her HEP  Long term goal 2: Pt will increase L knee flexion to 90 degrees in order to ambulation with normal gait pattern and without a brace when allowed by physician  Long term goal 3: Pt will increase L quad strength to 4-/5 in order to perform 5 SLR in order to increase functional strength  Long term goal 4: Pt will demonstrate no increase in girth measurements for edema in order to increase L knee AROM  Long term goal 5: Pt will return to 50% of normal activities including community ambulation    Minutes Tracking:  Time In: 1540  Time Out: 5440 Edwardo Colvin  Minutes: 54  Timed Code Treatment Minutes: [address], PT DPT     Date: 6/1/2022

## 2022-06-07 ENCOUNTER — HOSPITAL ENCOUNTER (OUTPATIENT)
Dept: PHYSICAL THERAPY | Age: 67
Setting detail: THERAPIES SERIES
Discharge: HOME | End: 2022-06-07
Payer: COMMERCIAL

## 2022-06-07 PROCEDURE — 97140 MANUAL THERAPY 1/> REGIONS: CPT

## 2022-06-07 PROCEDURE — 97110 THERAPEUTIC EXERCISES: CPT

## 2022-06-07 NOTE — PROGRESS NOTES
Phone: Brian           Fax: [phone]                           Outpatient Physical Therapy                                                                            Daily Note    Patient: Carlos Mcdonald : 1955  CSN #: [id number]   Referring Physician: No ref. provider found    Date: 2022    Diagnosis: L thigh soft tissue sarcoma  Treatment Diagnosis: L LE pain, decreased ambulation tolerance    Onset Date: 22  PT Insurance Information: Medical [place]  Total # of Visits Approved: 32 Per Physician Order         Pre-Treatment Pain:  0/10  Subjective: no complaints    Exercises:  Exercise 1: HEP heel slides, quad set, static flexion stretch  Exercise 5: static flexion 15min  Exercise 6: eccentric SLR lowering  Exercise 7: bike 12 mins  Exercise 8: knee flexion at stairs [id number]  Exercise 9: prone knee flexion with therapist overpressure    Manual:  Soft Tissue Mobilizaton: Static stretching in seated and prone positions  Other: Very light MFD with cuping    Assessment  Assessment: Continue with manual passive ROM in sitting and supine, decreased movement due to scar tissue      Patient Education  Patient Education: reviewed HEP  Pt verbalized/demonstrated good understanding:     [x] Yes         [] No, pt required further clarification.        Post Treatment Pain:  0/10      Plan  Plan Frequency: 3x/wk  Plan weeks: 8 weeks       Goals  ( )      Short Term Goals  Time Frame for Short term goals: 3 weeks  Short term goal 1: Pt will be educated on her POC and HEP-met  Short term goal 2: Pt will increase L knee AROM to 50 degrees flexion in order to normalize gait-met/cont    Long Term Goals  Time Frame for Long term goals : 6 weeks  Long term goal 1: Pt will be safe and independent with her HEP  Long term goal 2: Pt will increase L knee flexion to 90 degrees in order to ambulation with normal gait pattern and without a brace when allowed by physician  Long term goal 3: Pt will increase L quad strength to 4-/5 in order to perform 5 SLR in order to increase functional strength  Long term goal 4: Pt will demonstrate no increase in girth measurements for edema in order to increase L knee AROM  Long term goal 5: Pt will return to 50% of normal activities including community ambulation        Jayce Garza PT DPT      Date: 5/27/2022

## 2022-06-07 NOTE — PROGRESS NOTES
Phone: Brian           Fax: [phone]                           Outpatient Physical Therapy                                                                            Daily Note    Patient: Carlos Mcdonald : 1955  CSN #: [id number]   Referring Physician: No ref. provider found    Date: 2022    Diagnosis: L thigh soft tissue sarcoma  Treatment Diagnosis: L LE pain, decreased ambulation tolerance    Onset Date: 22  PT Insurance Information: [employer]  Total # of Visits Approved: 32 Per Physician Order  Total # of Visits to Date: 25      Pre-Treatment Pain:  1-2/10  Subjective: Pt reports her knee feels tighter today in her hamstrings    Exercises:  Exercise 1: HEP heel slides, quad set, static flexion stretch  Exercise 4: SAQ with A x10  Exercise 5: static flexion 15min  Exercise 6: eccentric SLR lowering  Exercise 7: bike 12 mins  Exercise 9: prone knee flexion with therapist overpressure  Exercise 10: TG x10  Exercise 11: FSU 6\" block 10-15x    Manual:  Soft Tissue Mobilizaton: Static stretching in seated and prone positions  Other: Very light MFD with cuping      Assessment  Assessment: Pt focused on hamstring tightness this date with prone static flexion and overpressure along with cupping to hamstring tendon to increase ROM. Pt has scans tomorrow and will see her ortho. PT observed more knee flexion with swing throuhg phase of gait this date      Patient Education  Patient Education: reviewed prone exercises  Pt verbalized/demonstrated good understanding:     [x] Yes         [] No, pt required further clarification.        Post Treatment Pain:  0/10      Plan  Plan Frequency: 3x/wk  Plan weeks: 8 weeks       Goals  (Total # of Visits to Date: 25)      Short Term Goals  Time Frame for Short term goals: 3 weeks  Short term goal 1: Pt will be educated on her POC and HEP-met  Short term goal 2: Pt will increase L knee AROM to 50 degrees flexion in order to normalize gait-met/cont    Long Term Goals  Time Frame for Long term goals : 6 weeks  Long term goal 1: Pt will be safe and independent with her HEP  Long term goal 2: Pt will increase L knee flexion to 90 degrees in order to ambulation with normal gait pattern and without a brace when allowed by physician  Long term goal 3: Pt will increase L quad strength to 4-/5 in order to perform 5 SLR in order to increase functional strength  Long term goal 4: Pt will demonstrate no increase in girth measurements for edema in order to increase L knee AROM  Long term goal 5: Pt will return to 50% of normal activities including community ambulation    Minutes Tracking:  Time In: 1014  Time Out: 1113  Minutes: 59  Timed Code Treatment Minutes: Gelacio Kirby PT DPT      Date: 6/7/2022

## 2022-06-07 NOTE — PROGRESS NOTES
Phone: Brian           Fax: [phone]                           Outpatient Physical Therapy                                                                            Daily Note    Patient: Christian Kapadia : 1955  CSN #: [id number]   Referring Physician: No ref. provider found    Date: 6/3/2022    Diagnosis: L thigh soft tissue sarcoma  Treatment Diagnosis: L LE pain, decreased ambulation tolerance    Onset Date: 22  PT Insurance Information: [employer] [place]  Total # of Visits Approved: 32 Per Physician Order  Total # of Visits to Date: 17      Pre-Treatment Pain:  -2/10  Subjective: Pt states she has been walking without her brace or quad cane at home    Exercises:  Exercise 1: HEP heel slides, quad set, static flexion stretch  Exercise 4: SAQ with A x10  Exercise 5: static flexion 15min  Exercise 6: eccentric SLR lowering  Exercise 7: bike 12 mins  Exercise 8: knee flexion at stairs [id number]  Exercise 9: prone knee flexion with therapist overpressure  Exercise 10: squats at counter x10  Exercise 11: FSU 6\" block 10-15x    Manual:  Cupping to It band and quad   Assessment  Assessment: Continue with functional strengthening and manual Passive ROM to L knee. Pt continues with significant scar tissue with quad tightness and hamstring tightness limiting ambulation      Patient Education  Patient Education: educated on continuing with POC  Pt verbalized/demonstrated good understanding:     [x] Yes         [] No, pt required further clarification.        Post Treatment Pain:  -2/10      Plan  Plan Frequency: 3x/wk  Plan weeks: 8 weeks       Goals  (Total # of Visits to Date: 16)      Short Term Goals  Time Frame for Short term goals: 3 weeks  Short term goal 1: Pt will be educated on her POC and HEP-met  Short term goal 2: Pt will increase L knee AROM to 50 degrees flexion in order to normalize gait-met/cont    Long Term Goals  Time Frame for Long term goals : 6 weeks  Long term goal 1: Pt will be safe and independent with her HEP  Long term goal 2: Pt will increase L knee flexion to 90 degrees in order to ambulation with normal gait pattern and without a brace when allowed by physician  Long term goal 3: Pt will increase L quad strength to 4-/5 in order to perform 5 SLR in order to increase functional strength  Long term goal 4: Pt will demonstrate no increase in girth measurements for edema in order to increase L knee AROM  Long term goal 5: Pt will return to 50% of normal activities including community ambulation    Minutes Tracking:  Time In: 1603  Time Out: 1700  Minutes: 57  Timed Code Treatment Minutes: [address]vin, PT DPT      Date: 6/3/2022

## 2022-06-08 ENCOUNTER — HOSPITAL ENCOUNTER (OUTPATIENT)
Dept: CT IMAGING | Age: 67
Discharge: HOME | End: 2022-06-10
Payer: COMMERCIAL

## 2022-06-08 ENCOUNTER — HOSPITAL ENCOUNTER (OUTPATIENT)
Age: 67
Discharge: HOME | End: 2022-06-08
Payer: COMMERCIAL

## 2022-06-08 ENCOUNTER — HOSPITAL ENCOUNTER (OUTPATIENT)
Dept: PHYSICAL THERAPY | Age: 67
Setting detail: THERAPIES SERIES
Discharge: HOME | End: 2022-06-08
Payer: COMMERCIAL

## 2022-06-08 ENCOUNTER — TELEPHONE (OUTPATIENT)
Dept: PRIMARY CARE CLINIC | Age: 67
End: 2022-06-08

## 2022-06-08 ENCOUNTER — HOSPITAL ENCOUNTER (OUTPATIENT)
Dept: MRI IMAGING | Age: 67
Discharge: HOME | End: 2022-06-10
Payer: COMMERCIAL

## 2022-06-08 DIAGNOSIS — C49.9 SARCOMA (HCC): ICD-10-CM

## 2022-06-08 LAB
BUN BLDV-MCNC: 13 MG/DL (ref 8–23)
CREAT SERPL-MCNC: 0.68 MG/DL (ref 0.5–0.9)
GFR AFRICAN AMERICAN: >60 ML/MIN
GFR NON-AFRICAN AMERICAN: >60 ML/MIN
GFR SERPL CREATININE-BSD FRML MDRD: NORMAL ML/MIN/{1.73_M2}
GFR SERPL CREATININE-BSD FRML MDRD: NORMAL ML/MIN/{1.73_M2}

## 2022-06-08 PROCEDURE — 6360000004 HC RX CONTRAST MEDICATION: Performed by: ORTHOPAEDIC SURGERY

## 2022-06-08 PROCEDURE — 97140 MANUAL THERAPY 1/> REGIONS: CPT

## 2022-06-08 PROCEDURE — A9579 GAD-BASE MR CONTRAST NOS,1ML: HCPCS | Performed by: ORTHOPAEDIC SURGERY

## 2022-06-08 PROCEDURE — 71250 CT THORAX DX C-: CPT

## 2022-06-08 PROCEDURE — 36415 COLL VENOUS BLD VENIPUNCTURE: CPT

## 2022-06-08 PROCEDURE — 73720 MRI LWR EXTREMITY W/O&W/DYE: CPT

## 2022-06-08 PROCEDURE — 97110 THERAPEUTIC EXERCISES: CPT

## 2022-06-08 PROCEDURE — 82565 ASSAY OF CREATININE: CPT

## 2022-06-08 RX ADMIN — GADOTERIDOL 12 ML: 279.3 INJECTION, SOLUTION INTRAVENOUS at 10:57

## 2022-06-08 NOTE — TELEPHONE ENCOUNTER
----- Message from [address] [place] [address], ANAIS - CNP sent at 6/8/2022  8:45 AM EDT -----  Results are normal, please call patient and make them aware.

## 2022-06-08 NOTE — PROGRESS NOTES
Phone: Brian           Fax: [phone]                           Outpatient Physical Therapy                                                                            Daily Note    Patient: Carlos Mcdonald : 1955  CSN #: [id number]   Referring Physician: No ref. provider found    Date: 2022    Diagnosis: L thigh soft tissue sarcoma  Treatment Diagnosis: L LE pain, decreased ambulation tolerance    Onset Date: 22  PT Insurance Information: Medical [place]  Total # of Visits Approved: 32 Per Physician Order  Total # of Visits to Date: 23      Pre-Treatment Pain:  0/10  Subjective: no complaints this date    Exercises:  Exercise 1: HEP heel slides, quad set, static flexion stretch  Exercise 4: SAQ with A x10  Exercise 5: static flexion 15min  Exercise 6: eccentric SLR lowering  Exercise 7: bike 12 mins  Exercise 8: knee flexion at stairs [id number]  Exercise 9: prone knee flexion with therapist overpressure  Exercise 11: FSU 6\" block 10-15x    Manual:  Soft Tissue Mobilizaton: Static stretching in seated and prone positions  Other: Very light MFD with cuping    Assessment  Assessment: Pt presents with 60 degrees L knee flexion with prone position with overpressure by therapy. Pt continues to demonstrate better swing throuhg phase of gait with more heel to toe pattern. Patient Education  Patient Education: educated on measurements  Pt verbalized/demonstrated good understanding:     [x] Yes         [] No, pt required further clarification.        Post Treatment Pain:  0/10      Plan  Plan Frequency: 3x/wk  Plan weeks: 8 weeks       Goals  (Total # of Visits to Date: 23)      Short Term Goals  Time Frame for Short term goals: 3 weeks  Short term goal 1: Pt will be educated on her POC and HEP-met  Short term goal 2: Pt will increase L knee AROM to 50 degrees flexion in order to normalize gait-met/cont    Long Term Goals  Time Frame for Long term goals : 6 weeks  Long term goal 1: Pt will be safe and independent with her HEP  Long term goal 2: Pt will increase L knee flexion to 90 degrees in order to ambulation with normal gait pattern and without a brace when allowed by physician  Long term goal 3: Pt will increase L quad strength to 4-/5 in order to perform 5 SLR in order to increase functional strength  Long term goal 4: Pt will demonstrate no increase in girth measurements for edema in order to increase L knee AROM  Long term goal 5: Pt will return to 50% of normal activities including community ambulation    Minutes Tracking:  Time In: 0816  Time Out: 0910  Minutes: 54  Timed Code Treatment Minutes: [address], PT DPT      Date: 6/8/2022

## 2022-06-10 ENCOUNTER — HOSPITAL ENCOUNTER (OUTPATIENT)
Dept: PHYSICAL THERAPY | Age: 67
Setting detail: THERAPIES SERIES
Discharge: HOME | End: 2022-06-10
Payer: COMMERCIAL

## 2022-06-10 PROCEDURE — 97140 MANUAL THERAPY 1/> REGIONS: CPT

## 2022-06-10 PROCEDURE — 97110 THERAPEUTIC EXERCISES: CPT

## 2022-06-13 ENCOUNTER — TELEPHONE (OUTPATIENT)
Dept: ONCOLOGY | Age: 67
End: 2022-06-13

## 2022-06-13 NOTE — TELEPHONE ENCOUNTER
Call made to patient for ONN follow up. No answer. Left message requesting call back.     Monserrat Conroy RN

## 2022-06-13 NOTE — PROGRESS NOTES
Phone: Brian           Fax: [phone]                           Outpatient Physical Therapy                                                                            Daily Note    Patient: Isa Burgos : 1955  CSN #: [id number]   Referring Physician: No ref. provider found    Date: 6/10/2022    Diagnosis: L thigh soft tissue sarcoma  Treatment Diagnosis: L LE pain, decreased ambulation tolerance    Onset Date: 22  PT Insurance Information: [facility]  Total # of Visits Approved: 32 Per Physician Order  Total # of Visits to Date: 20      Pre-Treatment Pain:  0/10  Subjective: pt reports she isn't using an AD anymore    Exercises:  Exercise 1: HEP heel slides, quad set, static flexion stretch  Exercise 5: static flexion 15min  Exercise 6: eccentric SLR lowering  Exercise 7: bike 12 mins  Exercise 8: knee flexion at stairs [id number]  Exercise 9: prone knee flexion with therapist overpressure  Exercise 11: FSU 6\" block 10-15x    Manual:  [employer]: contract/relax and co-contract sitting EOB to increase knee flexion  Soft Tissue Mobilizaton: Static stretching in seated and prone positions  Other: Very light MFD with cuping    Assessment  Assessment: Continue to progress exercises for quad strengthening. Manual PROM in flexion to gain motion. Pt stted ortho was pleased withher 60 degrees of flexion      Patient Education  *Patient Education: educated on progress  Pt verbalized/demonstrated good understanding:     [x] Yes         [] No, pt required further clarification.        Post Treatment Pain:  0/10      Plan  Plan Frequency: 3x/wk  Plan weeks: 8 weeks       Goals  (Total # of Visits to Date: 21)      Short Term Goals  Time Frame for Short term goals: 3 weeks  Short term goal 1: Pt will be educated on her POC and HEP-met  Short term goal 2: Pt will increase L knee AROM to 50 degrees flexion in order to normalize gait-met/cont    Long Term Goals  Time Frame for Long term goals : 6 weeks  Long term goal 1: Pt will be safe and independent with her HEP  Long term goal 2: Pt will increase L knee flexion to 90 degrees in order to ambulation with normal gait pattern and without a brace when allowed by physician  Long term goal 3: Pt will increase L quad strength to 4-/5 in order to perform 5 SLR in order to increase functional strength  Long term goal 4: Pt will demonstrate no increase in girth measurements for edema in order to increase L knee AROM  Long term goal 5: Pt will return to 50% of normal activities including community ambulation    Minutes Tracking:  Time In: 1545  Time Out: 1350 [facility]  Minutes: 58  Timed Code Treatment Minutes: [address], PT DPT      Date: 6/10/2022

## 2022-06-14 ENCOUNTER — HOSPITAL ENCOUNTER (OUTPATIENT)
Dept: PHYSICAL THERAPY | Age: 67
Setting detail: THERAPIES SERIES
Discharge: HOME | End: 2022-06-14
Payer: COMMERCIAL

## 2022-06-14 PROCEDURE — 97140 MANUAL THERAPY 1/> REGIONS: CPT

## 2022-06-14 PROCEDURE — 97110 THERAPEUTIC EXERCISES: CPT

## 2022-06-14 NOTE — PROGRESS NOTES
Phone: Brian           Fax: [phone]                           Outpatient Physical Therapy                                                                            Daily Note    Patient: Gisselle Ledezma : 1955  CSN #: [id number]   Referring Physician: No ref. provider found    Date: 2022    Diagnosis: L thigh soft tissue sarcoma  Treatment Diagnosis: L LE pain, decreased ambulation tolerance    Onset Date: 22  PT Insurance Information: Medical [place]  Total # of Visits Approved: 32 Per Physician Order  Total # of Visits to Date: 21      Pre-Treatment Pain: 0/10  Subjective: no complaints this date    Exercises:  Exercise 1: HEP heel slides, quad set, static flexion stretch  Exercise 5: static flexion 15min  Exercise 6: eccentric SLR lowering  Exercise 7: bike 12 mins  Exercise 8: knee flexion at stairs [id number]  Exercise 9: prone knee flexion with therapist overpressure  Exercise 11: FSU 6\" block 10-15x    Manual:  Soft Tissue Mobilizaton: Static stretching in seated and prone positions  Other: Very light MFD with cuping      Assessment  Assessment: Pt able to perform one SLR independently this date. Added static flexion with star track x5 minutes to increase ROM. Continue with cupping to reduce addhesions nd muscle tone      Patient Education  Patient Education: educated on new exercise  Pt verbalized/demonstrated good understanding:     [x] Yes         [] No, pt required further clarification.        Post Treatment Pain:  0/10      Plan  Plan Frequency: 3x/wk  Plan weeks: 8 weeks       Goals  (Total # of Visits to Date: 24)      Short Term Goals  Time Frame for Short term goals: 3 weeks  Short term goal 1: Pt will be educated on her POC and HEP-met  Short term goal 2: Pt will increase L knee AROM to 50 degrees flexion in order to normalize gait-met/cont    Long Term Goals  Time Frame for Long term goals : 6 weeks  Long term goal 1: Pt will be safe and independent with her HEP  Long term goal 2: Pt will increase L knee flexion to 90 degrees in order to ambulation with normal gait pattern and without a brace when allowed by physician  Long term goal 3: Pt will increase L quad strength to 4-/5 in order to perform 5 SLR in order to increase functional strength  Long term goal 4: Pt will demonstrate no increase in girth measurements for edema in order to increase L knee AROM  Long term goal 5: Pt will return to 50% of normal activities including community ambulation    Minutes Tracking:  Time In: 0930  Time Out: 1030  Minutes: 60  Timed Code Treatment Minutes: [id number], PT DPT      Date: 6/14/2022

## 2022-06-15 ENCOUNTER — TELEPHONE (OUTPATIENT)
Dept: ONCOLOGY | Age: 67
End: 2022-06-15

## 2022-06-15 ENCOUNTER — HOSPITAL ENCOUNTER (OUTPATIENT)
Dept: PHYSICAL THERAPY | Age: 67
Setting detail: THERAPIES SERIES
Discharge: HOME | End: 2022-06-15
Payer: COMMERCIAL

## 2022-06-15 PROCEDURE — 97110 THERAPEUTIC EXERCISES: CPT

## 2022-06-15 PROCEDURE — 97140 MANUAL THERAPY 1/> REGIONS: CPT

## 2022-06-15 NOTE — TELEPHONE ENCOUNTER
Call received from Lyle who states that she had spoke to Dr. Kimberley Hanson after her MRI and CT scan of chest. Lyle states that Dr. Kimberley Hanson was to call Dr. Jac Olivo to inquired about a possible MOHS biopsy of lung lesion. Confirmed with Dr. Esdras Nielsen that he has not spoken to Dr. Kimberley Hanson. Patient expresses want to continue to follow with Dr. Eneida Rivas. Navigator to contact insurance to see if out of network waver will cover continued visits or if it is just for consultation. Email sent to [employer], RN regarding out of network waiver. Awaiting response.

## 2022-06-15 NOTE — PROGRESS NOTES
Phone: Brian           Fax: [phone]                           Outpatient Physical Therapy                                                                            Daily Note    Patient: Lisa Goss : 1955  CSN #: [id number]   Referring Physician: No ref. provider found    Date: 6/15/2022    Diagnosis: L thigh soft tissue sarcoma  Treatment Diagnosis: L LE pain, decreased ambulation tolerance    Onset Date: 22  PT Insurance Information: Medical [place]  Total # of Visits Approved: 32 Per Physician Order  Total # of Visits to Date: 22      Pre-Treatment Pain:  0/10       Exercises:  Exercise 1: HEP heel slides, quad set, static flexion stretch  Exercise 2: standing straight leg hip flexion with yellow tband x10  Exercise 3: star tract static flexion 6 minutes  Exercise 4: SAQ with A 2x10  Exercise 5: static flexion 8min  Exercise 6: eccentric SLR lowering 2x10  Exercise 7: bike 12 mins  Exercise 8: knee flexion at stairs [id number]  Exercise 9: prone knee flexion with therapist overpressure  Exercise 10: counter squats x10  Exercise 11: FSU/LSU 6\" block 10-15x    Manual:  Soft Tissue Mobilizaton: Static stretching in  prone positions  Other: Very light MFD with cuping    Assessment  Assessment: PT added standing straight leg hip flexion with yellow theraband. Pt unable to keep leg straight against resistance and compensated significantly with hip flexors. Continue with passive ROM in flexion, pt had 62 degrees with knee flexion with therapist over pressure    Patient Education  Patient Education: continue with POC  Pt verbalized/demonstrated good understanding:     [x] Yes         [] No, pt required further clarification.        Post Treatment Pain:  0/10      Plan  Plan Frequency: 3x/wk  Plan weeks: 8 weeks       Goals  (Total # of Visits to Date: 25)      Short Term Goals  Time Frame for Short term goals: 3 weeks  Short term goal 1: Pt will be educated on her POC and HEP-met  Short term goal 2: Pt will increase L knee AROM to 50 degrees flexion in order to normalize gait-met/cont    Long Term Goals  Time Frame for Long term goals : 6 weeks  Long term goal 1: Pt will be safe and independent with her HEP  Long term goal 2: Pt will increase L knee flexion to 90 degrees in order to ambulation with normal gait pattern and without a brace when allowed by physician  Long term goal 3: Pt will increase L quad strength to 4-/5 in order to perform 5 SLR in order to increase functional strength  Long term goal 4: Pt will demonstrate no increase in girth measurements for edema in order to increase L knee AROM    Minutes Tracking:  Time In: 7378  Time Out: 1030  Minutes: 63  Timed Code Treatment Minutes: [address], PT DPT      Date: 6/15/2022

## 2022-06-15 NOTE — TELEPHONE ENCOUNTER
Email received from Jamie Rascon RN [employer]  who states out of network waivers are generally approved for one year but suggested contacting Cherry Shay at [phone]. Call made to Lyle. Patient notified that Dr. Sophia Corrigan had not spoke to Dr. Ana Campbell and the information as above. Lyle states that she will contact Dr. Kayla Moreland office and [facility] tomorrow. Patient encouraged to call with any other questions or needs.      Apryl Hong RN

## 2022-06-16 LAB
CHOLESTEROL/HDL RATIO: 3.7
CHOLESTEROL: 217 MG/DL
GLUCOSE BLD-MCNC: 93 MG/DL (ref 70–99)
HDLC SERPL-MCNC: 59 MG/DL
LDL CHOLESTEROL: 139 MG/DL (ref 0–130)
PATIENT FASTING?: YES
TRIGL SERPL-MCNC: 97 MG/DL

## 2022-06-17 ENCOUNTER — HOSPITAL ENCOUNTER (OUTPATIENT)
Dept: PHYSICAL THERAPY | Age: 67
Setting detail: THERAPIES SERIES
Discharge: HOME | End: 2022-06-17
Payer: COMMERCIAL

## 2022-06-17 PROCEDURE — 97110 THERAPEUTIC EXERCISES: CPT

## 2022-06-17 PROCEDURE — 97140 MANUAL THERAPY 1/> REGIONS: CPT

## 2022-06-17 NOTE — PROGRESS NOTES
Phone: Brian           Fax: [phone]                           Outpatient Physical Therapy                                                                            Daily Note    Patient: Padma Ansari : 1955  CSN #: [id number]   Referring Physician: Parish Flores DO    Date: 2022    Diagnosis: L thigh soft tissue sarcoma  Treatment Diagnosis: L LE pain, decreased ambulation tolerance    Onset Date: 22  PT Insurance Information: Medical [place]  Total # of Visits Approved: 32 Per Physician Order  Total # of Visits to Date: 23  No Show: 0  Canceled Appointment: 0      Pre-Treatment Pain:  0/10  Subjective: Pt denies pain upon arrival. Pt states knee feels about the same overall. Pt states she is going to try to go back to work on Monday. Exercises:  Exercise 2: standing straight leg hip flexion with yellow tband x10  Exercise 3: star tract static flexion 6 minutes-- 25#  Exercise 5: static flexion 8min -- 4#  Exercise 7: bike 12 mins  Exercise 8: knee flexion at stairs [id number]  Exercise 9: prone knee flexion with therapist overpressure  Exercise 11: FSU/LSU 6\" block 10-15x    Manual:  Soft Tissue Mobilizaton: Static stretching in  prone positions  Other: Very light MFD with cuping      Assessment  Assessment: Pt cont to demonstrate limited quad activation d/t weakness. Soft tissue restriction palpable throughout distal quad and ITB. MFD with cupping and manual STM to assist with mobility. ROM remains limited to ~60* flexion. Will progress as tolerated. Activity Tolerance  Activity Tolerance: Patient tolerated treatment well    Patient Education  Patient Education: Reviewed exercises  Pt verbalized/demonstrated good understanding:     [x] Yes         [] No, pt required further clarification.        Post Treatment Pain:  0/10      Plan  Plan Frequency: 3x/wk  Plan weeks: 8 weeks       Goals  (Total # of Visits to Date: 21)      Short Term Goals  Time Frame for Short term goals: 3 weeks  Short term goal 1: Pt will be educated on her POC and HEP-met  Short term goal 2: Pt will increase L knee AROM to 50 degrees flexion in order to normalize gait-met/cont    Long Term Goals  Time Frame for Long term goals : 6 weeks  Long term goal 1: Pt will be safe and independent with her HEP  Long term goal 2: Pt will increase L knee flexion to 90 degrees in order to ambulation with normal gait pattern and without a brace when allowed by physician  Long term goal 3: Pt will increase L quad strength to 4-/5 in order to perform 5 SLR in order to increase functional strength  Long term goal 4: Pt will demonstrate no increase in girth measurements for edema in order to increase L knee AROM  Long term goal 5: Pt will return to 50% of normal activities including community ambulation    Minutes Tracking:  Time In: 1323  Time Out: 1435  Minutes: 72  Timed Code Treatment Minutes: 70 Minutes      Rufino Patterson, DPT      Date: 6/17/2022

## 2022-06-21 ENCOUNTER — HOSPITAL ENCOUNTER (OUTPATIENT)
Dept: PHYSICAL THERAPY | Age: 67
Setting detail: THERAPIES SERIES
Discharge: HOME | End: 2022-06-21
Payer: COMMERCIAL

## 2022-06-21 PROCEDURE — 97140 MANUAL THERAPY 1/> REGIONS: CPT

## 2022-06-21 PROCEDURE — 97110 THERAPEUTIC EXERCISES: CPT

## 2022-06-21 NOTE — PROGRESS NOTES
Phone: Brian           Fax: [phone]                           Outpatient Physical Therapy                                                                            Daily Note    Patient: Cruz Vogel : 1955  CSN #: [id number]   Referring Physician: No ref. provider found    Date: 2022    Diagnosis: L thigh soft tissue sarcoma  Treatment Diagnosis: L LE pain, decreased ambulation tolerance    Onset Date: 22  PT Insurance Information: Medical [place]  Total # of Visits Approved: 32 Per Physician Order  Total # of Visits to Date: 24  No Show: 0  Canceled Appointment: 0      Pre-Treatment Pain:  0/10  Subjective: Pt denies pain upon arrival. Pt states she was a little sore after last visit, but didn't last long. Pt states she went back to work yesterday, which pt states she was able to work a full day without any pain. Exercises:  Exercise 2: standing straight leg hip flexion with yellow tband x15  Exercise 3: star tract static flexion 6 minutes-- 25#  Exercise 5: static flexion 8min -- 4#  Exercise 7: bike 12 mins  Exercise 8: knee flexion at stairs [id number]  Exercise 11: FSU/LSU 6\" block 10-15x    Manual:  Soft Tissue Mobilizaton: Static stretching seated this date  Other: Very light MFD with cuping; manual STM following MFD    Assessment  Assessment: Pt returned to work yesterday, which pt reports is going well thus far and she is trying to get up and walk periodically throughout the day to keep the knee from stiffening up. Pt cont to demonstrate minimal quad activation. Cont with manual techniques including MFD with cupping, STM and PROM for L knee flex ROM, which pt tolerates well. Will progress as tolerated. Activity Tolerance  Activity Tolerance: Patient tolerated treatment well    Patient Education  Patient Education: HEP  Pt verbalized/demonstrated good understanding:     [x] Yes         [] No, pt required further clarification. Post Treatment Pain:  0/10      Plan  Plan Frequency: 3x/wk  Plan weeks: 8 weeks       Goals  (Total # of Visits to Date: 25)      Short Term Goals  Time Frame for Short term goals: 3 weeks  Short term goal 1: Pt will be educated on her POC and HEP-met  Short term goal 2: Pt will increase L knee AROM to 50 degrees flexion in order to normalize gait-met/cont    Long Term Goals  Time Frame for Long term goals : 6 weeks  Long term goal 1: Pt will be safe and independent with her HEP  Long term goal 2: Pt will increase L knee flexion to 90 degrees in order to ambulation with normal gait pattern and without a brace when allowed by physician  Long term goal 3: Pt will increase L quad strength to 4-/5 in order to perform 5 SLR in order to increase functional strength  Long term goal 4: Pt will demonstrate no increase in girth measurements for edema in order to increase L knee AROM  Long term goal 5: Pt will return to 50% of normal activities including community ambulation    Minutes Tracking:  Time In: 1230  Time Out: 1330  Minutes: 60  Timed Code Treatment Minutes: 62 Minutes      Rufino Anderson, DPT      Date: 6/21/2022

## 2022-06-22 ENCOUNTER — TELEPHONE (OUTPATIENT)
Dept: ONCOLOGY | Age: 67
End: 2022-06-22

## 2022-06-22 DIAGNOSIS — C49.9 LIPOSARCOMA (HCC): Primary | ICD-10-CM

## 2022-06-22 NOTE — TELEPHONE ENCOUNTER
Name: Guilherme [facility]  : 1955  MRN: [id number]    Oncology Navigation Follow-Up Note    Contact Type:  Telephone    Notes: Covering for Radha,    Pt called Radha's phone left VM to contact her. Writer called pt and notified her that Bakari Monroe is out of the office and writer is covering. Pt reports she had figured out what she was going to ask Radha and needs no further assistance at this time. Writer proivded contact info if she has any further concerns/barriers.          Electronically signed by Steve Loredo RN on 2022 at 11:37 AM

## 2022-06-24 ENCOUNTER — CLINICAL DOCUMENTATION (OUTPATIENT)
Dept: GENERAL RADIOLOGY | Age: 67
End: 2022-06-24

## 2022-06-24 ENCOUNTER — HOSPITAL ENCOUNTER (OUTPATIENT)
Dept: PHYSICAL THERAPY | Age: 67
Setting detail: THERAPIES SERIES
Discharge: HOME | End: 2022-06-24
Payer: COMMERCIAL

## 2022-06-24 PROCEDURE — 97140 MANUAL THERAPY 1/> REGIONS: CPT

## 2022-06-24 PROCEDURE — 97110 THERAPEUTIC EXERCISES: CPT

## 2022-06-24 NOTE — PROGRESS NOTES
IR note    Imaging was reviewed. Most recent CT chest dated June 8, 2022 demonstrates new multiple b/l small (ranging up to 8 mm) pulmonary nodules, new from 12/7/21. In a patient with known sarcoma, these are consistent with pulmonary metastases. Percutaneous biopsy given it's small size will be technically  Challenging, high risk and high tissue sampling error.     David Hicks MD  IT attending

## 2022-06-24 NOTE — PROGRESS NOTES
Pt will be educated on her POC and HEP-met  Short term goal 2: Pt will increase L knee AROM to 50 degrees flexion in order to normalize gait-met/cont    Long Term Goals  Time Frame for Long term goals : 6 weeks  Long term goal 1: Pt will be safe and independent with her HEP  Long term goal 2: Pt will increase L knee flexion to 90 degrees in order to ambulation with normal gait pattern and without a brace when allowed by physician  Long term goal 3: Pt will increase L quad strength to 4-/5 in order to perform 5 SLR in order to increase functional strength  Long term goal 4: Pt will demonstrate no increase in girth measurements for edema in order to increase L knee AROM  Long term goal 5: Pt will return to 50% of normal activities including community ambulation    Minutes Tracking:  Time In: 1354  Time Out: 1450  Minutes: 56  Timed Code Treatment Minutes: [address] [facility], PT , DPT     Date: 6/24/2022

## 2022-06-28 ENCOUNTER — HOSPITAL ENCOUNTER (OUTPATIENT)
Dept: PHYSICAL THERAPY | Age: 67
Setting detail: THERAPIES SERIES
Discharge: HOME | End: 2022-06-28
Payer: COMMERCIAL

## 2022-06-28 PROCEDURE — 97110 THERAPEUTIC EXERCISES: CPT

## 2022-06-28 PROCEDURE — 97140 MANUAL THERAPY 1/> REGIONS: CPT

## 2022-06-28 NOTE — PROGRESS NOTES
Phone: Brian           Fax: [phone]                           Outpatient Physical Therapy                                                                            Daily Note    Patient: Padma Ansari : 1955  CSN #: [id number]   Referring Physician: No ref. provider found    Date: 2022    Diagnosis: L thigh soft tissue sarcoma  Treatment Diagnosis: L LE pain, decreased ambulation tolerance    Onset Date: 22  PT Insurance Information: Medical [place]  Total # of Visits Approved: 28 Per Physician Order  Total # of Visits to Date: 32  No Show: 0  Canceled Appointment: 0      Pre-Treatment Pain:  0/10  Subjective: Pt denies pain upon arrival this date. Pt reports no new issues/concerns and denies soreness following last visit. Exercises:  Exercise 2: standing straight leg hip flexion with yellow tband x15  Exercise 3: star tract static flexion 5 minutes-- 25#  Exercise 5: static flexion 5min -- 4#  Exercise 7: bike 12 mins  Exercise 8: knee flexion at stairs [id number]  Exercise 11: FSU/LSU 6\"  10-15x    Manual:  Soft Tissue Mobilizaton: Static stretching seated this date  Other: Very light MFD with cuping; manual STM following MFD      Assessment  Assessment: Pt cont to demonstrate inability to activate quad on LLE, compensating heavily with hip flexors with various exercises. Cont with manual PROM and STM to assist with improving L knee flex, which pt tolerates well. Will progress as tolerated. Activity Tolerance  Activity Tolerance: Patient tolerated treatment well    Patient Education  Patient Education: Reviewed exercises  Pt verbalized/demonstrated good understanding:     [x] Yes         [] No, pt required further clarification.        Post Treatment Pain:  0/10      Plan  Plan Frequency: 3x/wk  Plan weeks: 8 weeks       Goals  (Total # of Visits to Date: 32)      Short Term Goals  Time Frame for Short term goals: 3 weeks  Short term goal 1: Pt will be educated on her POC and HEP-met  Short term goal 2: Pt will increase L knee AROM to 50 degrees flexion in order to normalize gait-met/cont    Long Term Goals  Time Frame for Long term goals : 6 weeks  Long term goal 1: Pt will be safe and independent with her HEP  Long term goal 2: Pt will increase L knee flexion to 90 degrees in order to ambulation with normal gait pattern and without a brace when allowed by physician  Long term goal 3: Pt will increase L quad strength to 4-/5 in order to perform 5 SLR in order to increase functional strength  Long term goal 4: Pt will demonstrate no increase in girth measurements for edema in order to increase L knee AROM  Long term goal 5: Pt will return to 50% of normal activities including community ambulation    Minutes Tracking:  Time In: 1230  Time Out: 1334  Minutes: 64  Timed Code Treatment Minutes: Johanne Talbert, [place], DPT      Date: 6/28/2022

## 2022-06-29 ENCOUNTER — OFFICE VISIT (OUTPATIENT)
Dept: ONCOLOGY | Age: 67
End: 2022-06-29
Payer: COMMERCIAL

## 2022-06-29 VITALS
WEIGHT: 135 LBS | DIASTOLIC BLOOD PRESSURE: 67 MMHG | HEART RATE: 70 BPM | HEIGHT: 63 IN | RESPIRATION RATE: 18 BRPM | TEMPERATURE: 97.9 F | SYSTOLIC BLOOD PRESSURE: 121 MMHG | BODY MASS INDEX: 23.92 KG/M2

## 2022-06-29 DIAGNOSIS — C78.01 MALIGNANT NEOPLASM METASTATIC TO BOTH LUNGS (HCC): ICD-10-CM

## 2022-06-29 DIAGNOSIS — C78.02 MALIGNANT NEOPLASM METASTATIC TO BOTH LUNGS (HCC): ICD-10-CM

## 2022-06-29 DIAGNOSIS — C49.9 LIPOSARCOMA (HCC): Primary | ICD-10-CM

## 2022-06-29 PROCEDURE — 99215 OFFICE O/P EST HI 40 MIN: CPT | Performed by: INTERNAL MEDICINE

## 2022-06-29 NOTE — PROGRESS NOTES
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)  Staging form: Soft Tissue Sarcoma Of The Trunk And Extremities, AJCC 8th Edition  - Pathologic stage from 3/21/2022: Stage IIIB (ypT4, pN0, cM0, FNCLCC histologic grade: G3) - Signed by Jack Ahumada MD on 4/20/2022  - Clinical: Stage IB (cT2, cN0, cM0, FNCLCC histologic grade: GX) - Signed by Jack Ahumada MD on 4/20/2022      CURRENT THERAPY:         Status post neoadjuvant radiation therapy 50 Gy February 10, 2022  Status post left thigh sarcoma radical resection March 21, 2022  Plan for adjuvant chemotherapy treatment  Work-up in progress for lung nodules  BRIEF CASE HISTORY:      Ms. Lorice Rinne is a very pleasant 61 y.o. female with history of multiple co morbidities as listed. Patient is referred for evaluation of further management of high-grade liposarcoma. The patient states that she had left thigh swelling around November 2021 which was getting larger so she was evaluated by orthopedics and she had biopsy which showed liposarcoma. The patient had no significant pain or stiffness. No other systemic symptoms. She had neoadjuvant treatment with radiation therapy in February 2022. Following recovery she had wide excision on March 21, 2022. Pathology results showed 16.5 cm mass with greater than 50% necrosis with close margin of less than 2 mm. Patient is healing well from her surgery and she is undergoing rehab. No complications. Patient seen for further management. No chest pain or shortness of breath. No weight loss or decreased appetite. No other complaints. .   After resection, the patient was seen by us and then seen by Dr. Lionel Cummings at [facility] and while we are contemplating options for adjuvant therapy with reference to her is giving doxorubicin. Repeat staging studies showed multiple lung nodules concerning for metastatic disease, those nodules are bilateral and measuring 5 to 8 mm and the are difficult to biopsy. INTERIM HISTORY  The patient was called in today to the office to discuss options about her lung nodules. I contacted IR in our institution and they were not interested in biopsying the lung nodules considering the size and the location. Patient is very nervous about the findings but understands. The CT scan was reviewed with the patient  PAST MEDICAL HISTORY: has a past medical history of Cancer ([address] History of radiation therapy, and PONV (postoperative nausea and vomiting). PAST SURGICAL HISTORY: has a past surgical history that includes Leg Surgery (Left, ); Breast biopsy (2014);  section, low transverse; Colonoscopy (N/A, 3/30/2018); Colonoscopy (2018); Leg biopsy excision (Left, 2021); Leg biopsy excision (Left, 3/21/2022); tissue transfer (Left, 3/21/2022); Leg Surgery (Left, 3/21/2022); and Skin graft (Left, 3/21/2022). CURRENT MEDICATIONS:  has a current medication list which includes the following prescription(s): acetaminophen, ondansetron, and docusate sodium. ALLERGIES:  has No Known Allergies. FAMILY HISTORY: Negative for any hematological or oncological conditions. SOCIAL HISTORY:  reports that she has never smoked. She has never used smokeless tobacco. She reports current alcohol use. She reports that she does not use drugs. REVIEW OF SYSTEMS:     · General: No weakness or fatigue. No unanticipated weight loss or decreased appetite. No fever or chills. · Eyes: No blurred vision, eye pain or double vision. · Ears: No hearing problems or drainage. No tinnitus. · Throat: No sore throat, problems with swallowing or dysphagia. · Respiratory: No cough, sputum or hemoptysis.  No shortness of breath. No pleuritic chest pain. · Cardiovascular: No chest pain, orthopnea or PND. No lower extremity edema. No palpitation. · Gastrointestinal: No problems with swallowing. No abdominal pain or bloating. No nausea or vomiting. No diarrhea or constipation. No GI bleeding. · Genitourinary: No dysuria, hematuria, frequency or urgency. · Musculoskeletal: As above. · Dermatologic: No skin rashes or pruritus. No skin lesions or discolorations. · Psychiatric: No depression, anxiety, or stress or signs of schizophrenia. No change in mood or affect. · Hematologic: No history of bleeding tendency. No bruises or ecchymosis. No history of clotting problems. · Infectious disease: No fever, chills or frequent infections. · Endocrine: No polydipsia or polyuria. No temperature intolerance. · Neurologic: No headaches or dizziness. No weakness or numbness of the extremities. No changes in balance, coordination,  memory, mentation, behavior. · Allergic/Immunologic: No nasal congestion or hives. No repeated infections. PHYSICAL EXAM:  The patient is not in acute distress. Vital signs: Blood pressure 121/67, pulse 70, temperature 97.9 °F (36.6 °C), temperature source Temporal, resp. rate 18, height 5' 3\" (1.6 m), weight 135 lb (61.2 kg), last menstrual period 01/01/2010, not currently breastfeeding.      General appearance - well appearing, not in pain or distress  Mental status - good mood, alert and oriented  Eyes - pupils equal and reactive, extraocular eye movements intact  Ears - bilateral TM's and external ear canals normal  Nose - normal and patent, no erythema, discharge or polyps  Mouth - mucous membranes moist, pharynx normal without lesions  Neck - supple, no significant adenopathy  Lymphatics - no palpable lymphadenopathy, no hepatosplenomegaly  Chest - clear to auscultation, no wheezes, rales or rhonchi, symmetric air entry  Heart - normal rate, regular rhythm, normal S1, S2, no murmurs, rubs, clicks or gallops  Abdomen - soft, nontender, nondistended, no masses or organomegaly  Neurological - alert, oriented, normal speech, no focal findings or movement disorder noted  Musculoskeletal - no joint tenderness, deformity or swelling  Extremities -left lower extremity s/p recent large lipoma excision with healing wound extending from the upper thigh to below the knee. No signs of infection. Skin - normal coloration and turgor, no rashes, no suspicious skin lesions noted     Review of Diagnostic data:   Lab Results   Component Value Date    WBC 4.7 04/01/2022    HGB 9.7 (L) 04/01/2022    HCT 30.2 (L) 04/01/2022    MCV 84.1 04/01/2022     04/01/2022       Chemistry        Component Value Date/Time     04/01/2022 0740    K 4.2 04/01/2022 0740     04/01/2022 0740    CO2 28 04/01/2022 0740    BUN 13 06/08/2022 0754    CREATININE 0.68 06/08/2022 0754        Component Value Date/Time    CALCIUM 9.1 04/01/2022 0740          CT CHEST WO CONTRAST  Narrative: EXAMINATION:  CT OF THE CHEST WITHOUT CONTRAST 6/8/2022 7:53 am    TECHNIQUE:  CT of the chest was performed without the administration of intravenous  contrast. Multiplanar reformatted images are provided for review. Automated  exposure control, iterative reconstruction, and/or weight based adjustment of  the mA/kV was utilized to reduce the radiation dose to as low as reasonably  achievable. COMPARISON:  CT chest dated 12/07/2021    HISTORY:  ORDERING SYSTEM PROVIDED HISTORY: Sarcoma [facility])    FINDINGS:  Mediastinum: Heart is normal in size. There is no pericardial effusion. Thoracic aorta and pulmonary arteries are normal in caliber. No mediastinal  lymphadenopathy. There are calcified lymph nodes at the left hilum. Assessment of hilar structures is limited by the lack of IV contrast.  No  axillary lymphadenopathy. Lungs/pleura:  There are multiple new nodules seen in the right lung, most of  which are measuring approximately 0. 4 cm. A couple of the nodules in the  right lower lobe measure up to 0.6 cm. There are multiple new nodules seen  in the left lung as well. The largest in the left lower lobe measures up to  0.8 cm. No airspace consolidation, pneumothorax, or pleural effusion. Upper Abdomen: Limited visualized upper abdominal structures are unremarkable. Soft Tissues/Bones: There is no acute or suspicious osseous abnormality. Visualized superficial soft tissues are within normal limits. Impression: Multiple new bilateral pulmonary nodules, measuring up to 0.8 cm at the left  lower lobe, concerning for pulmonary metastases. 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  Grade 3. Concerning lung nodules highly suggestive of metastatic disease    PLAN:   I had a very long discussion with the patient. I called Dr. Andrez Felix at [facility]. Unfortunately we are highly suspicious that she has metastatic disease. But the metastatic disease need to be proven. She will be referred back to Dr. Andrez Felix to consider a biopsy there. If she has recurrent disease, will talk to her about palliative chemotherapy or molecularly targeted therapy. If the lesions could not be biopsied I think the only option is to observe and wait for those lesions to be large enough for tissue sampling another option is to obtain a PET CT scan to help guide the biopsy process  I called Dr. Andrez Felix and he will see her back next week. More than 40 minutes were spent    Jeff Vyas MD  Hematologist/Medical [address] [place] [address] hematology oncology physicians                                                          This note is created with the assistance of a speech recognition program.  While intending to generate a document that actually reflects the content of the visit, the document can still have some errors including those of syntax and sound a like substitutions which may escape proof reading.   It such instances, actual meaning can be extrapolated by contextual diversion.

## 2022-07-01 ENCOUNTER — HOSPITAL ENCOUNTER (OUTPATIENT)
Dept: PHYSICAL THERAPY | Age: 67
Setting detail: THERAPIES SERIES
Discharge: HOME | End: 2022-07-01
Payer: COMMERCIAL

## 2022-07-01 PROCEDURE — 97140 MANUAL THERAPY 1/> REGIONS: CPT

## 2022-07-01 PROCEDURE — 97110 THERAPEUTIC EXERCISES: CPT

## 2022-07-01 NOTE — PROGRESS NOTES
Phone: Brian           Fax: [phone]                           Outpatient Physical Therapy                                                                            Daily Note    Patient: Vilma Oswald : 1955  CSN #: [id number]   Referring Physician: No ref. provider found    Date: 2022    Diagnosis: L thigh soft tissue sarcoma  Treatment Diagnosis: L LE pain, decreased ambulation tolerance    Onset Date: 22  PT Insurance Information: Medical [place]  Total # of Visits Approved: 32 Per Physician Order  Total # of Visits to Date: 32  No Show: 0  Canceled Appointment: 0      Pre-Treatment Pain:  0/10  Subjective: Patient reports she feels tighter through medial quad today. Exercises:  Exercise 2: standing straight leg hip flexion with yellow tband x15  Exercise 3: star trac static flexion 5 minutes-- 25#  Exercise 7: bike 12 mins  Exercise 8: knee flexion at stairs [id number]  Exercise 11: FSU/LSU 6\"  15x    Manual:  [employer]: contract/relax and co-contract sitting EOB to increase knee flexion  Soft Tissue Mobilizaton: Static stretching seated this date  Other: Very light MFD with cuping; manual STM following MFD      Assessment  Assessment: Focused on L medial quad STM following cupping today to decrease tone/tightness; patient reports relief with this. Will continue. Activity Tolerance  Activity Tolerance: Patient tolerated treatment well    Patient Education  Exercise technique    Pt verbalized/demonstrated good understanding:     [x] Yes         [] No, pt required further clarification.        Post Treatment Pain:  0/10      Plan  Plan Frequency: 3x/wk  Plan weeks: 8 weeks       Goals  (Total # of Visits to Date: 32)      Short Term Goals  Time Frame for Short term goals: 3 weeks  Short term goal 1: Pt will be educated on her POC and HEP-met  Short term goal 2: Pt will increase L knee AROM to 50 degrees flexion in order to normalize gait-met/cont    Long Term Goals  Time Frame for Long term goals : 6 weeks  Long term goal 1: Pt will be safe and independent with her HEP  Long term goal 2: Pt will increase L knee flexion to 90 degrees in order to ambulation with normal gait pattern and without a brace when allowed by physician  Long term goal 3: Pt will increase L quad strength to 4-/5 in order to perform 5 SLR in order to increase functional strength  Long term goal 4: Pt will demonstrate no increase in girth measurements for edema in order to increase L knee AROM  Long term goal 5: Pt will return to 50% of normal activities including community ambulation    Minutes Tracking:  Time In: 1145  Time Out: 1240  Minutes: 55  Timed Code Treatment Minutes: 1500 Simone Noriega PT , DPT     Date: 7/1/2022

## 2022-07-05 ENCOUNTER — HOSPITAL ENCOUNTER (OUTPATIENT)
Dept: PHYSICAL THERAPY | Age: 67
Setting detail: THERAPIES SERIES
Discharge: HOME | End: 2022-07-05
Payer: COMMERCIAL

## 2022-07-05 PROCEDURE — 97140 MANUAL THERAPY 1/> REGIONS: CPT

## 2022-07-05 PROCEDURE — 97110 THERAPEUTIC EXERCISES: CPT

## 2022-07-05 NOTE — PROGRESS NOTES
Phone: Brian           Fax: [phone]                           Outpatient Physical Therapy                                                                            Daily Note    Patient: Diana Cueto : 1955  CSN #: [id number]   Referring Physician: No ref. provider found    Date: 2022    Diagnosis: L thigh soft tissue sarcoma  Treatment Diagnosis: L LE pain, decreased ambulation tolerance    Onset Date: 22  PT Insurance Information: Medical [place]  Total # of Visits Approved: 32 Per Physician Order  Total # of Visits to Date: 29  No Show: 0  Canceled Appointment: 0      Pre-Treatment Pain:  0/10  Subjective: Pt denies pain upon arrival and denies increased pain/soreness after last tx. Exercises:  Exercise 2: Standing 3 way hip YTB x15ea  Exercise 3: star trac static flexion 5 minutes-- 25#  Exercise 5: static flexion 5min -- 4#  Exercise 7: bike 12 mins  Exercise 8: knee flexion at stairs [id number]  Exercise 11: FSU/LSU 6\"  20x    Manual:  Joint Mobilization: Patellar mobs  Soft Tissue Mobilizaton: Static stretching seated this date  Other: MFD with cuping; manual STM following MFD      Assessment  Assessment: Progressed hip strengthening with addition of 3 way hip exercise with YTB. Pt cont to struggle with quad activation. L knee flex PROM remains limited to 60* at best. Will progress as tolerated. Activity Tolerance  Activity Tolerance: Patient tolerated treatment well    Patient Education  Patient Education: Ex progressions  Pt verbalized/demonstrated good understanding:     [x] Yes         [] No, pt required further clarification.        Post Treatment Pain:  0/10      Plan  Plan Frequency: 3x/wk  Plan weeks: 8 weeks       Goals  (Total # of Visits to Date: 29)      Short Term Goals  Time Frame for Short term goals: 3 weeks  Short term goal 1: Pt will be educated on her POC and HEP-met  Short term goal 2: Pt will increase L knee AROM to

## 2022-07-07 ENCOUNTER — TELEPHONE (OUTPATIENT)
Dept: ONCOLOGY | Age: 67
End: 2022-07-07

## 2022-07-07 NOTE — TELEPHONE ENCOUNTER
Call received from [place] who states she had a virtual visit with Dr. Bethanie Bhatt two days ago and recommended a repeat CT scan in one month. Will await note completion and discuss with Dr. Syeda Ling.      Ghazala Lezama RN

## 2022-07-07 NOTE — TELEPHONE ENCOUNTER
Name: Breezy Rios  : 1955  MRN: [id number]    Oncology Navigation Follow-Up Note    Contact Type:  Telephone    Notes: Call made to patient. No answer. Left message requesting call back.     Electronically signed by Marcos Kellogg RN on 2022 at 11:47 AM

## 2022-07-08 ENCOUNTER — HOSPITAL ENCOUNTER (OUTPATIENT)
Dept: PHYSICAL THERAPY | Age: 67
Setting detail: THERAPIES SERIES
Discharge: HOME | End: 2022-07-08
Payer: COMMERCIAL

## 2022-07-08 PROCEDURE — 97110 THERAPEUTIC EXERCISES: CPT

## 2022-07-08 PROCEDURE — 97140 MANUAL THERAPY 1/> REGIONS: CPT

## 2022-07-08 NOTE — PROGRESS NOTES
Phone: Brian           Fax: [phone]                           Outpatient Physical Therapy                                                                            Daily Note    Patient: [facility] : 1955  CSN #: [id number]   Referring Physician: Carlos Zimmer MD    Date: 2022    Diagnosis: L thigh soft tissue sarcoma  Treatment Diagnosis: L LE pain, decreased ambulation tolerance    Onset Date: 22  PT Insurance Information: Medical [place]  Total # of Visits Approved: 32 Per Physician Order  Total # of Visits to Date: 29  No Show: 0  Canceled Appointment: 0      Pre-Treatment Pain:  0/10  Subjective: Pt states she was a little sore after last visit, with slightly more tenderness over L thigh/quad region. Pt denies pain upon arrival.    Exercises:  Exercise 2: Standing 3 way hip YTB x15ea  Exercise 3: star trac static flexion 5 minutes-- 25#  Exercise 7: bike 12 mins lv 3  Exercise 8: knee flexion at stairs [id number]  Exercise 10: counter squats x10  Exercise 11: FSU/LSU 6\"  20x    Manual:  Soft Tissue Mobilizaton: Static stretching seated this date  Other: MFD with cuping; manual STM following MFD      Assessment  Assessment: Cont with hip stability/strengthening to assist with functional strength and mobility. Manual knee flexion seated EOB this date, which pt tolerates well, but flex remains limited to </= 60*. Will progress as tolerated. Activity Tolerance  Activity Tolerance: Patient tolerated treatment well    Patient Education  Patient Education: Reviewed exercises  Pt verbalized/demonstrated good understanding:     [x] Yes         [] No, pt required further clarification.        Post Treatment Pain:  0/10      Plan  Plan Frequency: 3x/wk  Plan weeks: 8 weeks       Goals  (Total # of Visits to Date: 34)      Short Term Goals  Time Frame for Short term goals: 3 weeks  Short term goal 1: Pt will be educated on her POC and HEP-met  Short term goal 2: Pt will increase L knee AROM to 50 degrees flexion in order to normalize gait-met/cont    Long Term Goals  Time Frame for Long term goals : 6 weeks  Long term goal 1: Pt will be safe and independent with her HEP  Long term goal 2: Pt will increase L knee flexion to 90 degrees in order to ambulation with normal gait pattern and without a brace when allowed by physician  Long term goal 3: Pt will increase L quad strength to 4-/5 in order to perform 5 SLR in order to increase functional strength  Long term goal 4: Pt will demonstrate no increase in girth measurements for edema in order to increase L knee AROM  Long term goal 5: Pt will return to 50% of normal activities including community ambulation    Minutes Tracking:  Time In: 1313  Time Out: 1415  Minutes: 62  Timed Code Treatment Minutes: Johanne [address], [address] [facility], [facility]      Date: 7/8/2022

## 2022-07-12 ENCOUNTER — HOSPITAL ENCOUNTER (OUTPATIENT)
Dept: PHYSICAL THERAPY | Age: 67
Setting detail: THERAPIES SERIES
Discharge: HOME | End: 2022-07-12
Payer: COMMERCIAL

## 2022-07-12 PROCEDURE — 97110 THERAPEUTIC EXERCISES: CPT

## 2022-07-12 PROCEDURE — 97140 MANUAL THERAPY 1/> REGIONS: CPT

## 2022-07-12 NOTE — PROGRESS NOTES
Phone: Brian           Fax: [phone]                           Outpatient Physical Therapy                                                                            Daily Note    Patient: Devan Deleon : 1955  CSN #: [id number]   Referring Physician: No ref. provider found    Date: 2022    Diagnosis: L thigh soft tissue sarcoma  Treatment Diagnosis: L LE pain, decreased ambulation tolerance    Onset Date: 22  PT Insurance Information: Medical [place]  Total # of Visits Approved: 32 Per Physician Order  Total # of Visits to Date: 30  No Show: 0  Canceled Appointment: 0      Pre-Treatment Pain:  0/10  Subjective: Pt states she did alot of walking this weekend and her LB is a little sore. No new knee or  thigh pain reported    Exercises:  Exercise 3: star trac static flexion 5 minutes-- 25#  Exercise 7: bike 12 mins lv 3  Exercise 8: knee flexion at stairs [id number]  Exercise 10: counter squats x10  Exercise 11: FSU/LSU 6\"  20x,  SD-4\"    Manual:  Soft Tissue Mobilizaton: Static stretching seated this date          Assessment  Assessment: Pt completed strengthening as trolerated. LB a liitle sore from walking alot this weekend. Seated PROM to left knee tolerated well. Pln to continue to progress as tolerated    Activity Tolerance  Activity Tolerance: Patient tolerated treatment well    Patient Education  Patient Education: HEP  Pt verbalized/demonstrated good understanding:     [x] Yes         [] No, pt required further clarification.        Post Treatment Pain:  0/10      Plan  Plan Frequency: 3x/wk  Plan weeks: 8 weeks       Goals  (Total # of Visits to Date: 27)      Short Term Goals  Time Frame for Short term goals: 3 weeks  Short term goal 1: Pt will be educated on her POC and HEP-met  Short term goal 2: Pt will increase L knee AROM to 50 degrees flexion in order to normalize gait-met/cont    Long Term Goals  Time Frame for Long term goals : 6 weeks  Long term goal 1: Pt will be safe and independent with her HEP  Long term goal 2: Pt will increase L knee flexion to 90 degrees in order to ambulation with normal gait pattern and without a brace when allowed by physician  Long term goal 3: Pt will increase L quad strength to 4-/5 in order to perform 5 SLR in order to increase functional strength  Long term goal 4: Pt will demonstrate no increase in girth measurements for edema in order to increase L knee AROM  Long term goal 5: Pt will return to 50% of normal activities including community ambulation    Minutes Tracking:  Time In: 0822  Time Out: 0910  Minutes: 48  Timed Code Treatment Minutes: [address] [facility]     Date: 7/12/2022

## 2022-07-15 ENCOUNTER — HOSPITAL ENCOUNTER (OUTPATIENT)
Dept: PHYSICAL THERAPY | Age: 67
Setting detail: THERAPIES SERIES
Discharge: HOME | End: 2022-07-15
Payer: COMMERCIAL

## 2022-07-15 PROCEDURE — 97140 MANUAL THERAPY 1/> REGIONS: CPT

## 2022-07-15 PROCEDURE — 97110 THERAPEUTIC EXERCISES: CPT

## 2022-07-15 NOTE — PLAN OF CARE
return to 50% of normal activities including community ambulation     Prognosis  Therapy Prognosis: Good    Treatment Plan   Plan Frequency: 3x/wk  Plan weeks: 8 weeks  [x] HP/CP      [] Electrical Stim   [x] Therapeutic Exercise      [x] Gait Training  [] Aquatics   [] Ultrasound         [x] Patient Education/HEP   [x] Manual Therapy  [] Traction    [x] Neuro-sadie        [x] Soft Tissue Mobs            [] Home TENS  [] Iontophoresis    [] Orthotic casting/fitting      [] Dry Needling             Electronically signed by: Violeta Cash PT, DPT    Date: 7/15/2022      ______________________________________ Date: 7/15/2022   Physician Signature

## 2022-07-15 NOTE — PROGRESS NOTES
Phone: Brian           Fax: [phone]                           Outpatient Physical Therapy                                                                            Daily Note    Patient: Mily Lovell : 1955  CSN #: [id number]   Referring Physician: No ref. provider found    Date: 7/15/2022    Diagnosis: L thigh soft tissue sarcoma  Treatment Diagnosis: L LE pain, decreased ambulation tolerance    Onset Date: 22  PT Insurance Information: Medical [place]  Total # of Visits Approved: 39 Per Physician Order  Total # of Visits to Date: 32  No Show: 0  Canceled Appointment: 0      Pre-Treatment Pain:  0/10  Subjective: Patient reports stiffness in the knee today but overall not too bad. Exercises:  Exercise 2: Standing 3 way hip YTB x15ea  Exercise 3: star trac static flexion 5 minutes-- 25#  Exercise 5: static flexion 5min -- 4#  Exercise 7: bike 12 mins lv 3  Exercise 8: knee flexion at stairs [id number]  Exercise 10: counter squats x10-15  Exercise 11: FSU/LSU 6\"  20x,  SD-4\"    Manual:  Soft Tissue Mobilizaton: Static stretching seated this date  Other: MFD with cuping; manual STM following MFD      Assessment  Assessment: Patient has attended 31 PT visits for L LE pain and decreased ambulation tolerance following removal of LLE tumor. Patient with improved L knee flexion to 66* with therapist overpressure this date; L quad strength remains limited to 2/5 limiting ability to walk with normalized gait pattern. Patient to benefit from continued physical therapy 2x/wk for 6 more weeks to meet remaining LTG's and return to PLOF. Activity Tolerance  Activity Tolerance: Patient tolerated treatment well    Patient Education  Exercise technique; ROM progression   Pt verbalized/demonstrated good understanding:     [x] Yes         [] No, pt required further clarification.        Post Treatment Pain:  0/10      Plan  Plan Frequency: 3x/wk  Plan weeks: 8 weeks       Goals  (Total # of Visits to Date: 32)      Short Term Goals  Time Frame for Short term goals: 3 weeks  Short term goal 1: Pt will be educated on her POC and HEP-met  Short term goal 2: Pt will increase L knee AROM to 50 degrees flexion in order to normalize gait-met/cont    Long Term Goals  Time Frame for Long term goals : 6 weeks  Long term goal 1: Pt will be safe and independent with her HEP  Long term goal 2: Pt will increase L knee flexion to 90 degrees in order to ambulation with normal gait pattern and without a brace when allowed by physician-progressing  Long term goal 3: Pt will increase L quad strength to 4-/5 in order to perform 5 SLR in order to increase functional strength-progressing  Long term goal 4: Pt will demonstrate no increase in girth measurements for edema in order to increase L knee AROM  Long term goal 5: Pt will return to 50% of normal activities including community ambulation    Minutes Tracking:  Time In: 1315  Time Out: 1410  Minutes: 55  Timed Code Treatment Minutes: [address] [facility], PT, DPT     Date: 7/15/2022

## 2022-07-18 ENCOUNTER — TELEPHONE (OUTPATIENT)
Dept: ONCOLOGY | Age: 67
End: 2022-07-18

## 2022-07-18 NOTE — TELEPHONE ENCOUNTER
Call received from Lyle inquiring if anything was heard from [facility]. Lyle states that she can not see note from Dr. Pj Grider in [address] Writer unable to see note from visit in care everywhere. Call made to Dr. Sue Jones office requesting note. Representative also stated that note is not currently completed. Message sent to Dr. Sue Jones office stating that note from virtual visit was requested.     Adrianne Colvin RN

## 2022-07-19 ENCOUNTER — HOSPITAL ENCOUNTER (OUTPATIENT)
Dept: PHYSICAL THERAPY | Age: 67
Setting detail: THERAPIES SERIES
Discharge: HOME | End: 2022-07-19
Payer: COMMERCIAL

## 2022-07-19 PROCEDURE — 97140 MANUAL THERAPY 1/> REGIONS: CPT

## 2022-07-19 PROCEDURE — 97110 THERAPEUTIC EXERCISES: CPT

## 2022-07-19 NOTE — PROGRESS NOTES
Phone: Brian           Fax: [phone]                           Outpatient Physical Therapy                                                                            Daily Note    Patient: Christian Ta : 1955  CSN #: [id number]   Referring Physician: No ref. provider found    Date: 2022    Diagnosis: L thigh soft tissue sarcoma  Treatment Diagnosis: L LE pain, decreased ambulation tolerance    Onset Date: 22  PT Insurance Information: Medical [place]  Total # of Visits Approved: 39 Per Physician Order  Total # of Visits to Date: 28  No Show: 0  Canceled Appointment: 0      Pre-Treatment Pain:  0/10  Subjective: Pt denies pain at this time. States she is doing OK    Exercises:  Exercise 3: star trac static flexion 5 minutes-- 25#  Exercise 6: eccentric SLR lowering 2x10  Exercise 7: bike 12 mins lv 3  Exercise 8: knee flexion at stairs [id number]  Exercise 10: counter squats x10-15  Exercise 11: FSU/LSU 6\"  20x,  SD-4\"    Manual:  Soft Tissue Mobilizaton: Static stretching seated this date           Assessment  Assessment: Pt with no new pain complaints. Strengthening as tolerated. Continue tompush flexion range thru functional stretching and long static manual stretching. Will continue to progress as tolerated    Activity Tolerance  Activity Tolerance: Patient tolerated treatment well    Patient Education  Patient Education: HEP  Pt verbalized/demonstrated good understanding:     [x] Yes         [] No, pt required further clarification.        Post Treatment Pain:  0/10      Plan  Plan Frequency: 3x/wk  Plan weeks: 8 weeks       Goals  (Total # of Visits to Date: 28)      Short Term Goals  Time Frame for Short term goals: 3 weeks  Short term goal 1: Pt will be educated on her POC and HEP-met  Short term goal 2: Pt will increase L knee AROM to 50 degrees flexion in order to normalize gait-met/cont    Long Term Goals  Time Frame for Long term goals : 6 weeks  Long term goal 1: Pt will be safe and independent with her HEP  Long term goal 2: Pt will increase L knee flexion to 90 degrees in order to ambulation with normal gait pattern and without a brace when allowed by physician-progressing  Long term goal 3: Pt will increase L quad strength to 4-/5 in order to perform 5 SLR in order to increase functional strength-progressing  Long term goal 4: Pt will demonstrate no increase in girth measurements for edema in order to increase L knee AROM  Long term goal 5: Pt will return to 50% of normal activities including community ambulation    Minutes Tracking:  Time In: 0730  Time Out: 0816  Minutes: 46  Timed Code Treatment Minutes: 45 Minutes       [place] Graven A AdelspergerPTA     Date: 7/19/2022

## 2022-07-20 ENCOUNTER — TELEPHONE (OUTPATIENT)
Dept: ONCOLOGY | Age: 67
End: 2022-07-20

## 2022-07-20 NOTE — TELEPHONE ENCOUNTER
Call  made to [facility] Syl[place] to update on plan. No answer. Left message informing pt that note was obtained from Dr. Flores Griffith office. [facility], RN to discuss with Dr. Tessy Montaño next Wednesday an obtain an order for CT scan and will schedule. Pt encouraged to call with questions, concerns, or needs.     Padmini Piañ RN

## 2022-07-20 NOTE — TELEPHONE ENCOUNTER
Call received from [place] at Dr. Flores Griffith office to confirm in the note could be seen in care everywhere. Confirmed that note could be seen.      Padmini Piña RN

## 2022-07-21 NOTE — TELEPHONE ENCOUNTER
Call received from [facility] [place] to inform writer that she had received my message and that she is okay with plan. No further needs voiced at this time.     Yolanda Klinefelter, RN

## 2022-07-22 ENCOUNTER — HOSPITAL ENCOUNTER (OUTPATIENT)
Dept: PHYSICAL THERAPY | Age: 67
Setting detail: THERAPIES SERIES
Discharge: HOME | End: 2022-07-22
Payer: COMMERCIAL

## 2022-07-22 PROCEDURE — 97110 THERAPEUTIC EXERCISES: CPT

## 2022-07-22 PROCEDURE — 97140 MANUAL THERAPY 1/> REGIONS: CPT

## 2022-07-22 NOTE — PROGRESS NOTES
Phone: Brian           Fax: [phone]                           Outpatient Physical Therapy                                                                            Daily Note    Patient: Jennie Hernandez : 1955  CSN #: [id number]   Referring Physician: No ref. provider found    Date: 2022    Diagnosis: L thigh soft tissue sarcoma  Treatment Diagnosis: L LE pain, decreased ambulation tolerance    Onset Date: 22  PT Insurance Information: Medical [place]  Total # of Visits Approved: 39 Per Physician Order  Total # of Visits to Date: 35  No Show: 0  Canceled Appointment: 0      Pre-Treatment Pain:  0/10  Subjective: Pt denies pain upon arrival this date. Pt states her quad is a little tender. Exercises:  Exercise 2: Standing 3 way hip YTB x15ea  Exercise 3: star trac static flexion 5 minutes-- 25#  Exercise 6: eccentric SLR lowering 2x10  Exercise 7: bike 12 mins lv 3  Exercise 8: knee flexion at stairs [id number]  Exercise 10: counter squats x10-15  Exercise 11: FSU/LSU 6\"  20x,  SD-4\"    Manual:  Soft Tissue Mobilizaton: Static stretching seated this date    Assessment  Assessment: Cont with current strengthening and stretching program, which pt tolerates well without c/o increased pain throughout tx. Quad activation remains absent, with compensation from hip flexors. Will progress as tolerated. Activity Tolerance  Activity Tolerance: Patient tolerated treatment well    Patient Education  Patient Education: Reviewed proper form with exercises  Pt verbalized/demonstrated good understanding:     [x] Yes         [] No, pt required further clarification.        Post Treatment Pain:  0/10      Plan  Plan Frequency: 3x/wk  Plan weeks: 8 weeks       Goals  (Total # of Visits to Date: 35)      Short Term Goals  Time Frame for Short term goals: 3 weeks  Short term goal 1: Pt will be educated on her POC and HEP-met  Short term goal 2: Pt will increase L knee AROM to 50 degrees flexion in order to normalize gait-met/cont    Long Term Goals  Time Frame for Long term goals : 6 weeks  Long term goal 1: Pt will be safe and independent with her HEP  Long term goal 2: Pt will increase L knee flexion to 90 degrees in order to ambulation with normal gait pattern and without a brace when allowed by physician-progressing  Long term goal 3: Pt will increase L quad strength to 4-/5 in order to perform 5 SLR in order to increase functional strength-progressing  Long term goal 4: Pt will demonstrate no increase in girth measurements for edema in order to increase L knee AROM  Long term goal 5: Pt will return to 50% of normal activities including community ambulation    Minutes Tracking:  Time In: 1340  Time Out: 1425  Minutes: 45  Timed Code Treatment Minutes: [address] [place], [place], DPT      Date: 7/22/2022

## 2022-07-26 ENCOUNTER — HOSPITAL ENCOUNTER (OUTPATIENT)
Dept: PHYSICAL THERAPY | Age: 67
Setting detail: THERAPIES SERIES
Discharge: HOME | End: 2022-07-26
Payer: COMMERCIAL

## 2022-07-26 PROCEDURE — 97110 THERAPEUTIC EXERCISES: CPT

## 2022-07-26 PROCEDURE — 97140 MANUAL THERAPY 1/> REGIONS: CPT

## 2022-07-27 ENCOUNTER — TELEPHONE (OUTPATIENT)
Dept: ONCOLOGY | Age: 67
End: 2022-07-27

## 2022-07-27 ENCOUNTER — E-VISIT (OUTPATIENT)
Dept: OBGYN CLINIC | Age: 67
End: 2022-07-27
Payer: COMMERCIAL

## 2022-07-27 DIAGNOSIS — N89.8 VAGINAL ITCHING: Primary | ICD-10-CM

## 2022-07-27 DIAGNOSIS — C49.9 LIPOSARCOMA (HCC): Primary | ICD-10-CM

## 2022-07-27 DIAGNOSIS — R91.8 LUNG MASS: ICD-10-CM

## 2022-07-27 PROCEDURE — 99211 OFF/OP EST MAY X REQ PHY/QHP: CPT | Performed by: NURSE PRACTITIONER

## 2022-07-27 NOTE — TELEPHONE ENCOUNTER
Call received from patient regarding update about CT scan order. Gini Mon informed that this would be discussed with Dr. Aristides Paz and she would be notified most likely tomorrow 7/28/22 about discussion and scheduling. Gini Mon is in agreement with plan and verbalized understanding.     Leticia Torrez RN

## 2022-07-28 ENCOUNTER — TELEPHONE (OUTPATIENT)
Dept: ONCOLOGY | Age: 67
End: 2022-07-28

## 2022-07-28 RX ORDER — FLUCONAZOLE 150 MG/1
150 TABLET ORAL ONCE
Qty: 1 TABLET | Refills: 0 | Status: SHIPPED | OUTPATIENT
Start: 2022-07-28 | End: 2022-07-28

## 2022-07-28 NOTE — TELEPHONE ENCOUNTER
Order obtained for Ct chest with contrast.  CT scheduled per pt preference on  8/10/22 scheduled for 7:45 with 6:45 arrival.  Nothing to eat or drink 2 hours prior but is able to have clear liquids. Pt notified and verbalized instructions back.       Jhoan Healy RN

## 2022-07-28 NOTE — PROGRESS NOTES
Reviewed questionnaire  Reviewed medications and allergies  Diagnosis: vaginal itching  Plan: Prescribe diflucan if persists schedule appointment for evaluation  [facility] 10 minute duration appointment spent on chart review, diagnosis, plan of care, follow up.

## 2022-07-29 ENCOUNTER — HOSPITAL ENCOUNTER (OUTPATIENT)
Dept: PHYSICAL THERAPY | Age: 67
Setting detail: THERAPIES SERIES
Discharge: HOME | End: 2022-07-29
Payer: COMMERCIAL

## 2022-07-29 PROCEDURE — 97110 THERAPEUTIC EXERCISES: CPT

## 2022-07-29 PROCEDURE — 97140 MANUAL THERAPY 1/> REGIONS: CPT

## 2022-07-29 NOTE — PROGRESS NOTES
Phone: Brian           Fax: [phone]                           Outpatient Physical Therapy                                                                            Daily Note    Patient: Shana Guzman : 1955  CSN #: [id number]   Referring Physician: No ref. provider found    Date: 2022    Diagnosis: L thigh soft tissue sarcoma  Treatment Diagnosis: L LE pain, decreased ambulation tolerance    Onset Date: 22  PT Insurance Information: Medical [place]  Total # of Visits Approved: 39 Per Physician Order  Total # of Visits to Date: 35  No Show: 0  Canceled Appointment: 0      Pre-Treatment Pain:  0/10  Subjective: Pt reports no new issues/concerns. Exercises:  Exercise 2: Standing 3 way hip YTB x15ea  Exercise 3: star trac static flexion 5 minutes-- 25#  Exercise 7: bike 12 mins lv 3  Exercise 8: knee flexion at stairs [id number]  Exercise 10: counter squats x15  Exercise 11: FSU/LSU 6\"  20x,  SD-5\" 10-15x    Manual:  Soft Tissue Mobilizaton: Static stretching seated this date    Assessment  Assessment: Cont with current exercise program, which pt tolerates well. Gait quality remains limited by lack of quad activation. Cont with manual knee flex stretching, which pt tolerates well. Will progress as tolerated. Activity Tolerance  Activity Tolerance: Patient tolerated treatment well    Patient Education  Patient Education: Reviewed exercise program  Pt verbalized/demonstrated good understanding:     [x] Yes         [] No, pt required further clarification.        Post Treatment Pain:  0/10      Plan  Plan Frequency: 3x/wk  Plan weeks: 8 weeks       Goals  (Total # of Visits to Date: 28)      Short Term Goals  Time Frame for Short term goals: 3 weeks  Short term goal 1: Pt will be educated on her POC and HEP-met  Short term goal 2: Pt will increase L knee AROM to 50 degrees flexion in order to normalize gait-met/cont    Long Term Goals  Time Frame for

## 2022-08-02 ENCOUNTER — HOSPITAL ENCOUNTER (OUTPATIENT)
Dept: PHYSICAL THERAPY | Age: 67
Setting detail: THERAPIES SERIES
Discharge: HOME | End: 2022-08-02
Payer: COMMERCIAL

## 2022-08-02 PROCEDURE — 97110 THERAPEUTIC EXERCISES: CPT

## 2022-08-02 PROCEDURE — 97140 MANUAL THERAPY 1/> REGIONS: CPT

## 2022-08-02 NOTE — PROGRESS NOTES
Phone: Brian           Fax: [phone]                           Outpatient Physical Therapy                                                                            Daily Note    Patient: Terell Solano : 1955  CSN #: [id number]   Referring Physician: No ref. provider found    Date: 2022    Diagnosis: L thigh soft tissue sarcoma  Treatment Diagnosis: L LE pain, decreased ambulation tolerance    Onset Date: 22  PT Insurance Information: Medical [place]  Total # of Visits Approved: 39 Per Physician Order  Total # of Visits to Date: 39      Pre-Treatment Pain:  0/10  Subjective: No new complaints, pt states she has some testing coming up    Exercises:  Exercise 2: Standing 3 way hip YTB x15ea  Exercise 3: star trac static flexion 5 minutes-- 25#  Exercise 7: bike 12 mins lv 3  Exercise 8: knee flexion at stairs [id number]  Exercise 10: counter squats x15  Exercise 11: FSU/LSU 6\"  20x,  SD-5\" 10-15x    Manual:  Soft Tissue Mobilizaton: Static stretching prone and supine      Assessment  Assessment: Pt continues to have decreased active quad control, SLR with theraband compesation noted with quad. Continue manual stretching and STM to hamstring    Patient Education  Patient Education: reviewed stretching  Pt verbalized/demonstrated good understanding:     [x] Yes         [] No, pt required further clarification.        Post Treatment Pain:  0/10      Plan  Plan Frequency: 3x/wk  Plan weeks: 8 weeks       Goals  (Total # of Visits to Date: 39)      Short Term Goals  Time Frame for Short term goals: 3 weeks  Short term goal 1: Pt will be educated on her POC and HEP-met  Short term goal 2: Pt will increase L knee AROM to 50 degrees flexion in order to normalize gait-met/cont    Long Term Goals  Time Frame for Long term goals : 6 weeks  Long term goal 1: Pt will be safe and independent with her HEP  Long term goal 2: Pt will increase L knee flexion to 90 degrees in order to ambulation with normal gait pattern and without a brace when allowed by physician-progressing  Long term goal 3: Pt will increase L quad strength to 4-/5 in order to perform 5 SLR in order to increase functional strength-progressing  Long term goal 4: Pt will demonstrate no increase in girth measurements for edema in order to increase L knee AROM  Long term goal 5: Pt will return to 50% of normal activities including community ambulation    Minutes Tracking:  Time In: 6804  Time Out: 0845  Minutes: 47  Timed Code Treatment Minutes: [address] [place], PTDPT     Date: 8/2/2022

## 2022-08-05 ENCOUNTER — HOSPITAL ENCOUNTER (OUTPATIENT)
Dept: PHYSICAL THERAPY | Age: 67
Setting detail: THERAPIES SERIES
Discharge: HOME | End: 2022-08-05
Payer: COMMERCIAL

## 2022-08-05 ENCOUNTER — HOSPITAL ENCOUNTER (OUTPATIENT)
Age: 67
Discharge: HOME | End: 2022-08-05
Payer: COMMERCIAL

## 2022-08-05 DIAGNOSIS — C78.01 MALIGNANT NEOPLASM METASTATIC TO BOTH LUNGS (HCC): ICD-10-CM

## 2022-08-05 DIAGNOSIS — C49.9 LIPOSARCOMA (HCC): Primary | ICD-10-CM

## 2022-08-05 DIAGNOSIS — R91.8 LUNG MASS: ICD-10-CM

## 2022-08-05 DIAGNOSIS — C78.02 MALIGNANT NEOPLASM METASTATIC TO BOTH LUNGS (HCC): ICD-10-CM

## 2022-08-05 DIAGNOSIS — C49.9 LIPOSARCOMA (HCC): ICD-10-CM

## 2022-08-05 LAB
BUN BLDV-MCNC: 13 MG/DL (ref 8–23)
CREAT SERPL-MCNC: 0.6 MG/DL (ref 0.5–0.9)
GFR AFRICAN AMERICAN: >60 ML/MIN
GFR NON-AFRICAN AMERICAN: >60 ML/MIN
GFR SERPL CREATININE-BSD FRML MDRD: NORMAL ML/MIN/{1.73_M2}
GFR SERPL CREATININE-BSD FRML MDRD: NORMAL ML/MIN/{1.73_M2}

## 2022-08-05 PROCEDURE — 84520 ASSAY OF UREA NITROGEN: CPT

## 2022-08-05 PROCEDURE — 36415 COLL VENOUS BLD VENIPUNCTURE: CPT

## 2022-08-05 PROCEDURE — 82565 ASSAY OF CREATININE: CPT

## 2022-08-05 PROCEDURE — 97110 THERAPEUTIC EXERCISES: CPT

## 2022-08-05 PROCEDURE — 97140 MANUAL THERAPY 1/> REGIONS: CPT

## 2022-08-05 NOTE — PROGRESS NOTES
Phone: Brian           Fax: [phone]                           Outpatient Physical Therapy                                                                            Daily Note    Patient: Romeo Hernandez : 1955  CSN #: [id number]   Referring Physician: No ref. provider found    Date: 2022    Diagnosis: L thigh soft tissue sarcoma  Treatment Diagnosis: L LE pain, decreased ambulation tolerance    Onset Date: 22  PT Insurance Information: [employer]  Total # of Visits Approved: 39 Per Physician Order  Total # of Visits to Date: 37      Pre-Treatment Pain:  0/10  Subjective: Pt states she has a CT scan next Friday    Exercises:  Exercise 2: Standing 3 way hip YTB x15ea  Exercise 3: star trac static flexion 5 minutes-- 25#  Exercise 7: bike 12 mins lv 3  Exercise 8: knee flexion at stairs [id number]  Exercise 10: counter squats x15  Exercise 11: FSU/LSU 6\"  20x,  SD-5\" 10-15x    Manual:  Soft Tissue Mobilizaton: Static stretching sitting  Other: STM/DTM to L hamstring      Assessment  Assessment: Tight end fell noted with passive ROM with pt's hamstring. Pt's L knee passive flexion sitting 62 degrees this date. Limited quad activation with exercises      Patient Education  0Patient Education: educated on IDN  Pt verbalized/demonstrated good understanding:     [x] Yes         [] No, pt required further clarification.        Post Treatment Pain:  0/10      Plan  Plan Frequency: 3x/wk  Plan weeks: 8 weeks       Goals  (Total # of Visits to Date: 40)      Short Term Goals  Time Frame for Short term goals: 3 weeks  Short term goal 1: Pt will be educated on her POC and HEP-met  Short term goal 2: Pt will increase L knee AROM to 50 degrees flexion in order to normalize gait-met/cont    Long Term Goals  Time Frame for Long term goals : 6 weeks  Long term goal 1: Pt will be safe and independent with her HEP  Long term goal 2: Pt will increase L knee flexion to 90 degrees in order to ambulation with normal gait pattern and without a brace when allowed by physician-progressing  Long term goal 3: Pt will increase L quad strength to 4-/5 in order to perform 5 SLR in order to increase functional strength-progressing  Long term goal 4: Pt will demonstrate no increase in girth measurements for edema in order to increase L knee AROM  Long term goal 5: Pt will return to 50% of normal activities including community ambulation    Minutes Tracking:  Time In: 1330  Time Out: 1413  Minutes: 43  Timed Code Treatment Minutes: 418 INDIRA Cano, DPT      Date: 8/5/2022

## 2022-08-09 ENCOUNTER — HOSPITAL ENCOUNTER (OUTPATIENT)
Dept: PHYSICAL THERAPY | Age: 67
Setting detail: THERAPIES SERIES
Discharge: HOME | End: 2022-08-09
Payer: COMMERCIAL

## 2022-08-09 PROCEDURE — 97140 MANUAL THERAPY 1/> REGIONS: CPT

## 2022-08-09 PROCEDURE — 97110 THERAPEUTIC EXERCISES: CPT

## 2022-08-09 NOTE — PROGRESS NOTES
Phone: Brian           Fax: [phone]                           Outpatient Physical Therapy                                                                            Daily Note    Patient: Jesus Neri : 1955  CSN #: [id number]   Referring Physician: No ref. provider found    Date: 2022    Diagnosis: L thigh soft tissue sarcoma  Treatment Diagnosis: L LE pain, decreased ambulation tolerance    Onset Date: 22  PT Insurance Information: Medical [employer]    Per Physician Order  Total # of Visits to Date: 45      Pre-Treatment Pain:  0/10  Subjective: No new complaints this date    Exercises:  Exercise 1: HEP heel slides, quad set, static flexion stretch  Exercise 2: Standing 3 way hip YTB x15ea  Exercise 3: star trac static flexion 7 minutes-- 25#  Exercise 5: static flexion 5min -- 4#  Exercise 7: bike 12 mins lv 3  Exercise 8: knee flexion at stairs [id number]  Exercise 10: counter squats x15  Exercise 11: FSU/LSU 6\"  20x,  SD-5\" 10-15x    Manual:  Soft Tissue Mobilizaton: Static stretching sitting  Other: STM/DTM to L hamstring    Assessment  Assessment: STM/DTM performed to pt's hamstring in prone in order to reduce muscle tension to gain PROM, PROM performed with overpressure in sitting. Pt toleraqted exercises this date but still minimal to no quad activation      Patient Education  Patient Education: educated on STM/DTM  Pt verbalized/demonstrated good understanding:     [x] Yes         [] No, pt required further clarification.        Post Treatment Pain:  0/10      Plan  Plan Frequency: 3x/wk  Plan weeks: 8 weeks       Goals  (Total # of Visits to Date: 45)      Short Term Goals  Time Frame for Short term goals: 3 weeks  Short term goal 1: Pt will be educated on her POC and HEP-met  Short term goal 2: Pt will increase L knee AROM to 50 degrees flexion in order to normalize gait-met/cont    Long Term Goals  Time Frame for Long term goals : 6 weeks  Long term goal 1: Pt will be safe and independent with her HEP  Long term goal 2: Pt will increase L knee flexion to 90 degrees in order to ambulation with normal gait pattern and without a brace when allowed by physician-progressing  Long term goal 3: Pt will increase L quad strength to 4-/5 in order to perform 5 SLR in order to increase functional strength-progressing  Long term goal 4: Pt will demonstrate no increase in girth measurements for edema in order to increase L knee AROM  Long term goal 5: Pt will return to 50% of normal activities including community ambulation    Minutes Tracking:  Time In: 0800  Time Out: 0845  Minutes: 45  Timed Code Treatment Minutes: Siena Leal 36, PT, DPT     Date: 8/9/2022

## 2022-08-10 ENCOUNTER — OFFICE VISIT (OUTPATIENT)
Dept: ONCOLOGY | Age: 67
End: 2022-08-10
Payer: COMMERCIAL

## 2022-08-10 ENCOUNTER — HOSPITAL ENCOUNTER (OUTPATIENT)
Dept: CT IMAGING | Age: 67
Discharge: HOME | End: 2022-08-12
Payer: COMMERCIAL

## 2022-08-10 VITALS
WEIGHT: 139 LBS | HEIGHT: 63 IN | TEMPERATURE: 98.1 F | HEART RATE: 96 BPM | SYSTOLIC BLOOD PRESSURE: 129 MMHG | RESPIRATION RATE: 18 BRPM | BODY MASS INDEX: 24.63 KG/M2 | DIASTOLIC BLOOD PRESSURE: 82 MMHG

## 2022-08-10 DIAGNOSIS — C49.9 LIPOSARCOMA (HCC): ICD-10-CM

## 2022-08-10 DIAGNOSIS — C49.9 LIPOSARCOMA (HCC): Primary | ICD-10-CM

## 2022-08-10 DIAGNOSIS — R91.8 LUNG MASS: ICD-10-CM

## 2022-08-10 DIAGNOSIS — C78.01 MALIGNANT NEOPLASM METASTATIC TO BOTH LUNGS (HCC): ICD-10-CM

## 2022-08-10 DIAGNOSIS — C49.22 SARCOMA OF LEFT THIGH (HCC): ICD-10-CM

## 2022-08-10 DIAGNOSIS — C78.02 MALIGNANT NEOPLASM METASTATIC TO BOTH LUNGS (HCC): ICD-10-CM

## 2022-08-10 PROCEDURE — 99215 OFFICE O/P EST HI 40 MIN: CPT | Performed by: INTERNAL MEDICINE

## 2022-08-10 PROCEDURE — 6360000004 HC RX CONTRAST MEDICATION: Performed by: INTERNAL MEDICINE

## 2022-08-10 PROCEDURE — 71260 CT THORAX DX C+: CPT

## 2022-08-10 RX ORDER — HEPARIN SODIUM (PORCINE) LOCK FLUSH IV SOLN 100 UNIT/ML 100 UNIT/ML
500 SOLUTION INTRAVENOUS PRN
Status: CANCELLED | OUTPATIENT
Start: 2022-08-20

## 2022-08-10 RX ORDER — SODIUM CHLORIDE 9 MG/ML
5-250 INJECTION, SOLUTION INTRAVENOUS PRN
Status: CANCELLED | OUTPATIENT
Start: 2022-08-18

## 2022-08-10 RX ORDER — SODIUM CHLORIDE 9 MG/ML
5-40 INJECTION INTRAVENOUS PRN
Status: CANCELLED | OUTPATIENT
Start: 2022-08-17

## 2022-08-10 RX ORDER — SODIUM CHLORIDE 9 MG/ML
INJECTION, SOLUTION INTRAVENOUS CONTINUOUS
Status: CANCELLED | OUTPATIENT
Start: 2022-08-19

## 2022-08-10 RX ORDER — SODIUM CHLORIDE 0.9 % (FLUSH) 0.9 %
5-40 SYRINGE (ML) INJECTION PRN
Status: CANCELLED | OUTPATIENT
Start: 2022-08-20

## 2022-08-10 RX ORDER — ONDANSETRON 2 MG/ML
8 INJECTION INTRAMUSCULAR; INTRAVENOUS
Status: CANCELLED | OUTPATIENT
Start: 2022-08-19

## 2022-08-10 RX ORDER — SODIUM CHLORIDE 0.9 % (FLUSH) 0.9 %
5-40 SYRINGE (ML) INJECTION PRN
Status: CANCELLED | OUTPATIENT
Start: 2022-08-18

## 2022-08-10 RX ORDER — ACETAMINOPHEN 325 MG/1
650 TABLET ORAL
Status: CANCELLED | OUTPATIENT
Start: 2022-08-19

## 2022-08-10 RX ORDER — SODIUM CHLORIDE 9 MG/ML
5-40 INJECTION INTRAVENOUS PRN
Status: CANCELLED | OUTPATIENT
Start: 2022-08-18

## 2022-08-10 RX ORDER — FAMOTIDINE 10 MG/ML
20 INJECTION, SOLUTION INTRAVENOUS
Status: CANCELLED | OUTPATIENT
Start: 2022-08-18

## 2022-08-10 RX ORDER — 0.9 % SODIUM CHLORIDE 0.9 %
1000 INTRAVENOUS SOLUTION INTRAVENOUS ONCE
Status: CANCELLED | OUTPATIENT
Start: 2022-08-17 | End: 2022-08-17

## 2022-08-10 RX ORDER — ACETAMINOPHEN 325 MG/1
650 TABLET ORAL
Status: CANCELLED | OUTPATIENT
Start: 2022-08-18

## 2022-08-10 RX ORDER — ONDANSETRON 2 MG/ML
8 INJECTION INTRAMUSCULAR; INTRAVENOUS EVERY 8 HOURS
Status: CANCELLED | OUTPATIENT
Start: 2022-08-18

## 2022-08-10 RX ORDER — SODIUM CHLORIDE 9 MG/ML
5-250 INJECTION, SOLUTION INTRAVENOUS PRN
Status: CANCELLED | OUTPATIENT
Start: 2022-08-17

## 2022-08-10 RX ORDER — SODIUM CHLORIDE 9 MG/ML
5-40 INJECTION INTRAVENOUS PRN
Status: CANCELLED | OUTPATIENT
Start: 2022-08-19

## 2022-08-10 RX ORDER — SODIUM CHLORIDE 0.9 % (FLUSH) 0.9 %
5-40 SYRINGE (ML) INJECTION PRN
Status: CANCELLED | OUTPATIENT
Start: 2022-08-17

## 2022-08-10 RX ORDER — EPINEPHRINE 1 MG/ML
0.3 INJECTION, SOLUTION, CONCENTRATE INTRAVENOUS PRN
Status: CANCELLED | OUTPATIENT
Start: 2022-08-20

## 2022-08-10 RX ORDER — ALBUTEROL SULFATE 90 UG/1
4 AEROSOL, METERED RESPIRATORY (INHALATION) PRN
Status: CANCELLED | OUTPATIENT
Start: 2022-08-19

## 2022-08-10 RX ORDER — ONDANSETRON 2 MG/ML
8 INJECTION INTRAMUSCULAR; INTRAVENOUS
Status: CANCELLED | OUTPATIENT
Start: 2022-08-17

## 2022-08-10 RX ORDER — ONDANSETRON 2 MG/ML
8 INJECTION INTRAMUSCULAR; INTRAVENOUS EVERY 8 HOURS
Status: CANCELLED | OUTPATIENT
Start: 2022-08-20

## 2022-08-10 RX ORDER — SODIUM CHLORIDE 9 MG/ML
INJECTION, SOLUTION INTRAVENOUS CONTINUOUS
Status: CANCELLED | OUTPATIENT
Start: 2022-08-18

## 2022-08-10 RX ORDER — ONDANSETRON 2 MG/ML
8 INJECTION INTRAMUSCULAR; INTRAVENOUS EVERY 8 HOURS
Status: CANCELLED | OUTPATIENT
Start: 2022-08-19

## 2022-08-10 RX ORDER — SODIUM CHLORIDE 0.9 % (FLUSH) 0.9 %
5-40 SYRINGE (ML) INJECTION PRN
Status: CANCELLED | OUTPATIENT
Start: 2022-08-19

## 2022-08-10 RX ORDER — ACETAMINOPHEN 325 MG/1
650 TABLET ORAL
Status: CANCELLED | OUTPATIENT
Start: 2022-08-20

## 2022-08-10 RX ORDER — FAMOTIDINE 10 MG/ML
20 INJECTION, SOLUTION INTRAVENOUS
Status: CANCELLED | OUTPATIENT
Start: 2022-08-20

## 2022-08-10 RX ORDER — MEPERIDINE HYDROCHLORIDE 50 MG/ML
12.5 INJECTION INTRAMUSCULAR; INTRAVENOUS; SUBCUTANEOUS PRN
Status: CANCELLED | OUTPATIENT
Start: 2022-08-19

## 2022-08-10 RX ORDER — EPINEPHRINE 1 MG/ML
0.3 INJECTION, SOLUTION, CONCENTRATE INTRAVENOUS PRN
Status: CANCELLED | OUTPATIENT
Start: 2022-08-19

## 2022-08-10 RX ORDER — MEPERIDINE HYDROCHLORIDE 50 MG/ML
12.5 INJECTION INTRAMUSCULAR; INTRAVENOUS; SUBCUTANEOUS PRN
Status: CANCELLED | OUTPATIENT
Start: 2022-08-20

## 2022-08-10 RX ORDER — HEPARIN SODIUM (PORCINE) LOCK FLUSH IV SOLN 100 UNIT/ML 100 UNIT/ML
500 SOLUTION INTRAVENOUS PRN
Status: CANCELLED | OUTPATIENT
Start: 2022-08-18

## 2022-08-10 RX ORDER — HEPARIN SODIUM (PORCINE) LOCK FLUSH IV SOLN 100 UNIT/ML 100 UNIT/ML
500 SOLUTION INTRAVENOUS PRN
Status: CANCELLED | OUTPATIENT
Start: 2022-08-17

## 2022-08-10 RX ORDER — ACETAMINOPHEN 325 MG/1
650 TABLET ORAL
Status: CANCELLED | OUTPATIENT
Start: 2022-08-17

## 2022-08-10 RX ORDER — HEPARIN SODIUM (PORCINE) LOCK FLUSH IV SOLN 100 UNIT/ML 100 UNIT/ML
500 SOLUTION INTRAVENOUS PRN
Status: CANCELLED | OUTPATIENT
Start: 2022-08-19

## 2022-08-10 RX ORDER — FAMOTIDINE 10 MG/ML
20 INJECTION, SOLUTION INTRAVENOUS
Status: CANCELLED | OUTPATIENT
Start: 2022-08-19

## 2022-08-10 RX ORDER — SODIUM CHLORIDE 9 MG/ML
5-250 INJECTION, SOLUTION INTRAVENOUS PRN
Status: CANCELLED | OUTPATIENT
Start: 2022-08-19

## 2022-08-10 RX ORDER — 0.9 % SODIUM CHLORIDE 0.9 %
1000 INTRAVENOUS SOLUTION INTRAVENOUS ONCE
Status: CANCELLED | OUTPATIENT
Start: 2022-08-18 | End: 2022-08-18

## 2022-08-10 RX ORDER — ALBUTEROL SULFATE 90 UG/1
4 AEROSOL, METERED RESPIRATORY (INHALATION) PRN
Status: CANCELLED | OUTPATIENT
Start: 2022-08-20

## 2022-08-10 RX ORDER — ONDANSETRON 2 MG/ML
8 INJECTION INTRAMUSCULAR; INTRAVENOUS
Status: CANCELLED | OUTPATIENT
Start: 2022-08-20

## 2022-08-10 RX ORDER — ONDANSETRON 2 MG/ML
8 INJECTION INTRAMUSCULAR; INTRAVENOUS EVERY 8 HOURS
Status: CANCELLED | OUTPATIENT
Start: 2022-08-17

## 2022-08-10 RX ORDER — DIPHENHYDRAMINE HYDROCHLORIDE 50 MG/ML
50 INJECTION INTRAMUSCULAR; INTRAVENOUS
Status: CANCELLED | OUTPATIENT
Start: 2022-08-18

## 2022-08-10 RX ORDER — SODIUM CHLORIDE 9 MG/ML
INJECTION, SOLUTION INTRAVENOUS CONTINUOUS
Status: CANCELLED | OUTPATIENT
Start: 2022-08-20

## 2022-08-10 RX ORDER — DIPHENHYDRAMINE HYDROCHLORIDE 50 MG/ML
50 INJECTION INTRAMUSCULAR; INTRAVENOUS
Status: CANCELLED | OUTPATIENT
Start: 2022-08-19

## 2022-08-10 RX ORDER — SODIUM CHLORIDE 9 MG/ML
5-40 INJECTION INTRAVENOUS PRN
Status: CANCELLED | OUTPATIENT
Start: 2022-08-20

## 2022-08-10 RX ORDER — SODIUM CHLORIDE 9 MG/ML
INJECTION, SOLUTION INTRAVENOUS CONTINUOUS
Status: CANCELLED | OUTPATIENT
Start: 2022-08-17

## 2022-08-10 RX ORDER — OLANZAPINE 5 MG/1
5 TABLET ORAL NIGHTLY
Status: CANCELLED | OUTPATIENT
Start: 2022-08-17

## 2022-08-10 RX ORDER — EPINEPHRINE 1 MG/ML
0.3 INJECTION, SOLUTION, CONCENTRATE INTRAVENOUS PRN
Status: CANCELLED | OUTPATIENT
Start: 2022-08-18

## 2022-08-10 RX ORDER — ALBUTEROL SULFATE 90 UG/1
4 AEROSOL, METERED RESPIRATORY (INHALATION) PRN
Status: CANCELLED | OUTPATIENT
Start: 2022-08-18

## 2022-08-10 RX ORDER — ALBUTEROL SULFATE 90 UG/1
4 AEROSOL, METERED RESPIRATORY (INHALATION) PRN
Status: CANCELLED | OUTPATIENT
Start: 2022-08-17

## 2022-08-10 RX ORDER — DIPHENHYDRAMINE HYDROCHLORIDE 50 MG/ML
50 INJECTION INTRAMUSCULAR; INTRAVENOUS
Status: CANCELLED | OUTPATIENT
Start: 2022-08-20

## 2022-08-10 RX ORDER — FAMOTIDINE 10 MG/ML
20 INJECTION, SOLUTION INTRAVENOUS
Status: CANCELLED | OUTPATIENT
Start: 2022-08-17

## 2022-08-10 RX ORDER — MEPERIDINE HYDROCHLORIDE 50 MG/ML
12.5 INJECTION INTRAMUSCULAR; INTRAVENOUS; SUBCUTANEOUS PRN
Status: CANCELLED | OUTPATIENT
Start: 2022-08-18

## 2022-08-10 RX ORDER — DIPHENHYDRAMINE HYDROCHLORIDE 50 MG/ML
50 INJECTION INTRAMUSCULAR; INTRAVENOUS
Status: CANCELLED | OUTPATIENT
Start: 2022-08-17

## 2022-08-10 RX ORDER — 0.9 % SODIUM CHLORIDE 0.9 %
1000 INTRAVENOUS SOLUTION INTRAVENOUS ONCE
Status: CANCELLED | OUTPATIENT
Start: 2022-08-19 | End: 2022-08-19

## 2022-08-10 RX ORDER — EPINEPHRINE 1 MG/ML
0.3 INJECTION, SOLUTION, CONCENTRATE INTRAVENOUS PRN
Status: CANCELLED | OUTPATIENT
Start: 2022-08-17

## 2022-08-10 RX ORDER — SODIUM CHLORIDE 9 MG/ML
5-250 INJECTION, SOLUTION INTRAVENOUS PRN
Status: CANCELLED | OUTPATIENT
Start: 2022-08-20

## 2022-08-10 RX ORDER — ONDANSETRON 2 MG/ML
8 INJECTION INTRAMUSCULAR; INTRAVENOUS
Status: CANCELLED | OUTPATIENT
Start: 2022-08-18

## 2022-08-10 RX ORDER — MEPERIDINE HYDROCHLORIDE 50 MG/ML
12.5 INJECTION INTRAMUSCULAR; INTRAVENOUS; SUBCUTANEOUS PRN
Status: CANCELLED | OUTPATIENT
Start: 2022-08-17

## 2022-08-10 RX ADMIN — IOPAMIDOL 75 ML: 755 INJECTION, SOLUTION INTRAVENOUS at 07:24

## 2022-08-10 NOTE — PROGRESS NOTES
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  Evidence of multiple lung lesions concerning for metastatic disease, June/22  Cancer Staging  Sarcoma of left thigh [facility])  Staging form: Soft Tissue Sarcoma Of The Trunk And Extremities, AJCC 8th Edition  - Pathologic stage from 3/21/2022: Stage IIIB (ypT4, pN0, cM0, FNCLCC histologic grade: G3) - Signed by Reginaldo Villanueva MD on 4/20/2022  - Clinical: Stage IB (cT2, cN0, cM0, FNCLCC histologic grade: GX) - Signed by Reginaldo Villanueva MD on 4/20/2022      CURRENT THERAPY:         Status post neoadjuvant radiation therapy 50 Gy February 10, 2022  Status post left thigh sarcoma radical resection March 21, 2022  Plan for adjuvant chemotherapy treatment  Work-up in progress for lung nodules  BRIEF CASE HISTORY:      Ms. Claudia Monterroso is a very pleasant 61 y.o. female with history of multiple co morbidities as listed. Patient is referred for evaluation of further management of high-grade liposarcoma. The patient states that she had left thigh swelling around November 2021 which was getting larger so she was evaluated by orthopedics and she had biopsy which showed liposarcoma. The patient had no significant pain or stiffness. No other systemic symptoms. She had neoadjuvant treatment with radiation therapy in February 2022. Following recovery she had wide excision on March 21, 2022. Pathology results showed 16.5 cm mass with greater than 50% necrosis with close margin of less than 2 mm. Patient is healing well from her surgery and she is undergoing rehab. No complications. Patient seen for further management. No chest pain or shortness of breath. No weight loss or decreased appetite. No other complaints. .   After resection, the patient was seen by us and then seen by [facility] Willy [facility]les [facility] and while we are contemplating options for adjuvant therapy with reference to her is giving doxorubicin. Repeat staging studies showed multiple lung nodules concerning for metastatic disease, those nodules are bilateral and measuring 5 to 8 mm and the are difficult to biopsy. The patient was sent to [facility] who concurred that the lesions are too small and too difficult location for biopsy. We decided to wait 6 weeks and repeat CT scan. CT scan unfortunately showed very rapid progression of disease with widespread metastatic disease to the lungs and liver  INTERIM HISTORY  The patient comes in today for a follow-up. I reviewed the CT scan with her. She is devastated with the news that she has rapidly progressive metastatic disease. CT scan was reviewed with the patient. Very rapid progression of disease to the lungs and liver. We will contact Dr. Windy Augustin at [facility] but she will need systemic therapy as soon as possible. Likely a combination of ifosfamide and Adriamycin will be used. We will obtain a tissue biopsy for molecular testing. She will need an echocardiogram and Mediport. We talked about life expectancy and prognosis. Obviously prognosis very poor and life expectancy will be a matter of months. We talked about palliative care the patient is interested in systemic therapy. PAST MEDICAL HISTORY: has a past medical history of Cancer ([facility] [address] History of radiation therapy, and PONV (postoperative nausea and vomiting). PAST SURGICAL HISTORY: has a past surgical history that includes Leg Surgery (Left, ); Breast biopsy (2014);  section, low transverse; Colonoscopy (N/A, 3/30/2018); Colonoscopy (2018); Leg biopsy excision (Left, 2021); Leg biopsy excision (Left, 3/21/2022); tissue transfer (Left, 3/21/2022); Leg Surgery (Left, 3/21/2022); and Skin graft (Left, 3/21/2022).      CURRENT MEDICATIONS:  has a current medication list which includes the following prescription(s): acetaminophen, docusate sodium, and ondansetron. ALLERGIES:  has No Known Allergies. FAMILY HISTORY: Negative for any hematological or oncological conditions. SOCIAL HISTORY:  reports that she has never smoked. She has never used smokeless tobacco. She reports current alcohol use. She reports that she does not use drugs. REVIEW OF SYSTEMS:     General: No weakness or fatigue. No unanticipated weight loss or decreased appetite. No fever or chills. Eyes: No blurred vision, eye pain or double vision. Ears: No hearing problems or drainage. No tinnitus. Throat: No sore throat, problems with swallowing or dysphagia. Respiratory: No cough, sputum or hemoptysis. No shortness of breath. No pleuritic chest pain. Cardiovascular: No chest pain, orthopnea or PND. No lower extremity edema. No palpitation. Gastrointestinal: No problems with swallowing. No abdominal pain or bloating. No nausea or vomiting. No diarrhea or constipation. No GI bleeding. Genitourinary: No dysuria, hematuria, frequency or urgency. Musculoskeletal: As above. Dermatologic: No skin rashes or pruritus. No skin lesions or discolorations. Psychiatric: No depression, anxiety, or stress or signs of schizophrenia. No change in mood or affect. Hematologic: No history of bleeding tendency. No bruises or ecchymosis. No history of clotting problems. Infectious disease: No fever, chills or frequent infections. Endocrine: No polydipsia or polyuria. No temperature intolerance. Neurologic: No headaches or dizziness. No weakness or numbness of the extremities. No changes in balance, coordination,  memory, mentation, behavior. Allergic/Immunologic: No nasal congestion or hives. No repeated infections. PHYSICAL EXAM:  The patient is not in acute distress. Vital signs: Blood pressure 129/82, pulse 96, temperature 98.1 °F (36.7 °C), temperature source Temporal, resp.  rate 18, height 5' 3\" (1.6 m), weight 139 lb (63 kg), last menstrual period 01/01/2010, not currently breastfeeding. General appearance - well appearing, not in pain or distress  Mental status - good mood, alert and oriented  Eyes - pupils equal and reactive, extraocular eye movements intact  Ears - bilateral TM's and external ear canals normal  Nose - normal and patent, no erythema, discharge or polyps  Mouth - mucous membranes moist, pharynx normal without lesions  Neck - supple, no significant adenopathy  Lymphatics - no palpable lymphadenopathy, no hepatosplenomegaly  Chest - clear to auscultation, no wheezes, rales or rhonchi, symmetric air entry  Heart - normal rate, regular rhythm, normal S1, S2, no murmurs, rubs, clicks or gallops  Abdomen - soft, nontender, nondistended, no masses or organomegaly  Neurological - alert, oriented, normal speech, no focal findings or movement disorder noted  Musculoskeletal - no joint tenderness, deformity or swelling  Extremities -left lower extremity s/p recent large lipoma excision with healing wound extending from the upper thigh to below the knee. No signs of infection. Skin - normal coloration and turgor, no rashes, no suspicious skin lesions noted     Review of Diagnostic data:   Lab Results   Component Value Date    WBC 4.7 04/01/2022    HGB 9.7 (L) 04/01/2022    HCT 30.2 (L) 04/01/2022    MCV 84.1 04/01/2022     04/01/2022       Chemistry        Component Value Date/Time     04/01/2022 0740    K 4.2 04/01/2022 0740     04/01/2022 0740    CO2 28 04/01/2022 0740    BUN 13 08/05/2022 1429    CREATININE 0.60 08/05/2022 1429        Component Value Date/Time    CALCIUM 9.1 04/01/2022 0740          CT CHEST W CONTRAST  Narrative: EXAMINATION:  CT OF THE CHEST WITH CONTRAST 8/10/2022 7:34 am    TECHNIQUE:  CT of the chest was performed with the administration of intravenous  contrast. Multiplanar reformatted images are provided for review.  Automated  exposure control, iterative reconstruction, and/or weight based adjustment of  the mA/kV was utilized to reduce the radiation dose to as low as reasonably  achievable. COMPARISON:  Chest CT 06/08/2022, 12/07/2021    HISTORY:  ORDERING SYSTEM PROVIDED HISTORY: Liposarcoma ([facility] [address]  TECHNOLOGIST PROVIDED HISTORY:  STAT Creatinine as needed:->Yes  sarcoma, lung lesions, please compare    FINDINGS:  Mediastinum: The heart and great vessels are normal in size. No pericardial  effusion. No enlarged or suspicious-appearing lymph nodes are identified. Lungs/pleura: Progressing pulmonary metastatic disease is demonstrated with  innumerable new and larger pulmonary nodules present. For example, a 5.2 cm  mass in the anterolateral right lower lobe previously measured 0.7 cm on the  most recent exam.  A 2.7 cm nodule in the left lung apex was not previously  identified. Small right pleural effusion is new in the interval.  Mild  septal thickening is present, which suggestive underlying component of edema. No pneumothorax. The central airway is patent. Upper Abdomen: Findings compatible with hepatic steatosis. A 1.8 cm liver  lesion in the right hepatic lobe is present with nodular enhancement  suggesting a hemangioma, although this was not clearly present on the prior  exams. The visualized adrenal glands appear within normal limits. Soft Tissues/Bones: No acute findings. Impression: 1. Findings consistent with progressing pulmonary metastatic disease with  innumerable new and larger pulmonary nodules. 2.  A 1.8 cm liver lesion in the right hepatic lobe is identified, not  previously demonstrated. This could be further characterized with  contrast-enhanced MRI if clinically appropriate. 3.  New small right pleural effusion with findings suggestive of interstitial  edema. 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  Grade 3.   Concerning lung nodules highly suggestive of metastatic disease    PLAN:   As discussed above, the CT scan was reviewed with the patient. Very concerning for metastatic disease. We will obtain tissue diagnosis and plan chemotherapy as soon as possible. Likely inpatient chemotherapy with ifosfamide and Adriamycin with mesna AIM chemotherapy regimen. She will need a Mediport or a PICC line and needs an echocardiogram and this will be ordered as soon as possible  Patient prognosis is very poor and unless we get her started on systemic therapy and she has a response, I expect her survival to be in a matter of 2 to 4 months  We talked about palliative care and the patient is obviously very interested in systemic therapy. She will consider palliative care if she does not respond or tolerate chemo  We talked about molecular testing and we will send the biopsy for molecular testing hopefully it will find a targetable mutation. Other options discussed include pazopanib or and gemcitabine based chemotherapy. Morgan Vyas MD  Hematologist/Medical [address] [place] [address] hematology oncology physicians                                                          This note is created with the assistance of a speech recognition program.  While intending to generate a document that actually reflects the content of the visit, the document can still have some errors including those of syntax and sound a like substitutions which may escape proof reading. It such instances, actual meaning can be extrapolated by contextual diversion.

## 2022-08-10 NOTE — PATIENT INSTRUCTIONS
Need bx and port asap  See chemo orders, urgent please  Send bx results for tempus testing if not already done  Rv to start chemo, likely next week.    If unable to get port and bx done in the next 5 days, plan to admit to the hospital

## 2022-08-11 DIAGNOSIS — Z45.2 ENCOUNTER FOR CARE RELATED TO VASCULAR ACCESS PORT: ICD-10-CM

## 2022-08-11 DIAGNOSIS — C49.9 LIPOSARCOMA (HCC): Primary | ICD-10-CM

## 2022-08-11 DIAGNOSIS — R91.8 LUNG MASS: ICD-10-CM

## 2022-08-11 DIAGNOSIS — C78.01 MALIGNANT NEOPLASM METASTATIC TO BOTH LUNGS (HCC): ICD-10-CM

## 2022-08-11 DIAGNOSIS — C78.02 MALIGNANT NEOPLASM METASTATIC TO BOTH LUNGS (HCC): ICD-10-CM

## 2022-08-12 ENCOUNTER — APPOINTMENT (OUTPATIENT)
Dept: PHYSICAL THERAPY | Age: 67
End: 2022-08-12
Payer: COMMERCIAL

## 2022-08-12 ENCOUNTER — HOSPITAL ENCOUNTER (OUTPATIENT)
Dept: CT IMAGING | Age: 67
Discharge: HOME | End: 2022-08-14
Payer: COMMERCIAL

## 2022-08-12 ENCOUNTER — HOSPITAL ENCOUNTER (OUTPATIENT)
Dept: GENERAL RADIOLOGY | Age: 67
Discharge: HOME | End: 2022-08-14
Payer: COMMERCIAL

## 2022-08-12 VITALS
RESPIRATION RATE: 16 BRPM | HEART RATE: 77 BPM | OXYGEN SATURATION: 96 % | TEMPERATURE: 97.8 F | DIASTOLIC BLOOD PRESSURE: 70 MMHG | SYSTOLIC BLOOD PRESSURE: 113 MMHG | HEIGHT: 63 IN | WEIGHT: 140.2 LBS | BODY MASS INDEX: 24.84 KG/M2

## 2022-08-12 DIAGNOSIS — R91.8 LUNG MASS: ICD-10-CM

## 2022-08-12 LAB
INR BLD: 1
PLATELET # BLD: 241 K/UL (ref 138–453)
PROTHROMBIN TIME: 13.4 SEC (ref 11.5–14.2)

## 2022-08-12 PROCEDURE — 85049 AUTOMATED PLATELET COUNT: CPT

## 2022-08-12 PROCEDURE — 88341 IMHCHEM/IMCYTCHM EA ADD ANTB: CPT

## 2022-08-12 PROCEDURE — 88333 PATH CONSLTJ SURG CYTO XM 1: CPT

## 2022-08-12 PROCEDURE — 2580000003 HC RX 258: Performed by: RADIOLOGY

## 2022-08-12 PROCEDURE — 36415 COLL VENOUS BLD VENIPUNCTURE: CPT

## 2022-08-12 PROCEDURE — 7100000011 HC PHASE II RECOVERY - ADDTL 15 MIN

## 2022-08-12 PROCEDURE — 71045 X-RAY EXAM CHEST 1 VIEW: CPT

## 2022-08-12 PROCEDURE — 6360000002 HC RX W HCPCS: Performed by: RADIOLOGY

## 2022-08-12 PROCEDURE — 77012 CT SCAN FOR NEEDLE BIOPSY: CPT

## 2022-08-12 PROCEDURE — 7100000010 HC PHASE II RECOVERY - FIRST 15 MIN

## 2022-08-12 PROCEDURE — 88305 TISSUE EXAM BY PATHOLOGIST: CPT

## 2022-08-12 PROCEDURE — 85610 PROTHROMBIN TIME: CPT

## 2022-08-12 PROCEDURE — 88342 IMHCHEM/IMCYTCHM 1ST ANTB: CPT

## 2022-08-12 PROCEDURE — 2709999900 CT NEEDLE BIOPSY LUNG PERCUTANEOUS W IMAGING GUIDANCE

## 2022-08-12 RX ORDER — MIDAZOLAM HYDROCHLORIDE 1 MG/ML
INJECTION INTRAMUSCULAR; INTRAVENOUS
Status: COMPLETED | OUTPATIENT
Start: 2022-08-12 | End: 2022-08-12

## 2022-08-12 RX ORDER — SODIUM CHLORIDE 0.9 % (FLUSH) 0.9 %
5-40 SYRINGE (ML) INJECTION EVERY 12 HOURS SCHEDULED
Status: DISCONTINUED | OUTPATIENT
Start: 2022-08-12 | End: 2022-08-15 | Stop reason: HOSPADM

## 2022-08-12 RX ORDER — SODIUM CHLORIDE 9 MG/ML
INJECTION, SOLUTION INTRAVENOUS PRN
Status: DISCONTINUED | OUTPATIENT
Start: 2022-08-12 | End: 2022-08-15 | Stop reason: HOSPADM

## 2022-08-12 RX ORDER — SODIUM CHLORIDE 9 MG/ML
INJECTION, SOLUTION INTRAVENOUS CONTINUOUS
Status: DISCONTINUED | OUTPATIENT
Start: 2022-08-12 | End: 2022-08-15 | Stop reason: HOSPADM

## 2022-08-12 RX ORDER — FENTANYL CITRATE 50 UG/ML
INJECTION, SOLUTION INTRAMUSCULAR; INTRAVENOUS
Status: COMPLETED | OUTPATIENT
Start: 2022-08-12 | End: 2022-08-12

## 2022-08-12 RX ORDER — SODIUM CHLORIDE 0.9 % (FLUSH) 0.9 %
5-40 SYRINGE (ML) INJECTION PRN
Status: DISCONTINUED | OUTPATIENT
Start: 2022-08-12 | End: 2022-08-15 | Stop reason: HOSPADM

## 2022-08-12 RX ORDER — ACETAMINOPHEN 325 MG/1
650 TABLET ORAL EVERY 4 HOURS PRN
Status: DISCONTINUED | OUTPATIENT
Start: 2022-08-12 | End: 2022-08-15 | Stop reason: HOSPADM

## 2022-08-12 RX ADMIN — MIDAZOLAM 1 MG: 1 INJECTION INTRAMUSCULAR; INTRAVENOUS at 10:26

## 2022-08-12 RX ADMIN — SODIUM CHLORIDE: 9 INJECTION, SOLUTION INTRAVENOUS at 09:11

## 2022-08-12 RX ADMIN — FENTANYL CITRATE 50 MCG: 50 INJECTION INTRAMUSCULAR; INTRAVENOUS at 10:26

## 2022-08-12 ASSESSMENT — PAIN - FUNCTIONAL ASSESSMENT: PAIN_FUNCTIONAL_ASSESSMENT: 0-10

## 2022-08-12 NOTE — H&P
Interval H&P Note    Pt Name: Williams Lawrence  MRN: [id number]  YOB: 1955  Date of evaluation: 8/12/2022      [x] I have reviewed in epic the Hematology/Oncology Progress Note by Dr Latrell Chung dated 8/10/22 attached below for the Interval History and Physical note. [x] I have examined  Williams Lawrence  There are no changes to the patient who is scheduled for LUNG BIOPSY IN IR BY DR JIMENEZ. The patient denies new health changes, fever, chills, wheezing, cough, increased SOB, chest pain, open sores or wounds. PMH, Surgical History, Social History, Psych, and Family History reviewed and updated in EPIC in appropriate section. Vital signs: /70   Temp 97.1 °F (36.2 °C) (Temporal)   Resp 20   Ht 5' 3\" (1.6 m)   Wt 140 lb 3.2 oz (63.6 kg)   LMP 01/01/2010 (Approximate)   SpO2 97%   BMI 24.84 kg/m²  Apical HR 84. Allergies:  Patient has no known allergies. Medications:    Prior to Admission medications    Medication Sig Start Date End Date Taking? Authorizing Provider   acetaminophen (TYLENOL) 500 MG tablet Take 1,000 mg by mouth every 6 hours as needed for Pain    Historical Provider, MD   ondansetron (ZOFRAN-ODT) 4 MG disintegrating tablet Take 1 tablet by mouth every 8 hours as needed for Nausea or Vomiting  Patient not taking: No sig reported 4/2/22   Chau Coma L Heis, DO   docusate sodium (COLACE) 100 MG capsule Take 100 mg by mouth as needed for Constipation  Patient not taking: Reported on 8/12/2022    Historical Provider, MD         This is a 61 y.o. female who is pleasant, cooperative, alert and oriented x3, in no acute distress. Heart: Heart sounds are normal. Apical HR 84 regular rate and rhythm without murmur, gallop or rub. Lungs: Normal respiratory effort with equal expansion, good air exchange, unlabored and clear to auscultation without wheezes or rales bilaterally   Abdomen: soft, nontender, nondistended with bowel sounds .      Labs:  Recent Labs     08/12/22  9719 08/05/22  1429     --    BUN  --  13   CREATININE  --  0.60   INR 1.0  --    PROTIME 13.4  --        No results for input(s): COVID19 in the last 720 hours. 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  Evidence of multiple lung lesions concerning for metastatic disease, June/22  Cancer Staging  Sarcoma of left thigh [facility])  Staging form: Soft Tissue Sarcoma Of The Trunk And Extremities, AJCC 8th Edition  - Pathologic stage from 3/21/2022: Stage IIIB (ypT4, pN0, cM0, FNCLCC histologic grade: G3) - Signed by Cedric Baumgarten, MD on 4/20/2022  - Clinical: Stage IB (cT2, cN0, cM0, FNCLCC histologic grade: GX) - Signed by Cedric Baumgarten, MD on 4/20/2022        CURRENT THERAPY:         Status post neoadjuvant radiation therapy 50 Gy February 10, 2022  Status post left thigh sarcoma radical resection March 21, 2022  Plan for adjuvant chemotherapy treatment  Work-up in progress for lung nodules  BRIEF CASE HISTORY:      Ms. Trice Hernandez is a very pleasant 61 y.o. female with history of multiple co morbidities as listed. Patient is referred for evaluation of further management of high-grade liposarcoma. The patient states that she had left thigh swelling around November 2021 which was getting larger so she was evaluated by orthopedics and she had biopsy which showed liposarcoma. The patient had no significant pain or stiffness. No other systemic symptoms.   She had neoadjuvant treatment with radiation therapy in February 2022. Following recovery she had wide excision on March 21, 2022. Pathology results showed 16.5 cm mass with greater than 50% necrosis with close margin of less than 2 mm. Patient is healing well from her surgery and she is undergoing rehab. No complications. Patient seen for further management. No chest pain or shortness of breath. No weight loss or decreased appetite. No other complaints. .  After resection, the patient was seen by us and then seen by Dr. Shanae Lassiter at [facility] and while we are contemplating options for adjuvant therapy with reference to her is giving doxorubicin. Repeat staging studies showed multiple lung nodules concerning for metastatic disease, those nodules are bilateral and measuring 5 to 8 mm and the are difficult to biopsy. The patient was sent to [facility] who concurred that the lesions are too small and too difficult location for biopsy. We decided to wait 6 weeks and repeat CT scan. CT scan unfortunately showed very rapid progression of disease with widespread metastatic disease to the lungs and liver  INTERIM HISTORY  The patient comes in today for a follow-up. I reviewed the CT scan with her. She is devastated with the news that she has rapidly progressive metastatic disease. CT scan was reviewed with the patient. Very rapid progression of disease to the lungs and liver. We will contact Dr. Paige Reid at [facility] but she will need systemic therapy as soon as possible. Likely a combination of ifosfamide and Adriamycin will be used. We will obtain a tissue biopsy for molecular testing. She will need an echocardiogram and Mediport. We talked about life expectancy and prognosis. Obviously prognosis very poor and life expectancy will be a matter of months. We talked about palliative care the patient is interested in systemic therapy.   PAST MEDICAL HISTORY: has a past medical history of Cancer ([facility] [address] History of radiation therapy, and PONV (postoperative nausea and vomiting). PAST SURGICAL HISTORY: has a past surgical history that includes Leg Surgery (Left, ); Breast biopsy (2014);  section, low transverse; Colonoscopy (N/A, 3/30/2018); Colonoscopy (2018); Leg biopsy excision (Left, 2021); Leg biopsy excision (Left, 3/21/2022); tissue transfer (Left, 3/21/2022); Leg Surgery (Left, 3/21/2022); and Skin graft (Left, 3/21/2022). CURRENT MEDICATIONS:  has a current medication list which includes the following prescription(s): acetaminophen, docusate sodium, and ondansetron. ALLERGIES:  has No Known Allergies. FAMILY HISTORY: Negative for any hematological or oncological conditions. SOCIAL HISTORY:  reports that she has never smoked. She has never used smokeless tobacco. She reports current alcohol use. She reports that she does not use drugs. REVIEW OF SYSTEMS:     General: No weakness or fatigue. No unanticipated weight loss or decreased appetite. No fever or chills. Eyes: No blurred vision, eye pain or double vision. Ears: No hearing problems or drainage. No tinnitus. Throat: No sore throat, problems with swallowing or dysphagia. Respiratory: No cough, sputum or hemoptysis. No shortness of breath. No pleuritic chest pain. Cardiovascular: No chest pain, orthopnea or PND. No lower extremity edema. No palpitation. Gastrointestinal: No problems with swallowing. No abdominal pain or bloating. No nausea or vomiting. No diarrhea or constipation. No GI bleeding. Genitourinary: No dysuria, hematuria, frequency or urgency. Musculoskeletal: As above. Dermatologic: No skin rashes or pruritus. No skin lesions or discolorations. Psychiatric: No depression, anxiety, or stress or signs of schizophrenia. No change in mood or affect. Hematologic: No history of bleeding tendency. No bruises or ecchymosis. No history of clotting problems.   Infectious disease: No fever, chills or frequent infections. Endocrine: No polydipsia or polyuria. No temperature intolerance. Neurologic: No headaches or dizziness. No weakness or numbness of the extremities. No changes in balance, coordination,  memory, mentation, behavior. Allergic/Immunologic: No nasal congestion or hives. No repeated infections. PHYSICAL EXAM:  The patient is not in acute distress. Vital signs: Blood pressure 129/82, pulse 96, temperature 98.1 °F (36.7 °C), temperature source Temporal, resp. rate 18, height 5' 3\" (1.6 m), weight 139 lb (63 kg), last menstrual period 01/01/2010, not currently breastfeeding. General appearance - well appearing, not in pain or distress  Mental status - good mood, alert and oriented  Eyes - pupils equal and reactive, extraocular eye movements intact  Ears - bilateral TM's and external ear canals normal  Nose - normal and patent, no erythema, discharge or polyps  Mouth - mucous membranes moist, pharynx normal without lesions  Neck - supple, no significant adenopathy  Lymphatics - no palpable lymphadenopathy, no hepatosplenomegaly  Chest - clear to auscultation, no wheezes, rales or rhonchi, symmetric air entry  Heart - normal rate, regular rhythm, normal S1, S2, no murmurs, rubs, clicks or gallops  Abdomen - soft, nontender, nondistended, no masses or organomegaly  Neurological - alert, oriented, normal speech, no focal findings or movement disorder noted  Musculoskeletal - no joint tenderness, deformity or swelling  Extremities -left lower extremity s/p recent large lipoma excision with healing wound extending from the upper thigh to below the knee. No signs of infection.   Skin - normal coloration and turgor, no rashes, no suspicious skin lesions noted     Review of Diagnostic data:         Lab Results   Component Value Date     WBC 4.7 04/01/2022     HGB 9.7 (L) 04/01/2022     HCT 30.2 (L) 04/01/2022     MCV 84.1 04/01/2022      04/01/2022        Chemistry               Component Value Date/Time      04/01/2022 0740     K 4.2 04/01/2022 0740      04/01/2022 0740     CO2 28 04/01/2022 0740     BUN 13 08/05/2022 1429     CREATININE 0.60 08/05/2022 1429               Component Value Date/Time     CALCIUM 9.1 04/01/2022 0740             CT CHEST W CONTRAST  Narrative: EXAMINATION:  CT OF THE CHEST WITH CONTRAST 8/10/2022 7:34 am     TECHNIQUE:  CT of the chest was performed with the administration of intravenous  contrast. Multiplanar reformatted images are provided for review. Automated  exposure control, iterative reconstruction, and/or weight based adjustment of  the mA/kV was utilized to reduce the radiation dose to as low as reasonably  achievable. COMPARISON:  Chest CT 06/08/2022, 12/07/2021     HISTORY:  ORDERING SYSTEM PROVIDED HISTORY: Liposarcoma ([facility] [address]  TECHNOLOGIST PROVIDED HISTORY:  STAT Creatinine as needed:->Yes  sarcoma, lung lesions, please compare     FINDINGS:  Mediastinum: The heart and great vessels are normal in size. No pericardial  effusion. No enlarged or suspicious-appearing lymph nodes are identified. Lungs/pleura: Progressing pulmonary metastatic disease is demonstrated with  innumerable new and larger pulmonary nodules present. For example, a 5.2 cm  mass in the anterolateral right lower lobe previously measured 0.7 cm on the  most recent exam.  A 2.7 cm nodule in the left lung apex was not previously  identified. Small right pleural effusion is new in the interval.  Mild  septal thickening is present, which suggestive underlying component of edema. No pneumothorax. The central airway is patent. Upper Abdomen: Findings compatible with hepatic steatosis. A 1.8 cm liver  lesion in the right hepatic lobe is present with nodular enhancement  suggesting a hemangioma, although this was not clearly present on the prior  exams. The visualized adrenal glands appear within normal limits. Soft Tissues/Bones: No acute findings. Impression: 1. Findings consistent with progressing pulmonary metastatic disease with  innumerable new and larger pulmonary nodules. 2.  A 1.8 cm liver lesion in the right hepatic lobe is identified, not  previously demonstrated. This could be further characterized with  contrast-enhanced MRI if clinically appropriate. 3.  New small right pleural effusion with findings suggestive of interstitial  edema. IMPRESSION:  High-grade liposarcoma of the left thigh. Stage IIIb, T4 N0 M0.  Grade 3. Concerning lung nodules highly suggestive of metastatic disease     PLAN:   As discussed above, the CT scan was reviewed with the patient. Very concerning for metastatic disease. We will obtain tissue diagnosis and plan chemotherapy as soon as possible. Likely inpatient chemotherapy with ifosfamide and Adriamycin with mesna AIM chemotherapy regimen. She will need a Mediport or a PICC line and needs an echocardiogram and this will be ordered as soon as possible  Patient prognosis is very poor and unless we get her started on systemic therapy and she has a response, I expect her survival to be in a matter of 2 to 4 months  We talked about palliative care and the patient is obviously very interested in systemic therapy. She will consider palliative care if she does not respond or tolerate chemo  We talked about molecular testing and we will send the biopsy for molecular testing hopefully it will find a targetable mutation. Other options discussed include pazopanib or and gemcitabine based chemotherapy.      Melidna Vyas MD  Hematologist/Medical [id number] [facility] hematology oncology physicians                                                             This note is created with the assistance of a speech recognition program.  While intending to generate a document that actually reflects the content of the visit, the document can still have some errors including those of syntax and sound a like substitutions which may escape proof reading. It such instances, actual meaning can be extrapolated by contextual diversion.

## 2022-08-12 NOTE — PRE SEDATION
Reported on 8/12/2022    Historical Provider, MD     Coumadin Use Last 7 Days:  no  Antiplatelet drug therapy use last 7 days: no  Other anticoagulant use last 7 days: no  Additional Medication Information:  see [facility]      Pre-Sedation Documentation and Exam:   I have reviewed the patient's history and review of systems.     Mallampati Airway Assessment:  Mallampati Class II - (soft palate, fauces & uvula are visible)    Prior History of Anesthesia Complications:   none    ASA Classification:  Class 2 / 3    Sedation/ Anesthesia Plan:   intravenous sedation    Medications Planned:   midazolam (Versed) intravenously and fentanyl intravenously    Patient is an appropriate candidate for plan of sedation: yes    Electronically signed by Aleshia Pradhan MD on 8/12/2022 at 10:00 AM

## 2022-08-12 NOTE — DISCHARGE INSTRUCTIONS
Watch for signs and symptoms of infection including fever, chills, headache, vomiting. Watch for increased redness, swelling or drainage at the site. Watch for any increased weakness or numbness. May remove dressing and shower tomorrow. Soap and water only. May resume normal diet and activity. No heavy lifting for 24 hours. Call doctor for any complications from procedure.    Watch for shortness of breath  Pain at puncture site

## 2022-08-12 NOTE — FLOWSHEET NOTE
Patient tolerated lung bx. Vitals remained stable and charted. Samples obtained and sent with pathology occlusive transparent dressing applied.

## 2022-08-12 NOTE — BRIEF OP NOTE
Brief Postoperative Note    Wes Lancaster  YOB: 1955  [id number]    Pre-operative Diagnosis: Pulmonary metastases    Post-operative Diagnosis: Same    Procedure: CT guided core biopsy of a pulmonary nodule    Anesthesia: Moderate Sedation    Surgeons/Assistants: José Miguel    Estimated Blood Loss: less than 50     Complications: None    Specimens: Was Obtained: 3 core samples using 20 G needle    Electronically signed by Jessi Durand MD on 8/12/2022 at 10:58 AM

## 2022-08-15 ENCOUNTER — HOSPITAL ENCOUNTER (OUTPATIENT)
Dept: NON INVASIVE DIAGNOSTICS | Age: 67
Discharge: HOME | End: 2022-08-15
Payer: COMMERCIAL

## 2022-08-15 DIAGNOSIS — C49.9 LIPOSARCOMA (HCC): ICD-10-CM

## 2022-08-15 DIAGNOSIS — C78.01 MALIGNANT NEOPLASM METASTATIC TO BOTH LUNGS (HCC): ICD-10-CM

## 2022-08-15 DIAGNOSIS — R91.8 LUNG MASS: ICD-10-CM

## 2022-08-15 DIAGNOSIS — C78.02 MALIGNANT NEOPLASM METASTATIC TO BOTH LUNGS (HCC): ICD-10-CM

## 2022-08-15 LAB
LV EF: 60 %
LVEF MODALITY: NORMAL

## 2022-08-15 PROCEDURE — 93306 TTE W/DOPPLER COMPLETE: CPT

## 2022-08-15 NOTE — PROGRESS NOTES
[facility]  Inpatient/Observation/Outpatient Rehabilitation    Date: 8/15/2022  Patient Name: Fiona Cazares       [] Inpatient Acute/Observation       []  Outpatient  : 1955       [] Pt no showed for scheduled appointment    [] Pt refused/declined therapy at this time due to:           [x] Pt cancelled due to:  [] No Reason Given   [] Sick/ill   [] Other:    Patient is being admitted to hospital tomorrow. Therapist/Assistant will attempt to see this patient, at our earliest opportunity.        Lisbeth Marina Date: 8/15/2022

## 2022-08-16 ENCOUNTER — HOSPITAL ENCOUNTER (OUTPATIENT)
Dept: PHYSICAL THERAPY | Age: 67
Setting detail: THERAPIES SERIES
Discharge: HOME | End: 2022-08-16
Payer: COMMERCIAL

## 2022-08-16 ENCOUNTER — HOSPITAL ENCOUNTER (INPATIENT)
Age: 67
LOS: 5 days | Discharge: HOME | DRG: 847 | End: 2022-08-21
Attending: INTERNAL MEDICINE | Admitting: INTERNAL MEDICINE
Payer: COMMERCIAL

## 2022-08-16 DIAGNOSIS — C49.22 SARCOMA OF LEFT THIGH (HCC): ICD-10-CM

## 2022-08-16 DIAGNOSIS — C78.02 MALIGNANT NEOPLASM METASTATIC TO BOTH LUNGS (HCC): Primary | ICD-10-CM

## 2022-08-16 DIAGNOSIS — C78.01 MALIGNANT NEOPLASM METASTATIC TO BOTH LUNGS (HCC): Primary | ICD-10-CM

## 2022-08-16 PROBLEM — C49.9 SOFT TISSUE SARCOMA (HCC): Status: ACTIVE | Noted: 2022-08-16

## 2022-08-16 LAB — SURGICAL PATHOLOGY REPORT: NORMAL

## 2022-08-16 PROCEDURE — 1200000000 HC SEMI PRIVATE

## 2022-08-16 PROCEDURE — 2580000003 HC RX 258: Performed by: INTERNAL MEDICINE

## 2022-08-16 PROCEDURE — 36569 INSJ PICC 5 YR+ W/O IMAGING: CPT

## 2022-08-16 PROCEDURE — 6360000002 HC RX W HCPCS: Performed by: INTERNAL MEDICINE

## 2022-08-16 PROCEDURE — 76937 US GUIDE VASCULAR ACCESS: CPT

## 2022-08-16 PROCEDURE — 02HV33Z INSERTION OF INFUSION DEVICE INTO SUPERIOR VENA CAVA, PERCUTANEOUS APPROACH: ICD-10-PCS | Performed by: INTERNAL MEDICINE

## 2022-08-16 PROCEDURE — 99223 1ST HOSP IP/OBS HIGH 75: CPT | Performed by: INTERNAL MEDICINE

## 2022-08-16 RX ORDER — SODIUM CHLORIDE 9 MG/ML
INJECTION, SOLUTION INTRAVENOUS CONTINUOUS
Status: DISCONTINUED | OUTPATIENT
Start: 2022-08-16 | End: 2022-08-21 | Stop reason: HOSPADM

## 2022-08-16 RX ORDER — ONDANSETRON 2 MG/ML
4 INJECTION INTRAMUSCULAR; INTRAVENOUS EVERY 4 HOURS PRN
Status: DISCONTINUED | OUTPATIENT
Start: 2022-08-16 | End: 2022-08-21 | Stop reason: HOSPADM

## 2022-08-16 RX ORDER — SODIUM CHLORIDE 0.9 % (FLUSH) 0.9 %
5-40 SYRINGE (ML) INJECTION PRN
Status: DISCONTINUED | OUTPATIENT
Start: 2022-08-16 | End: 2022-08-21 | Stop reason: HOSPADM

## 2022-08-16 RX ORDER — ENOXAPARIN SODIUM 100 MG/ML
40 INJECTION SUBCUTANEOUS DAILY
Status: DISCONTINUED | OUTPATIENT
Start: 2022-08-16 | End: 2022-08-21 | Stop reason: HOSPADM

## 2022-08-16 RX ORDER — POLYETHYLENE GLYCOL 3350 17 G/17G
17 POWDER, FOR SOLUTION ORAL DAILY PRN
Status: DISCONTINUED | OUTPATIENT
Start: 2022-08-16 | End: 2022-08-21 | Stop reason: HOSPADM

## 2022-08-16 RX ORDER — SODIUM CHLORIDE 9 MG/ML
INJECTION, SOLUTION INTRAVENOUS PRN
Status: DISCONTINUED | OUTPATIENT
Start: 2022-08-16 | End: 2022-08-21 | Stop reason: HOSPADM

## 2022-08-16 RX ORDER — SODIUM CHLORIDE 0.9 % (FLUSH) 0.9 %
5-40 SYRINGE (ML) INJECTION EVERY 12 HOURS SCHEDULED
Status: DISCONTINUED | OUTPATIENT
Start: 2022-08-16 | End: 2022-08-21 | Stop reason: HOSPADM

## 2022-08-16 RX ORDER — ACETAMINOPHEN 325 MG/1
650 TABLET ORAL EVERY 6 HOURS PRN
Status: DISCONTINUED | OUTPATIENT
Start: 2022-08-16 | End: 2022-08-21 | Stop reason: HOSPADM

## 2022-08-16 RX ORDER — ACETAMINOPHEN 650 MG/1
650 SUPPOSITORY RECTAL EVERY 6 HOURS PRN
Status: DISCONTINUED | OUTPATIENT
Start: 2022-08-16 | End: 2022-08-21 | Stop reason: HOSPADM

## 2022-08-16 RX ADMIN — SODIUM CHLORIDE: 9 INJECTION, SOLUTION INTRAVENOUS at 18:47

## 2022-08-16 RX ADMIN — ENOXAPARIN SODIUM 40 MG: 100 INJECTION SUBCUTANEOUS at 18:47

## 2022-08-16 ASSESSMENT — PAIN SCALES - GENERAL: PAINLEVEL_OUTOF10: 0

## 2022-08-16 NOTE — PROCEDURES
Picc placement order:  Benefits include stable, long term intravenous access. Blood draws. Peripheral vein preservation. Safely deliver vesicant medications. Risks include failure to obtain the desired result(s) of the procedure, discomfort, injury, the need for additional procedures/therapies, permanent loss of body function, bleeding/bruising, arterial puncture, air embolism, nerve damage, hematoma, phlebitis, catheter fracture/rupture, catheter embolism, catheter occlusion, catheter migration, catheter site infection, unintentional/accidental removal of catheter, bloodstream infection, infiltration, cardiac arrhythmia, vein thrombosis, difficult catheter removal.   Alternatives discussed including centrally inserted central catheter, as well as less invasive procedures such as multiple peripheral IVs, extended dwell catheters and midline placements. Consent obtained by proceduralist verbal consent from patient and . Prescribed therapy: Chemotherapy  Peripheral ultrasound assessment done. Plan for left basilic vein insertion. Vein measurement = 0.42cm and area based CVR= 12.6%, preferable CVR based on area would be 11%-20% (which correlates to 33% to 45% linear CVR). Product type: AMAT Arrow PICC  History/Labs/Allergies Reviewed  Placed By: Marija Perez - KRISTIN IV Team  Assistant - Staff-RN  Time out Performed using Two Identifiers  Lot #: [id number]  Expiration date: 12/31/2022  Catheter size: 4.5 Lao - single lumen  Total length: 46cm  Total length inserted: 42cm  External catheter length: 4cm  Number of attempts: 1  Estimated blood loss: 1 ml  Placement verified by: positive blood return & flushes easily  Special equipment used: NEBOTRADE ECG Tip tracker system, ultrasound, MST, and micro-introducer needle.    Catheter securement: statlock  Dressing applied: Tegaderm CHG  Lidocaine administered intradermally conc.1%: 2 mL    RN aware picc placed with NEBOTRADE ECG technology and is confirmed in the distal 1/3 SVC. Picc is released for use. Rn aware new iv tubing required. PICC education:     [ X ] Discussed with patient/Family or POA prior to procedure. Risks and Benefits along with reason for procedure were discussed and teaching was reinforced with an education handout on line  insertion. [facility] FAQ Catheter Associated Blood Stream Infections and [address] [id number] REV. 7/13 Nursing and Booklet left at bedside or in chart. Patient (Family or POA) acknowledged understanding of information taught and agreed to procedure. [  ] Was not discussed with patient/family or POA due to pts medical status at time of procedure. pts family or POA not available to discuss line education.  [facility] FAQ Catheter Associated Blood Stream Infections and [address] [id number] REV. 7/13 Nursing and Booklet left at bedside or in chart

## 2022-08-16 NOTE — H&P
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 Oneil Romero is a very pleasant 61 y.o. female with history of multiple co morbidities as listed. Patient is referred for evaluation of further management of high-grade liposarcoma. The patient states that she had left thigh swelling around November 2021 which was getting larger so she was evaluated by orthopedics and she had biopsy which showed liposarcoma. The patient had no significant pain or stiffness. No other systemic symptoms. She had neoadjuvant treatment with radiation therapy in February 2022. Following recovery she had wide excision on March 21, 2022. Pathology results showed 16.5 cm mass with greater than 50% necrosis with close margin of less than 2 mm. Patient is healing well from her surgery and she is undergoing rehab. No complications. Patient seen for further management. No chest pain or shortness of breath. No weight loss or decreased appetite. No other complaints. .  After resection, the patient was seen by us and then seen by Dr. Juan Wallace at [facility] and while we are contemplating options for adjuvant therapy with reference to her is giving doxorubicin. Repeat staging studies showed multiple lung nodules concerning for metastatic disease, those nodules are bilateral and measuring 5 to 8 mm and the are difficult to biopsy. The patient was sent to [facility] clinic who concurred that the lesions are too small and too difficult location for biopsy. We decided to wait 6 weeks and repeat CT scan.   CT scan unfortunately showed very rapid progression of disease with widespread metastatic disease to the lungs and liver  We discussed options and decided to treat with AIM chemotherapy inpatient. Biopsy was done for molecular testing, and Echo was done prior to chemo     Past Medical History:   has a past medical history of Cancer ([address] History of radiation therapy, and PONV (postoperative nausea and vomiting). Past Surgical History:   has a past surgical history that includes Leg Surgery (Left, ); Breast biopsy (2014);  section, low transverse; Colonoscopy (N/A, 2018); Colonoscopy (2018); Leg biopsy excision (Left, 2021); Leg biopsy excision (Left, 2022); tissue transfer (Left, 2022); Leg Surgery (Left, 2022); Skin graft (Left, 2022); skin biopsy; and CT NEEDLE BIOPSY LUNG PERCUTANEOUS (2022). Medications:    Reviewed in Epic     Allergies:  Patient has no known allergies. Social History:   reports that she has never smoked. She has never used smokeless tobacco. She reports current alcohol use. She reports that she does not use drugs. Family History: family history includes Cancer in her maternal aunt, maternal aunt, and mother; Heart Attack in her maternal grandfather and maternal grandmother; Hypertension in her father; Other in her father and paternal grandmother; Thyroid Disease in her mother. REVIEW OF SYSTEMS:    Constitutional: No fever or chills. No night sweats, no weight loss   Eyes: No eye discharge, double vision, or eye pain   HEENT: negative for sore mouth, sore throat, hoarseness and voice change   Respiratory: negative for cough , sputum, dyspnea, wheezing, hemoptysis, chest pain   Cardiovascular: negative for chest pain, dyspnea, palpitations, orthopnea, PND   Gastrointestinal: negative for nausea, vomiting, diarrhea, constipation, abdominal pain, Dysphagia, hematemesis and hematochezia   Genitourinary: negative for frequency, dysuria, nocturia, urinary incontinence, and hematuria   Integument: negative for rash, skin lesions, bruises. Hematologic/Lymphatic: negative for easy bruising, bleeding, lymphadenopathy, or petechiae   Endocrine: negative for heat or cold intolerance,weight changes, change in bowel habits and hair loss   Musculoskeletal: negative for myalgias, arthralgias, pain, joint swelling,and bone pain   Neurological: negative for headaches, dizziness, seizures, weakness, numbness    PHYSICAL EXAM:      /71   Pulse 80   Temp 98.2 °F (36.8 °C) (Oral)   Resp 16   Ht 5' 3\" (1.6 m)   Wt 135 lb 5.8 oz (61.4 kg)   LMP 2010 (Approximate)   SpO2 99%   BMI 23.98 kg/m²    Temp (24hrs), Av.2 °F (36.8 °C), Min:98.2 °F (36.8 °C), Max:98.2 °F (36.8 °C)    General appearance - well appearing, no in pain or distress   Mental status - alert and cooperative   Eyes - pupils equal and reactive, extraocular eye movements intact   Ears - bilateral TM's and external ear canals normal   Mouth - mucous membranes moist, pharynx normal without lesions   Neck - supple, no significant adenopathy   Lymphatics - no palpable lymphadenopathy, no hepatosplenomegaly   Chest - clear to auscultation, no wheezes, rales or rhonchi, symmetric air entry   Heart - normal rate, regular rhythm, normal S1, S2, no murmurs  Abdomen - soft, nontender, nondistended, no masses or organomegaly   Neurological - alert, oriented, normal speech, no focal findings or movement disorder noted   Musculoskeletal - no joint tenderness, deformity or swelling   Extremities - peripheral pulses normal, no pedal edema, no clubbing or cyanosis   Skin - normal coloration and turgor, no rashes, no suspicious skin lesions noted ,    DATA:    Labs:   Lab Results   Component Value Date    WBC 4.7 2022    HGB 9.7 (L) 2022    HCT 30.2 (L) 2022    MCV 84.1 2022     2022     Lab Results   Component Value Date/Time     2022 07:40 AM    K 4.2 2022 07:40 AM     2022 07:40 AM    CO2 28 2022 07:40 AM    BUN 13 08/05/2022 02:29 PM    CREATININE 0.60 08/05/2022 02:29 PM    GLUCOSE 93 06/16/2022 07:22 AM    GLUCOSE 95 03/28/2012 07:25 AM    CALCIUM 9.1 04/01/2022 07:40 AM      No results found for: ALT, AST, GGT, ALKPHOS, BILITOT  Biopsy of lung met  -- Diagnosis --     LUNG, LEFT UPPER LOBE, CT-GUIDED NEEDLE BIOPSY:   -MALIGNANT NEOPLASM CONSISTENT WITH METASTATIC SARCOMA   -SEE COMMENT   IMAGING DATA:  Echocardiogram   Summary  Global left ventricular systolic function appears preserved with an  estimated ejection fraction of 60%. The left ventricular cavity size is within normal limits and the left  ventricular wall thickness is within normal limits. No definite specific wall motion abnormalities were identified. No significant valvular abnormalities. Diastolic left ventricular function is normal.     No prior studies were available for comparison. CT chest   Impression   1. Findings consistent with progressing pulmonary metastatic disease with   innumerable new and larger pulmonary nodules. 2.  A 1.8 cm liver lesion in the right hepatic lobe is identified, not   previously demonstrated. This could be further characterized with   contrast-enhanced MRI if clinically appropriate. 3.  New small right pleural effusion with findings suggestive of interstitial   edema. Primary Problem  Soft tissue sarcoma [facility])    Active Hospital Problems    Diagnosis Date Noted    Soft tissue sarcoma ([facility] [C49.9] 08/16/2022     Priority: Medium         IMPRESSION:   Metastatic sarcoma  Lung mets   Plan AIM chemotherapy     RECOMMENDATIONS:  Long discussion with the patient, I discussed treatment options. Side effects and protocol were explained. Pt is agreeable to start   Pt needs a PICC line for chemo, as adriamycin is vesicant chemo and can not be given peripherally. discussed pros and cons.  She is agreeable to proceed   Will watch labs closely   Watch for neurotoxicity   Check urine PH daily       Discussed with patient and Nurse. Thank you for asking us to see this patient.     ZHOU POPE [facility] MD [place]  Hematologist/Medical Oncologist  Cell: [phone]

## 2022-08-17 ENCOUNTER — TELEPHONE (OUTPATIENT)
Dept: CASE MANAGEMENT | Age: 67
End: 2022-08-17

## 2022-08-17 LAB
ABSOLUTE EOS #: 0.1 K/UL (ref 0–0.44)
ABSOLUTE IMMATURE GRANULOCYTE: <0.03 K/UL (ref 0–0.3)
ABSOLUTE LYMPH #: 0.88 K/UL (ref 1.1–3.7)
ABSOLUTE MONO #: 0.43 K/UL (ref 0.1–1.2)
ALBUMIN SERPL-MCNC: 3.8 G/DL (ref 3.5–5.2)
ALBUMIN/GLOBULIN RATIO: 1.8 (ref 1–2.5)
ALP BLD-CCNC: 127 U/L (ref 35–104)
ALT SERPL-CCNC: 20 U/L (ref 5–33)
ANION GAP SERPL CALCULATED.3IONS-SCNC: 9 MMOL/L (ref 9–17)
AST SERPL-CCNC: 13 U/L
BASOPHILS # BLD: 1 % (ref 0–2)
BASOPHILS ABSOLUTE: 0.04 K/UL (ref 0–0.2)
BILIRUB SERPL-MCNC: 0.48 MG/DL (ref 0.3–1.2)
BUN BLDV-MCNC: 9 MG/DL (ref 8–23)
CALCIUM SERPL-MCNC: 8.8 MG/DL (ref 8.6–10.4)
CHLORIDE BLD-SCNC: 106 MMOL/L (ref 98–107)
CO2: 25 MMOL/L (ref 20–31)
CREAT SERPL-MCNC: 0.67 MG/DL (ref 0.5–0.9)
EOSINOPHILS RELATIVE PERCENT: 2 % (ref 1–4)
GFR AFRICAN AMERICAN: >60 ML/MIN
GFR NON-AFRICAN AMERICAN: >60 ML/MIN
GFR SERPL CREATININE-BSD FRML MDRD: ABNORMAL ML/MIN/{1.73_M2}
GLUCOSE BLD-MCNC: 88 MG/DL (ref 70–99)
HCT VFR BLD CALC: 37.2 % (ref 36.3–47.1)
HEMOGLOBIN: 12.5 G/DL (ref 11.9–15.1)
IMMATURE GRANULOCYTES: 0 %
LYMPHOCYTES # BLD: 18 % (ref 24–43)
MCH RBC QN AUTO: 28.5 PG (ref 25.2–33.5)
MCHC RBC AUTO-ENTMCNC: 33.6 G/DL (ref 28.4–34.8)
MCV RBC AUTO: 84.9 FL (ref 82.6–102.9)
MONOCYTES # BLD: 9 % (ref 3–12)
NRBC AUTOMATED: 0 PER 100 WBC
PDW BLD-RTO: 14 % (ref 11.8–14.4)
PH UA: 6.5 (ref 5–8)
PLATELET # BLD: 248 K/UL (ref 138–453)
PMV BLD AUTO: 9.8 FL (ref 8.1–13.5)
POTASSIUM SERPL-SCNC: 4 MMOL/L (ref 3.7–5.3)
RBC # BLD: 4.38 M/UL (ref 3.95–5.11)
SEG NEUTROPHILS: 70 % (ref 36–65)
SEGMENTED NEUTROPHILS ABSOLUTE COUNT: 3.32 K/UL (ref 1.5–8.1)
SODIUM BLD-SCNC: 140 MMOL/L (ref 135–144)
TOTAL PROTEIN: 5.9 G/DL (ref 6.4–8.3)
WBC # BLD: 4.8 K/UL (ref 3.5–11.3)

## 2022-08-17 PROCEDURE — 2580000003 HC RX 258: Performed by: INTERNAL MEDICINE

## 2022-08-17 PROCEDURE — 80053 COMPREHEN METABOLIC PANEL: CPT

## 2022-08-17 PROCEDURE — 85025 COMPLETE CBC W/AUTO DIFF WBC: CPT

## 2022-08-17 PROCEDURE — 1200000000 HC SEMI PRIVATE

## 2022-08-17 PROCEDURE — 99232 SBSQ HOSP IP/OBS MODERATE 35: CPT | Performed by: INTERNAL MEDICINE

## 2022-08-17 PROCEDURE — 6360000002 HC RX W HCPCS: Performed by: INTERNAL MEDICINE

## 2022-08-17 PROCEDURE — 83986 ASSAY PH BODY FLUID NOS: CPT

## 2022-08-17 PROCEDURE — 36415 COLL VENOUS BLD VENIPUNCTURE: CPT

## 2022-08-17 PROCEDURE — 6370000000 HC RX 637 (ALT 250 FOR IP): Performed by: INTERNAL MEDICINE

## 2022-08-17 RX ORDER — 0.9 % SODIUM CHLORIDE 0.9 %
1000 INTRAVENOUS SOLUTION INTRAVENOUS ONCE
Status: COMPLETED | OUTPATIENT
Start: 2022-08-17 | End: 2022-08-17

## 2022-08-17 RX ORDER — ONDANSETRON 2 MG/ML
8 INJECTION INTRAMUSCULAR; INTRAVENOUS EVERY 8 HOURS
Status: COMPLETED | OUTPATIENT
Start: 2022-08-17 | End: 2022-08-18

## 2022-08-17 RX ORDER — OLANZAPINE 5 MG/1
5 TABLET ORAL NIGHTLY
Status: DISCONTINUED | OUTPATIENT
Start: 2022-08-17 | End: 2022-08-18

## 2022-08-17 RX ADMIN — ONDANSETRON 8 MG: 2 INJECTION INTRAMUSCULAR; INTRAVENOUS at 12:11

## 2022-08-17 RX ADMIN — SODIUM CHLORIDE 4200 MG: 9 INJECTION, SOLUTION INTRAVENOUS at 13:21

## 2022-08-17 RX ADMIN — DEXAMETHASONE SODIUM PHOSPHATE 12 MG: 4 INJECTION, SOLUTION INTRAMUSCULAR; INTRAVENOUS at 12:08

## 2022-08-17 RX ADMIN — FOSAPREPITANT 150 MG: 150 INJECTION, POWDER, LYOPHILIZED, FOR SOLUTION INTRAVENOUS at 12:13

## 2022-08-17 RX ADMIN — MESNA 835 MG: 100 INJECTION, SOLUTION INTRAVENOUS at 12:45

## 2022-08-17 RX ADMIN — MESNA 835 MG: 100 INJECTION, SOLUTION INTRAVENOUS at 17:46

## 2022-08-17 RX ADMIN — SODIUM CHLORIDE 1000 ML: 9 INJECTION, SOLUTION INTRAVENOUS at 10:53

## 2022-08-17 RX ADMIN — ONDANSETRON 8 MG: 2 INJECTION INTRAMUSCULAR; INTRAVENOUS at 20:14

## 2022-08-17 RX ADMIN — SODIUM CHLORIDE: 9 INJECTION, SOLUTION INTRAVENOUS at 16:56

## 2022-08-17 RX ADMIN — SODIUM CHLORIDE, PRESERVATIVE FREE 10 ML: 5 INJECTION INTRAVENOUS at 20:14

## 2022-08-17 RX ADMIN — MESNA 835 MG: 100 INJECTION, SOLUTION INTRAVENOUS at 20:25

## 2022-08-17 RX ADMIN — ENOXAPARIN SODIUM 40 MG: 100 INJECTION SUBCUTANEOUS at 08:43

## 2022-08-17 RX ADMIN — OLANZAPINE 5 MG: 5 TABLET, FILM COATED ORAL at 20:13

## 2022-08-17 RX ADMIN — SODIUM CHLORIDE 42 MG: 9 INJECTION, SOLUTION INTRAVENOUS at 16:56

## 2022-08-17 RX ADMIN — SODIUM CHLORIDE: 9 INJECTION, SOLUTION INTRAVENOUS at 04:52

## 2022-08-17 NOTE — PLAN OF CARE
Problem: Safety - Adult  Goal: Free from fall injury  8/17/2022 0555 by Tami Alejandra, RN  Outcome: Progressing  Flowsheets (Taken 8/16/2022 1930)  Free From Fall Injury: Instruct family/caregiver on patient safety  8/16/2022 1902 by José Miguel Ceballos RN  Outcome: Progressing     Problem: ABCDS Injury Assessment  Goal: Absence of physical injury  Outcome: Progressing

## 2022-08-17 NOTE — TELEPHONE ENCOUNTER
Name: Gopi Harman  : 1955  MRN: [id number]    Navigator reviewing chart and pt.  Followed by Irsih Banks  Electronically signed by Leela Simental RN on 2022 at 10:51 AM

## 2022-08-17 NOTE — PROGRESS NOTES
Progress Note               Date:                           8/17/2022  Patient name:           Isa Dick  Date of admission:  8/16/2022 12:08 PM  MRN:   [id number]  YOB: 1955  PCP:                           ANAIS Werner CNP        Subjective:       No acute event overnight   Cbc unremarkable  Serum electrolyte unremarkable   hEmodynamicall stable , saturataing normally on room air   Picc line placed , Chemo to be started today. Patient  tearful . counselled   Urine ph normal for today   \        HPI in Brief:   Ms. Evangelista oM is a very pleasant 61 y.o. female with history of multiple co morbidities as listed. Patient is referred for evaluation of further management of high-grade liposarcoma. The patient states that she had left thigh swelling around November 2021 which was getting larger so she was evaluated by orthopedics and she had biopsy which showed liposarcoma. The patient had no significant pain or stiffness. No other systemic symptoms. She had neoadjuvant treatment with radiation therapy in February 2022. Following recovery she had wide excision on March 21, 2022. Pathology results showed 16.5 cm mass with greater than 50% necrosis with close margin of less than 2 mm. Patient is healing well from her surgery and she is undergoing rehab. No complications. Patient seen for further management. No chest pain or shortness of breath. No weight loss or decreased appetite. No other complaints. .  After resection, the patient was seen by us and then seen by [facility]. Tino Andrade at [facility] and while we are contemplating options for adjuvant therapy with reference to her is giving doxorubicin. Repeat staging studies showed multiple lung nodules concerning for metastatic disease, those nodules are bilateral and measuring 5 to 8 mm and the are difficult to biopsy.   [facility] who concurred that the lesions are too small and too difficult location for biopsy. We decided to wait 6 weeks and repeat CT scan. CT scan unfortunately showed very rapid progression of disease with widespread metastatic disease to the lungs and liver  We discussed options and decided to treat with AIM chemotherapy inpatient.  Biopsy was done for molecular testing, and Echo was done prior to chemo     Problem Lists:   Primary Problem:  Soft tissue sarcoma [facility])   Current Problems:  Active Hospital Problems    Diagnosis Date Noted    Soft tissue sarcoma ([facility] [C49.9] 08/16/2022     Priority: Medium     PMH:  Past Medical History:   Diagnosis Date    Cancer ([facility] 2021    SARCOMA LEFT THIGH    History of radiation therapy     PONV (postoperative nausea and vomiting)       Allergies: No Known Allergies     Medications:   Scheduled Meds:   sodium chloride  1,000 mL IntraVENous Once    OLANZapine  5 mg Oral Nightly    fosaprepitant (EMEND) IVPB  150 mg IntraVENous Once    dexamethasone  12 mg IntraVENous Once    ondansetron  8 mg IntraVENous Q8H    DOXOrubicin (ADRIAMYCIN) chemo infusion  25 mg/m2 (Treatment Plan Recorded) IntraVENous Once    mesna (MESNEX) IVPB  500 mg/m2 (Treatment Plan Recorded) IntraVENous Once    ifosfamide (IFEX) chemo IVPB  2,500 mg/m2 (Treatment Plan Recorded) IntraVENous Once    mesna (MESNEX) IVPB  500 mg/m2 (Treatment Plan Recorded) IntraVENous Once    mesna (MESNEX) IVPB  500 mg/m2 (Treatment Plan Recorded) IntraVENous Once    sodium chloride flush  5-40 mL IntraVENous 2 times per day    enoxaparin  40 mg SubCUTAneous Daily     PRN Medications: sodium chloride flush, sodium chloride, polyethylene glycol, acetaminophen **OR** acetaminophen, ondansetron  Continuous Infusions:   sodium chloride      sodium chloride 100 mL/hr at 08/17/22 0452       Objective:   Vitals: /70   Pulse 65   Temp 98.4 °F (36.9 °C) (Oral)   Resp 16   Ht 5' 3\" (1.6 m)   Wt 135 lb 5.8 oz (61.4 kg)   LMP 01/01/2010 (Approximate)   SpO2 97%   BMI 23.98 kg/m² General appearance - well appearing, little stressful  Mental status - alert and cooperative   Eyes - pupils equal and reactive, extraocular eye movements intact   Ears - bilateral TM's and external ear canals normal   Mouth - mucous membranes moist, pharynx normal without lesions   Neck - supple, no significant adenopathy   Lymphatics - no palpable lymphadenopathy, no hepatosplenomegaly   Chest - clear to auscultation, no wheezes, rales or rhonchi, symmetric air entry   Heart - normal rate, regular rhythm, normal S1, S2, no murmurs, rubs, clicks or gallops   Abdomen - soft, nontender, nondistended, no masses or organomegaly   Neurological - alert, oriented, normal speech, no focal findings or movement disorder noted   Musculoskeletal - no joint tenderness, deformity or swelling   Extremities - peripheral pulses normal, no pedal edema, no clubbing or cyanosis   Skin - normal coloration and turgor, no rashes, no suspicious skin lesions noted ,     Data:  I/O this shift:  In: 320 [P.O.:320]  Out: -   In: 1325.9 [P.O.:320; I.V.:1005.9]  Out: -   CBC:   Recent Labs     08/17/22  0637   WBC 4.8   HGB 12.5        BMP:    Recent Labs     08/17/22  0637      K 4.0      CO2 25   BUN 9   CREATININE 0.67   GLUCOSE 88     Hepatic:   Recent Labs     08/17/22  0637   AST 13   ALT 20   BILITOT 0.48   ALKPHOS 127*     INR: No results for input(s): INR in the last 72 hours. IMAGING DATA:  No orders to display       Assessment    Metastatic sarcoma  Lung mets  Plan AIM chemotherapy           Plan   Treatment discussed with patient   Side effects and protocol were explained. Pt is agreeable to start  Pt needs a PICC line for chemo, as adriamycin is vesicant chemo and can not be given peripherally. discussed pros and cons.  She is agreeable to proceed  Will watch labs closely  Watch for neurotoxicity  Check urine PH daily         Flash Crabtree MD  Internal Medicine Resident, PGY3   Shen Fairbanks [address] [place] [address]  8/17/2022,10:32 AM    Attending Physician Statement  The patient was seen and examined during rounds, I have discussed the care of [place], including pertinent history and exam findings with the resident. I have reviewed the key elements of all parts of the encounter with the resident. I agree with the assessment, and status of the problem list as documented.    Additional assessment/ plan  Day 1 cycle 1  Well tolerated       Aniceto Fuentes MD  Hematology Oncology  [phone]  Electronically signed by Jeffry Guold MD on 8/17/2022 at 10:30 PM

## 2022-08-17 NOTE — PROGRESS NOTES
[facility] [address]  PROGRESS NOTE  2  Shift date: 8/17/2022  Shift day: Wednesday   Shift # 2    Room # 4751/1384-21   Name: Jhon Verdugo                Jain: Marcin Clemente [address] of [employer]:       Referral: Routine Visit    Admit Date & Time: 8/16/2022 12:08 PM    Assessment:  Jhon Verdugo is a 61 y.o. female in the hospital due to Soft tissue sarcoma ([address] Upon entering the room writer observes pt appears calm,  Patient denies any spiritual\emotional distress at this time. Intervention:  Writer introduced himself to the patient and her family and offered space to express feelings, needs, and concerns and provided a ministry presence. Outcome:  Upon concluding the visit pt and her  appeared calm and expressed gratitude for the spiritual\emotional care provided.     Plan:  Chaplains will remain available to offer spiritual and emotional support as needed       08/17/22 1816   Encounter Summary   Service Provided For: Patient and family together   Referral/Consult From: [facility] Spouse   Last Encounter  08/17/22   Complexity of Encounter Low   Begin Time 1806   End Time  1818   Total Time Calculated 12 min       Electronically signed by Chaplain Resident Nohemy Correa MDiv.on 8/17/2022 at [facility]  [phone]

## 2022-08-17 NOTE — CARE COORDINATION
08/17/22 1047   Service Assessment   Patient Orientation Alert and Oriented   Cognition Alert   History Provided By Patient   Primary [facility] is: Legal Next of Kin   PCP Verified by CM Yes  (PCP- Dr. Karuna Peng)   Last Visit to PCP Within last 3 months   Prior Functional Level Independent in ADLs/IADLs   Current Functional Level Independent in ADLs/IADLs   Can patient return to prior living arrangement Yes   Ability to make needs known: Good   Family able to assist with home care needs: Yes   Would you like for me to discuss the discharge plan with any other family members/significant others, and if so, who? No   Social/Functional History   Lives With Spouse   Type of Home Condo   Home Layout Two level   Home Access Stairs to enter with rails   Bathroom Shower/Tub Tub/Shower unit   Bathroom Toilet Standard   Bathroom Equipment Shower chair   [address] 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 0   Services At/After Discharge   Transition of Care Consult (CM Consult) [facility]  (For Picc line flush)    Resource Information Provided? No   Mode of Transport at Discharge Self   Confirm Follow Up Transport Family   Condition of Participation: Discharge Planning   The Plan for Transition of Care is related to the following treatment goals: Patients goal is to return back to her home independently   The Patient and/or Patient Representative was provided with a Choice of Provider?  Patient   The Patient and/Or Patient Representative agree with the Discharge Plan? Yes   Freedom of Choice list was provided with basic dialogue that supports the patient's individualized plan of care/goals, treatment preferences, and shares the quality data associated with the providers? Yes     CM spoke with patient at bedside to discuss transitional planning. Patient will be discharging with a new PICC line. Referral sent to [employer] [facility] for home PICC line flushes. Patient states that she has had [facility] services through [facility] in the past, and if she has an [facility] needs at discharge she would like to have them again.

## 2022-08-18 LAB
ABSOLUTE EOS #: 0 K/UL (ref 0–0.4)
ABSOLUTE IMMATURE GRANULOCYTE: 0 K/UL (ref 0–0.3)
ABSOLUTE LYMPH #: 0.69 K/UL (ref 1–4.8)
ABSOLUTE MONO #: 0 K/UL (ref 0.1–0.8)
ALBUMIN SERPL-MCNC: 3.5 G/DL (ref 3.5–5.2)
ALBUMIN/GLOBULIN RATIO: 1.7 (ref 1–2.5)
ALP BLD-CCNC: 115 U/L (ref 35–104)
ALT SERPL-CCNC: 21 U/L (ref 5–33)
ANION GAP SERPL CALCULATED.3IONS-SCNC: 9 MMOL/L (ref 9–17)
AST SERPL-CCNC: 16 U/L
BASOPHILS # BLD: 0 % (ref 0–2)
BASOPHILS ABSOLUTE: 0 K/UL (ref 0–0.2)
BILIRUB SERPL-MCNC: 0.34 MG/DL (ref 0.3–1.2)
BUN BLDV-MCNC: 11 MG/DL (ref 8–23)
CALCIUM SERPL-MCNC: 8.7 MG/DL (ref 8.6–10.4)
CHLORIDE BLD-SCNC: 110 MMOL/L (ref 98–107)
CO2: 19 MMOL/L (ref 20–31)
CREAT SERPL-MCNC: 0.66 MG/DL (ref 0.5–0.9)
EOSINOPHILS RELATIVE PERCENT: 0 % (ref 1–4)
GFR AFRICAN AMERICAN: >60 ML/MIN
GFR NON-AFRICAN AMERICAN: >60 ML/MIN
GFR SERPL CREATININE-BSD FRML MDRD: ABNORMAL ML/MIN/{1.73_M2}
GLUCOSE BLD-MCNC: 117 MG/DL (ref 70–99)
HCT VFR BLD CALC: 36.6 % (ref 36.3–47.1)
HEMOGLOBIN: 11.6 G/DL (ref 11.9–15.1)
IMMATURE GRANULOCYTES: 0 %
LYMPHOCYTES # BLD: 10 % (ref 24–44)
MCH RBC QN AUTO: 28.1 PG (ref 25.2–33.5)
MCHC RBC AUTO-ENTMCNC: 31.7 G/DL (ref 28.4–34.8)
MCV RBC AUTO: 88.6 FL (ref 82.6–102.9)
MONOCYTES # BLD: 0 % (ref 1–7)
MORPHOLOGY: NORMAL
NRBC AUTOMATED: 0 PER 100 WBC
PDW BLD-RTO: 14.1 % (ref 11.8–14.4)
PH UA: 5.5 (ref 5–8)
PLATELET # BLD: 234 K/UL (ref 138–453)
PMV BLD AUTO: 10.5 FL (ref 8.1–13.5)
POTASSIUM SERPL-SCNC: 4.2 MMOL/L (ref 3.7–5.3)
RBC # BLD: 4.13 M/UL (ref 3.95–5.11)
SEG NEUTROPHILS: 90 % (ref 36–66)
SEGMENTED NEUTROPHILS ABSOLUTE COUNT: 6.21 K/UL (ref 1.8–7.7)
SODIUM BLD-SCNC: 138 MMOL/L (ref 135–144)
TOTAL PROTEIN: 5.6 G/DL (ref 6.4–8.3)
WBC # BLD: 6.9 K/UL (ref 3.5–11.3)

## 2022-08-18 PROCEDURE — 6370000000 HC RX 637 (ALT 250 FOR IP): Performed by: INTERNAL MEDICINE

## 2022-08-18 PROCEDURE — 80053 COMPREHEN METABOLIC PANEL: CPT

## 2022-08-18 PROCEDURE — 2580000003 HC RX 258: Performed by: INTERNAL MEDICINE

## 2022-08-18 PROCEDURE — 83986 ASSAY PH BODY FLUID NOS: CPT

## 2022-08-18 PROCEDURE — 99232 SBSQ HOSP IP/OBS MODERATE 35: CPT | Performed by: INTERNAL MEDICINE

## 2022-08-18 PROCEDURE — 6360000002 HC RX W HCPCS: Performed by: INTERNAL MEDICINE

## 2022-08-18 PROCEDURE — 1200000000 HC SEMI PRIVATE

## 2022-08-18 PROCEDURE — 36415 COLL VENOUS BLD VENIPUNCTURE: CPT

## 2022-08-18 PROCEDURE — 85025 COMPLETE CBC W/AUTO DIFF WBC: CPT

## 2022-08-18 RX ORDER — ONDANSETRON 2 MG/ML
8 INJECTION INTRAMUSCULAR; INTRAVENOUS EVERY 8 HOURS
Status: COMPLETED | OUTPATIENT
Start: 2022-08-18 | End: 2022-08-19

## 2022-08-18 RX ORDER — OLANZAPINE 2.5 MG/1
2.5 TABLET ORAL NIGHTLY
Status: COMPLETED | OUTPATIENT
Start: 2022-08-18 | End: 2022-08-20

## 2022-08-18 RX ORDER — 0.9 % SODIUM CHLORIDE 0.9 %
1000 INTRAVENOUS SOLUTION INTRAVENOUS ONCE
Status: COMPLETED | OUTPATIENT
Start: 2022-08-18 | End: 2022-08-18

## 2022-08-18 RX ADMIN — SODIUM CHLORIDE, PRESERVATIVE FREE 10 ML: 5 INJECTION INTRAVENOUS at 20:45

## 2022-08-18 RX ADMIN — POLYETHYLENE GLYCOL 3350 17 G: 17 POWDER, FOR SOLUTION ORAL at 11:08

## 2022-08-18 RX ADMIN — SODIUM CHLORIDE: 9 INJECTION, SOLUTION INTRAVENOUS at 04:05

## 2022-08-18 RX ADMIN — MESNA 835 MG: 100 INJECTION, SOLUTION INTRAVENOUS at 17:55

## 2022-08-18 RX ADMIN — DEXAMETHASONE SODIUM PHOSPHATE 12 MG: 4 INJECTION, SOLUTION INTRAMUSCULAR; INTRAVENOUS at 12:07

## 2022-08-18 RX ADMIN — MESNA 835 MG: 100 INJECTION, SOLUTION INTRAVENOUS at 20:47

## 2022-08-18 RX ADMIN — ONDANSETRON 8 MG: 2 INJECTION INTRAMUSCULAR; INTRAVENOUS at 12:02

## 2022-08-18 RX ADMIN — SODIUM CHLORIDE 1000 ML: 9 INJECTION, SOLUTION INTRAVENOUS at 12:12

## 2022-08-18 RX ADMIN — SODIUM CHLORIDE 4200 MG: 9 INJECTION, SOLUTION INTRAVENOUS at 13:18

## 2022-08-18 RX ADMIN — ENOXAPARIN SODIUM 40 MG: 100 INJECTION SUBCUTANEOUS at 08:51

## 2022-08-18 RX ADMIN — SODIUM CHLORIDE, PRESERVATIVE FREE 10 ML: 5 INJECTION INTRAVENOUS at 04:07

## 2022-08-18 RX ADMIN — ONDANSETRON 8 MG: 2 INJECTION INTRAMUSCULAR; INTRAVENOUS at 04:04

## 2022-08-18 RX ADMIN — SODIUM CHLORIDE 42 MG: 9 INJECTION, SOLUTION INTRAVENOUS at 17:55

## 2022-08-18 RX ADMIN — MESNA 835 MG: 100 INJECTION, SOLUTION INTRAVENOUS at 12:46

## 2022-08-18 RX ADMIN — OLANZAPINE 2.5 MG: 2.5 TABLET, FILM COATED ORAL at 20:45

## 2022-08-18 RX ADMIN — ONDANSETRON 8 MG: 2 INJECTION INTRAMUSCULAR; INTRAVENOUS at 20:44

## 2022-08-18 ASSESSMENT — PAIN SCALES - GENERAL: PAINLEVEL_OUTOF10: 0

## 2022-08-18 NOTE — PLAN OF CARE
Problem: Safety - Adult  Goal: Free from fall injury  8/18/2022 1742 by Jacy Savage RN  Outcome: Progressing  8/18/2022 0537 by Jaylon Dwyer RN  Outcome: Progressing  Flowsheets (Taken 8/17/2022 1906)  Free From Fall Injury: Instruct family/caregiver on patient safety     Problem: ABCDS Injury Assessment  Goal: Absence of physical injury  8/18/2022 1742 by Jacy Savage RN  Outcome: Progressing  8/18/2022 0537 by Jaylon Dwyer RN  Outcome: Progressing  Flowsheets (Taken 8/17/2022 1906)  Absence of Physical Injury: Implement safety measures based on patient assessment

## 2022-08-18 NOTE — CARE COORDINATION
Transitioinal Planning:  Spoke with Kirk Quach at [facility]. They do not supply port care flushes. Call to Nallely at [facility]. They are not currently accepting port care patients. Call to Chi Gagnon at [facility] [phone]. They can flush it on Monday at her injection appointment. Chi Gagnon states plan is for pt to have a port placed on Wednesday.

## 2022-08-18 NOTE — PLAN OF CARE
Problem: Safety - Adult  Goal: Free from fall injury  8/18/2022 1742 by Ruma Ruiz RN  Outcome: Progressing  8/18/2022 0537 by Ruchi Marina RN  Outcome: Progressing  Flowsheets (Taken 8/17/2022 1906)  Free From Fall Injury: Instruct family/caregiver on patient safety     Problem: ABCDS Injury Assessment  Goal: Absence of physical injury  8/18/2022 1742 by Ruma Ruiz RN  Outcome: Progressing  8/18/2022 0537 by Ruchi Marina RN  Outcome: Progressing  Flowsheets (Taken 8/17/2022 1906)  Absence of Physical Injury: Implement safety measures based on patient assessment

## 2022-08-18 NOTE — PROGRESS NOTES
Progress Note               Date:                           8/18/2022  Patient name:           Ana Alas  Date of admission:  8/16/2022 12:08 PM  MRN:   [id number]  YOB: 1955  PCP:                           ANAIS Thompson CNP        Subjective:       No acute event overnight   Cbc relatively unremarkable. hb 11.6  Serum electrolyte unremarkable   hEmodynamicall stable , saturataing normally on room air   Picc line placed , s/p 1st cycl of chemo   Urine ph normal for today           HPI in Brief:   Ms. Priya Hilliard is a very pleasant 61 y.o. female with history of multiple co morbidities as listed. Patient is referred for evaluation of further management of high-grade liposarcoma. The patient states that she had left thigh swelling around November 2021 which was getting larger so she was evaluated by orthopedics and she had biopsy which showed liposarcoma. The patient had no significant pain or stiffness. No other systemic symptoms. She had neoadjuvant treatment with radiation therapy in February 2022. Following recovery she had wide excision on March 21, 2022. Pathology results showed 16.5 cm mass with greater than 50% necrosis with close margin of less than 2 mm. Patient is healing well from her surgery and she is undergoing rehab. No complications. Patient seen for further management. No chest pain or shortness of breath. No weight loss or decreased appetite. No other complaints. .  After resection, the patient was seen by us and then seen by Dr. Tamiko Yang at [facility] and while we are contemplating options for adjuvant therapy with reference to her is giving doxorubicin. Repeat staging studies showed multiple lung nodules concerning for metastatic disease, those nodules are bilateral and measuring 5 to 8 mm and the are difficult to biopsy.   The patient was sent to [facility] clinic who concurred that the lesions are too small and too difficult location for biopsy. We decided to wait 6 weeks and repeat CT scan. CT scan unfortunately showed very rapid progression of disease with widespread metastatic disease to the lungs and liver  We discussed options and decided to treat with AIM chemotherapy inpatient.  Biopsy was done for molecular testing, and Echo was done prior to chemo     Problem Lists:   Primary Problem:  Soft tissue sarcoma [facility])   Current Problems:  Active Hospital Problems    Diagnosis Date Noted    Soft tissue sarcoma ([facility] [C49.9] 08/16/2022     Priority: Medium     PMH:  Past Medical History:   Diagnosis Date    Cancer ([facility] 2021    SARCOMA LEFT THIGH    History of radiation therapy     PONV (postoperative nausea and vomiting)       Allergies: No Known Allergies     Medications:   Scheduled Meds:   dexamethasone  12 mg IntraVENous Once    ondansetron  8 mg IntraVENous q8h    DOXOrubicin (ADRIAMYCIN) chemo infusion  25 mg/m2 (Treatment Plan Recorded) IntraVENous Once    mesna (MESNEX) IVPB  500 mg/m2 (Treatment Plan Recorded) IntraVENous Once    ifosfamide (IFEX) chemo IVPB  2,500 mg/m2 (Treatment Plan Recorded) IntraVENous Once    mesna (MESNEX) IVPB  500 mg/m2 (Treatment Plan Recorded) IntraVENous Once    mesna (MESNEX) IVPB  500 mg/m2 (Treatment Plan Recorded) IntraVENous Once    sodium chloride  1,000 mL IntraVENous Once    OLANZapine  5 mg Oral Nightly    DOXOrubicin (ADRIAMYCIN) chemo infusion  25 mg/m2 (Treatment Plan Recorded) IntraVENous Once    sodium chloride flush  5-40 mL IntraVENous 2 times per day    enoxaparin  40 mg SubCUTAneous Daily     PRN Medications: sodium chloride flush, sodium chloride, polyethylene glycol, acetaminophen **OR** acetaminophen, ondansetron  Continuous Infusions:   sodium chloride      sodium chloride 100 mL/hr at 08/18/22 0405       Objective:   Vitals: /60   Pulse 54   Temp 98.1 °F (36.7 °C) (Oral)   Resp 16   Ht 5' 3\" (1.6 m)   Wt 135 lb 5.8 oz (61.4 kg)   LMP 01/01/2010 (Approximate)   SpO2 96% BMI 23.98 kg/m²   General appearance - well appearing, little stressful  Mental status - alert and cooperative   Eyes - pupils equal and reactive, extraocular eye movements intact   Ears - bilateral TM's and external ear canals normal   Mouth - mucous membranes moist, pharynx normal without lesions   Neck - supple, no significant adenopathy   Lymphatics - no palpable lymphadenopathy, no hepatosplenomegaly   Chest - clear to auscultation, no wheezes, rales or rhonchi, symmetric air entry   Heart - normal rate, regular rhythm, normal S1, S2, no murmurs, rubs, clicks or gallops   Abdomen - soft, nontender, nondistended, no masses or organomegaly   Neurological - alert, oriented, normal speech, no focal findings or movement disorder noted   Musculoskeletal - no joint tenderness, deformity or swelling   Extremities - peripheral pulses normal, no pedal edema, no clubbing or cyanosis   Skin - normal coloration and turgor, no rashes, no suspicious skin lesions noted ,     Data:  No intake/output data recorded. In: 740 [P.O.:720; I.V.:20]  Out: 650 [Urine:650]  CBC:   Recent Labs     08/17/22  0637 08/18/22  0452   WBC 4.8 6.9   HGB 12.5 11.6*    234       BMP:    Recent Labs     08/17/22 0637 08/18/22  0452    138   K 4.0 4.2    110*   CO2 25 19*   BUN 9 11   CREATININE 0.67 0.66   GLUCOSE 88 117*       Hepatic:   Recent Labs     08/17/22 0637 08/18/22  0452   AST 13 16   ALT 20 21   BILITOT 0.48 0.34   ALKPHOS 127* 115*       INR: No results for input(s): INR in the last 72 hours. IMAGING DATA:  No orders to display       Assessment    Metastatic sarcoma s/p 1 cycle   Lung mets  Plan AIM chemotherapy s/p 1 cycle          Plan   Treatment discussed with patient   Side effects and protocol were explained. Pt is agreeable to start  Pt needs a PICC line for chemo, as adriamycin is vesicant chemo and can not be given peripherally. discussed pros and cons.  She is agreeable to proceed  Will watch labs closely  Watch for neurotoxicity  Check urine PH daily         Anai Andre MD  Internal Medicine Resident, PGY3   R Sancho 21  8/18/2022,10:48 AM    Attending Physician Statement  The patient was seen and examined during rounds, I have discussed the care of [facility], including pertinent history and exam findings with the resident. I have reviewed the key elements of all parts of the encounter with the resident. I agree with the assessment, and status of the problem list as documented.    Additional assessment/ plan  Day 2 cycle 1  Chemo is very well tolerated  Constipated, plan miralax  Decrease zyprexa to 2.5 mg (helps with nausea and sleep)      Allan Raya MD  Hematology Oncology  [phone]  Electronically signed by Hermila Hines MD on 8/18/2022 at 6:51 PM

## 2022-08-18 NOTE — PLAN OF CARE
Problem: Safety - Adult  Goal: Free from fall injury  8/18/2022 0537 by Starr Arnold RN  Outcome: Progressing  Flowsheets (Taken 8/17/2022 1906)  Free From Fall Injury: Instruct family/caregiver on patient safety  8/17/2022 1821 by Rosaline Gandhi  Outcome: Progressing     Problem: ABCDS Injury Assessment  Goal: Absence of physical injury  8/18/2022 0537 by Starr Arnold RN  Outcome: Progressing  Flowsheets (Taken 8/17/2022 1906)  Absence of Physical Injury: Implement safety measures based on patient assessment  8/17/2022 1821 by Rosaline Gandhi  Outcome: Progressing

## 2022-08-19 LAB
ABSOLUTE EOS #: <0.03 K/UL (ref 0–0.44)
ABSOLUTE IMMATURE GRANULOCYTE: 0.06 K/UL (ref 0–0.3)
ABSOLUTE LYMPH #: 0.98 K/UL (ref 1.1–3.7)
ABSOLUTE MONO #: 0.65 K/UL (ref 0.1–1.2)
ALBUMIN SERPL-MCNC: 3.4 G/DL (ref 3.5–5.2)
ALBUMIN/GLOBULIN RATIO: 1.7 (ref 1–2.5)
ALP BLD-CCNC: 105 U/L (ref 35–104)
ALT SERPL-CCNC: 18 U/L (ref 5–33)
ANION GAP SERPL CALCULATED.3IONS-SCNC: 11 MMOL/L (ref 9–17)
AST SERPL-CCNC: 13 U/L
BASOPHILS # BLD: 0 % (ref 0–2)
BASOPHILS ABSOLUTE: <0.03 K/UL (ref 0–0.2)
BILIRUB SERPL-MCNC: 0.28 MG/DL (ref 0.3–1.2)
BUN BLDV-MCNC: 12 MG/DL (ref 8–23)
CALCIUM SERPL-MCNC: 8.9 MG/DL (ref 8.6–10.4)
CHLORIDE BLD-SCNC: 111 MMOL/L (ref 98–107)
CO2: 18 MMOL/L (ref 20–31)
CREAT SERPL-MCNC: 0.59 MG/DL (ref 0.5–0.9)
EOSINOPHILS RELATIVE PERCENT: 0 % (ref 1–4)
GFR AFRICAN AMERICAN: >60 ML/MIN
GFR NON-AFRICAN AMERICAN: >60 ML/MIN
GFR SERPL CREATININE-BSD FRML MDRD: ABNORMAL ML/MIN/{1.73_M2}
GLUCOSE BLD-MCNC: 79 MG/DL (ref 70–99)
HCT VFR BLD CALC: 34.1 % (ref 36.3–47.1)
HEMOGLOBIN: 11.5 G/DL (ref 11.9–15.1)
IMMATURE GRANULOCYTES: 1 %
LYMPHOCYTES # BLD: 10 % (ref 24–43)
MCH RBC QN AUTO: 29 PG (ref 25.2–33.5)
MCHC RBC AUTO-ENTMCNC: 33.7 G/DL (ref 28.4–34.8)
MCV RBC AUTO: 86.1 FL (ref 82.6–102.9)
MONOCYTES # BLD: 7 % (ref 3–12)
NRBC AUTOMATED: 0 PER 100 WBC
PDW BLD-RTO: 14.3 % (ref 11.8–14.4)
PH UA: 6 (ref 5–8)
PLATELET # BLD: 203 K/UL (ref 138–453)
PMV BLD AUTO: 10.5 FL (ref 8.1–13.5)
POTASSIUM SERPL-SCNC: 3.9 MMOL/L (ref 3.7–5.3)
RBC # BLD: 3.96 M/UL (ref 3.95–5.11)
SEG NEUTROPHILS: 82 % (ref 36–65)
SEGMENTED NEUTROPHILS ABSOLUTE COUNT: 7.71 K/UL (ref 1.5–8.1)
SODIUM BLD-SCNC: 140 MMOL/L (ref 135–144)
TOTAL PROTEIN: 5.4 G/DL (ref 6.4–8.3)
WBC # BLD: 9.4 K/UL (ref 3.5–11.3)

## 2022-08-19 PROCEDURE — 1200000000 HC SEMI PRIVATE

## 2022-08-19 PROCEDURE — 99232 SBSQ HOSP IP/OBS MODERATE 35: CPT | Performed by: INTERNAL MEDICINE

## 2022-08-19 PROCEDURE — 80053 COMPREHEN METABOLIC PANEL: CPT

## 2022-08-19 PROCEDURE — 36415 COLL VENOUS BLD VENIPUNCTURE: CPT

## 2022-08-19 PROCEDURE — 2580000003 HC RX 258: Performed by: INTERNAL MEDICINE

## 2022-08-19 PROCEDURE — 6370000000 HC RX 637 (ALT 250 FOR IP): Performed by: INTERNAL MEDICINE

## 2022-08-19 PROCEDURE — 83986 ASSAY PH BODY FLUID NOS: CPT

## 2022-08-19 PROCEDURE — 6360000002 HC RX W HCPCS: Performed by: INTERNAL MEDICINE

## 2022-08-19 PROCEDURE — 85025 COMPLETE CBC W/AUTO DIFF WBC: CPT

## 2022-08-19 RX ORDER — 0.9 % SODIUM CHLORIDE 0.9 %
1000 INTRAVENOUS SOLUTION INTRAVENOUS ONCE
Status: COMPLETED | OUTPATIENT
Start: 2022-08-19 | End: 2022-08-19

## 2022-08-19 RX ORDER — ONDANSETRON 2 MG/ML
8 INJECTION INTRAMUSCULAR; INTRAVENOUS EVERY 8 HOURS
Status: COMPLETED | OUTPATIENT
Start: 2022-08-19 | End: 2022-08-20

## 2022-08-19 RX ORDER — BISACODYL 10 MG
10 SUPPOSITORY, RECTAL RECTAL DAILY PRN
Status: DISCONTINUED | OUTPATIENT
Start: 2022-08-19 | End: 2022-08-21 | Stop reason: HOSPADM

## 2022-08-19 RX ORDER — ONDANSETRON 2 MG/ML
8 INJECTION INTRAMUSCULAR; INTRAVENOUS EVERY 8 HOURS
Status: DISCONTINUED | OUTPATIENT
Start: 2022-08-19 | End: 2022-08-19

## 2022-08-19 RX ADMIN — ENOXAPARIN SODIUM 40 MG: 100 INJECTION SUBCUTANEOUS at 08:39

## 2022-08-19 RX ADMIN — MESNA 835 MG: 100 INJECTION, SOLUTION INTRAVENOUS at 20:33

## 2022-08-19 RX ADMIN — SODIUM CHLORIDE 4200 MG: 9 INJECTION, SOLUTION INTRAVENOUS at 14:25

## 2022-08-19 RX ADMIN — MESNA 835 MG: 100 INJECTION, SOLUTION INTRAVENOUS at 16:47

## 2022-08-19 RX ADMIN — ONDANSETRON 8 MG: 2 INJECTION INTRAMUSCULAR; INTRAVENOUS at 20:37

## 2022-08-19 RX ADMIN — SODIUM CHLORIDE: 9 INJECTION, SOLUTION INTRAVENOUS at 20:31

## 2022-08-19 RX ADMIN — SODIUM CHLORIDE: 9 INJECTION, SOLUTION INTRAVENOUS at 12:15

## 2022-08-19 RX ADMIN — DEXAMETHASONE SODIUM PHOSPHATE 12 MG: 4 INJECTION, SOLUTION INTRAMUSCULAR; INTRAVENOUS at 12:48

## 2022-08-19 RX ADMIN — MESNA 835 MG: 100 INJECTION, SOLUTION INTRAVENOUS at 13:52

## 2022-08-19 RX ADMIN — SODIUM CHLORIDE: 9 INJECTION, SOLUTION INTRAVENOUS at 01:05

## 2022-08-19 RX ADMIN — ONDANSETRON 8 MG: 2 INJECTION INTRAMUSCULAR; INTRAVENOUS at 04:34

## 2022-08-19 RX ADMIN — ONDANSETRON 8 MG: 2 INJECTION INTRAMUSCULAR; INTRAVENOUS at 12:42

## 2022-08-19 RX ADMIN — POLYETHYLENE GLYCOL 3350 17 G: 17 POWDER, FOR SOLUTION ORAL at 11:19

## 2022-08-19 RX ADMIN — SODIUM CHLORIDE 1000 ML: 9 INJECTION, SOLUTION INTRAVENOUS at 11:11

## 2022-08-19 RX ADMIN — OLANZAPINE 2.5 MG: 2.5 TABLET, FILM COATED ORAL at 20:29

## 2022-08-19 RX ADMIN — SODIUM CHLORIDE 42 MG: 9 INJECTION, SOLUTION INTRAVENOUS at 18:57

## 2022-08-19 NOTE — PLAN OF CARE
Problem: Safety - Adult  Goal: Free from fall injury  8/19/2022 0536 by Rome Garcia RN  Outcome: Progressing  8/19/2022 0344 by Rome Gacria RN  Outcome: Progressing  8/18/2022 1742 by Yang Marley RN  Outcome: Progressing     Problem: ABCDS Injury Assessment  Goal: Absence of physical injury  8/19/2022 0536 by Rome Garcia RN  Outcome: Progressing  8/19/2022 0344 by Rome Garcia RN  Outcome: Progressing  Flowsheets (Taken 8/18/2022 1930)  Absence of Physical Injury: Implement safety measures based on patient assessment  8/18/2022 1742 by Yang Marley RN  Outcome: Progressing

## 2022-08-19 NOTE — PLAN OF CARE
Problem: Safety - Adult  Goal: Free from fall injury  8/19/2022 1835 by Henderson Gilford, RN  Outcome: Progressing  8/19/2022 0536 by Katherin Aguilar RN  Outcome: Progressing     Problem: ABCDS Injury Assessment  Goal: Absence of physical injury  8/19/2022 1835 by Henderson Gilford, RN  Outcome: Progressing  8/19/2022 0536 by Katherin Aguilar RN  Outcome: Progressing

## 2022-08-19 NOTE — PROGRESS NOTES
lesions are too small and too difficult location for biopsy. We decided to wait 6 weeks and repeat CT scan. CT scan unfortunately showed very rapid progression of disease with widespread metastatic disease to the lungs and liver  We discussed options and decided to treat with AIM chemotherapy inpatient.  Biopsy was done for molecular testing, and Echo was done prior to chemo     Problem Lists:   Primary Problem:  Soft tissue sarcoma [facility])   Current Problems:  Active Hospital Problems    Diagnosis Date Noted    [facility] ([facility] [C49.9] 08/16/2022     Priority: Medium    Liposarcoma ([facility] [C49.9] 03/21/2022     PMH:  Past Medical History:   Diagnosis Date    Cancer ([facility] 2021    SARCOMA LEFT THIGH    History of radiation therapy     PONV (postoperative nausea and vomiting)       Allergies: No Known Allergies     Medications:   Scheduled Meds:   sodium chloride  1,000 mL IntraVENous Once    dexamethasone  12 mg IntraVENous Once    ondansetron  8 mg IntraVENous q8h    DOXOrubicin (ADRIAMYCIN) chemo infusion  25 mg/m2 (Treatment Plan Recorded) IntraVENous Once    mesna (MESNEX) IVPB  500 mg/m2 (Treatment Plan Recorded) IntraVENous Once    ifosfamide (IFEX) chemo IVPB  2,500 mg/m2 (Treatment Plan Recorded) IntraVENous Once    mesna (MESNEX) IVPB  500 mg/m2 (Treatment Plan Recorded) IntraVENous Once    mesna (MESNEX) IVPB  500 mg/m2 (Treatment Plan Recorded) IntraVENous Once    DOXOrubicin (ADRIAMYCIN) chemo infusion  25 mg/m2 (Treatment Plan Recorded) IntraVENous Once    OLANZapine  2.5 mg Oral Nightly    sodium chloride flush  5-40 mL IntraVENous 2 times per day    enoxaparin  40 mg SubCUTAneous Daily     PRN Medications: sodium chloride flush, sodium chloride, polyethylene glycol, acetaminophen **OR** acetaminophen, ondansetron  Continuous Infusions:   sodium chloride      sodium chloride 100 mL/hr at 08/19/22 0602       Objective:   Vitals: /67   Pulse 59   Temp 98.1 °F (36.7 °C) (Oral)   Resp 16 Ht 5' 3\" (1.6 m)   Wt 135 lb 5.8 oz (61.4 kg)   LMP 01/01/2010 (Approximate)   SpO2 94%   BMI 23.98 kg/m²   General appearance - well appearing, little stressful  Mental status - alert and cooperative   Eyes - pupils equal and reactive, extraocular eye movements intact   Ears - bilateral TM's and external ear canals normal   Mouth - mucous membranes moist, pharynx normal without lesions   Neck - supple, no significant adenopathy   Lymphatics - no palpable lymphadenopathy, no hepatosplenomegaly   Chest - clear to auscultation, no wheezes, rales or rhonchi, symmetric air entry   Heart - normal rate, regular rhythm, normal S1, S2, no murmurs, rubs, clicks or gallops   Abdomen - soft, nontender, nondistended, no masses or organomegaly   Neurological - alert, oriented, normal speech, no focal findings or movement disorder noted   Musculoskeletal - no joint tenderness, deformity or swelling   Extremities - peripheral pulses normal, no pedal edema, no clubbing or cyanosis   Skin - normal coloration and turgor, no rashes, no suspicious skin lesions noted ,     Data:  No intake/output data recorded. In: 5189.5 [I.V.:4240.1]  Out: 5748 [XFCWW:9283]  CBC:   Recent Labs     08/17/22  0637 08/18/22  0452 08/19/22  0734   WBC 4.8 6.9 9.4   HGB 12.5 11.6* 11.5*    234 203       BMP:    Recent Labs     08/17/22  0637 08/18/22  0452 08/19/22  0734    138 140   K 4.0 4.2 3.9    110* 111*   CO2 25 19* 18*   BUN 9 11 12   CREATININE 0.67 0.66 0.59   GLUCOSE 88 117* 79       Hepatic:   Recent Labs     08/17/22  0637 08/18/22  0452 08/19/22  0734   AST 13 16 13   ALT 20 21 18   BILITOT 0.48 0.34 0.28*   ALKPHOS 127* 115* 105*       INR: No results for input(s): INR in the last 72 hours.     IMAGING DATA:  No orders to display       Assessment    Metastatic sarcoma s/p 1 cycle   Lung mets  Plan AIM chemotherapy s/p 1 cycle          Plan   Treatment discussed with patient   Side effects and protocol were explained. Pt is agreeable to start  Pt needs a PICC line for chemo, as adriamycin is vesicant chemo and can not be given peripherally. discussed pros and cons. She is agreeable to proceed  Will watch labs closely  Watch for neurotoxicity  Check urine PH daily         Ap Gonzalez MD  Internal Medicine Resident, PGY3   [address] [place] [address] 372  8/19/2022,10:31 AM   Ap Gonzalez MD on 8/19/2022 at 10:31 AM  Attending Physician Statement  The patient was seen and examined during rounds, I have discussed the care of [facility], including pertinent history and exam findings with the resident. I have reviewed the key elements of all parts of the encounter with the resident. I agree with the assessment, and status of the problem list as documented. Additional assessment/ plan  Day 3 of chemotherapy  Chemotherapy extremely well-tolerated so far. But she is constipated. We will give her some Dulcolax. Plan to discharge in 1 to 2 days depending on chemo schedule.   Plan G-CSF on Monday      Ai Hoyt MD  Hematology Oncology  [phone]  Electronically signed by Nicholas Dumont MD on 8/19/2022 at 6:38 PM

## 2022-08-20 LAB
ABSOLUTE EOS #: 0.04 K/UL (ref 0–0.44)
ABSOLUTE IMMATURE GRANULOCYTE: 0.04 K/UL (ref 0–0.3)
ABSOLUTE LYMPH #: 1.07 K/UL (ref 1.1–3.7)
ABSOLUTE MONO #: 0.48 K/UL (ref 0.1–1.2)
ALBUMIN SERPL-MCNC: 4 G/DL (ref 3.5–5.2)
ALBUMIN/GLOBULIN RATIO: 2 (ref 1–2.5)
ALP BLD-CCNC: 106 U/L (ref 35–104)
ALT SERPL-CCNC: 21 U/L (ref 5–33)
ANION GAP SERPL CALCULATED.3IONS-SCNC: 8 MMOL/L (ref 9–17)
AST SERPL-CCNC: 15 U/L
BASOPHILS # BLD: 0 % (ref 0–2)
BASOPHILS ABSOLUTE: <0.03 K/UL (ref 0–0.2)
BILIRUB SERPL-MCNC: 0.5 MG/DL (ref 0.3–1.2)
BILIRUBIN URINE: NEGATIVE
BUN BLDV-MCNC: 10 MG/DL (ref 8–23)
CALCIUM SERPL-MCNC: 8.9 MG/DL (ref 8.6–10.4)
CHLORIDE BLD-SCNC: 107 MMOL/L (ref 98–107)
CO2: 25 MMOL/L (ref 20–31)
COLOR: YELLOW
COMMENT UA: ABNORMAL
CREAT SERPL-MCNC: 0.6 MG/DL (ref 0.5–0.9)
EOSINOPHILS RELATIVE PERCENT: 1 % (ref 1–4)
GFR AFRICAN AMERICAN: >60 ML/MIN
GFR NON-AFRICAN AMERICAN: >60 ML/MIN
GFR SERPL CREATININE-BSD FRML MDRD: ABNORMAL ML/MIN/{1.73_M2}
GLUCOSE BLD-MCNC: 85 MG/DL (ref 70–99)
GLUCOSE URINE: NEGATIVE
HCT VFR BLD CALC: 36.9 % (ref 36.3–47.1)
HEMOGLOBIN: 11.7 G/DL (ref 11.9–15.1)
IMMATURE GRANULOCYTES: 1 %
KETONES, URINE: ABNORMAL
LEUKOCYTE ESTERASE, URINE: NEGATIVE
LYMPHOCYTES # BLD: 16 % (ref 24–43)
MCH RBC QN AUTO: 27.9 PG (ref 25.2–33.5)
MCHC RBC AUTO-ENTMCNC: 31.7 G/DL (ref 28.4–34.8)
MCV RBC AUTO: 88.1 FL (ref 82.6–102.9)
MONOCYTES # BLD: 7 % (ref 3–12)
NITRITE, URINE: NEGATIVE
NRBC AUTOMATED: 0 PER 100 WBC
PDW BLD-RTO: 14.7 % (ref 11.8–14.4)
PH UA: 6 (ref 5–8)
PH UA: 6 (ref 5–8)
PLATELET # BLD: 194 K/UL (ref 138–453)
PMV BLD AUTO: 10.4 FL (ref 8.1–13.5)
POTASSIUM SERPL-SCNC: 3.8 MMOL/L (ref 3.7–5.3)
PROTEIN UA: NEGATIVE
RBC # BLD: 4.19 M/UL (ref 3.95–5.11)
RBC # BLD: ABNORMAL 10*6/UL
SEG NEUTROPHILS: 75 % (ref 36–65)
SEGMENTED NEUTROPHILS ABSOLUTE COUNT: 5.04 K/UL (ref 1.5–8.1)
SODIUM BLD-SCNC: 140 MMOL/L (ref 135–144)
SPECIFIC GRAVITY UA: 1.01 (ref 1–1.03)
TOTAL PROTEIN: 6 G/DL (ref 6.4–8.3)
TURBIDITY: CLEAR
URINE HGB: NEGATIVE
UROBILINOGEN, URINE: NORMAL
WBC # BLD: 6.7 K/UL (ref 3.5–11.3)

## 2022-08-20 PROCEDURE — 36415 COLL VENOUS BLD VENIPUNCTURE: CPT

## 2022-08-20 PROCEDURE — 85025 COMPLETE CBC W/AUTO DIFF WBC: CPT

## 2022-08-20 PROCEDURE — 83986 ASSAY PH BODY FLUID NOS: CPT

## 2022-08-20 PROCEDURE — 81003 URINALYSIS AUTO W/O SCOPE: CPT

## 2022-08-20 PROCEDURE — 80053 COMPREHEN METABOLIC PANEL: CPT

## 2022-08-20 PROCEDURE — 99233 SBSQ HOSP IP/OBS HIGH 50: CPT | Performed by: INTERNAL MEDICINE

## 2022-08-20 PROCEDURE — 6360000002 HC RX W HCPCS: Performed by: INTERNAL MEDICINE

## 2022-08-20 PROCEDURE — 2580000003 HC RX 258: Performed by: INTERNAL MEDICINE

## 2022-08-20 PROCEDURE — 1200000000 HC SEMI PRIVATE

## 2022-08-20 PROCEDURE — 6370000000 HC RX 637 (ALT 250 FOR IP): Performed by: INTERNAL MEDICINE

## 2022-08-20 RX ORDER — ONDANSETRON 2 MG/ML
8 INJECTION INTRAMUSCULAR; INTRAVENOUS EVERY 8 HOURS
Status: COMPLETED | OUTPATIENT
Start: 2022-08-20 | End: 2022-08-21

## 2022-08-20 RX ADMIN — OLANZAPINE 2.5 MG: 2.5 TABLET, FILM COATED ORAL at 19:58

## 2022-08-20 RX ADMIN — MESNA 835 MG: 100 INJECTION, SOLUTION INTRAVENOUS at 17:01

## 2022-08-20 RX ADMIN — MESNA 835 MG: 100 INJECTION, SOLUTION INTRAVENOUS at 23:38

## 2022-08-20 RX ADMIN — SODIUM CHLORIDE: 9 INJECTION, SOLUTION INTRAVENOUS at 14:41

## 2022-08-20 RX ADMIN — ONDANSETRON 8 MG: 2 INJECTION INTRAMUSCULAR; INTRAVENOUS at 04:54

## 2022-08-20 RX ADMIN — ONDANSETRON 8 MG: 2 INJECTION INTRAMUSCULAR; INTRAVENOUS at 16:22

## 2022-08-20 RX ADMIN — POLYETHYLENE GLYCOL 3350 17 G: 17 POWDER, FOR SOLUTION ORAL at 09:39

## 2022-08-20 RX ADMIN — ONDANSETRON 8 MG: 2 INJECTION INTRAMUSCULAR; INTRAVENOUS at 23:37

## 2022-08-20 RX ADMIN — IFOSFAMIDE 4200 MG: 1 INJECTION, POWDER, FOR SOLUTION INTRAVENOUS at 17:28

## 2022-08-20 RX ADMIN — SODIUM CHLORIDE, PRESERVATIVE FREE 10 ML: 5 INJECTION INTRAVENOUS at 09:21

## 2022-08-20 RX ADMIN — ENOXAPARIN SODIUM 40 MG: 100 INJECTION SUBCUTANEOUS at 09:21

## 2022-08-20 RX ADMIN — DEXAMETHASONE SODIUM PHOSPHATE 12 MG: 4 INJECTION, SOLUTION INTRAMUSCULAR; INTRAVENOUS at 16:23

## 2022-08-20 NOTE — PLAN OF CARE
Problem: Safety - Adult  Goal: Free from fall injury  8/20/2022 0034 by Alesha Osborne RN  Outcome: Progressing  Flowsheets (Taken 8/19/2022 2000)  Free From Fall Injury: Instruct family/caregiver on patient safety  8/19/2022 1835 by Marie Baugh RN  Outcome: Progressing     Problem: ABCDS Injury Assessment  Goal: Absence of physical injury  8/20/2022 0034 by Alesha Osborne RN  Outcome: Progressing  Flowsheets (Taken 8/19/2022 2000)  Absence of Physical Injury: Implement safety measures based on patient assessment  8/19/2022 1835 by Marie Baugh RN  Outcome: Progressing

## 2022-08-20 NOTE — PROGRESS NOTES
Progress Note               Date:                           8/20/2022  Patient name:           Arianna Crawford  Date of admission:  8/16/2022 12:08 PM  MRN:   [id number]  YOB: 1955  PCP:                           ANAIS Storm CNP        Subjective:       No acute event overnight   Cbc relatively unremarkable. hb 11.7  Serum electrolyte unremarkable   hEmodynamicall stable , saturataing normally on room air   Picc line placed , s/p DAY 4 , 1st cycl of chemo   [facility] to put port on Wednesday   Urine ph normal for today             HPI in Brief:   Ms. Armand Decker is a very pleasant 61 y.o. female with history of multiple co morbidities as listed. Patient is referred for evaluation of further management of high-grade liposarcoma. The patient states that she had left thigh swelling around November 2021 which was getting larger so she was evaluated by orthopedics and she had biopsy which showed liposarcoma. The patient had no significant pain or stiffness. No other systemic symptoms. She had neoadjuvant treatment with radiation therapy in February 2022. Following recovery she had wide excision on March 21, 2022. Pathology results showed 16.5 cm mass with greater than 50% necrosis with close margin of less than 2 mm. Patient is healing well from her surgery and she is undergoing rehab. No complications. Patient seen for further management. No chest pain or shortness of breath. No weight loss or decreased appetite. No other complaints. .  After resection, the patient was seen by us and then seen by Dr. Mitzi Bianchi at [facility] and while we are contemplating options for adjuvant therapy with reference to her is giving doxorubicin. Repeat staging studies showed multiple lung nodules concerning for metastatic disease, those nodules are bilateral and measuring 5 to 8 mm and the are difficult to biopsy.   The patient was sent to [facility] clinic who concurred that the lesions are too small and too difficult location for biopsy. We decided to wait 6 weeks and repeat CT scan. CT scan unfortunately showed very rapid progression of disease with widespread metastatic disease to the lungs and liver  We discussed options and decided to treat with AIM chemotherapy inpatient.  Biopsy was done for molecular testing, and Echo was done prior to chemo     Problem Lists:   Primary Problem:  Soft tissue sarcoma [facility])   Current Problems:  Active Hospital Problems    Diagnosis Date Noted    [facility] ([facility] [C49.9] 08/16/2022     Priority: Medium    Liposarcoma ([facility] [C49.9] 03/21/2022     PMH:  Past Medical History:   Diagnosis Date    Cancer ([facility] 2021    SARCOMA LEFT THIGH    History of radiation therapy     PONV (postoperative nausea and vomiting)       Allergies: No Known Allergies     Medications:   Scheduled Meds:   DOXOrubicin (ADRIAMYCIN) chemo infusion  25 mg/m2 (Treatment Plan Recorded) IntraVENous Once    OLANZapine  2.5 mg Oral Nightly    sodium chloride flush  5-40 mL IntraVENous 2 times per day    enoxaparin  40 mg SubCUTAneous Daily     PRN Medications: bisacodyl, sodium chloride flush, sodium chloride, polyethylene glycol, acetaminophen **OR** acetaminophen, ondansetron  Continuous Infusions:   sodium chloride      sodium chloride 100 mL/hr at 08/20/22 0554       Objective:   Vitals: /64   Pulse 50   Temp 98.2 °F (36.8 °C) (Oral)   Resp 16   Ht 5' 3\" (1.6 m)   Wt 135 lb 5.8 oz (61.4 kg)   LMP 01/01/2010 (Approximate)   SpO2 95%   BMI 23.98 kg/m²   General appearance - well appearing, little stressful  Mental status - alert and cooperative   Eyes - pupils equal and reactive, extraocular eye movements intact   Ears - bilateral TM's and external ear canals normal   Mouth - mucous membranes moist, pharynx normal without lesions   Neck - supple, no significant adenopathy   Lymphatics - no palpable lymphadenopathy, no hepatosplenomegaly   Chest - clear to auscultation, no wheezes, rales or rhonchi, symmetric air entry   Heart - normal rate, regular rhythm, normal S1, S2, no murmurs, rubs, clicks or gallops   Abdomen - soft, nontender, nondistended, no masses or organomegaly   Neurological - alert, oriented, normal speech, no focal findings or movement disorder noted   Musculoskeletal - no joint tenderness, deformity or swelling   Extremities - peripheral pulses normal, no pedal edema, no clubbing or cyanosis   Skin - normal coloration and turgor, no rashes, no suspicious skin lesions noted ,     Data:  No intake/output data recorded. In: 2373.2 [I.V.:1525.6]  Out: 2329 [Urine:6050]  CBC:   Recent Labs     08/18/22  0452 08/19/22  0734 08/20/22  0817   WBC 6.9 9.4 6.7   HGB 11.6* 11.5* 11.7*    203 194       BMP:    Recent Labs     08/18/22  0452 08/19/22  0734 08/20/22  0817    140 140   K 4.2 3.9 3.8   * 111* 107   CO2 19* 18* 25   BUN 11 12 10   CREATININE 0.66 0.59 0.60   GLUCOSE 117* 79 85       Hepatic:   Recent Labs     08/18/22  0452 08/19/22  0734 08/20/22  0817   AST 16 13 15   ALT 21 18 21   BILITOT 0.34 0.28* 0.50   ALKPHOS 115* 105* 106*       INR: No results for input(s): INR in the last 72 hours. IMAGING DATA:  No orders to display       Assessment    Metastatic sarcoma s/p 1 cycle   Lung mets  Plan AIM chemotherapy s/p 1 cycle          Plan   Treatment discussed with patient   Side effects and protocol were explained. Pt is agreeable to start  Pt needs a PICC line for chemo, as adriamycin is vesicant chemo and can not be given peripherally. discussed pros and cons. She is agreeable to proceed  Will watch labs closely  Watch for neurotoxicity  Check urine PH daily   Discharge in 1 to 2 days depending on chemo schedule.   Plan G-CSF on Monday      Tricia Keys MD  Internal Medicine Resident, PGY3   [address] [place] [address], [address]  8/20/2022,9:25 AM       Attending Physician Statement  The patient was seen and examined during rounds, I have discussed the care of [facility] [place], including pertinent history and exam findings with the resident. I have reviewed the key elements of all parts of the encounter with the resident. I agree with the assessment, and status of the problem list as documented. Additional assessment/ plan    Monitor renal function. Monitor for hypophosphatemia, renal potassium wasting. Monitor for hemorrhagic cystitis  Monitor for neurotoxicity. Patient complains of some nausea we will treat with antiemetics  Patient to complete chemotherapy later tonight. Anticipating discharge tomorrow  Patient scheduled to get GS/CSF in [facility] on Monday. Scheduled for port placement later Wednesday coming week. Patient at high risk of side effects from the chemotherapy we will continue to monitor while on treatment.     Electronically signed by   William Isbell MD    on 8/20/2022 at 12:40 PM

## 2022-08-20 NOTE — PLAN OF CARE
Problem: Safety - Adult  Goal: Free from fall injury  8/20/2022 1857 by Tiki Adams RN  Outcome: Progressing  8/20/2022 1842 by Tiki Adams RN  Outcome: Progressing

## 2022-08-21 VITALS
SYSTOLIC BLOOD PRESSURE: 110 MMHG | HEIGHT: 63 IN | WEIGHT: 135.36 LBS | BODY MASS INDEX: 23.98 KG/M2 | DIASTOLIC BLOOD PRESSURE: 55 MMHG | HEART RATE: 85 BPM | OXYGEN SATURATION: 96 % | TEMPERATURE: 97.3 F | RESPIRATION RATE: 16 BRPM

## 2022-08-21 PROCEDURE — 6370000000 HC RX 637 (ALT 250 FOR IP): Performed by: INTERNAL MEDICINE

## 2022-08-21 PROCEDURE — 6360000002 HC RX W HCPCS: Performed by: INTERNAL MEDICINE

## 2022-08-21 PROCEDURE — 99239 HOSP IP/OBS DSCHRG MGMT >30: CPT | Performed by: INTERNAL MEDICINE

## 2022-08-21 PROCEDURE — 2580000003 HC RX 258: Performed by: INTERNAL MEDICINE

## 2022-08-21 RX ORDER — OMEPRAZOLE 20 MG/1
20 TABLET, DELAYED RELEASE ORAL DAILY
Qty: 30 TABLET | Refills: 3 | Status: ON HOLD
Start: 2022-08-21 | End: 2022-10-02 | Stop reason: HOSPADM

## 2022-08-21 RX ADMIN — MESNA 835 MG: 100 INJECTION, SOLUTION INTRAVENOUS at 04:14

## 2022-08-21 RX ADMIN — SODIUM CHLORIDE: 9 INJECTION, SOLUTION INTRAVENOUS at 00:32

## 2022-08-21 RX ADMIN — POLYETHYLENE GLYCOL 3350 17 G: 17 POWDER, FOR SOLUTION ORAL at 09:33

## 2022-08-21 RX ADMIN — SODIUM CHLORIDE, PRESERVATIVE FREE 20 ML: 5 INJECTION INTRAVENOUS at 09:13

## 2022-08-21 RX ADMIN — ENOXAPARIN SODIUM 40 MG: 100 INJECTION SUBCUTANEOUS at 09:12

## 2022-08-21 RX ADMIN — ONDANSETRON 8 MG: 2 INJECTION INTRAMUSCULAR; INTRAVENOUS at 09:12

## 2022-08-21 ASSESSMENT — PAIN SCALES - GENERAL: PAINLEVEL_OUTOF10: 0

## 2022-08-21 NOTE — PLAN OF CARE
Problem: Safety - Adult  Goal: Free from fall injury  8/21/2022 1344 by Maame Downey, RN  Outcome: Completed  8/21/2022 0212 by Elías Castillo, RN  Outcome: Progressing  Flowsheets (Taken 8/20/2022 2000)  Free From Fall Injury: Instruct family/caregiver on patient safety

## 2022-08-21 NOTE — PLAN OF CARE
Problem: Safety - Adult  Goal: Free from fall injury  8/21/2022 0212 by Marta Hernandez RN  Outcome: Progressing  Flowsheets (Taken 8/20/2022 2000)  Free From Fall Injury: Instruct family/caregiver on patient safety       Problem: ABCDS Injury Assessment  Goal: Absence of physical injury  8/21/2022 0212 by Marta Hernandez RN  Outcome: Progressing  Flowsheets (Taken 8/20/2022 2000)  Absence of Physical Injury: Implement safety measures based on patient assessment

## 2022-08-21 NOTE — DISCHARGE SUMMARY
Name:  Jennie Hernandez  YOB: 1955  Medical Record Number:  [id number]    Date of Admission:  8/16/2022  Date of Discharge:  8/21/2022    Admitting physician: Azucena Dotson MD    Discharge Attending: Bobby Reyez MD, MD  Primary Care Physician: Pamela Schuster, APRN - CNP  Consultants: none     HOSPITAL ADMISSION PROBLEM LIST:  Patient Active Problem List   Diagnosis    Dyspareunia in female    Post-menopause atrophic vaginitis    Serrated adenoma of colon    Sarcoma of left thigh ([facility] [address]    Mass of muscle of left lower extremity    Liposarcoma ([facility] [address]    Debility    Malignant neoplasm metastatic to both lungs ([facility] [address]    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 Kindly refer to the H&P for more detailed history. Patient hospitalization course was uncomplicated. Patient tolerated chemotherapy without any unexpected or severe side effects. After completion of chemotherapy patient was discharged home in a stable condition. She has follow-up appointment at the cancer center in [facility] for administration of growth factor support. Patient is also scheduled for port placement. Discharge exam:   Vitals:    08/21/22 1211   BP: (!) 110/55   Pulse: 85   Resp: 16   Temp: 97.3 °F (36.3 °C)   SpO2: 96%     Neuro: normal  Chest: Clear to ausculation. No wheezing.   Cardiac: Cor RRR  Abdomen/groin: soft, non-tender, without masses or organomegaly  Lower extremity edema: Trace       Discharge Medications:     Medication List        START taking these medications      ondansetron 4 MG disintegrating tablet  Commonly known as: ZOFRAN-ODT  Take 1 tablet by mouth every 8 hours as needed for Nausea or Vomiting            CONTINUE taking these medications      acetaminophen 500 MG tablet  Commonly known as: TYLENOL     docusate sodium 100 MG capsule  Commonly known as: COLACE               Activity: activity as tolerated    Diet: regular diet    Discharge to Home    Discharged Condition: Stable    Follow up with primary care provider 2 weeks  Follow up with Hematology/Oncology: 1 week  Follow up with other consultant physicians at their directions. Discussed with patient and family and nursing. Medications and discharge instructions reviewed with patient and nursing.     Time Spent on discharge is more than 30 minutes in the examination, evaluation, counseling and review of medications and discharge plan    Electronically signed by Matt Bradley MD on 8/21/2022 at 12:45 PM

## 2022-08-22 ENCOUNTER — HOSPITAL ENCOUNTER (OUTPATIENT)
Dept: INFUSION THERAPY | Age: 67
Discharge: HOME | End: 2022-08-22
Payer: COMMERCIAL

## 2022-08-22 VITALS
HEART RATE: 96 BPM | DIASTOLIC BLOOD PRESSURE: 65 MMHG | RESPIRATION RATE: 16 BRPM | TEMPERATURE: 98.5 F | SYSTOLIC BLOOD PRESSURE: 107 MMHG

## 2022-08-22 DIAGNOSIS — C49.22 SARCOMA OF LEFT THIGH (HCC): ICD-10-CM

## 2022-08-22 DIAGNOSIS — C78.02 MALIGNANT NEOPLASM METASTATIC TO BOTH LUNGS (HCC): Primary | ICD-10-CM

## 2022-08-22 DIAGNOSIS — C78.01 MALIGNANT NEOPLASM METASTATIC TO BOTH LUNGS (HCC): Primary | ICD-10-CM

## 2022-08-22 PROCEDURE — 96372 THER/PROPH/DIAG INJ SC/IM: CPT

## 2022-08-22 PROCEDURE — 6360000002 HC RX W HCPCS: Performed by: INTERNAL MEDICINE

## 2022-08-22 RX ADMIN — PEGFILGRASTIM-CBQV 6 MG: 6 INJECTION, SOLUTION SUBCUTANEOUS at 14:14

## 2022-08-23 PROBLEM — Z45.2 ENCOUNTER FOR CARE RELATED TO VASCULAR ACCESS PORT: Status: ACTIVE | Noted: 2022-08-23

## 2022-08-23 RX ORDER — SODIUM CHLORIDE 0.9 % (FLUSH) 0.9 %
5-40 SYRINGE (ML) INJECTION PRN
OUTPATIENT
Start: 2022-08-24

## 2022-08-23 RX ORDER — SODIUM CHLORIDE 9 MG/ML
25 INJECTION, SOLUTION INTRAVENOUS PRN
OUTPATIENT
Start: 2022-08-24

## 2022-08-23 RX ORDER — HEPARIN SODIUM (PORCINE) LOCK FLUSH IV SOLN 100 UNIT/ML 100 UNIT/ML
500 SOLUTION INTRAVENOUS PRN
OUTPATIENT
Start: 2022-08-24

## 2022-08-24 ENCOUNTER — HOSPITAL ENCOUNTER (OUTPATIENT)
Dept: INTERVENTIONAL RADIOLOGY/VASCULAR | Age: 67
Discharge: HOME | End: 2022-08-24
Attending: RADIOLOGY | Admitting: RADIOLOGY
Payer: COMMERCIAL

## 2022-08-24 VITALS
TEMPERATURE: 98.7 F | SYSTOLIC BLOOD PRESSURE: 106 MMHG | OXYGEN SATURATION: 97 % | HEIGHT: 63 IN | RESPIRATION RATE: 18 BRPM | WEIGHT: 140 LBS | HEART RATE: 80 BPM | DIASTOLIC BLOOD PRESSURE: 46 MMHG | BODY MASS INDEX: 24.8 KG/M2

## 2022-08-24 PROCEDURE — 6360000002 HC RX W HCPCS: Performed by: RADIOLOGY

## 2022-08-24 PROCEDURE — 77001 FLUOROGUIDE FOR VEIN DEVICE: CPT

## 2022-08-24 PROCEDURE — 2580000003 HC RX 258: Performed by: RADIOLOGY

## 2022-08-24 PROCEDURE — 99153 MOD SED SAME PHYS/QHP EA: CPT

## 2022-08-24 PROCEDURE — C1788 PORT, INDWELLING, IMP: HCPCS

## 2022-08-24 PROCEDURE — 6360000002 HC RX W HCPCS

## 2022-08-24 PROCEDURE — 2500000003 HC RX 250 WO HCPCS: Performed by: RADIOLOGY

## 2022-08-24 PROCEDURE — 36561 INSERT TUNNELED CV CATH: CPT

## 2022-08-24 PROCEDURE — 99152 MOD SED SAME PHYS/QHP 5/>YRS: CPT

## 2022-08-24 PROCEDURE — 2500000003 HC RX 250 WO HCPCS

## 2022-08-24 RX ORDER — SODIUM CHLORIDE 0.9 % (FLUSH) 0.9 %
SYRINGE (ML) INJECTION
Status: COMPLETED | OUTPATIENT
Start: 2022-08-24 | End: 2022-08-24

## 2022-08-24 RX ORDER — SODIUM CHLORIDE 9 MG/ML
INJECTION, SOLUTION INTRAVENOUS CONTINUOUS
Status: DISCONTINUED | OUTPATIENT
Start: 2022-08-24 | End: 2022-08-24 | Stop reason: HOSPADM

## 2022-08-24 RX ORDER — MIDAZOLAM HYDROCHLORIDE 2 MG/2ML
INJECTION, SOLUTION INTRAMUSCULAR; INTRAVENOUS
Status: COMPLETED | OUTPATIENT
Start: 2022-08-24 | End: 2022-08-24

## 2022-08-24 RX ORDER — LIDOCAINE HYDROCHLORIDE 10 MG/ML
INJECTION, SOLUTION EPIDURAL; INFILTRATION; INTRACAUDAL; PERINEURAL
Status: COMPLETED | OUTPATIENT
Start: 2022-08-24 | End: 2022-08-24

## 2022-08-24 RX ORDER — HEPARIN SODIUM (PORCINE) LOCK FLUSH IV SOLN 100 UNIT/ML 100 UNIT/ML
SOLUTION INTRAVENOUS
Status: COMPLETED | OUTPATIENT
Start: 2022-08-24 | End: 2022-08-24

## 2022-08-24 RX ORDER — FENTANYL CITRATE 50 UG/ML
INJECTION, SOLUTION INTRAMUSCULAR; INTRAVENOUS
Status: COMPLETED | OUTPATIENT
Start: 2022-08-24 | End: 2022-08-24

## 2022-08-24 RX ORDER — CEFAZOLIN SODIUM 2 G/50ML
2000 SOLUTION INTRAVENOUS ONCE
Status: COMPLETED | OUTPATIENT
Start: 2022-08-24 | End: 2022-08-24

## 2022-08-24 RX ADMIN — SODIUM CHLORIDE, PRESERVATIVE FREE 10 ML: 5 INJECTION INTRAVENOUS at 12:25

## 2022-08-24 RX ADMIN — LIDOCAINE HYDROCHLORIDE 10 ML: 10 INJECTION, SOLUTION EPIDURAL; INFILTRATION; INTRACAUDAL; PERINEURAL at 12:15

## 2022-08-24 RX ADMIN — HEPARIN 5 ML: 100 SYRINGE at 12:26

## 2022-08-24 RX ADMIN — CEFAZOLIN SODIUM 2000 MG: 2 SOLUTION INTRAVENOUS at 11:54

## 2022-08-24 RX ADMIN — SODIUM CHLORIDE: 9 INJECTION, SOLUTION INTRAVENOUS at 10:56

## 2022-08-24 RX ADMIN — MIDAZOLAM HYDROCHLORIDE 1 MG: 1 INJECTION, SOLUTION INTRAMUSCULAR; INTRAVENOUS at 12:11

## 2022-08-24 RX ADMIN — FENTANYL CITRATE 50 MCG: 50 INJECTION, SOLUTION INTRAMUSCULAR; INTRAVENOUS at 12:11

## 2022-08-24 RX ADMIN — LIDOCAINE HYDROCHLORIDE 3 ML: 10 INJECTION, SOLUTION EPIDURAL; INFILTRATION; INTRACAUDAL; PERINEURAL at 12:11

## 2022-08-24 ASSESSMENT — PAIN - FUNCTIONAL ASSESSMENT: PAIN_FUNCTIONAL_ASSESSMENT: NONE - DENIES PAIN

## 2022-08-24 NOTE — PROGRESS NOTES
Vital signs stable. Dressed for home. Ambulates off department unaided, all belongings sent with patient. Driven home in private car per .

## 2022-08-24 NOTE — PROGRESS NOTES
Inpatients must meet criteria 1 through 7.   1. Minimum 30 minutes after last dose of sedative medication, minimum 120 minutes after last dose of reversal agent. Yes   2. Systolic BP stable within 20 mmHg for 30 minutes & systolic BP between 90 & 210 or within 10 mmHg of baseline. Yes   3. Pulse between 60 and 100 or within 10 bpm of baseline. Yes   4. Spontaneous respiratory rate >/= 10 per minute. Yes   5. SaO2 >/= 95 or >/= baseline. Yes   6. Able to cough and swallow or return to baseline function. Yes   7. Alert and oriented or return to baseline mental status. Yes   8. Demonstrates controlled, coordinated movements, ambulates with steady gait, or return to baseline activity function. Yes   9. Minimal or no pain or nausea, or at a level tolerable and acceptable to patient. Yes   10. Takes and retains oral fluids as allowed. Yes   11. Procedural / perioperative site stable. Minimal or no bleeding. Yes   12. If GI endoscopy procedure, minimal or no abdominal distention or passing flatus. No   13. Written discharge instructions and emergency telephone number provided. Yes   14. Accompanied by a responsible adult. Yes   Adult patient discharged from facility without responsible person meets above criteria plus the following:   a) remains awake without stimulus for 30 minutes   b) oriented appropriate for age   c) all vital signs stable   d) no significant risk of losing protective reflexes   e) able to maintain pre-procedure mobility without assistance   f) no nausea or dizziness   g) transportation arrangements that do not require patient to operate motor Vehicle.    N/A

## 2022-08-24 NOTE — BRIEF OP NOTE
Brief Postoperative Note    Jennie Hernandez  YOB: 1955  [id number]    Pre-operative Diagnosis: Need for chemoTx access    Post-operative Diagnosis: Same    Procedure: Port placement    Anesthesia: Moderate sedation    Surgeons/Assistants: Rancho Jacobs MD     Estimated Blood Loss: minimal    Complications: none immediate    Specimens: were obtained    Findings: Technically successful placemen of a tunneled right chest port via the right IJ. Port was locked with heparin and is ready for use.       Electronically signed by Rancho Jacobs MD on 8/24/2022 at 12:51 PM

## 2022-08-24 NOTE — PROGRESS NOTES
Received to recovery. Denies complaint, dressing intact to right upper chest-clean, dry and intact. Surgical glue intact to right neck. Vital signs stable.

## 2022-08-24 NOTE — SEDATION DOCUMENTATION
Padded tegaderm applied over incision area. Patient taken to post procedure area. Report given to Amor Pearce RN.

## 2022-08-24 NOTE — PRE SEDATION
Sedation Pre-Procedure Note    Patient Name: Jennie Hernandez   YOB: 1955  Room/Bed: [facility] LAB Pool/NONE  Medical Record Number: [id number]  Date: 2022   Time: 11:55 AM       Indication:  Need for chemoTx access    Consent: I have discussed with the patient and/or the patient representative the indication and the possible risks and/or complications of the planned procedure and the anesthesia methods. The patient and/or patient representative appear to understand and agree to proceed. Vital Signs:   Vitals:    22 1044   BP: 111/71   Pulse: 95   Resp: 16   Temp: 98.7 °F (37.1 °C)   SpO2: 98%       Past Medical History:   has a past medical history of Cancer ([facility] [address] History of radiation therapy, and PONV (postoperative nausea and vomiting). Past Surgical History:   has a past surgical history that includes Leg Surgery (Left, ); Breast biopsy (2014);  section, low transverse; Colonoscopy (N/A, 2018); Colonoscopy (2018); Leg biopsy excision (Left, 2021); Leg biopsy excision (Left, 2022); tissue transfer (Left, 2022); Leg Surgery (Left, 2022); Skin graft (Left, 2022); skin biopsy; and CT NEEDLE BIOPSY LUNG PERCUTANEOUS (2022). Medications:   Scheduled Meds:    ceFAZolin  2,000 mg IntraVENous Once     Continuous Infusions:    sodium chloride 100 mL/hr at 22 1056     PRN Meds:   Home Meds:   Prior to Admission medications    Medication Sig Start Date End Date Taking?  Authorizing Provider   omeprazole (PRILOSEC OTC) 20 MG tablet Take 1 tablet by mouth daily 22   Juan Granda MD   acetaminophen (TYLENOL) 500 MG tablet Take 1,000 mg by mouth every 6 hours as needed for Pain    Historical Provider, MD   ondansetron (ZOFRAN-ODT) 4 MG disintegrating tablet Take 1 tablet by mouth every 8 hours as needed for Nausea or Vomiting 22   Krupa Lane DO   docusate sodium (COLACE) 100 MG capsule Take 100 mg by mouth as needed for Constipation    Historical Provider, MD     Coumadin Use Last 7 Days:  no  Antiplatelet drug therapy use last 7 days: no  Other anticoagulant use last 7 days: no  Additional Medication Information:  n/a      Pre-Sedation Documentation and Exam:   Vital signs have been reviewed (see flow sheet for vitals).     Mallampati Airway Assessment:  Mallampati Class III - (soft palate & base of uvula are visible)    ASA Classification:  Class 3 - A patient with severe systemic disease that limits activity but is not incapacitating    Sedation/ Anesthesia Plan:   intravenous sedation    Medications Planned:   midazolam (Versed) intravenously and fentanyl intravenously    Patient is an appropriate candidate for plan of sedation: no    Electronically signed by Pam Foster MD on 8/24/2022 at 11:55 AM

## 2022-08-24 NOTE — POST SEDATION
Sedation Post Procedure Note    Patient Name: Devan Deleon   YOB: 1955  Room/Bed: [facility] AT THE [facility] CATH LAB Pool/NONE  Medical Record Number: [id number]  Date: 8/24/2022   Time: 12:50 PM         Physicians/Assistants: Ryan Caro MD, MD    Procedure Performed:  Port placement    Post-Sedation Vital Signs:  Vitals:    08/24/22 1245   BP: (!) 111/57   Pulse: 80   Resp: 20   Temp:    SpO2: 94%      Vital signs were reviewed and were stable after the procedure (see flow sheet for vitals)            Post-Sedation Exam: Responds appropriately           Complications: none    Electronically signed by Ryan Caro MD on 8/24/2022 at 12:50 PM

## 2022-08-25 ENCOUNTER — TELEPHONE (OUTPATIENT)
Dept: ONCOLOGY | Age: 67
End: 2022-08-25

## 2022-08-25 NOTE — TELEPHONE ENCOUNTER
Name: Devan Deleon  : 1955  MRN: [id number]    Oncology Navigation Follow-Up Note    Contact Type:  Telephone    Call made to [facility] for follow up after treatment and port placement. Spoke to [facility] who states that she is doing well after port placement. She notes some bruising and swelling around [facility] site[facility] that was normal.  [facility] asked is someone would be available to look at her port in he AM if needed as she is going out of town. [facility] [place] informed that she could call the cancer center in the morning and someone would be able to look at her port. Kendall Armas RN notified. [facility] notes that she is doing well after treatment and \"wishes they would all be like this\". [facility] stated that she is \"officially retired\" from the hospital and will have her husbands insurance at the start of next month. [facility] denies any other needs at this time. Will continue to follow.       Electronically signed by Ghazala Lezama RN on 2022 at 2:04 PM Name: Devan Deleon  : 1955  MRN: [id number]

## 2022-08-31 ENCOUNTER — OFFICE VISIT (OUTPATIENT)
Dept: ONCOLOGY | Age: 67
End: 2022-08-31
Payer: COMMERCIAL

## 2022-08-31 ENCOUNTER — TELEPHONE (OUTPATIENT)
Dept: ONCOLOGY | Age: 67
End: 2022-08-31

## 2022-08-31 VITALS — BODY MASS INDEX: 24.27 KG/M2 | TEMPERATURE: 98.4 F | HEIGHT: 63 IN | WEIGHT: 137 LBS | RESPIRATION RATE: 18 BRPM

## 2022-08-31 DIAGNOSIS — C49.9 LIPOSARCOMA (HCC): ICD-10-CM

## 2022-08-31 DIAGNOSIS — C78.01 MALIGNANT NEOPLASM METASTATIC TO BOTH LUNGS (HCC): Primary | ICD-10-CM

## 2022-08-31 DIAGNOSIS — C49.22 SARCOMA OF LEFT THIGH (HCC): ICD-10-CM

## 2022-08-31 DIAGNOSIS — C49.9 SOFT TISSUE SARCOMA (HCC): ICD-10-CM

## 2022-08-31 DIAGNOSIS — C78.02 MALIGNANT NEOPLASM METASTATIC TO BOTH LUNGS (HCC): Primary | ICD-10-CM

## 2022-08-31 PROCEDURE — 99215 OFFICE O/P EST HI 40 MIN: CPT | Performed by: INTERNAL MEDICINE

## 2022-08-31 RX ORDER — ACETAMINOPHEN 325 MG/1
650 TABLET ORAL
Status: CANCELLED | OUTPATIENT
Start: 2022-09-08

## 2022-08-31 RX ORDER — SODIUM CHLORIDE 9 MG/ML
5-40 INJECTION INTRAVENOUS PRN
Status: CANCELLED | OUTPATIENT
Start: 2022-09-09

## 2022-08-31 RX ORDER — ONDANSETRON 2 MG/ML
8 INJECTION INTRAMUSCULAR; INTRAVENOUS
Status: CANCELLED | OUTPATIENT
Start: 2022-09-07

## 2022-08-31 RX ORDER — ONDANSETRON 2 MG/ML
8 INJECTION INTRAMUSCULAR; INTRAVENOUS EVERY 8 HOURS
Status: CANCELLED | OUTPATIENT
Start: 2022-09-08

## 2022-08-31 RX ORDER — SODIUM CHLORIDE 0.9 % (FLUSH) 0.9 %
5-40 SYRINGE (ML) INJECTION PRN
Status: CANCELLED | OUTPATIENT
Start: 2022-09-10

## 2022-08-31 RX ORDER — FAMOTIDINE 10 MG/ML
20 INJECTION, SOLUTION INTRAVENOUS
Status: CANCELLED | OUTPATIENT
Start: 2022-09-09

## 2022-08-31 RX ORDER — SODIUM CHLORIDE 9 MG/ML
5-40 INJECTION INTRAVENOUS PRN
Status: CANCELLED | OUTPATIENT
Start: 2022-09-08

## 2022-08-31 RX ORDER — ALBUTEROL SULFATE 90 UG/1
4 AEROSOL, METERED RESPIRATORY (INHALATION) PRN
Status: CANCELLED | OUTPATIENT
Start: 2022-09-08

## 2022-08-31 RX ORDER — SODIUM CHLORIDE 9 MG/ML
5-40 INJECTION INTRAVENOUS PRN
Status: CANCELLED | OUTPATIENT
Start: 2022-09-10

## 2022-08-31 RX ORDER — ALBUTEROL SULFATE 90 UG/1
4 AEROSOL, METERED RESPIRATORY (INHALATION) PRN
Status: CANCELLED | OUTPATIENT
Start: 2022-09-10

## 2022-08-31 RX ORDER — MEPERIDINE HYDROCHLORIDE 50 MG/ML
12.5 INJECTION INTRAMUSCULAR; INTRAVENOUS; SUBCUTANEOUS PRN
Status: CANCELLED | OUTPATIENT
Start: 2022-09-08

## 2022-08-31 RX ORDER — HEPARIN SODIUM (PORCINE) LOCK FLUSH IV SOLN 100 UNIT/ML 100 UNIT/ML
500 SOLUTION INTRAVENOUS PRN
Status: CANCELLED | OUTPATIENT
Start: 2022-09-07

## 2022-08-31 RX ORDER — ONDANSETRON 2 MG/ML
8 INJECTION INTRAMUSCULAR; INTRAVENOUS EVERY 8 HOURS
Status: CANCELLED | OUTPATIENT
Start: 2022-09-10

## 2022-08-31 RX ORDER — ONDANSETRON 2 MG/ML
8 INJECTION INTRAMUSCULAR; INTRAVENOUS
Status: CANCELLED | OUTPATIENT
Start: 2022-09-10

## 2022-08-31 RX ORDER — ONDANSETRON 2 MG/ML
8 INJECTION INTRAMUSCULAR; INTRAVENOUS
Status: CANCELLED | OUTPATIENT
Start: 2022-09-08

## 2022-08-31 RX ORDER — OLANZAPINE 5 MG/1
5 TABLET ORAL NIGHTLY
Status: CANCELLED | OUTPATIENT
Start: 2022-09-07

## 2022-08-31 RX ORDER — ACETAMINOPHEN 325 MG/1
650 TABLET ORAL
Status: CANCELLED | OUTPATIENT
Start: 2022-09-10

## 2022-08-31 RX ORDER — SODIUM CHLORIDE 0.9 % (FLUSH) 0.9 %
5-40 SYRINGE (ML) INJECTION PRN
Status: CANCELLED | OUTPATIENT
Start: 2022-09-09

## 2022-08-31 RX ORDER — 0.9 % SODIUM CHLORIDE 0.9 %
1000 INTRAVENOUS SOLUTION INTRAVENOUS ONCE
Status: CANCELLED | OUTPATIENT
Start: 2022-09-08 | End: 2022-09-08

## 2022-08-31 RX ORDER — ONDANSETRON 2 MG/ML
8 INJECTION INTRAMUSCULAR; INTRAVENOUS EVERY 8 HOURS
Status: CANCELLED | OUTPATIENT
Start: 2022-09-07

## 2022-08-31 RX ORDER — SODIUM CHLORIDE 9 MG/ML
5-40 INJECTION INTRAVENOUS PRN
Status: CANCELLED | OUTPATIENT
Start: 2022-09-07

## 2022-08-31 RX ORDER — HEPARIN SODIUM (PORCINE) LOCK FLUSH IV SOLN 100 UNIT/ML 100 UNIT/ML
500 SOLUTION INTRAVENOUS PRN
Status: CANCELLED | OUTPATIENT
Start: 2022-09-08

## 2022-08-31 RX ORDER — SODIUM CHLORIDE 9 MG/ML
INJECTION, SOLUTION INTRAVENOUS CONTINUOUS
Status: CANCELLED | OUTPATIENT
Start: 2022-09-10

## 2022-08-31 RX ORDER — MEPERIDINE HYDROCHLORIDE 50 MG/ML
12.5 INJECTION INTRAMUSCULAR; INTRAVENOUS; SUBCUTANEOUS PRN
Status: CANCELLED | OUTPATIENT
Start: 2022-09-07

## 2022-08-31 RX ORDER — SODIUM CHLORIDE 0.9 % (FLUSH) 0.9 %
5-40 SYRINGE (ML) INJECTION PRN
Status: CANCELLED | OUTPATIENT
Start: 2022-09-08

## 2022-08-31 RX ORDER — EPINEPHRINE 1 MG/ML
0.3 INJECTION, SOLUTION, CONCENTRATE INTRAVENOUS PRN
Status: CANCELLED | OUTPATIENT
Start: 2022-09-09

## 2022-08-31 RX ORDER — ACETAMINOPHEN 325 MG/1
650 TABLET ORAL
Status: CANCELLED | OUTPATIENT
Start: 2022-09-09

## 2022-08-31 RX ORDER — SODIUM CHLORIDE 9 MG/ML
5-250 INJECTION, SOLUTION INTRAVENOUS PRN
Status: CANCELLED | OUTPATIENT
Start: 2022-09-07

## 2022-08-31 RX ORDER — ONDANSETRON 2 MG/ML
8 INJECTION INTRAMUSCULAR; INTRAVENOUS
Status: CANCELLED | OUTPATIENT
Start: 2022-09-09

## 2022-08-31 RX ORDER — EPINEPHRINE 1 MG/ML
0.3 INJECTION, SOLUTION, CONCENTRATE INTRAVENOUS PRN
Status: CANCELLED | OUTPATIENT
Start: 2022-09-08

## 2022-08-31 RX ORDER — HEPARIN SODIUM (PORCINE) LOCK FLUSH IV SOLN 100 UNIT/ML 100 UNIT/ML
500 SOLUTION INTRAVENOUS PRN
Status: CANCELLED | OUTPATIENT
Start: 2022-09-09

## 2022-08-31 RX ORDER — 0.9 % SODIUM CHLORIDE 0.9 %
1000 INTRAVENOUS SOLUTION INTRAVENOUS ONCE
Status: CANCELLED | OUTPATIENT
Start: 2022-09-09 | End: 2022-09-09

## 2022-08-31 RX ORDER — DIPHENHYDRAMINE HYDROCHLORIDE 50 MG/ML
50 INJECTION INTRAMUSCULAR; INTRAVENOUS
Status: CANCELLED | OUTPATIENT
Start: 2022-09-09

## 2022-08-31 RX ORDER — DIPHENHYDRAMINE HYDROCHLORIDE 50 MG/ML
50 INJECTION INTRAMUSCULAR; INTRAVENOUS
Status: CANCELLED | OUTPATIENT
Start: 2022-09-07

## 2022-08-31 RX ORDER — SODIUM CHLORIDE 9 MG/ML
INJECTION, SOLUTION INTRAVENOUS CONTINUOUS
Status: CANCELLED | OUTPATIENT
Start: 2022-09-08

## 2022-08-31 RX ORDER — EPINEPHRINE 1 MG/ML
0.3 INJECTION, SOLUTION, CONCENTRATE INTRAVENOUS PRN
Status: CANCELLED | OUTPATIENT
Start: 2022-09-10

## 2022-08-31 RX ORDER — SODIUM CHLORIDE 9 MG/ML
5-250 INJECTION, SOLUTION INTRAVENOUS PRN
Status: CANCELLED | OUTPATIENT
Start: 2022-09-09

## 2022-08-31 RX ORDER — MEPERIDINE HYDROCHLORIDE 50 MG/ML
12.5 INJECTION INTRAMUSCULAR; INTRAVENOUS; SUBCUTANEOUS PRN
Status: CANCELLED | OUTPATIENT
Start: 2022-09-09

## 2022-08-31 RX ORDER — ALBUTEROL SULFATE 90 UG/1
4 AEROSOL, METERED RESPIRATORY (INHALATION) PRN
Status: CANCELLED | OUTPATIENT
Start: 2022-09-07

## 2022-08-31 RX ORDER — SODIUM CHLORIDE 9 MG/ML
5-250 INJECTION, SOLUTION INTRAVENOUS PRN
Status: CANCELLED | OUTPATIENT
Start: 2022-09-08

## 2022-08-31 RX ORDER — HEPARIN SODIUM (PORCINE) LOCK FLUSH IV SOLN 100 UNIT/ML 100 UNIT/ML
500 SOLUTION INTRAVENOUS PRN
Status: CANCELLED | OUTPATIENT
Start: 2022-09-10

## 2022-08-31 RX ORDER — ACETAMINOPHEN 325 MG/1
650 TABLET ORAL
Status: CANCELLED | OUTPATIENT
Start: 2022-09-07

## 2022-08-31 RX ORDER — SODIUM CHLORIDE 9 MG/ML
INJECTION, SOLUTION INTRAVENOUS CONTINUOUS
Status: CANCELLED | OUTPATIENT
Start: 2022-09-07

## 2022-08-31 RX ORDER — DIPHENHYDRAMINE HYDROCHLORIDE 50 MG/ML
50 INJECTION INTRAMUSCULAR; INTRAVENOUS
Status: CANCELLED | OUTPATIENT
Start: 2022-09-10

## 2022-08-31 RX ORDER — SODIUM CHLORIDE 9 MG/ML
5-250 INJECTION, SOLUTION INTRAVENOUS PRN
Status: CANCELLED | OUTPATIENT
Start: 2022-09-10

## 2022-08-31 RX ORDER — ONDANSETRON 2 MG/ML
8 INJECTION INTRAMUSCULAR; INTRAVENOUS EVERY 8 HOURS
Status: CANCELLED | OUTPATIENT
Start: 2022-09-09

## 2022-08-31 RX ORDER — SODIUM CHLORIDE 9 MG/ML
INJECTION, SOLUTION INTRAVENOUS CONTINUOUS
Status: CANCELLED | OUTPATIENT
Start: 2022-09-09

## 2022-08-31 RX ORDER — MEPERIDINE HYDROCHLORIDE 50 MG/ML
12.5 INJECTION INTRAMUSCULAR; INTRAVENOUS; SUBCUTANEOUS PRN
Status: CANCELLED | OUTPATIENT
Start: 2022-09-10

## 2022-08-31 RX ORDER — SODIUM CHLORIDE 0.9 % (FLUSH) 0.9 %
5-40 SYRINGE (ML) INJECTION PRN
Status: CANCELLED | OUTPATIENT
Start: 2022-09-07

## 2022-08-31 RX ORDER — 0.9 % SODIUM CHLORIDE 0.9 %
1000 INTRAVENOUS SOLUTION INTRAVENOUS ONCE
Status: CANCELLED | OUTPATIENT
Start: 2022-09-07 | End: 2022-09-07

## 2022-08-31 RX ORDER — EPINEPHRINE 1 MG/ML
0.3 INJECTION, SOLUTION, CONCENTRATE INTRAVENOUS PRN
Status: CANCELLED | OUTPATIENT
Start: 2022-09-07

## 2022-08-31 RX ORDER — ALBUTEROL SULFATE 90 UG/1
4 AEROSOL, METERED RESPIRATORY (INHALATION) PRN
Status: CANCELLED | OUTPATIENT
Start: 2022-09-09

## 2022-08-31 RX ORDER — DIPHENHYDRAMINE HYDROCHLORIDE 50 MG/ML
50 INJECTION INTRAMUSCULAR; INTRAVENOUS
Status: CANCELLED | OUTPATIENT
Start: 2022-09-08

## 2022-08-31 RX ORDER — FAMOTIDINE 10 MG/ML
20 INJECTION, SOLUTION INTRAVENOUS
Status: CANCELLED | OUTPATIENT
Start: 2022-09-07

## 2022-08-31 RX ORDER — FAMOTIDINE 10 MG/ML
20 INJECTION, SOLUTION INTRAVENOUS
Status: CANCELLED | OUTPATIENT
Start: 2022-09-10

## 2022-08-31 RX ORDER — FAMOTIDINE 10 MG/ML
20 INJECTION, SOLUTION INTRAVENOUS
Status: CANCELLED | OUTPATIENT
Start: 2022-09-08

## 2022-08-31 NOTE — TELEPHONE ENCOUNTER
Call received from patient stating that she has some \"swelling and drooping\" in arm where PICC line was. Pt notes that she does sleep on the side. Denies fever, redness, or warmth to the area. Pt has appointment today at 4 pm.  Will have physician assess at appointment. Pt in agreement with plan. No further questions or concerns at this time.     Katelin Reyes RN

## 2022-08-31 NOTE — PROGRESS NOTES
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  Evidence of multiple lung lesions concerning for metastatic disease, June/22, biopsy-proven to be metastatic disease  Cancer Staging  Sarcoma of left thigh [facility])  Staging form: Soft Tissue Sarcoma Of The Trunk And Extremities, AJCC 8th Edition  - Pathologic stage from 3/21/2022: Stage IIIB (ypT4, pN0, cM0, FNCLCC histologic grade: G3) - Signed by Roxana Paulino MD on 4/20/2022  - Clinical: Stage IB (cT2, cN0, cM0, FNCLCC histologic grade: GX) - Signed by Roxana Paulino MD on 4/20/2022      CURRENT THERAPY:         Status post neoadjuvant radiation therapy 50 Gy February 10, 2022  Status post left thigh sarcoma radical resection March 21, 2022  Upon diagnosis of metastatic disease, started on AIM chemotherapy  August/2022  BRIEF CASE HISTORY:      Ms. Oneil Romero is a very pleasant 61 y.o. female with history of multiple co morbidities as listed. Patient is referred for evaluation of further management of high-grade liposarcoma. The patient states that she had left thigh swelling around November 2021 which was getting larger so she was evaluated by orthopedics and she had biopsy which showed liposarcoma. The patient had no significant pain or stiffness. No other systemic symptoms. She had neoadjuvant treatment with radiation therapy in February 2022. Following recovery she had wide excision on March 21, 2022. Pathology results showed 16.5 cm mass with greater than 50% necrosis with close margin of less than 2 mm. Patient is healing well from her surgery and she is undergoing rehab. No complications. Patient seen for further management. No chest pain or shortness of breath. No weight loss or decreased appetite. No other complaints. .   After resection, the patient was seen by us and then seen by Dr. Juan Wallace at [facility] and while we are contemplating options for adjuvant therapy with reference to her is giving doxorubicin. Repeat staging studies showed multiple lung nodules concerning for metastatic disease, those nodules are bilateral and measuring 5 to 8 mm and the are difficult to biopsy. The patient was sent to [facility] who concurred that the lesions are too small and too difficult location for biopsy. We decided to wait 6 weeks and repeat CT scan. CT scan unfortunately showed very rapid progression of disease with widespread metastatic disease to the lungs and liver  We discussed options with the patient and we decided to start with chemotherapy with AIM chemo, molecular testing was ordered  INTERIM HISTORY  The patient comes in today for a follow-up. She received chemotherapy and tolerated that fairly well. Had minimal nausea and no vomiting. Molecular testing is still pending but PDL expression was 0  Patient is having mild swelling where the PICC line was. No evidence of dilated veins. The port was inserted in the right upper chest and it seems to be healing well. She is ready for cycle #2 of chemotherapy next week while  PAST MEDICAL HISTORY: has a past medical history of Cancer ([facility] [address] History of radiation therapy, and PONV (postoperative nausea and vomiting). PAST SURGICAL HISTORY: has a past surgical history that includes Leg Surgery (Left, ); Breast biopsy (2014);  section, low transverse; Colonoscopy (N/A, 2018); Colonoscopy (2018); Leg biopsy excision (Left, 2021); Leg biopsy excision (Left, 2022); tissue transfer (Left, 2022); Leg Surgery (Left, 2022); Skin graft (Left, 2022); skin biopsy; CT NEEDLE BIOPSY LUNG PERCUTANEOUS (2022); and IR PORT PLACEMENT > 5 YEARS (2022).      CURRENT MEDICATIONS:  has a current medication list which includes the following prescription(s): omeprazole, acetaminophen, docusate sodium, and ondansetron. ALLERGIES:  has No Known Allergies. FAMILY HISTORY: Negative for any hematological or oncological conditions. SOCIAL HISTORY:  reports that she has never smoked. She has never used smokeless tobacco. She reports current alcohol use. She reports that she does not use drugs. REVIEW OF SYSTEMS:     General: No weakness or fatigue. No unanticipated weight loss or decreased appetite. No fever or chills. Eyes: No blurred vision, eye pain or double vision. Ears: No hearing problems or drainage. No tinnitus. Throat: No sore throat, problems with swallowing or dysphagia. Respiratory: No cough, sputum or hemoptysis. No shortness of breath. No pleuritic chest pain. Cardiovascular: No chest pain, orthopnea or PND. No lower extremity edema. No palpitation. Gastrointestinal: No problems with swallowing. No abdominal pain or bloating. No nausea or vomiting. No diarrhea or constipation. No GI bleeding. Genitourinary: No dysuria, hematuria, frequency or urgency. Musculoskeletal: As above. Dermatologic: No skin rashes or pruritus. No skin lesions or discolorations. Psychiatric: No depression, anxiety, or stress or signs of schizophrenia. No change in mood or affect. Hematologic: No history of bleeding tendency. No bruises or ecchymosis. No history of clotting problems. Infectious disease: No fever, chills or frequent infections. Endocrine: No polydipsia or polyuria. No temperature intolerance. Neurologic: No headaches or dizziness. No weakness or numbness of the extremities. No changes in balance, coordination,  memory, mentation, behavior. Allergic/Immunologic: No nasal congestion or hives. No repeated infections. PHYSICAL EXAM:  The patient is not in acute distress. Vital signs: Temperature 98.4 °F (36.9 °C), temperature source Temporal, resp.  rate 18, height 5' 3\" (1.6 m), weight 137 lb (62.1 kg), last menstrual period 01/01/2010, not currently breastfeeding. General appearance - well appearing, not in pain or distress  Mental status - good mood, alert and oriented  Eyes - pupils equal and reactive, extraocular eye movements intact  Ears - bilateral TM's and external ear canals normal  Nose - normal and patent, no erythema, discharge or polyps  Mouth - mucous membranes moist, pharynx normal without lesions  Neck - supple, no significant adenopathy  Lymphatics - no palpable lymphadenopathy, no hepatosplenomegaly  Chest - clear to auscultation, no wheezes, rales or rhonchi, symmetric air entry  Heart - normal rate, regular rhythm, normal S1, S2, no murmurs, rubs, clicks or gallops  Abdomen - soft, nontender, nondistended, no masses or organomegaly  Neurological - alert, oriented, normal speech, no focal findings or movement disorder noted  Musculoskeletal - no joint tenderness, deformity or swelling  Extremities -left lower extremity s/p recent large lipoma excision with healing wound extending from the upper thigh to below the knee. No signs of infection. Skin - normal coloration and turgor, no rashes, no suspicious skin lesions noted     Review of Diagnostic data:   Lab Results   Component Value Date    WBC 6.7 08/20/2022    HGB 11.7 (L) 08/20/2022    HCT 36.9 08/20/2022    MCV 88.1 08/20/2022     08/20/2022       Chemistry        Component Value Date/Time     08/20/2022 0817    K 3.8 08/20/2022 0817    K 4.2 04/01/2022 0740     08/20/2022 0817    CO2 25 08/20/2022 0817    BUN 10 08/20/2022 0817    CREATININE 0.60 08/20/2022 0817        Component Value Date/Time    CALCIUM 8.9 08/20/2022 0817    ALKPHOS 106 (H) 08/20/2022 0817    AST 15 08/20/2022 0817    ALT 21 08/20/2022 0817    BILITOT 0.50 08/20/2022 0817          IR PORT PLACEMENT > 5 YEARS  Narrative: PROCEDURE:  ULTRASOUND GUIDED VASCULAR ACCESS. FLUOROSCOPY GUIDED PLACEMENT OF A SUBCUTANEOUS PORT-A-CATH. MODERATE CONSCIOUS SEDATION    8/24/2022.     HISTORY:  ORDERING SYSTEM PROVIDED HISTORY: Malignant neoplasm metastatic to both lungs  TECHNOLOGIST PROVIDED HISTORY:  Malignant neoplasm metastatic to both lungs  How many lumens are being requested?->1  What site is the preferred site? ->Internal Jugular  What side should this line be placed?->Right    SEDATION:  Versed and fentanyl were titrated intravenously for moderate sedation  monitored under my direction. The patient's vital signs were monitored  throughout the procedure and recorded in the patient's medical record by the  nurse. FLUOROSCOPY DOSE AND TYPE OR TIME AND EXPOSURES:  0.9 minute fluoroscopy time; dap: 266 mGy per cm2; 9.3 mGy air kerma    Views: 3    TECHNIQUE:  Informed consent was obtained after a detailed explanation of the procedure  including the risks and benefits. All aspects of maximum sterile barrier  technique were used including washing hands with alcohol-based hand rub, skin  preparation, cap, mask, sterile gown, sterile gloves, and sterile full body  drape. Local anesthesia was achieved with lidocaine. A micropuncture needle  was used to access the right internal jugular vein using ultrasound guidance. (A sterile probe cover and gel were utilized.)  An ultrasound image  demonstrating patency of the vein with needle tip located within it was  obtained and stored in PACS. The micropuncture needle was exchanged for a  coaxial micro puncture dilator and sheath over a microwire. The microwire and  dilator were exchanged for a 0.035 inch Amplatz wire which was advanced into  the IVC. A chest wall incision was made, and a subcutaneous pocket was  created. The port reservoir was placed within the pocket, and the attached  port tubing was tunneled subcutaneously to the venotomy site. A peel-away  sheath was advanced over the Amplatz wire during fluoroscopic visualization.   The port tubing was cut to an appropriate length, and advanced through the  peel-away sheath under fluoroscopic guidance into the

## 2022-09-01 ENCOUNTER — HOSPITAL ENCOUNTER (OUTPATIENT)
Dept: VASCULAR LAB | Age: 67
Discharge: HOME | End: 2022-09-03
Payer: COMMERCIAL

## 2022-09-01 ENCOUNTER — TELEPHONE (OUTPATIENT)
Dept: ONCOLOGY | Age: 67
End: 2022-09-01

## 2022-09-01 DIAGNOSIS — C49.9 LIPOSARCOMA (HCC): ICD-10-CM

## 2022-09-01 PROCEDURE — 93971 EXTREMITY STUDY: CPT

## 2022-09-01 NOTE — TELEPHONE ENCOUNTER
[phone] Call received from [facility] in [address] who states that a clot  in the superficial basilic vein was found. Spoke to patient. Who was informed to continue to take Tylenol and use compression and informed that she could go home. [facility] [place] verbalized understanding. Perfect serve with update sent to Dr. Alan Shaw.      Alison Kwan RN

## 2022-09-01 NOTE — TELEPHONE ENCOUNTER
[id number]- Message received from Dr. Coulter Givens Nsour to have pt take full dose Aspirin x 5 days. Call made to Michelle Altamirano. Michelle Altamirano instructed to take Aspirin 325 mg daily x 5 days. Michelle Altamirano verbalized instructions back to writer. Michelle Altamirano denies any other questions at this time.     Anurag Ray RN

## 2022-09-07 ENCOUNTER — HOSPITAL ENCOUNTER (INPATIENT)
Age: 67
LOS: 4 days | Discharge: HOME | DRG: 847 | End: 2022-09-11
Attending: INTERNAL MEDICINE | Admitting: INTERNAL MEDICINE
Payer: COMMERCIAL

## 2022-09-07 ENCOUNTER — TELEPHONE (OUTPATIENT)
Dept: ONCOLOGY | Age: 67
End: 2022-09-07

## 2022-09-07 DIAGNOSIS — C78.01 MALIGNANT NEOPLASM METASTATIC TO BOTH LUNGS (HCC): Primary | ICD-10-CM

## 2022-09-07 DIAGNOSIS — C78.02 MALIGNANT NEOPLASM METASTATIC TO BOTH LUNGS (HCC): Primary | ICD-10-CM

## 2022-09-07 DIAGNOSIS — C49.22 SARCOMA OF LEFT THIGH (HCC): ICD-10-CM

## 2022-09-07 LAB
ABSOLUTE EOS #: 0 K/UL (ref 0–0.4)
ABSOLUTE IMMATURE GRANULOCYTE: 0.15 K/UL (ref 0–0.3)
ABSOLUTE LYMPH #: 0.31 K/UL (ref 1–4.8)
ABSOLUTE MONO #: 0.77 K/UL (ref 0.1–0.8)
ALBUMIN SERPL-MCNC: 3.6 G/DL (ref 3.5–5.2)
ALBUMIN/GLOBULIN RATIO: 1.3 (ref 1–2.5)
ALP BLD-CCNC: 171 U/L (ref 35–104)
ALT SERPL-CCNC: 42 U/L (ref 5–33)
ANION GAP SERPL CALCULATED.3IONS-SCNC: 15 MMOL/L (ref 9–17)
AST SERPL-CCNC: 21 U/L
BASOPHILS # BLD: 0 % (ref 0–2)
BASOPHILS ABSOLUTE: 0 K/UL (ref 0–0.2)
BILIRUB SERPL-MCNC: 0.3 MG/DL (ref 0.3–1.2)
BILIRUBIN URINE: NEGATIVE
BUN BLDV-MCNC: 18 MG/DL (ref 8–23)
CALCIUM SERPL-MCNC: 9.1 MG/DL (ref 8.6–10.4)
CHLORIDE BLD-SCNC: 103 MMOL/L (ref 98–107)
CO2: 22 MMOL/L (ref 20–31)
COLOR: YELLOW
COMMENT UA: NORMAL
CREAT SERPL-MCNC: 0.52 MG/DL (ref 0.5–0.9)
EOSINOPHILS RELATIVE PERCENT: 0 % (ref 1–4)
GFR AFRICAN AMERICAN: >60 ML/MIN
GFR NON-AFRICAN AMERICAN: >60 ML/MIN
GFR SERPL CREATININE-BSD FRML MDRD: ABNORMAL ML/MIN/{1.73_M2}
GLUCOSE BLD-MCNC: 103 MG/DL (ref 70–99)
GLUCOSE URINE: NEGATIVE
HCT VFR BLD CALC: 30.6 % (ref 36.3–47.1)
HEMOGLOBIN: 10.3 G/DL (ref 11.9–15.1)
IMMATURE GRANULOCYTES: 1 %
KETONES, URINE: NEGATIVE
LEUKOCYTE ESTERASE, URINE: NEGATIVE
LYMPHOCYTES # BLD: 2 % (ref 24–44)
MCH RBC QN AUTO: 28.9 PG (ref 25.2–33.5)
MCHC RBC AUTO-ENTMCNC: 33.7 G/DL (ref 28.4–34.8)
MCV RBC AUTO: 86 FL (ref 82.6–102.9)
MONOCYTES # BLD: 5 % (ref 1–7)
MORPHOLOGY: ABNORMAL
NITRITE, URINE: NEGATIVE
NRBC AUTOMATED: 0.1 PER 100 WBC
PDW BLD-RTO: 14.5 % (ref 11.8–14.4)
PH UA: 5.5 (ref 5–8)
PLATELET # BLD: 429 K/UL (ref 138–453)
PMV BLD AUTO: 9.6 FL (ref 8.1–13.5)
POTASSIUM SERPL-SCNC: 3.9 MMOL/L (ref 3.7–5.3)
PROTEIN UA: NEGATIVE
RBC # BLD: 3.56 M/UL (ref 3.95–5.11)
SEG NEUTROPHILS: 92 % (ref 36–66)
SEGMENTED NEUTROPHILS ABSOLUTE COUNT: 14.17 K/UL (ref 1.8–7.7)
SODIUM BLD-SCNC: 140 MMOL/L (ref 135–144)
SPECIFIC GRAVITY UA: 1.01 (ref 1–1.03)
TOTAL PROTEIN: 6.3 G/DL (ref 6.4–8.3)
TURBIDITY: CLEAR
URINE HGB: NEGATIVE
UROBILINOGEN, URINE: NORMAL
WBC # BLD: 15.4 K/UL (ref 3.5–11.3)

## 2022-09-07 PROCEDURE — 80053 COMPREHEN METABOLIC PANEL: CPT

## 2022-09-07 PROCEDURE — 99223 1ST HOSP IP/OBS HIGH 75: CPT | Performed by: INTERNAL MEDICINE

## 2022-09-07 PROCEDURE — 6360000002 HC RX W HCPCS: Performed by: INTERNAL MEDICINE

## 2022-09-07 PROCEDURE — 81003 URINALYSIS AUTO W/O SCOPE: CPT

## 2022-09-07 PROCEDURE — 2580000003 HC RX 258: Performed by: INTERNAL MEDICINE

## 2022-09-07 PROCEDURE — 1200000000 HC SEMI PRIVATE

## 2022-09-07 PROCEDURE — 3E03305 INTRODUCTION OF OTHER ANTINEOPLASTIC INTO PERIPHERAL VEIN, PERCUTANEOUS APPROACH: ICD-10-PCS | Performed by: INTERNAL MEDICINE

## 2022-09-07 PROCEDURE — 85025 COMPLETE CBC W/AUTO DIFF WBC: CPT

## 2022-09-07 PROCEDURE — 36415 COLL VENOUS BLD VENIPUNCTURE: CPT

## 2022-09-07 PROCEDURE — 6370000000 HC RX 637 (ALT 250 FOR IP): Performed by: INTERNAL MEDICINE

## 2022-09-07 RX ORDER — OMEPRAZOLE 20 MG/1
20 TABLET, DELAYED RELEASE ORAL DAILY
Status: DISCONTINUED | OUTPATIENT
Start: 2022-09-07 | End: 2022-09-07 | Stop reason: CLARIF

## 2022-09-07 RX ORDER — ONDANSETRON 2 MG/ML
8 INJECTION INTRAMUSCULAR; INTRAVENOUS EVERY 8 HOURS
Status: COMPLETED | OUTPATIENT
Start: 2022-09-07 | End: 2022-09-08

## 2022-09-07 RX ORDER — ONDANSETRON 4 MG/1
4 TABLET, ORALLY DISINTEGRATING ORAL EVERY 8 HOURS PRN
Status: DISCONTINUED | OUTPATIENT
Start: 2022-09-07 | End: 2022-09-11 | Stop reason: HOSPADM

## 2022-09-07 RX ORDER — 0.9 % SODIUM CHLORIDE 0.9 %
1000 INTRAVENOUS SOLUTION INTRAVENOUS ONCE
Status: COMPLETED | OUTPATIENT
Start: 2022-09-07 | End: 2022-09-07

## 2022-09-07 RX ORDER — LIDOCAINE 40 MG/G
CREAM TOPICAL ONCE
Status: COMPLETED | OUTPATIENT
Start: 2022-09-07 | End: 2022-09-07

## 2022-09-07 RX ORDER — DOCUSATE SODIUM 100 MG/1
100 CAPSULE, LIQUID FILLED ORAL DAILY PRN
Status: DISCONTINUED | OUTPATIENT
Start: 2022-09-08 | End: 2022-09-11 | Stop reason: HOSPADM

## 2022-09-07 RX ORDER — PANTOPRAZOLE SODIUM 40 MG/1
40 TABLET, DELAYED RELEASE ORAL
Status: DISCONTINUED | OUTPATIENT
Start: 2022-09-08 | End: 2022-09-11 | Stop reason: HOSPADM

## 2022-09-07 RX ORDER — OLANZAPINE 5 MG/1
5 TABLET ORAL NIGHTLY
Status: COMPLETED | OUTPATIENT
Start: 2022-09-07 | End: 2022-09-10

## 2022-09-07 RX ORDER — ACETAMINOPHEN 500 MG
1000 TABLET ORAL EVERY 6 HOURS PRN
Status: DISCONTINUED | OUTPATIENT
Start: 2022-09-07 | End: 2022-09-11 | Stop reason: HOSPADM

## 2022-09-07 RX ADMIN — IFOSFAMIDE 4200 MG: 3 INJECTION, POWDER, FOR SOLUTION INTRAVENOUS at 15:16

## 2022-09-07 RX ADMIN — DEXAMETHASONE SODIUM PHOSPHATE 12 MG: 4 INJECTION, SOLUTION INTRAMUSCULAR; INTRAVENOUS at 13:36

## 2022-09-07 RX ADMIN — SODIUM CHLORIDE 1000 ML: 9 INJECTION, SOLUTION INTRAVENOUS at 12:27

## 2022-09-07 RX ADMIN — OLANZAPINE 5 MG: 5 TABLET, FILM COATED ORAL at 21:33

## 2022-09-07 RX ADMIN — MESNA 835 MG: 100 INJECTION, SOLUTION INTRAVENOUS at 18:56

## 2022-09-07 RX ADMIN — MESNA 835 MG: 100 INJECTION, SOLUTION INTRAVENOUS at 14:51

## 2022-09-07 RX ADMIN — ONDANSETRON 8 MG: 2 INJECTION INTRAMUSCULAR; INTRAVENOUS at 21:33

## 2022-09-07 RX ADMIN — FOSAPREPITANT 150 MG: 150 INJECTION, POWDER, LYOPHILIZED, FOR SOLUTION INTRAVENOUS at 14:09

## 2022-09-07 RX ADMIN — LIDOCAINE: 40 CREAM TOPICAL at 10:23

## 2022-09-07 RX ADMIN — SODIUM CHLORIDE 42 MG: 9 INJECTION, SOLUTION INTRAVENOUS at 15:10

## 2022-09-07 RX ADMIN — MESNA 835 MG: 100 INJECTION, SOLUTION INTRAVENOUS at 23:20

## 2022-09-07 RX ADMIN — ONDANSETRON 8 MG: 2 INJECTION INTRAMUSCULAR; INTRAVENOUS at 13:30

## 2022-09-07 ASSESSMENT — PAIN SCALES - GENERAL: PAINLEVEL_OUTOF10: 0

## 2022-09-07 NOTE — TELEPHONE ENCOUNTER
Name: Mary Patten  : 1955  MRN: [id number]    Oncology Navigation Follow-Up Note    Contact Type:  Telephone    Notes: Call made to [facility] after receiving VM from pt. [facility] state that she doesn't remember what she called about but is on her way to the hospital for admission. Wished Jolan Fair and encourage to call with any needs.       Electronically signed by Kwasi Winters RN on 2022 at 9:34 AM

## 2022-09-08 LAB
ABSOLUTE EOS #: 0 K/UL (ref 0–0.4)
ABSOLUTE IMMATURE GRANULOCYTE: 0.16 K/UL (ref 0–0.3)
ABSOLUTE LYMPH #: 0.16 K/UL (ref 1–4.8)
ABSOLUTE MONO #: 0.16 K/UL (ref 0.1–0.8)
ALBUMIN SERPL-MCNC: 3.4 G/DL (ref 3.5–5.2)
ALBUMIN/GLOBULIN RATIO: 1.4 (ref 1–2.5)
ALP BLD-CCNC: 154 U/L (ref 35–104)
ALT SERPL-CCNC: 40 U/L (ref 5–33)
ANION GAP SERPL CALCULATED.3IONS-SCNC: 10 MMOL/L (ref 9–17)
AST SERPL-CCNC: 19 U/L
BASOPHILS # BLD: 0 % (ref 0–2)
BASOPHILS ABSOLUTE: 0 K/UL (ref 0–0.2)
BILIRUB SERPL-MCNC: 0.2 MG/DL (ref 0.3–1.2)
BILIRUBIN URINE: NEGATIVE
BUN BLDV-MCNC: 11 MG/DL (ref 8–23)
CALCIUM SERPL-MCNC: 9.1 MG/DL (ref 8.6–10.4)
CHLORIDE BLD-SCNC: 108 MMOL/L (ref 98–107)
CO2: 22 MMOL/L (ref 20–31)
COLOR: YELLOW
COMMENT UA: ABNORMAL
CREAT SERPL-MCNC: 0.51 MG/DL (ref 0.5–0.9)
EOSINOPHILS RELATIVE PERCENT: 0 % (ref 1–4)
GFR AFRICAN AMERICAN: >60 ML/MIN
GFR NON-AFRICAN AMERICAN: >60 ML/MIN
GFR SERPL CREATININE-BSD FRML MDRD: ABNORMAL ML/MIN/{1.73_M2}
GLUCOSE BLD-MCNC: 99 MG/DL (ref 70–99)
GLUCOSE URINE: NEGATIVE
HCT VFR BLD CALC: 28.1 % (ref 36.3–47.1)
HEMOGLOBIN: 9.5 G/DL (ref 11.9–15.1)
IMMATURE GRANULOCYTES: 1 %
KETONES, URINE: ABNORMAL
LEUKOCYTE ESTERASE, URINE: NEGATIVE
LYMPHOCYTES # BLD: 1 % (ref 24–44)
MCH RBC QN AUTO: 29.3 PG (ref 25.2–33.5)
MCHC RBC AUTO-ENTMCNC: 33.8 G/DL (ref 28.4–34.8)
MCV RBC AUTO: 86.7 FL (ref 82.6–102.9)
MONOCYTES # BLD: 1 % (ref 1–7)
MORPHOLOGY: ABNORMAL
MORPHOLOGY: ABNORMAL
NITRITE, URINE: NEGATIVE
NRBC AUTOMATED: 0 PER 100 WBC
PDW BLD-RTO: 14.5 % (ref 11.8–14.4)
PH UA: 5.5 (ref 5–8)
PLATELET # BLD: 412 K/UL (ref 138–453)
PMV BLD AUTO: 9.6 FL (ref 8.1–13.5)
POTASSIUM SERPL-SCNC: 4.1 MMOL/L (ref 3.7–5.3)
PROTEIN UA: NEGATIVE
RBC # BLD: 3.24 M/UL (ref 3.95–5.11)
SEG NEUTROPHILS: 97 % (ref 36–66)
SEGMENTED NEUTROPHILS ABSOLUTE COUNT: 15.72 K/UL (ref 1.8–7.7)
SODIUM BLD-SCNC: 140 MMOL/L (ref 135–144)
SPECIFIC GRAVITY UA: 1.02 (ref 1–1.03)
TOTAL PROTEIN: 5.8 G/DL (ref 6.4–8.3)
TURBIDITY: CLEAR
URINE HGB: NEGATIVE
UROBILINOGEN, URINE: NORMAL
WBC # BLD: 16.2 K/UL (ref 3.5–11.3)

## 2022-09-08 PROCEDURE — 2580000003 HC RX 258: Performed by: INTERNAL MEDICINE

## 2022-09-08 PROCEDURE — 80053 COMPREHEN METABOLIC PANEL: CPT

## 2022-09-08 PROCEDURE — 1200000000 HC SEMI PRIVATE

## 2022-09-08 PROCEDURE — 81003 URINALYSIS AUTO W/O SCOPE: CPT

## 2022-09-08 PROCEDURE — 99232 SBSQ HOSP IP/OBS MODERATE 35: CPT | Performed by: INTERNAL MEDICINE

## 2022-09-08 PROCEDURE — 6360000002 HC RX W HCPCS: Performed by: INTERNAL MEDICINE

## 2022-09-08 PROCEDURE — 36415 COLL VENOUS BLD VENIPUNCTURE: CPT

## 2022-09-08 PROCEDURE — 6370000000 HC RX 637 (ALT 250 FOR IP): Performed by: INTERNAL MEDICINE

## 2022-09-08 PROCEDURE — 85027 COMPLETE CBC AUTOMATED: CPT

## 2022-09-08 RX ORDER — SODIUM CHLORIDE 9 MG/ML
5-250 INJECTION, SOLUTION INTRAVENOUS PRN
Status: ACTIVE | OUTPATIENT
Start: 2022-09-08 | End: 2022-09-09

## 2022-09-08 RX ORDER — 0.9 % SODIUM CHLORIDE 0.9 %
1000 INTRAVENOUS SOLUTION INTRAVENOUS ONCE
Status: COMPLETED | OUTPATIENT
Start: 2022-09-08 | End: 2022-09-08

## 2022-09-08 RX ORDER — ONDANSETRON 2 MG/ML
8 INJECTION INTRAMUSCULAR; INTRAVENOUS EVERY 8 HOURS
Status: COMPLETED | OUTPATIENT
Start: 2022-09-08 | End: 2022-09-09

## 2022-09-08 RX ORDER — ENOXAPARIN SODIUM 100 MG/ML
30 INJECTION SUBCUTANEOUS DAILY
Status: DISCONTINUED | OUTPATIENT
Start: 2022-09-08 | End: 2022-09-11 | Stop reason: HOSPADM

## 2022-09-08 RX ADMIN — MESNA 835 MG: 100 INJECTION, SOLUTION INTRAVENOUS at 14:04

## 2022-09-08 RX ADMIN — MESNA 835 MG: 100 INJECTION, SOLUTION INTRAVENOUS at 22:17

## 2022-09-08 RX ADMIN — ONDANSETRON 8 MG: 2 INJECTION INTRAMUSCULAR; INTRAVENOUS at 13:29

## 2022-09-08 RX ADMIN — SODIUM CHLORIDE 42 MG: 9 INJECTION, SOLUTION INTRAVENOUS at 16:22

## 2022-09-08 RX ADMIN — SODIUM CHLORIDE 1000 ML: 9 INJECTION, SOLUTION INTRAVENOUS at 12:12

## 2022-09-08 RX ADMIN — ONDANSETRON 8 MG: 2 INJECTION INTRAMUSCULAR; INTRAVENOUS at 05:38

## 2022-09-08 RX ADMIN — MESNA 835 MG: 100 INJECTION, SOLUTION INTRAVENOUS at 18:37

## 2022-09-08 RX ADMIN — IFOSFAMIDE 4200 MG: 1 INJECTION, POWDER, LYOPHILIZED, FOR SOLUTION INTRAVENOUS at 14:44

## 2022-09-08 RX ADMIN — DEXAMETHASONE SODIUM PHOSPHATE 12 MG: 4 INJECTION, SOLUTION INTRAMUSCULAR; INTRAVENOUS at 13:26

## 2022-09-08 RX ADMIN — OLANZAPINE 5 MG: 5 TABLET, FILM COATED ORAL at 20:53

## 2022-09-08 RX ADMIN — PANTOPRAZOLE SODIUM 40 MG: 40 TABLET, DELAYED RELEASE ORAL at 05:39

## 2022-09-08 RX ADMIN — ONDANSETRON 8 MG: 2 INJECTION INTRAMUSCULAR; INTRAVENOUS at 20:54

## 2022-09-08 NOTE — CARE COORDINATION
09/08/22 1242   Readmission Assessment   Number of Days since last admission?  8-30 days  (Planned second round of chemo)

## 2022-09-08 NOTE — H&P
CT scan unfortunately showed very rapid progression of disease with widespread metastatic disease to the lungs and liver  We discussed options with the patient and we decided to start with chemotherapy with AIM chemo, molecular testing was ordered. CURRENT THERAPY:         Status post neoadjuvant radiation therapy 50 Gy February 10, 2022  Status post left thigh sarcoma radical resection 2022  Upon diagnosis of metastatic disease, started on AIM chemotherapy  2022      Past Medical History:   has a past medical history of Cancer ([address] History of radiation therapy, and PONV (postoperative nausea and vomiting). Past Surgical History:   has a past surgical history that includes Leg Surgery (Left, ); Breast biopsy (2014);  section, low transverse; Colonoscopy (N/A, 2018); Colonoscopy (2018); Leg biopsy excision (Left, 2021); Leg biopsy excision (Left, 2022); tissue transfer (Left, 2022); Leg Surgery (Left, 2022); Skin graft (Left, 2022); skin biopsy; CT NEEDLE BIOPSY LUNG PERCUTANEOUS (2022); and IR PORT PLACEMENT > 5 YEARS (2022). Medications:    Reviewed in Epic     Allergies:  Patient has no known allergies. Social History:   reports that she has never smoked. She has never been exposed to tobacco smoke. She has never used smokeless tobacco. She reports current alcohol use. She reports that she does not use drugs. Family History: family history includes Cancer in her maternal aunt, maternal aunt, and mother; Heart Attack in her maternal grandfather and maternal grandmother; Hypertension in her father; Other in her father and paternal grandmother; Thyroid Disease in her mother. REVIEW OF SYSTEMS:    Constitutional: No fever or chills.  No night sweats, no weight loss   Eyes: No eye discharge, double vision, or eye pain   HEENT: negative for sore mouth, sore throat, hoarseness and voice change   Respiratory: negative for cough , sputum, dyspnea, wheezing, hemoptysis, chest pain   Cardiovascular: negative for chest pain, dyspnea, palpitations, orthopnea, PND   Gastrointestinal: negative for nausea, vomiting, diarrhea, constipation, abdominal pain, Dysphagia, hematemesis and hematochezia   Genitourinary: negative for frequency, dysuria, nocturia, urinary incontinence, and hematuria   Integument: negative for rash, skin lesions, bruises.    Hematologic/Lymphatic: negative for easy bruising, bleeding, lymphadenopathy, or petechiae   Endocrine: negative for heat or cold intolerance,weight changes, change in bowel habits and hair loss   Musculoskeletal: negative for myalgias, arthralgias, pain, joint swelling,and bone pain   Neurological: negative for headaches, dizziness, seizures, weakness, numbness    PHYSICAL EXAM:      BP (!) 99/55   Pulse 83   Temp 97.7 °F (36.5 °C) (Oral)   Resp 16   Ht 5' 3\" (1.6 m)   Wt 138 lb 0.1 oz (62.6 kg)   LMP 2010 (Approximate)   SpO2 94%   BMI 24.45 kg/m²    Temp (24hrs), Av.8 °F (36.6 °C), Min:97.5 °F (36.4 °C), Max:98.2 °F (36.8 °C)    General appearance - well appearing, no in pain or distress   Mental status - alert and cooperative   Eyes - pupils equal and reactive, extraocular eye movements intact   Ears - bilateral TM's and external ear canals normal   Mouth - mucous membranes moist, pharynx normal without lesions   Neck - supple, no significant adenopathy   Lymphatics - no palpable lymphadenopathy, no hepatosplenomegaly   Chest - clear to auscultation, no wheezes, rales or rhonchi, symmetric air entry   Heart - normal rate, regular rhythm, normal S1, S2, no murmurs  Abdomen - soft, nontender, nondistended, no masses or organomegaly   Neurological - alert, oriented, normal speech, no focal findings or movement disorder noted   Musculoskeletal - no joint tenderness, deformity or swelling   Extremities - peripheral pulses normal, no pedal edema, no clubbing or cyanosis   Skin - normal coloration and turgor, no rashes, no suspicious skin lesions noted ,    DATA:    Labs:   CBC:   Recent Labs     09/07/22  1015   WBC 15.4*   HGB 10.3*   HCT 30.6*        BMP:   Recent Labs     09/07/22  1015      K 3.9   CO2 22   BUN 18   CREATININE 0.52   LABGLOM >60   GLUCOSE 103*     PT/INR: No results for input(s): PROTIME, INR in the last 72 hours. 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 Diagnosed March/22  Evidence of multiple lung lesions concerning for metastatic disease, June/22, biopsy-proven to be metastatic disease    RECOMMENDATIONS:  Plan for cycle #2 of AIM chemotherapy   Today will be day 1  Continue AIM chemotherapy  Neulasta on 9/12 as outpatient  CBC and cmp daily  Continue home meds   DVT prophylaxis with lovenox  We will follow    Levoanna Sharif  PGY-3  Internal Medicine  [address]  9/7/2022 8:58 PM    Attending Physician Statement   I have discussed the care of this patient, including pertinent history and exam findings, with the resident. I have seen and examined the patient and the key elements of all parts of the encounter have been performed by me. I agree with the assessment, plan and orders as documented by the resident and with changes made to the note.     Juan Pablo Woodard MD  Hematology/Oncology    Cell: [phone]

## 2022-09-08 NOTE — PLAN OF CARE
Problem: Safety - Adult  Goal: Free from fall injury  9/8/2022 0433 by Roxana Cordero RN  Outcome: Progressing  9/7/2022 1817 by José Miguel Ceballos RN  Outcome: Progressing

## 2022-09-08 NOTE — CARE COORDINATION
09/08/22 1235   Service Assessment   Patient Orientation Alert and Oriented   Cognition Alert   History Provided By Patient   Primary Caregiver Self   Support Systems Spouse/Significant Other   Patient's Healthcare Decision Maker is: Named in [address] [place] [address]   PCP Verified by CM Yes   Last Visit to PCP Within last 6 months   Prior Functional Level Independent in ADLs/IADLs   Current Functional Level Independent in ADLs/IADLs   Can patient return to prior living arrangement Yes   Ability to make needs known: Good   Family able to assist with home care needs: Yes   Social/Functional History   Lives With Spouse   Type of Home Condo   Home Layout Two level   Home Access Stairs to enter with rails   Entrance Stairs - Number of Steps 3   Bathroom Shower/Tub Tub/Shower unit   Bathroom Toilet Standard   Bathroom Equipment Shower chair   [facility];Cane   ADL Assistance Independent   Homemaking Assistance Independent   Ambulation Assistance Independent   Transfer Assistance Independent   Active  Yes   Mode of Transportation Car   Occupation Retired   Discharge Planning   Potential Assistance Purchasing Medications No   One/Two Story Residence Two story   History of falls? 0   Services At/After Discharge   [employer] Information Provided? No   Mode of Transport at Discharge   (son)   Condition of Participation: Discharge Planning   The Plan for Transition of Care is related to the following treatment goals: Heal from cancer   Plan is to return home with spouse who will provide transport.

## 2022-09-09 LAB
ABSOLUTE EOS #: <0.03 K/UL (ref 0–0.44)
ABSOLUTE IMMATURE GRANULOCYTE: 0.24 K/UL (ref 0–0.3)
ABSOLUTE LYMPH #: 0.75 K/UL (ref 1.1–3.7)
ABSOLUTE MONO #: 0.8 K/UL (ref 0.1–1.2)
ANION GAP SERPL CALCULATED.3IONS-SCNC: 9 MMOL/L (ref 9–17)
BASOPHILS # BLD: 0 % (ref 0–2)
BASOPHILS ABSOLUTE: 0.05 K/UL (ref 0–0.2)
BILIRUBIN URINE: NEGATIVE
BUN BLDV-MCNC: 10 MG/DL (ref 8–23)
CALCIUM SERPL-MCNC: 9 MG/DL (ref 8.6–10.4)
CHLORIDE BLD-SCNC: 110 MMOL/L (ref 98–107)
CO2: 21 MMOL/L (ref 20–31)
COLOR: YELLOW
COMMENT UA: ABNORMAL
CREAT SERPL-MCNC: 0.57 MG/DL (ref 0.5–0.9)
EOSINOPHILS RELATIVE PERCENT: 0 % (ref 1–4)
FOLATE: 7.8 NG/ML
GFR AFRICAN AMERICAN: >60 ML/MIN
GFR NON-AFRICAN AMERICAN: >60 ML/MIN
GFR SERPL CREATININE-BSD FRML MDRD: ABNORMAL ML/MIN/{1.73_M2}
GLUCOSE BLD-MCNC: 88 MG/DL (ref 70–99)
GLUCOSE URINE: NEGATIVE
HCT VFR BLD CALC: 29.5 % (ref 36.3–47.1)
HEMOGLOBIN: 9.2 G/DL (ref 11.9–15.1)
IMMATURE GRANULOCYTES: 2 %
IRON SATURATION: ABNORMAL % (ref 20–55)
IRON: 202 UG/DL (ref 37–145)
KETONES, URINE: ABNORMAL
LEUKOCYTE ESTERASE, URINE: NEGATIVE
LYMPHOCYTES # BLD: 7 % (ref 24–43)
MCH RBC QN AUTO: 28 PG (ref 25.2–33.5)
MCHC RBC AUTO-ENTMCNC: 31.2 G/DL (ref 28.4–34.8)
MCV RBC AUTO: 89.9 FL (ref 82.6–102.9)
MONOCYTES # BLD: 7 % (ref 3–12)
NITRITE, URINE: NEGATIVE
NRBC AUTOMATED: 0 PER 100 WBC
PDW BLD-RTO: 15 % (ref 11.8–14.4)
PH UA: 6 (ref 5–8)
PLATELET # BLD: 366 K/UL (ref 138–453)
PMV BLD AUTO: 9.9 FL (ref 8.1–13.5)
POTASSIUM SERPL-SCNC: 4.2 MMOL/L (ref 3.7–5.3)
PROTEIN UA: NEGATIVE
RBC # BLD: 3.28 M/UL (ref 3.95–5.11)
RBC # BLD: ABNORMAL 10*6/UL
SEG NEUTROPHILS: 84 % (ref 36–65)
SEGMENTED NEUTROPHILS ABSOLUTE COUNT: 9.41 K/UL (ref 1.5–8.1)
SODIUM BLD-SCNC: 140 MMOL/L (ref 135–144)
SPECIFIC GRAVITY UA: 1.01 (ref 1–1.03)
TOTAL IRON BINDING CAPACITY: ABNORMAL UG/DL (ref 250–450)
TURBIDITY: CLEAR
UNSATURATED IRON BINDING CAPACITY: <17 UG/DL (ref 112–347)
URINE HGB: NEGATIVE
UROBILINOGEN, URINE: NORMAL
VITAMIN B-12: >2000 PG/ML (ref 232–1245)
WBC # BLD: 11.3 K/UL (ref 3.5–11.3)

## 2022-09-09 PROCEDURE — 85025 COMPLETE CBC W/AUTO DIFF WBC: CPT

## 2022-09-09 PROCEDURE — 6360000002 HC RX W HCPCS: Performed by: INTERNAL MEDICINE

## 2022-09-09 PROCEDURE — 36415 COLL VENOUS BLD VENIPUNCTURE: CPT

## 2022-09-09 PROCEDURE — 6370000000 HC RX 637 (ALT 250 FOR IP): Performed by: INTERNAL MEDICINE

## 2022-09-09 PROCEDURE — 81003 URINALYSIS AUTO W/O SCOPE: CPT

## 2022-09-09 PROCEDURE — 2580000003 HC RX 258: Performed by: INTERNAL MEDICINE

## 2022-09-09 PROCEDURE — 82746 ASSAY OF FOLIC ACID SERUM: CPT

## 2022-09-09 PROCEDURE — 83550 IRON BINDING TEST: CPT

## 2022-09-09 PROCEDURE — 99232 SBSQ HOSP IP/OBS MODERATE 35: CPT | Performed by: INTERNAL MEDICINE

## 2022-09-09 PROCEDURE — 83540 ASSAY OF IRON: CPT

## 2022-09-09 PROCEDURE — 1200000000 HC SEMI PRIVATE

## 2022-09-09 PROCEDURE — 80048 BASIC METABOLIC PNL TOTAL CA: CPT

## 2022-09-09 PROCEDURE — 82607 VITAMIN B-12: CPT

## 2022-09-09 RX ORDER — 0.9 % SODIUM CHLORIDE 0.9 %
1000 INTRAVENOUS SOLUTION INTRAVENOUS ONCE
Status: COMPLETED | OUTPATIENT
Start: 2022-09-09 | End: 2022-09-09

## 2022-09-09 RX ORDER — ONDANSETRON 2 MG/ML
8 INJECTION INTRAMUSCULAR; INTRAVENOUS EVERY 8 HOURS
Status: COMPLETED | OUTPATIENT
Start: 2022-09-09 | End: 2022-09-10

## 2022-09-09 RX ADMIN — ENOXAPARIN SODIUM 30 MG: 100 INJECTION SUBCUTANEOUS at 09:10

## 2022-09-09 RX ADMIN — ONDANSETRON 8 MG: 2 INJECTION INTRAMUSCULAR; INTRAVENOUS at 20:19

## 2022-09-09 RX ADMIN — SODIUM CHLORIDE 42 MG: 9 INJECTION, SOLUTION INTRAVENOUS at 17:15

## 2022-09-09 RX ADMIN — DEXAMETHASONE SODIUM PHOSPHATE 12 MG: 4 INJECTION, SOLUTION INTRAMUSCULAR; INTRAVENOUS at 13:43

## 2022-09-09 RX ADMIN — ONDANSETRON 8 MG: 2 INJECTION INTRAMUSCULAR; INTRAVENOUS at 12:16

## 2022-09-09 RX ADMIN — MESNA 835 MG: 100 INJECTION, SOLUTION INTRAVENOUS at 14:24

## 2022-09-09 RX ADMIN — SODIUM CHLORIDE 1000 ML: 9 INJECTION, SOLUTION INTRAVENOUS at 12:19

## 2022-09-09 RX ADMIN — MESNA 835 MG: 100 INJECTION, SOLUTION INTRAVENOUS at 18:33

## 2022-09-09 RX ADMIN — ONDANSETRON 8 MG: 2 INJECTION INTRAMUSCULAR; INTRAVENOUS at 04:27

## 2022-09-09 RX ADMIN — OLANZAPINE 5 MG: 5 TABLET, FILM COATED ORAL at 20:23

## 2022-09-09 RX ADMIN — MESNA 835 MG: 100 INJECTION, SOLUTION INTRAVENOUS at 22:51

## 2022-09-09 RX ADMIN — PANTOPRAZOLE SODIUM 40 MG: 40 TABLET, DELAYED RELEASE ORAL at 06:54

## 2022-09-09 RX ADMIN — IFOSFAMIDE 4200 MG: 3 INJECTION, POWDER, FOR SOLUTION INTRAVENOUS at 14:59

## 2022-09-09 NOTE — PROGRESS NOTES
Progress Note               Date:                           9/8/2022  Patient name:           Jennie Hernandez  Date of admission:  9/7/2022  9:30 AM  MRN:   [id number]  YOB: 1955  PCP:                           ANAIS Medina CNP        Subjective:     No acute issues overnight. Chemotherapy ongoing today. 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   Diagnosed March/22  Evidence of multiple lung lesions concerning for metastatic disease, June/22, biopsy-proven to be metastatic disease    CURRENT THERAPY:         Status post neoadjuvant radiation therapy 50 Gy February 10, 2022  Status post left thigh sarcoma radical resection March 21, 2022  Upon diagnosis of metastatic disease, started on AIM chemotherapy  August/2022       Problem Lists:   Primary Problem:  Sarcoma of left thigh ([facility]   Current Problems:  Active Hospital Problems    Diagnosis Date Noted    Sarcoma of left thigh ([facility] [address] [C49.22] 12/15/2021     PMH:  Past Medical History:   Diagnosis Date    Cancer ([facility] [address] 2021    SARCOMA LEFT THIGH    History of radiation therapy     PONV (postoperative nausea and vomiting)       Allergies: No Known Allergies     Medications:   Scheduled Meds:   ondansetron  8 mg IntraVENous q8h    DOXOrubicin (ADRIAMYCIN) chemo infusion  25 mg/m2 (Treatment Plan Recorded) IntraVENous Once    mesna (MESNEX) IVPB  500 mg/m2 (Treatment Plan Recorded) IntraVENous Once    enoxaparin  30 mg SubCUTAneous Daily    pantoprazole  40 mg Oral QAM AC    OLANZapine  5 mg Oral Nightly     PRN Medications: sodium chloride, acetaminophen, docusate sodium, ondansetron  Continuous Infusions:   sodium chloride         Objective:   Vitals: BP 98/63   Pulse 78   Temp 97.8 °F (36.6 °C) (Oral)   Resp 18   Ht 5' 3\" (1.6 m)   Wt 138 lb 0.1 oz (62.6 kg)   LMP 01/01/2010 (Approximate)   SpO2 96%   BMI 24.45 kg/m²   General appearance - well appearing, no in pain or distress   Mental status - alert and cooperative   Eyes - pupils equal and reactive, extraocular eye movements intact   Ears - bilateral TM's and external ear canals normal   Mouth - mucous membranes moist, pharynx normal without lesions   Neck - supple, no significant adenopathy , port on right side  Lymphatics - no palpable lymphadenopathy, no hepatosplenomegaly   Chest - clear to auscultation, no wheezes, rales or rhonchi, symmetric air entry   Heart - normal rate, regular rhythm, normal S1, S2, no murmurs, rubs, clicks or gallops   Abdomen - soft, nontender, nondistended, no masses or organomegaly   Neurological - alert, oriented, normal speech, no focal findings or movement disorder noted   Musculoskeletal - no joint tenderness, deformity or swelling   Extremities - peripheral pulses normal, no pedal edema, no clubbing or cyanosis   Skin - normal coloration and turgor, no rashes, no suspicious skin lesions noted ,     Data:  No intake/output data recorded. In: 2601.7 [P.O.:1200]  Out: -   CBC:   Recent Labs     09/07/22  1015 09/08/22  0830   WBC 15.4* 16.2*   HGB 10.3* 9.5*    412     BMP:    Recent Labs     09/07/22  1015 09/08/22  0830    140   K 3.9 4.1    108*   CO2 22 22   BUN 18 11   CREATININE 0.52 0.51   GLUCOSE 103* 99     Hepatic:   Recent Labs     09/07/22  1015 09/08/22  0830   AST 21 19   ALT 42* 40*   BILITOT 0.3 0.2*   ALKPHOS 171* 154*     INR: No results for input(s): INR in the last 72 hours. 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   Diagnosed March/22  Evidence of multiple lung lesions concerning for metastatic disease, June/22, biopsy-proven to be metastatic disease        Plan     Plan for cycle #2 of AIM chemotherapy   Today will be day 2  Continue AIM chemotherapy  Neulasta on 9/12 as outpatient  CBC and cmp daily  Continue home meds   DVT prophylaxis with lovenox  We will follow      Ana Hanks  PGY-3  Internal [address] [facility] [place] [facility]""  9/8/2022 8:26 PM    Attending Physician Statement   I have discussed the care of this patient, including pertinent history and exam findings, with the resident. I have seen and examined the patient and the key elements of all parts of the encounter have been performed by me. I agree with the assessment, plan and orders as documented by the resident and with changes made to the note.     Chemo as planned  Chemo Day 4 will be on Saturday  And likely discharge Sunday morning    Mike Woodard MD  Hematology/Oncology    Cell: [phone]

## 2022-09-10 LAB
ABSOLUTE EOS #: 0.07 K/UL (ref 0–0.4)
ABSOLUTE IMMATURE GRANULOCYTE: 0.14 K/UL (ref 0–0.3)
ABSOLUTE LYMPH #: 0.79 K/UL (ref 1–4.8)
ABSOLUTE MONO #: 0.14 K/UL (ref 0.1–0.8)
ALBUMIN SERPL-MCNC: 2.9 G/DL (ref 3.5–5.2)
ALBUMIN/GLOBULIN RATIO: 1.4 (ref 1–2.5)
ALP BLD-CCNC: 115 U/L (ref 35–104)
ALT SERPL-CCNC: 24 U/L (ref 5–33)
ANION GAP SERPL CALCULATED.3IONS-SCNC: 9 MMOL/L (ref 9–17)
AST SERPL-CCNC: 10 U/L
BASOPHILS # BLD: 0 % (ref 0–2)
BASOPHILS ABSOLUTE: 0 K/UL (ref 0–0.2)
BILIRUB SERPL-MCNC: 0.2 MG/DL (ref 0.3–1.2)
BILIRUBIN URINE: NEGATIVE
BUN BLDV-MCNC: 10 MG/DL (ref 8–23)
CALCIUM SERPL-MCNC: 8.6 MG/DL (ref 8.6–10.4)
CHLORIDE BLD-SCNC: 111 MMOL/L (ref 98–107)
CO2: 23 MMOL/L (ref 20–31)
COLOR: YELLOW
COMMENT UA: ABNORMAL
CREAT SERPL-MCNC: 0.53 MG/DL (ref 0.5–0.9)
EOSINOPHILS RELATIVE PERCENT: 1 % (ref 1–4)
GFR AFRICAN AMERICAN: >60 ML/MIN
GFR NON-AFRICAN AMERICAN: >60 ML/MIN
GFR SERPL CREATININE-BSD FRML MDRD: ABNORMAL ML/MIN/{1.73_M2}
GLUCOSE BLD-MCNC: 80 MG/DL (ref 70–99)
GLUCOSE URINE: NEGATIVE
HCT VFR BLD CALC: 26.4 % (ref 36.3–47.1)
HEMOGLOBIN: 8.8 G/DL (ref 11.9–15.1)
IMMATURE GRANULOCYTES: 2 %
KETONES, URINE: ABNORMAL
LEUKOCYTE ESTERASE, URINE: NEGATIVE
LYMPHOCYTES # BLD: 11 % (ref 24–44)
MCH RBC QN AUTO: 29.3 PG (ref 25.2–33.5)
MCHC RBC AUTO-ENTMCNC: 33.3 G/DL (ref 28.4–34.8)
MCV RBC AUTO: 88 FL (ref 82.6–102.9)
MONOCYTES # BLD: 2 % (ref 1–7)
MORPHOLOGY: ABNORMAL
NITRITE, URINE: NEGATIVE
NRBC AUTOMATED: 0 PER 100 WBC
PDW BLD-RTO: 15.1 % (ref 11.8–14.4)
PH UA: 6.5 (ref 5–8)
PLATELET # BLD: 363 K/UL (ref 138–453)
PMV BLD AUTO: 10 FL (ref 8.1–13.5)
POTASSIUM SERPL-SCNC: 4.1 MMOL/L (ref 3.7–5.3)
PROTEIN UA: NEGATIVE
RBC # BLD: 3 M/UL (ref 3.95–5.11)
SEG NEUTROPHILS: 84 % (ref 36–66)
SEGMENTED NEUTROPHILS ABSOLUTE COUNT: 6.06 K/UL (ref 1.8–7.7)
SODIUM BLD-SCNC: 143 MMOL/L (ref 135–144)
SPECIFIC GRAVITY UA: 1.01 (ref 1–1.03)
TOTAL PROTEIN: 5 G/DL (ref 6.4–8.3)
TURBIDITY: CLEAR
URINE HGB: NEGATIVE
UROBILINOGEN, URINE: NORMAL
WBC # BLD: 7.2 K/UL (ref 3.5–11.3)

## 2022-09-10 PROCEDURE — 80053 COMPREHEN METABOLIC PANEL: CPT

## 2022-09-10 PROCEDURE — 36415 COLL VENOUS BLD VENIPUNCTURE: CPT

## 2022-09-10 PROCEDURE — 6360000002 HC RX W HCPCS: Performed by: INTERNAL MEDICINE

## 2022-09-10 PROCEDURE — 85025 COMPLETE CBC W/AUTO DIFF WBC: CPT

## 2022-09-10 PROCEDURE — 6370000000 HC RX 637 (ALT 250 FOR IP): Performed by: INTERNAL MEDICINE

## 2022-09-10 PROCEDURE — 2580000003 HC RX 258: Performed by: INTERNAL MEDICINE

## 2022-09-10 PROCEDURE — 81003 URINALYSIS AUTO W/O SCOPE: CPT

## 2022-09-10 PROCEDURE — 1200000000 HC SEMI PRIVATE

## 2022-09-10 PROCEDURE — 99233 SBSQ HOSP IP/OBS HIGH 50: CPT | Performed by: INTERNAL MEDICINE

## 2022-09-10 RX ORDER — SODIUM CHLORIDE 9 MG/ML
5-250 INJECTION, SOLUTION INTRAVENOUS PRN
Status: ACTIVE | OUTPATIENT
Start: 2022-09-10 | End: 2022-09-11

## 2022-09-10 RX ORDER — SODIUM CHLORIDE 9 MG/ML
5-40 INJECTION INTRAVENOUS PRN
Status: ACTIVE | OUTPATIENT
Start: 2022-09-10 | End: 2022-09-11

## 2022-09-10 RX ORDER — SODIUM CHLORIDE 0.9 % (FLUSH) 0.9 %
5-40 SYRINGE (ML) INJECTION PRN
Status: ACTIVE | OUTPATIENT
Start: 2022-09-10 | End: 2022-09-11

## 2022-09-10 RX ORDER — ONDANSETRON 2 MG/ML
8 INJECTION INTRAMUSCULAR; INTRAVENOUS EVERY 8 HOURS
Status: COMPLETED | OUTPATIENT
Start: 2022-09-10 | End: 2022-09-11

## 2022-09-10 RX ORDER — HEPARIN SODIUM (PORCINE) LOCK FLUSH IV SOLN 100 UNIT/ML 100 UNIT/ML
500 SOLUTION INTRAVENOUS PRN
Status: ACTIVE | OUTPATIENT
Start: 2022-09-10 | End: 2022-09-11

## 2022-09-10 RX ADMIN — IFOSFAMIDE 4200 MG: 1 INJECTION, POWDER, LYOPHILIZED, FOR SOLUTION INTRAVENOUS at 16:25

## 2022-09-10 RX ADMIN — MESNA 835 MG: 100 INJECTION, SOLUTION INTRAVENOUS at 16:03

## 2022-09-10 RX ADMIN — ONDANSETRON 8 MG: 2 INJECTION INTRAMUSCULAR; INTRAVENOUS at 23:47

## 2022-09-10 RX ADMIN — ONDANSETRON 8 MG: 2 INJECTION INTRAMUSCULAR; INTRAVENOUS at 15:13

## 2022-09-10 RX ADMIN — PANTOPRAZOLE SODIUM 40 MG: 40 TABLET, DELAYED RELEASE ORAL at 06:57

## 2022-09-10 RX ADMIN — DEXAMETHASONE SODIUM PHOSPHATE 12 MG: 4 INJECTION, SOLUTION INTRAMUSCULAR; INTRAVENOUS at 15:19

## 2022-09-10 RX ADMIN — DOCUSATE SODIUM 100 MG: 100 CAPSULE, LIQUID FILLED ORAL at 08:38

## 2022-09-10 RX ADMIN — MESNA 835 MG: 100 INJECTION, SOLUTION INTRAVENOUS at 23:49

## 2022-09-10 RX ADMIN — OLANZAPINE 5 MG: 5 TABLET, FILM COATED ORAL at 20:11

## 2022-09-10 RX ADMIN — MESNA 835 MG: 100 INJECTION, SOLUTION INTRAVENOUS at 20:02

## 2022-09-10 RX ADMIN — ENOXAPARIN SODIUM 30 MG: 100 INJECTION SUBCUTANEOUS at 08:38

## 2022-09-10 RX ADMIN — ONDANSETRON 8 MG: 2 INJECTION INTRAMUSCULAR; INTRAVENOUS at 04:29

## 2022-09-10 NOTE — PROGRESS NOTES
Progress Note               Date:                           9/9/2022  Patient name:           Jaswant Mendoza  Date of admission:  9/7/2022  9:30 AM  MRN:   [id number]  YOB: 1955  PCP:                           ANAIS Harmon CNP        Subjective:     No acute issues overnight. Chemotherapy ongoing today. 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   Diagnosed March/22  Evidence of multiple lung lesions concerning for metastatic disease, June/22, biopsy-proven to be metastatic disease    CURRENT THERAPY:         Status post neoadjuvant radiation therapy 50 Gy February 10, 2022  Status post left thigh sarcoma radical resection March 21, 2022  Upon diagnosis of metastatic disease, started on AIM chemotherapy  August/2022       Problem Lists:   Primary Problem:  Sarcoma of left thigh ([facility] [address]   Current Problems:  Active Hospital Problems    Diagnosis Date Noted    Sarcoma of left thigh ([facility] [address] [C49.22] 12/15/2021     PMH:  Past Medical History:   Diagnosis Date    Cancer ([facility] [address] 2021    SARCOMA LEFT THIGH    History of radiation therapy     PONV (postoperative nausea and vomiting)       Allergies: No Known Allergies     Medications:   Scheduled Meds:   ondansetron  8 mg IntraVENous q8h    DOXOrubicin (ADRIAMYCIN) chemo infusion  25 mg/m2 (Treatment Plan Recorded) IntraVENous Once    mesna (MESNEX) IVPB  500 mg/m2 (Treatment Plan Recorded) IntraVENous Once    enoxaparin  30 mg SubCUTAneous Daily    pantoprazole  40 mg Oral QAM AC    OLANZapine  5 mg Oral Nightly     PRN Medications: acetaminophen, docusate sodium, ondansetron  Continuous Infusions:        Objective:   Vitals: BP (!) 95/55   Pulse 68   Temp 98.3 °F (36.8 °C) (Oral)   Resp 17   Ht 5' 3\" (1.6 m)   Wt 138 lb 0.1 oz (62.6 kg)   LMP 01/01/2010 (Approximate)   SpO2 96%   BMI 24.45 kg/m²   General appearance - well appearing, no in pain or distress   Mental status - alert and cooperative   Eyes - pupils equal and reactive, extraocular eye movements intact   Ears - bilateral TM's and external ear canals normal   Mouth - mucous membranes moist, pharynx normal without lesions   Neck - supple, no significant adenopathy , port on right side  Lymphatics - no palpable lymphadenopathy, no hepatosplenomegaly   Chest - clear to auscultation, no wheezes, rales or rhonchi, symmetric air entry   Heart - normal rate, regular rhythm, normal S1, S2, no murmurs, rubs, clicks or gallops   Abdomen - soft, nontender, nondistended, no masses or organomegaly   Neurological - alert, oriented, normal speech, no focal findings or movement disorder noted   Musculoskeletal - no joint tenderness, deformity or swelling   Extremities - peripheral pulses normal, no pedal edema, no clubbing or cyanosis   Skin - normal coloration and turgor, no rashes, no suspicious skin lesions noted ,     Data:  I/O this shift:  In: -   Out: 850 [Urine:850]  In: 3360.1 [P.O.:1550]  Out: 3800 [Urine:3800]  CBC:   Recent Labs     09/07/22  1015 09/08/22  0830 09/09/22  0833   WBC 15.4* 16.2* 11.3   HGB 10.3* 9.5* 9.2*    412 366       BMP:    Recent Labs     09/07/22  1015 09/08/22  0830 09/09/22  0833    140 140   K 3.9 4.1 4.2    108* 110*   CO2 22 22 21   BUN 18 11 10   CREATININE 0.52 0.51 0.57   GLUCOSE 103* 99 88       Hepatic:   Recent Labs     09/07/22  1015 09/08/22  0830   AST 21 19   ALT 42* 40*   BILITOT 0.3 0.2*   ALKPHOS 171* 154*       INR: No results for input(s): INR in the last 72 hours. 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   Diagnosed March/22  Evidence of multiple lung lesions concerning for metastatic disease, June/22, biopsy-proven to be metastatic disease        Plan     Plan for cycle #2 of AIM chemotherapy   Today will be day 3  Continue AIM chemotherapy  Neulasta on 9/12 as outpatient  CBC and cmp daily  Continue home meds   DVT prophylaxis with lovenox  We will follow. Chemo day 4 on Saturday, then discharge likely Jorge Luis morning. Regina Hopper  PGY-3  Internal Medicine  [address]  9/9/2022 8:41 PM      Attending Physician Statement   I have discussed the care of this patient, including pertinent history and exam findings, with the resident. I have seen and examined the patient and the key elements of all parts of the encounter have been performed by me. I agree with the assessment, plan and orders as documented by the resident and with changes made to the note.     Chemo as planned  Marii horn Sunday AM  Neulasta on 9/12 as o/p as scheduled    Mp Woodard MD  Hematology/Oncology    Cell: [phone]

## 2022-09-10 NOTE — PLAN OF CARE
Problem: Safety - Adult  Goal: Free from fall injury  9/10/2022 1737 by Lissett Hemphill RN  Outcome: Progressing  9/10/2022 0541 by Julieth Lucero RN  Outcome: Progressing  9/10/2022 0538 by Julieth Lucero RN  Outcome: Progressing     Problem: ABCDS Injury Assessment  Goal: Absence of physical injury  9/10/2022 1737 by Lissett Hemphill RN  Outcome: Progressing  9/10/2022 0541 by Julieth Lucero RN  Outcome: Progressing     Problem: Discharge Planning  Goal: Discharge to home or other facility with appropriate resources  9/10/2022 1737 by Lissett Hemphill RN  Outcome: Progressing  9/10/2022 0541 by Julieth Lucero RN  Outcome: Progressing     Problem: Pain  Goal: Verbalizes/displays adequate comfort level or baseline comfort level  9/10/2022 1737 by Lissett Hemphill RN  Outcome: Progressing  9/10/2022 0541 by Julieth Lucero RN  Outcome: Progressing

## 2022-09-10 NOTE — PROGRESS NOTES
I have seen and examined the patient independently. I reviewed all laboratory and imaging studies that are relevant. I agree with the resident note with the addendum. Electronically signed by Leonard Smith MD on 9/10/2022 at 11:34 PM        Progress Note               Date:                           9/10/2022  Patient name:           Chrissy Becerril  Date of admission:  9/7/2022  9:30 AM  MRN:   [id number]  YOB: 1955  PCP:                           ANAIS Marquez CNP        Subjective:     No acute issues overnight. Chemotherapy ongoing today. Plan for dischrge tomorrow. 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   Diagnosed March/22  Evidence of multiple lung lesions concerning for metastatic disease, June/22, biopsy-proven to be metastatic disease    CURRENT THERAPY:         Status post neoadjuvant radiation therapy 50 Gy February 10, 2022  Status post left thigh sarcoma radical resection March 21, 2022  Upon diagnosis of metastatic disease, started on AIM chemotherapy  August/2022       Problem Lists:   Primary Problem:  Sarcoma of left thigh ([facility] [address]   Current Problems:  Active Hospital Problems    Diagnosis Date Noted    Sarcoma of left thigh ([address] [C49.22] 12/15/2021     PMH:  Past Medical History:   Diagnosis Date    Cancer ([address] 2021    SARCOMA LEFT THIGH    History of radiation therapy     PONV (postoperative nausea and vomiting)       Allergies: No Known Allergies     Medications:   Scheduled Meds:   dexamethasone  12 mg IntraVENous Once    ondansetron  8 mg IntraVENous q8h    mesna (MESNEX) IVPB  500 mg/m2 (Treatment Plan Recorded) IntraVENous Once    ifosfamide (IFEX) chemo IVPB  2,500 mg/m2 (Treatment Plan Recorded) IntraVENous Once    mesna (MESNEX) IVPB  500 mg/m2 (Treatment Plan Recorded) IntraVENous Once    mesna (MESNEX) IVPB  500 mg/m2 (Treatment Plan Recorded) IntraVENous Once    DOXOrubicin (ADRIAMYCIN) chemo infusion  25 mg/m2 (Treatment Plan Recorded) IntraVENous Once    enoxaparin  30 mg SubCUTAneous Daily    pantoprazole  40 mg Oral QAM AC    OLANZapine  5 mg Oral Nightly     PRN Medications: sodium chloride flush, sodium chloride (PF), sodium chloride, heparin flush, acetaminophen, docusate sodium, ondansetron  Continuous Infusions:   sodium chloride           Objective:   Vitals: BP (!) 103/54   Pulse 84   Temp 98.2 °F (36.8 °C) (Oral)   Resp 17   Ht 5' 3\" (1.6 m)   Wt 138 lb 0.1 oz (62.6 kg)   LMP 01/01/2010 (Approximate)   SpO2 96%   BMI 24.45 kg/m²   General appearance - well appearing, no in pain or distress   Mental status - alert and cooperative   Eyes - pupils equal and reactive, extraocular eye movements intact   Ears - bilateral TM's and external ear canals normal   Mouth - mucous membranes moist, pharynx normal without lesions   Neck - supple, no significant adenopathy , port on right side  Lymphatics - no palpable lymphadenopathy, no hepatosplenomegaly   Chest - clear to auscultation, no wheezes, rales or rhonchi, symmetric air entry   Heart - normal rate, regular rhythm, normal S1, S2, no murmurs, rubs, clicks or gallops   Abdomen - soft, nontender, nondistended, no masses or organomegaly   Neurological - alert, oriented, normal speech, no focal findings or movement disorder noted   Musculoskeletal - no joint tenderness, deformity or swelling   Extremities - peripheral pulses normal, no pedal edema, no clubbing or cyanosis   Skin - normal coloration and turgor, no rashes, no suspicious skin lesions noted ,     Data:  I/O this shift:  In: 240 [P.O.:240]  Out: 800 [Urine:800]  In: 3703.1 [P.O.:1790]  Out: 4500 [Urine:4500]  CBC:   Recent Labs     09/08/22  0830 09/09/22  0833 09/10/22  0544   WBC 16.2* 11.3 7.2   HGB 9.5* 9.2* 8.8*    366 363       BMP:    Recent Labs     09/08/22  0830 09/09/22  0833 09/10/22  0544    140 143   K 4.1 4.2 4.1   * 110* 111*   CO2 22 21 23   BUN 11 10 10 CREATININE 0.51 0.57 0.53   GLUCOSE 99 88 80       Hepatic:   Recent Labs     09/08/22  0830 09/10/22  0544   AST 19 10   ALT 40* 24   BILITOT 0.2* 0.2*   ALKPHOS 154* 115*       INR: No results for input(s): INR in the last 72 hours. 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 Diagnosed March/22  Evidence of multiple lung lesions concerning for metastatic disease, June/22, biopsy-proven to be metastatic disease        Plan     Plan for cycle #2 of AIM chemotherapy   Today will be day 4  Continue AIM chemotherapy  Neulasta on 9/12 as outpatient  CBC and cmp daily  Continue home meds   DVT prophylaxis with lovenox  We will follow. Chemo day 4 today, then discharge tomorrow.     Khari Arriaza  PGY-3  Internal Medicine  [facility] [address]  9/10/2022 3:47 PM

## 2022-09-11 VITALS
TEMPERATURE: 99.2 F | DIASTOLIC BLOOD PRESSURE: 60 MMHG | RESPIRATION RATE: 16 BRPM | OXYGEN SATURATION: 94 % | SYSTOLIC BLOOD PRESSURE: 100 MMHG | HEART RATE: 67 BPM | WEIGHT: 138.01 LBS | HEIGHT: 63 IN | BODY MASS INDEX: 24.45 KG/M2

## 2022-09-11 PROCEDURE — 6360000002 HC RX W HCPCS: Performed by: INTERNAL MEDICINE

## 2022-09-11 PROCEDURE — 6360000002 HC RX W HCPCS

## 2022-09-11 PROCEDURE — 6370000000 HC RX 637 (ALT 250 FOR IP): Performed by: INTERNAL MEDICINE

## 2022-09-11 RX ORDER — ONDANSETRON 2 MG/ML
INJECTION INTRAMUSCULAR; INTRAVENOUS
Status: COMPLETED
Start: 2022-09-11 | End: 2022-09-11

## 2022-09-11 RX ORDER — HEPARIN SODIUM (PORCINE) LOCK FLUSH IV SOLN 100 UNIT/ML 100 UNIT/ML
500 SOLUTION INTRAVENOUS PRN
Status: DISCONTINUED | OUTPATIENT
Start: 2022-09-11 | End: 2022-09-11 | Stop reason: HOSPADM

## 2022-09-11 RX ORDER — HEPARIN SODIUM (PORCINE) LOCK FLUSH IV SOLN 100 UNIT/ML 100 UNIT/ML
100 SOLUTION INTRAVENOUS PRN
Status: DISCONTINUED | OUTPATIENT
Start: 2022-09-11 | End: 2022-09-11 | Stop reason: HOSPADM

## 2022-09-11 RX ADMIN — ONDANSETRON 8 MG: 2 INJECTION INTRAMUSCULAR; INTRAVENOUS at 07:25

## 2022-09-11 RX ADMIN — HEPARIN 500 UNITS: 100 SYRINGE at 11:10

## 2022-09-11 RX ADMIN — ENOXAPARIN SODIUM 30 MG: 100 INJECTION SUBCUTANEOUS at 08:47

## 2022-09-11 RX ADMIN — PANTOPRAZOLE SODIUM 40 MG: 40 TABLET, DELAYED RELEASE ORAL at 07:24

## 2022-09-11 NOTE — PLAN OF CARE
Problem: Safety - Adult  Goal: Free from fall injury  9/11/2022 1135 by Maame Downey RN  Outcome: Completed  9/11/2022 0403 by Elliot Vincent RN  Outcome: Progressing

## 2022-09-11 NOTE — PLAN OF CARE
Problem: Safety - Adult  Goal: Free from fall injury  9/11/2022 0403 by Ila Mclain RN  Outcome: Progressing  9/10/2022 1737 by Viral Candelario RN  Outcome: Progressing     Problem: ABCDS Injury Assessment  Goal: Absence of physical injury  9/11/2022 0403 by Ila Mclain RN  Outcome: Progressing  9/10/2022 1737 by Viral Candelario RN  Outcome: Progressing     Problem: Discharge Planning  Goal: Discharge to home or other facility with appropriate resources  9/11/2022 0403 by Ila Mclain RN  Outcome: Progressing  9/10/2022 1737 by Viral Candelario RN  Outcome: Progressing     Problem: Pain  Goal: Verbalizes/displays adequate comfort level or baseline comfort level  9/11/2022 0403 by Ila Mclain RN  Outcome: Progressing  9/10/2022 1737 by Viral Candelario RN  Outcome: Progressing

## 2022-09-12 ENCOUNTER — HOSPITAL ENCOUNTER (OUTPATIENT)
Dept: INFUSION THERAPY | Age: 67
Discharge: HOME | End: 2022-09-12
Payer: COMMERCIAL

## 2022-09-12 VITALS — RESPIRATION RATE: 16 BRPM | HEART RATE: 86 BPM | TEMPERATURE: 97 F

## 2022-09-12 DIAGNOSIS — C49.22 SARCOMA OF LEFT THIGH (HCC): ICD-10-CM

## 2022-09-12 DIAGNOSIS — C78.01 MALIGNANT NEOPLASM METASTATIC TO BOTH LUNGS (HCC): Primary | ICD-10-CM

## 2022-09-12 DIAGNOSIS — C78.02 MALIGNANT NEOPLASM METASTATIC TO BOTH LUNGS (HCC): Primary | ICD-10-CM

## 2022-09-12 PROCEDURE — 96372 THER/PROPH/DIAG INJ SC/IM: CPT

## 2022-09-12 PROCEDURE — 6360000002 HC RX W HCPCS: Performed by: INTERNAL MEDICINE

## 2022-09-12 RX ADMIN — PEGFILGRASTIM-CBQV 6 MG: 6 INJECTION, SOLUTION SUBCUTANEOUS at 15:06

## 2022-09-13 DIAGNOSIS — C49.22 SARCOMA OF LEFT THIGH (HCC): Primary | ICD-10-CM

## 2022-09-13 DIAGNOSIS — C78.02 MALIGNANT NEOPLASM METASTATIC TO BOTH LUNGS (HCC): ICD-10-CM

## 2022-09-13 DIAGNOSIS — C78.01 MALIGNANT NEOPLASM METASTATIC TO BOTH LUNGS (HCC): ICD-10-CM

## 2022-09-20 ENCOUNTER — HOSPITAL ENCOUNTER (OUTPATIENT)
Dept: CT IMAGING | Age: 67
Discharge: HOME | End: 2022-09-22
Payer: COMMERCIAL

## 2022-09-20 DIAGNOSIS — C78.02 MALIGNANT NEOPLASM METASTATIC TO BOTH LUNGS (HCC): ICD-10-CM

## 2022-09-20 DIAGNOSIS — C49.22 SARCOMA OF LEFT THIGH (HCC): ICD-10-CM

## 2022-09-20 DIAGNOSIS — C78.01 MALIGNANT NEOPLASM METASTATIC TO BOTH LUNGS (HCC): ICD-10-CM

## 2022-09-20 PROCEDURE — 71260 CT THORAX DX C+: CPT

## 2022-09-20 PROCEDURE — 6360000004 HC RX CONTRAST MEDICATION: Performed by: INTERNAL MEDICINE

## 2022-09-20 RX ADMIN — IOPAMIDOL 75 ML: 755 INJECTION, SOLUTION INTRAVENOUS at 08:03

## 2022-09-21 ENCOUNTER — OFFICE VISIT (OUTPATIENT)
Dept: ONCOLOGY | Age: 67
End: 2022-09-21
Payer: COMMERCIAL

## 2022-09-21 VITALS
RESPIRATION RATE: 18 BRPM | TEMPERATURE: 98.5 F | HEART RATE: 99 BPM | SYSTOLIC BLOOD PRESSURE: 120 MMHG | WEIGHT: 137 LBS | BODY MASS INDEX: 24.27 KG/M2 | DIASTOLIC BLOOD PRESSURE: 63 MMHG

## 2022-09-21 DIAGNOSIS — C78.01 MALIGNANT NEOPLASM METASTATIC TO BOTH LUNGS (HCC): Primary | ICD-10-CM

## 2022-09-21 DIAGNOSIS — C78.02 MALIGNANT NEOPLASM METASTATIC TO BOTH LUNGS (HCC): Primary | ICD-10-CM

## 2022-09-21 PROCEDURE — 3017F COLORECTAL CA SCREEN DOC REV: CPT | Performed by: INTERNAL MEDICINE

## 2022-09-21 PROCEDURE — G8427 DOCREV CUR MEDS BY ELIG CLIN: HCPCS | Performed by: INTERNAL MEDICINE

## 2022-09-21 PROCEDURE — 1036F TOBACCO NON-USER: CPT | Performed by: INTERNAL MEDICINE

## 2022-09-21 PROCEDURE — G8420 CALC BMI NORM PARAMETERS: HCPCS | Performed by: INTERNAL MEDICINE

## 2022-09-21 PROCEDURE — 1111F DSCHRG MED/CURRENT MED MERGE: CPT | Performed by: INTERNAL MEDICINE

## 2022-09-21 PROCEDURE — 99214 OFFICE O/P EST MOD 30 MIN: CPT | Performed by: INTERNAL MEDICINE

## 2022-09-21 NOTE — PROGRESS NOTES
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  Evidence of multiple lung lesions concerning for metastatic disease, June/22, biopsy-proven to be metastatic disease  Cancer Staging  Sarcoma of left thigh [facility])  Staging form: Soft Tissue Sarcoma Of The Trunk And Extremities, AJCC 8th Edition  - Pathologic stage from 3/21/2022: Stage IIIB (ypT4, pN0, cM0, FNCLCC histologic grade: G3) - Signed by America Garcia MD on 4/20/2022  - Clinical: Stage IB (cT2, cN0, cM0, FNCLCC histologic grade: GX) - Signed by America Garcia MD on 4/20/2022      CURRENT THERAPY:         Status post neoadjuvant radiation therapy 50 Gy February 10, 2022  Status post left thigh sarcoma radical resection March 21, 2022  Upon diagnosis of metastatic disease, started on AIM chemotherapy  August/2022  BRIEF CASE HISTORY:      Ms. Srinivas Feliz is a very pleasant 61 y.o. female with history of multiple co morbidities as listed. Patient is referred for evaluation of further management of high-grade liposarcoma. The patient states that she had left thigh swelling around November 2021 which was getting larger so she was evaluated by orthopedics and she had biopsy which showed liposarcoma. The patient had no significant pain or stiffness. No other systemic symptoms. She had neoadjuvant treatment with radiation therapy in February 2022. Following recovery she had wide excision on March 21, 2022. Pathology results showed 16.5 cm mass with greater than 50% necrosis with close margin of less than 2 mm. Patient is healing well from her surgery and she is undergoing rehab. No complications. Patient seen for further management. No chest pain or shortness of breath. No weight loss or decreased appetite. No other complaints. .   After resection, the patient was seen by us and [facility] and while we are contemplating options for adjuvant therapy with reference to her is giving doxorubicin. Repeat staging studies showed multiple lung nodules concerning for metastatic disease, those nodules are bilateral and measuring 5 to 8 mm and the are difficult to biopsy. The patient was [facility] who concurred that the lesions are too small and too difficult location for biopsy. We decided to wait 6 weeks and repeat CT scan. CT scan unfortunately showed very rapid progression of disease with widespread metastatic disease to the lungs and liver  We discussed options with the patient and we decided to start with chemotherapy with AIM chemo, molecular testing was ordered  INTERIM HISTORY  The patient comes in today for a follow-up. She received 2 cycles of chemotherapy and tolerated that fairly well. Had minimal nausea and no vomiting. 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 We did a CT scan that showed very impressive response in the lung. However there was report about enlarging liver lesions. Looking at the CT scan and the previous CT which were both CT of the chest, I think is difference in technique rather than progression of disease. PAST MEDICAL HISTORY: has a past medical history of Cancer ([facility] [address] History of radiation therapy, and PONV (postoperative nausea and vomiting). PAST SURGICAL HISTORY: has a past surgical history that includes Leg Surgery (Left, ); Breast biopsy (2014);  section, low transverse; Colonoscopy (N/A, 2018); Colonoscopy (2018); Leg biopsy excision (Left, 2021); Leg biopsy excision (Left, 2022); tissue transfer (Left, 2022); Leg Surgery (Left, 2022); Skin graft (Left, 2022); skin biopsy; CT NEEDLE BIOPSY LUNG PERCUTANEOUS (2022); and IR PORT PLACEMENT > 5 YEARS (2022).      CURRENT MEDICATIONS:  has a current medication list which includes the following prescription(s): fiber, acetaminophen, ondansetron, docusate sodium, and omeprazole. ALLERGIES:  has No Known Allergies. FAMILY HISTORY: Negative for any hematological or oncological conditions. SOCIAL HISTORY:  reports that she has never smoked. She has never been exposed to tobacco smoke. She has never used smokeless tobacco. She reports current alcohol use. She reports that she does not use drugs. REVIEW OF SYSTEMS:     General: No weakness or fatigue. No unanticipated weight loss or decreased appetite. No fever or chills. Eyes: No blurred vision, eye pain or double vision. Ears: No hearing problems or drainage. No tinnitus. Throat: No sore throat, problems with swallowing or dysphagia. Respiratory: No cough, sputum or hemoptysis. No shortness of breath. No pleuritic chest pain. Cardiovascular: No chest pain, orthopnea or PND. No lower extremity edema. No palpitation. Gastrointestinal: No problems with swallowing. No abdominal pain or bloating. No nausea or vomiting. No diarrhea or constipation. No GI bleeding. Genitourinary: No dysuria, hematuria, frequency or urgency. Musculoskeletal: As above. Dermatologic: No skin rashes or pruritus. No skin lesions or discolorations. Psychiatric: No depression, anxiety, or stress or signs of schizophrenia. No change in mood or affect. Hematologic: No history of bleeding tendency. No bruises or ecchymosis. No history of clotting problems. Infectious disease: No fever, chills or frequent infections. Endocrine: No polydipsia or polyuria. No temperature intolerance. Neurologic: No headaches or dizziness. No weakness or numbness of the extremities. No changes in balance, coordination,  memory, mentation, behavior. Allergic/Immunologic: No nasal congestion or hives. No repeated infections. PHYSICAL EXAM:  The patient is not in acute distress.   Vital signs: Blood pressure 120/63, pulse 99, temperature 98.5 °F (36.9 °C), resp. rate 18, weight 137 lb (62.1 kg), last menstrual period 01/01/2010, not currently breastfeeding. General appearance - well appearing, not in pain or distress  Mental status - good mood, alert and oriented  Eyes - pupils equal and reactive, extraocular eye movements intact  Ears - bilateral TM's and external ear canals normal  Nose - normal and patent, no erythema, discharge or polyps  Mouth - mucous membranes moist, pharynx normal without lesions  Neck - supple, no significant adenopathy  Lymphatics - no palpable lymphadenopathy, no hepatosplenomegaly  Chest - clear to auscultation, no wheezes, rales or rhonchi, symmetric air entry  Heart - normal rate, regular rhythm, normal S1, S2, no murmurs, rubs, clicks or gallops  Abdomen - soft, nontender, nondistended, no masses or organomegaly  Neurological - alert, oriented, normal speech, no focal findings or movement disorder noted  Musculoskeletal - no joint tenderness, deformity or swelling  Extremities -left lower extremity s/p recent large lipoma excision with healing wound extending from the upper thigh to below the knee. No signs of infection.   Skin - normal coloration and turgor, no rashes, no suspicious skin lesions noted     Review of Diagnostic data:   Lab Results   Component Value Date    WBC 7.2 09/10/2022    HGB 8.8 (L) 09/10/2022    HCT 26.4 (L) 09/10/2022    MCV 88.0 09/10/2022     09/10/2022       Chemistry        Component Value Date/Time     09/10/2022 0544    K 4.1 09/10/2022 0544    K 4.2 04/01/2022 0740     (H) 09/10/2022 0544    CO2 23 09/10/2022 0544    BUN 10 09/10/2022 0544    CREATININE 0.53 09/10/2022 0544        Component Value Date/Time    CALCIUM 8.6 09/10/2022 0544    ALKPHOS 115 (H) 09/10/2022 0544    AST 10 09/10/2022 0544    ALT 24 09/10/2022 0544    BILITOT 0.2 (L) 09/10/2022 0544          CT CHEST W CONTRAST  Narrative: EXAMINATION:  CT OF THE CHEST WITH CONTRAST 9/20/2022 8:01 am    TECHNIQUE:  CT of the chest was performed with the administration of intravenous  contrast. Multiplanar reformatted images are provided for review. Automated  exposure control, iterative reconstruction, and/or weight based adjustment of  the mA/kV was utilized to reduce the radiation dose to as low as reasonably  achievable. COMPARISON:  08/10/2022    HISTORY:  ORDERING SYSTEM PROVIDED HISTORY: Sarcoma of left thigh [facility])  TECHNOLOGIST PROVIDED HISTORY:  STAT Creatinine as needed:->Yes  compare to previous for treatment response    FINDINGS:  Mediastinum:  Thyroid is grossly unremarkable within the limits of CT. Right  chest wall Port-A-Cath line with tip at the cavoatrial junction. Heart and  pericardium are unremarkable. No definite evidence of mediastinal  lymphadenopathy. No discrete hilar lymph nodes. The central airways are  clear. Lungs/pleura: There are numerous solid noncalcified rounded pulmonary nodules  and masses in both lungs consistent with metastatic disease. However, these  have significantly decreased in size compared to prior examination suggesting  response to therapy. For example:    *A right lower lobe perihilar mass now measures approximately 3.1 x 2.2 cm in  size on image 55 of series 2 previously measuring 4.5 x 4.2 cm in size. *Right lower lobe subpleural mass measures 43 x 25 mm previously measuring 56  x 29 mm  *Left upper lobe subpleural mass measures approximately 18 x 12 mm on image  61 previously measuring approximately 26 x 18 mm  *There is a right lower lobe basilar nodule measuring approximately 41 x 17  mm previously measuring approximately 42 x 21 mm in size. *Left lower lobe nodule measuring 17 mm on image 77 previously measured  approximately 20 mm. *Right upper lobe nodule that measures 10 mm previously measured 15 mm best  seen on image 34 series 2. *Few scattered small 3-4 mm pulmonary nodule seen on prior examination  resolved since prior.   Decreased size of the right pleural effusion. No pneumothorax. Azygous  fissure is noted. Upper Abdomen: Enlarging segment 4 left hepatic lobe mass measuring 27 x 33  mm in size previously measuring approximately 17 x 15 mm in size. Findings  suggest progression of hepatic metastatic disease. Soft Tissues/Bones: Left axillary soft tissue nodule/lymph node measures 17 x  18 mm and is stable to slightly increased since prior examination where it  measured approximately 16 x 15 mm. This best seen on image 15 of series 2. No other axillary lymphadenopathy is noted. No acute abnormality of the  visualized osseous structures. Impression: Overall mixed response to therapy. Numerous solid noncalcified pulmonary nodules and masses have decreased in  size and number compared to prior examination suggesting partial response to  therapy of metastatic disease from primary sarcoma. Slight enlargement of a left axillary soft tissue nodule/lymph node as well  as an enlarging segment 4 hepatic mass suggesting areas of progression of  disease. 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  Grade 3. Spread metastatic disease to the lung and liver, biopsy-proven  Started AIM chemotherapy in August/2022  PLAN:   The patient handled chemotherapy fairly well. No side effects symptoms other than fatigue grade 1. She is having very impressive response in her lung metastases   I reviewed the imaging with the patient. I think the difference in the liver lesion and axillary lymph node is minimal and might be related to the technique of the CT scan. Overall, I think she is responding. We will plan to continue on with chemotherapy. We will refer her back to [facility] looking for molecularly targeted therapy after 4 cycles.     In absence of clinical trial, I am plan to stop chemotherapy and switch her to pazopanib after 4 cycles   The patient verbalized understanding and agreement    Andrey Collet Al-Nsour,MD  Hematologist/Medical [facility] [place] hematology oncology physicians                                                          This note is created with the assistance of a speech recognition program.  While intending to generate a document that actually reflects the content of the visit, the document can still have some errors including those of syntax and sound a like substitutions which may escape proof reading. It such instances, actual meaning can be extrapolated by contextual diversion.

## 2022-09-22 NOTE — DISCHARGE SUMMARY
[facility]      Hematology Oncology Service    INPATIENT DISCHARGE SUMMARY      Patient Identification:  Christian Ta is a 61 y.o. female. :  1955  MRN: [id number]     Acct: [de-identified]   PCP: [facility], APRN - CNP  Admit Date:  2022  Discharge date and time: 2022 11:36 AM   Attending Provider: No att. providers found                                     [facility] Problem Lists:  Principal Problem:    Sarcoma of left thigh ([address]  Resolved Problems:    * No resolved hospital problems. *      HOSPITAL STAY     Brief Inpatient course:   Christian Ta is a 61 y.o. female who was admitted for the management of Sarcoma of left thigh [facility], admitted to the hospital for administration of chemotherapy for patient's soft tissue sarcoma. Patient hospitalization course was uncomplicated. Patient tolerated chemotherapy without any unexpected or severe side effects. After completion of chemotherapy patient was discharged home in a stable condition. She has follow-up appointment at the cancer center in [facility] for administration of growth factor support. Patient is also scheduled for port placement.     Procedures/ Significant Interventions:    Chemotherapy    Consults:     Consults:     Final Specialist Recommendations/Findings:   None      Any Hospital Acquired Infections: none    Discharge Functional Status:  stable    DISCHARGE PLAN     Disposition: home    Patient Instructions:   Discharge Medication List as of 2022 11:04 AM        CONTINUE these medications which have NOT CHANGED    Details   omeprazole (PRILOSEC OTC) 20 MG tablet Take 1 tablet by mouth daily, Disp-30 tablet, R-3Normal      acetaminophen (TYLENOL) 500 MG tablet Take 1,000 mg by mouth every 6 hours as needed for PainHistorical Med      ondansetron (ZOFRAN-ODT) 4 MG disintegrating tablet Take 1 tablet by mouth every 8 hours as needed for Nausea or Vomiting, Disp-30 tablet, R-0Normal      docusate sodium (COLACE) 100 MG capsule Take 100 mg by mouth as needed for ConstipationHistorical Med             Activity: activity as tolerated    Diet: regular diet    Follow-up:    No follow-up provider specified. Patient Instructions: Follow up with Oncology outpatient clinic for Neulasta administration on Monday 9/12/22  Follow up labs: none  Follow up imaging: none    Note that over 30 minutes was spent in preparing discharge papers, discussing discharge with patient, medication review, etc.      Ebony Paniagua MD, MD  Hematology Oncology Resident, PGY-3  [facility];  [place], [place]  9/22/2022, 9:39 AM

## 2022-09-28 ENCOUNTER — HOSPITAL ENCOUNTER (INPATIENT)
Age: 67
LOS: 4 days | Discharge: HOME | DRG: 847 | End: 2022-10-02
Attending: INTERNAL MEDICINE | Admitting: INTERNAL MEDICINE
Payer: COMMERCIAL

## 2022-09-28 DIAGNOSIS — C49.9 SARCOMA (HCC): ICD-10-CM

## 2022-09-28 DIAGNOSIS — C49.22 SARCOMA OF LEFT THIGH (HCC): ICD-10-CM

## 2022-09-28 DIAGNOSIS — C78.02 MALIGNANT NEOPLASM METASTATIC TO BOTH LUNGS (HCC): Primary | ICD-10-CM

## 2022-09-28 DIAGNOSIS — N94.10 DYSPAREUNIA IN FEMALE: ICD-10-CM

## 2022-09-28 DIAGNOSIS — Z45.2 ENCOUNTER FOR CARE RELATED TO VASCULAR ACCESS PORT: ICD-10-CM

## 2022-09-28 DIAGNOSIS — C49.9 LIPOSARCOMA (HCC): ICD-10-CM

## 2022-09-28 DIAGNOSIS — C49.9 SOFT TISSUE SARCOMA (HCC): ICD-10-CM

## 2022-09-28 DIAGNOSIS — C78.01 MALIGNANT NEOPLASM METASTATIC TO BOTH LUNGS (HCC): Primary | ICD-10-CM

## 2022-09-28 DIAGNOSIS — N95.2 POST-MENOPAUSE ATROPHIC VAGINITIS: ICD-10-CM

## 2022-09-28 DIAGNOSIS — R53.81 DEBILITY: ICD-10-CM

## 2022-09-28 DIAGNOSIS — M62.89 MASS OF MUSCLE OF LEFT LOWER EXTREMITY: ICD-10-CM

## 2022-09-28 DIAGNOSIS — D12.6 SERRATED ADENOMA OF COLON: ICD-10-CM

## 2022-09-28 LAB
ABSOLUTE EOS #: 0 K/UL (ref 0–0.4)
ABSOLUTE IMMATURE GRANULOCYTE: 0.7 K/UL (ref 0–0.3)
ABSOLUTE LYMPH #: 0.47 K/UL (ref 1–4.8)
ABSOLUTE MONO #: 0.59 K/UL (ref 0.1–0.8)
ALBUMIN SERPL-MCNC: 3.7 G/DL (ref 3.5–5.2)
ALBUMIN/GLOBULIN RATIO: 1.5 (ref 1–2.5)
ALP BLD-CCNC: 105 U/L (ref 35–104)
ALT SERPL-CCNC: 13 U/L (ref 5–33)
ANION GAP SERPL CALCULATED.3IONS-SCNC: 11 MMOL/L (ref 9–17)
AST SERPL-CCNC: 11 U/L
BASOPHILS # BLD: 0 % (ref 0–2)
BASOPHILS ABSOLUTE: 0 K/UL (ref 0–0.2)
BILIRUB SERPL-MCNC: 0.2 MG/DL (ref 0.3–1.2)
BILIRUBIN URINE: NEGATIVE
BUN BLDV-MCNC: 7 MG/DL (ref 8–23)
CALCIUM SERPL-MCNC: 9.1 MG/DL (ref 8.6–10.4)
CHLORIDE BLD-SCNC: 106 MMOL/L (ref 98–107)
CO2: 24 MMOL/L (ref 20–31)
COLOR: YELLOW
COMMENT UA: NORMAL
CREAT SERPL-MCNC: 0.53 MG/DL (ref 0.5–0.9)
EOSINOPHILS RELATIVE PERCENT: 0 % (ref 1–4)
GFR AFRICAN AMERICAN: >60 ML/MIN
GFR NON-AFRICAN AMERICAN: >60 ML/MIN
GFR SERPL CREATININE-BSD FRML MDRD: ABNORMAL ML/MIN/{1.73_M2}
GLUCOSE BLD-MCNC: 104 MG/DL (ref 70–99)
GLUCOSE URINE: NEGATIVE
HCT VFR BLD CALC: 26.3 % (ref 36.3–47.1)
HEMOGLOBIN: 8.5 G/DL (ref 11.9–15.1)
IMMATURE GRANULOCYTES: 6 %
KETONES, URINE: NEGATIVE
LEUKOCYTE ESTERASE, URINE: NEGATIVE
LYMPHOCYTES # BLD: 4 % (ref 24–44)
MCH RBC QN AUTO: 29.1 PG (ref 25.2–33.5)
MCHC RBC AUTO-ENTMCNC: 32.3 G/DL (ref 28.4–34.8)
MCV RBC AUTO: 90.1 FL (ref 82.6–102.9)
MONOCYTES # BLD: 5 % (ref 1–7)
MORPHOLOGY: ABNORMAL
MORPHOLOGY: ABNORMAL
NITRITE, URINE: NEGATIVE
NRBC AUTOMATED: 0.4 PER 100 WBC
PDW BLD-RTO: 17.1 % (ref 11.8–14.4)
PH UA: 5 (ref 5–8)
PLATELET # BLD: 368 K/UL (ref 138–453)
PMV BLD AUTO: 9.7 FL (ref 8.1–13.5)
POTASSIUM SERPL-SCNC: 4 MMOL/L (ref 3.7–5.3)
PROTEIN UA: NEGATIVE
RBC # BLD: 2.92 M/UL (ref 3.95–5.11)
SEG NEUTROPHILS: 85 % (ref 36–66)
SEGMENTED NEUTROPHILS ABSOLUTE COUNT: 9.94 K/UL (ref 1.8–7.7)
SODIUM BLD-SCNC: 141 MMOL/L (ref 135–144)
SPECIFIC GRAVITY UA: 1.02 (ref 1–1.03)
TOTAL PROTEIN: 6.2 G/DL (ref 6.4–8.3)
TURBIDITY: CLEAR
URINE HGB: NEGATIVE
UROBILINOGEN, URINE: NORMAL
WBC # BLD: 11.7 K/UL (ref 3.5–11.3)

## 2022-09-28 PROCEDURE — 85025 COMPLETE CBC W/AUTO DIFF WBC: CPT

## 2022-09-28 PROCEDURE — 99223 1ST HOSP IP/OBS HIGH 75: CPT | Performed by: INTERNAL MEDICINE

## 2022-09-28 PROCEDURE — 80053 COMPREHEN METABOLIC PANEL: CPT

## 2022-09-28 PROCEDURE — 6370000000 HC RX 637 (ALT 250 FOR IP): Performed by: INTERNAL MEDICINE

## 2022-09-28 PROCEDURE — 36415 COLL VENOUS BLD VENIPUNCTURE: CPT

## 2022-09-28 PROCEDURE — 2580000003 HC RX 258: Performed by: INTERNAL MEDICINE

## 2022-09-28 PROCEDURE — 81003 URINALYSIS AUTO W/O SCOPE: CPT

## 2022-09-28 PROCEDURE — 6360000002 HC RX W HCPCS: Performed by: INTERNAL MEDICINE

## 2022-09-28 PROCEDURE — 1200000000 HC SEMI PRIVATE

## 2022-09-28 RX ORDER — DIPHENHYDRAMINE HYDROCHLORIDE 50 MG/ML
50 INJECTION INTRAMUSCULAR; INTRAVENOUS
Status: CANCELLED | OUTPATIENT
Start: 2022-09-29

## 2022-09-28 RX ORDER — ONDANSETRON 2 MG/ML
8 INJECTION INTRAMUSCULAR; INTRAVENOUS
Status: CANCELLED | OUTPATIENT
Start: 2022-09-28

## 2022-09-28 RX ORDER — EPINEPHRINE 1 MG/ML
0.3 INJECTION, SOLUTION, CONCENTRATE INTRAVENOUS PRN
Status: CANCELLED | OUTPATIENT
Start: 2022-09-30

## 2022-09-28 RX ORDER — SODIUM CHLORIDE 9 MG/ML
INJECTION, SOLUTION INTRAVENOUS CONTINUOUS
Status: CANCELLED | OUTPATIENT
Start: 2022-10-01

## 2022-09-28 RX ORDER — ACETAMINOPHEN 650 MG/1
650 SUPPOSITORY RECTAL EVERY 6 HOURS PRN
Status: DISCONTINUED | OUTPATIENT
Start: 2022-09-28 | End: 2022-10-02 | Stop reason: HOSPADM

## 2022-09-28 RX ORDER — ONDANSETRON 4 MG/1
4 TABLET, ORALLY DISINTEGRATING ORAL EVERY 8 HOURS PRN
Status: DISCONTINUED | OUTPATIENT
Start: 2022-09-28 | End: 2022-10-02 | Stop reason: HOSPADM

## 2022-09-28 RX ORDER — EPINEPHRINE 1 MG/ML
0.3 INJECTION, SOLUTION, CONCENTRATE INTRAVENOUS PRN
Status: CANCELLED | OUTPATIENT
Start: 2022-09-28

## 2022-09-28 RX ORDER — SODIUM CHLORIDE 9 MG/ML
5-250 INJECTION, SOLUTION INTRAVENOUS PRN
Status: CANCELLED | OUTPATIENT
Start: 2022-09-28

## 2022-09-28 RX ORDER — HEPARIN SODIUM (PORCINE) LOCK FLUSH IV SOLN 100 UNIT/ML 100 UNIT/ML
500 SOLUTION INTRAVENOUS PRN
Status: CANCELLED | OUTPATIENT
Start: 2022-09-28

## 2022-09-28 RX ORDER — POLYETHYLENE GLYCOL 3350 17 G/17G
17 POWDER, FOR SOLUTION ORAL DAILY PRN
Status: DISCONTINUED | OUTPATIENT
Start: 2022-09-28 | End: 2022-10-02 | Stop reason: HOSPADM

## 2022-09-28 RX ORDER — EPINEPHRINE 1 MG/ML
0.3 INJECTION, SOLUTION, CONCENTRATE INTRAVENOUS PRN
Status: CANCELLED | OUTPATIENT
Start: 2022-09-29

## 2022-09-28 RX ORDER — ONDANSETRON 2 MG/ML
8 INJECTION INTRAMUSCULAR; INTRAVENOUS
Status: CANCELLED | OUTPATIENT
Start: 2022-09-29

## 2022-09-28 RX ORDER — SODIUM CHLORIDE 9 MG/ML
5-250 INJECTION, SOLUTION INTRAVENOUS PRN
Status: CANCELLED | OUTPATIENT
Start: 2022-09-29

## 2022-09-28 RX ORDER — DIPHENHYDRAMINE HYDROCHLORIDE 50 MG/ML
50 INJECTION INTRAMUSCULAR; INTRAVENOUS
Status: CANCELLED | OUTPATIENT
Start: 2022-09-30

## 2022-09-28 RX ORDER — SODIUM CHLORIDE 9 MG/ML
5-40 INJECTION INTRAVENOUS PRN
Status: CANCELLED | OUTPATIENT
Start: 2022-10-01

## 2022-09-28 RX ORDER — ALBUTEROL SULFATE 90 UG/1
4 AEROSOL, METERED RESPIRATORY (INHALATION) PRN
Status: CANCELLED | OUTPATIENT
Start: 2022-09-28

## 2022-09-28 RX ORDER — SODIUM CHLORIDE 9 MG/ML
5-40 INJECTION INTRAVENOUS PRN
Status: CANCELLED | OUTPATIENT
Start: 2022-09-28

## 2022-09-28 RX ORDER — ACETAMINOPHEN 325 MG/1
650 TABLET ORAL
Status: CANCELLED | OUTPATIENT
Start: 2022-10-01

## 2022-09-28 RX ORDER — SODIUM CHLORIDE 9 MG/ML
INJECTION, SOLUTION INTRAVENOUS CONTINUOUS
Status: DISCONTINUED | OUTPATIENT
Start: 2022-09-28 | End: 2022-10-02 | Stop reason: HOSPADM

## 2022-09-28 RX ORDER — ALBUTEROL SULFATE 90 UG/1
4 AEROSOL, METERED RESPIRATORY (INHALATION) PRN
Status: CANCELLED | OUTPATIENT
Start: 2022-09-29

## 2022-09-28 RX ORDER — SODIUM CHLORIDE 9 MG/ML
INJECTION, SOLUTION INTRAVENOUS CONTINUOUS
Status: CANCELLED | OUTPATIENT
Start: 2022-09-30

## 2022-09-28 RX ORDER — OLANZAPINE 5 MG/1
5 TABLET ORAL NIGHTLY
Status: COMPLETED | OUTPATIENT
Start: 2022-09-28 | End: 2022-10-01

## 2022-09-28 RX ORDER — 0.9 % SODIUM CHLORIDE 0.9 %
1000 INTRAVENOUS SOLUTION INTRAVENOUS ONCE
Status: COMPLETED | OUTPATIENT
Start: 2022-09-28 | End: 2022-09-28

## 2022-09-28 RX ORDER — ACETAMINOPHEN 325 MG/1
650 TABLET ORAL
Status: CANCELLED | OUTPATIENT
Start: 2022-09-28

## 2022-09-28 RX ORDER — ALBUTEROL SULFATE 90 UG/1
4 AEROSOL, METERED RESPIRATORY (INHALATION) PRN
Status: CANCELLED | OUTPATIENT
Start: 2022-10-01

## 2022-09-28 RX ORDER — HEPARIN SODIUM (PORCINE) LOCK FLUSH IV SOLN 100 UNIT/ML 100 UNIT/ML
500 SOLUTION INTRAVENOUS PRN
Status: CANCELLED | OUTPATIENT
Start: 2022-09-29

## 2022-09-28 RX ORDER — ACETAMINOPHEN 325 MG/1
650 TABLET ORAL
Status: CANCELLED | OUTPATIENT
Start: 2022-09-29

## 2022-09-28 RX ORDER — SODIUM CHLORIDE 0.9 % (FLUSH) 0.9 %
5-40 SYRINGE (ML) INJECTION PRN
Status: CANCELLED | OUTPATIENT
Start: 2022-09-29

## 2022-09-28 RX ORDER — ACETAMINOPHEN 325 MG/1
650 TABLET ORAL EVERY 6 HOURS PRN
Status: DISCONTINUED | OUTPATIENT
Start: 2022-09-28 | End: 2022-10-02 | Stop reason: HOSPADM

## 2022-09-28 RX ORDER — SODIUM CHLORIDE 9 MG/ML
INJECTION, SOLUTION INTRAVENOUS PRN
Status: DISCONTINUED | OUTPATIENT
Start: 2022-09-28 | End: 2022-10-02 | Stop reason: HOSPADM

## 2022-09-28 RX ORDER — DIPHENHYDRAMINE HYDROCHLORIDE 50 MG/ML
50 INJECTION INTRAMUSCULAR; INTRAVENOUS
Status: CANCELLED | OUTPATIENT
Start: 2022-09-28

## 2022-09-28 RX ORDER — SODIUM CHLORIDE 9 MG/ML
INJECTION, SOLUTION INTRAVENOUS CONTINUOUS
Status: CANCELLED | OUTPATIENT
Start: 2022-09-28

## 2022-09-28 RX ORDER — SODIUM CHLORIDE 0.9 % (FLUSH) 0.9 %
5-40 SYRINGE (ML) INJECTION EVERY 12 HOURS SCHEDULED
Status: DISCONTINUED | OUTPATIENT
Start: 2022-09-28 | End: 2022-10-02 | Stop reason: HOSPADM

## 2022-09-28 RX ORDER — ONDANSETRON 2 MG/ML
4 INJECTION INTRAMUSCULAR; INTRAVENOUS EVERY 6 HOURS PRN
Status: DISCONTINUED | OUTPATIENT
Start: 2022-09-28 | End: 2022-10-02 | Stop reason: HOSPADM

## 2022-09-28 RX ORDER — ONDANSETRON 2 MG/ML
8 INJECTION INTRAMUSCULAR; INTRAVENOUS
Status: CANCELLED | OUTPATIENT
Start: 2022-10-01

## 2022-09-28 RX ORDER — DIPHENHYDRAMINE HYDROCHLORIDE 50 MG/ML
50 INJECTION INTRAMUSCULAR; INTRAVENOUS
Status: CANCELLED | OUTPATIENT
Start: 2022-10-01

## 2022-09-28 RX ORDER — SODIUM CHLORIDE 9 MG/ML
INJECTION, SOLUTION INTRAVENOUS CONTINUOUS
Status: CANCELLED | OUTPATIENT
Start: 2022-09-29

## 2022-09-28 RX ORDER — SODIUM CHLORIDE 9 MG/ML
5-250 INJECTION, SOLUTION INTRAVENOUS PRN
Status: CANCELLED | OUTPATIENT
Start: 2022-10-01

## 2022-09-28 RX ORDER — SODIUM CHLORIDE 9 MG/ML
5-40 INJECTION INTRAVENOUS PRN
Status: CANCELLED | OUTPATIENT
Start: 2022-09-29

## 2022-09-28 RX ORDER — HEPARIN SODIUM (PORCINE) LOCK FLUSH IV SOLN 100 UNIT/ML 100 UNIT/ML
500 SOLUTION INTRAVENOUS PRN
Status: CANCELLED | OUTPATIENT
Start: 2022-10-01

## 2022-09-28 RX ORDER — 0.9 % SODIUM CHLORIDE 0.9 %
1000 INTRAVENOUS SOLUTION INTRAVENOUS ONCE
Status: DISCONTINUED | OUTPATIENT
Start: 2022-09-28 | End: 2022-09-28

## 2022-09-28 RX ORDER — ONDANSETRON 2 MG/ML
8 INJECTION INTRAMUSCULAR; INTRAVENOUS
Status: CANCELLED | OUTPATIENT
Start: 2022-09-30

## 2022-09-28 RX ORDER — PANTOPRAZOLE SODIUM 40 MG/1
40 TABLET, DELAYED RELEASE ORAL
Status: DISCONTINUED | OUTPATIENT
Start: 2022-09-29 | End: 2022-10-02 | Stop reason: HOSPADM

## 2022-09-28 RX ORDER — ENOXAPARIN SODIUM 100 MG/ML
40 INJECTION SUBCUTANEOUS DAILY
Status: DISCONTINUED | OUTPATIENT
Start: 2022-09-28 | End: 2022-10-02 | Stop reason: HOSPADM

## 2022-09-28 RX ORDER — ALBUTEROL SULFATE 90 UG/1
4 AEROSOL, METERED RESPIRATORY (INHALATION) PRN
Status: CANCELLED | OUTPATIENT
Start: 2022-09-30

## 2022-09-28 RX ORDER — SODIUM CHLORIDE 0.9 % (FLUSH) 0.9 %
5-40 SYRINGE (ML) INJECTION PRN
Status: DISCONTINUED | OUTPATIENT
Start: 2022-09-28 | End: 2022-10-02 | Stop reason: HOSPADM

## 2022-09-28 RX ORDER — SODIUM CHLORIDE 0.9 % (FLUSH) 0.9 %
5-40 SYRINGE (ML) INJECTION PRN
Status: CANCELLED | OUTPATIENT
Start: 2022-10-01

## 2022-09-28 RX ORDER — EPINEPHRINE 1 MG/ML
0.3 INJECTION, SOLUTION, CONCENTRATE INTRAVENOUS PRN
Status: CANCELLED | OUTPATIENT
Start: 2022-10-01

## 2022-09-28 RX ORDER — ACETAMINOPHEN 325 MG/1
650 TABLET ORAL
Status: CANCELLED | OUTPATIENT
Start: 2022-09-30

## 2022-09-28 RX ORDER — SODIUM CHLORIDE 0.9 % (FLUSH) 0.9 %
5-40 SYRINGE (ML) INJECTION PRN
Status: CANCELLED | OUTPATIENT
Start: 2022-09-28

## 2022-09-28 RX ORDER — ONDANSETRON 2 MG/ML
8 INJECTION INTRAMUSCULAR; INTRAVENOUS EVERY 8 HOURS
Status: COMPLETED | OUTPATIENT
Start: 2022-09-28 | End: 2022-09-29

## 2022-09-28 RX ADMIN — ONDANSETRON 8 MG: 2 INJECTION INTRAMUSCULAR; INTRAVENOUS at 22:44

## 2022-09-28 RX ADMIN — ENOXAPARIN SODIUM 40 MG: 100 INJECTION SUBCUTANEOUS at 14:07

## 2022-09-28 RX ADMIN — IFOSFAMIDE 4200 MG: 3 INJECTION, POWDER, FOR SOLUTION INTRAVENOUS at 15:40

## 2022-09-28 RX ADMIN — MESNA 835 MG: 100 INJECTION, SOLUTION INTRAVENOUS at 15:21

## 2022-09-28 RX ADMIN — FOSAPREPITANT 150 MG: 150 INJECTION, POWDER, LYOPHILIZED, FOR SOLUTION INTRAVENOUS at 14:46

## 2022-09-28 RX ADMIN — SODIUM CHLORIDE 42 MG: 9 INJECTION, SOLUTION INTRAVENOUS at 15:44

## 2022-09-28 RX ADMIN — SODIUM CHLORIDE: 9 INJECTION, SOLUTION INTRAVENOUS at 14:13

## 2022-09-28 RX ADMIN — SODIUM CHLORIDE 1000 ML: 9 INJECTION, SOLUTION INTRAVENOUS at 13:06

## 2022-09-28 RX ADMIN — MESNA 835 MG: 100 INJECTION, SOLUTION INTRAVENOUS at 20:05

## 2022-09-28 RX ADMIN — OLANZAPINE 5 MG: 5 TABLET, FILM COATED ORAL at 20:13

## 2022-09-28 RX ADMIN — POLYETHYLENE GLYCOL 3350 17 G: 17 POWDER, FOR SOLUTION ORAL at 20:13

## 2022-09-28 RX ADMIN — ONDANSETRON 8 MG: 2 INJECTION INTRAMUSCULAR; INTRAVENOUS at 14:08

## 2022-09-28 RX ADMIN — DEXAMETHASONE SODIUM PHOSPHATE 12 MG: 4 INJECTION, SOLUTION INTRAMUSCULAR; INTRAVENOUS at 14:15

## 2022-09-28 ASSESSMENT — PAIN SCALES - GENERAL: PAINLEVEL_OUTOF10: 0

## 2022-09-28 NOTE — CARE COORDINATION
09/28/22 1258   Service Assessment   Patient Orientation Alert and Oriented   Cognition Alert   History Provided By Patient   Primary Caregiver Self   Support Systems Spouse/Significant Other   Patient's Healthcare Decision Maker is: Named in [facility]   PCP Verified by CM Yes   Last Visit to PCP Within last year   Prior Functional Level Independent in ADLs/IADLs   Current Functional Level Independent in ADLs/IADLs   Can patient return to prior living arrangement Yes   Ability to make needs known: Good   Family able to assist with home care needs: Yes   Social/Functional History   Lives With Spouse   Type of Home Condo   Home Layout Two level   Home Access Stairs to enter with rails   Entrance Stairs - Number of Steps 3   Bathroom Shower/Tub Walk-in shower; Shower chair with back   [address] Cane;Walker, rolling   ADL Assistance Independent   Homemaking Assistance Independent   Homemaking Responsibilities Yes   Ambulation Assistance Independent   Transfer Assistance Independent   Active  Yes   Mode of Transportation Car   Occupation Retired   Discharge [address] Medications No   [address] Discharge   [address] Provided? No   Mode of Transport at Discharge   ()   Condition of Participation: Discharge Planning   The Plan for Transition of Care is related to the following treatment goals: heal from cancer as much as I can   Plan is to return home with  who will provide transportation.

## 2022-09-28 NOTE — H&P
Today's Date: 9/28/2022  Patient Name: [facility] [place]  Date of admission: 9/28/2022  9:28 AM  Patient's age: 61 y.o., 1955  Admission Dx: Soft tissue sarcoma (HCC) [C49.9]  Sarcoma ([facility] [address] [C49.9]      Attending  Physician: Stefania Morris MD    CHIEF COMPLAINT:  sarcoma, coming for chemotherapy     History Obtained From:  patient    HISTORY OF PRESENT ILLNESS:    Ms. Lance Chaves is a very pleasant 61 y.o. female with history of multiple co morbidities as listed. Patient is referred for evaluation of further management of high-grade liposarcoma. The patient states that she had left thigh swelling around November 2021 which was getting larger so she was evaluated by orthopedics and she had biopsy which showed liposarcoma. The patient had no significant pain or stiffness. No other systemic symptoms. She had neoadjuvant treatment with radiation therapy in February 2022. Following recovery she had wide excision on March 21, 2022. Pathology results showed 16.5 cm mass with greater than 50% necrosis with close margin of less than 2 mm. Patient is healing well from her surgery and she is undergoing rehab. No complications. Patient seen for further management. No chest pain or shortness of breath. No weight loss or decreased appetite. No other complaints. .  After resection, the patient was seen by us and then seen by Dr. S[facility] at [facility] and while we are contemplating options for adjuvant therapy with reference to her is giving doxorubicin. Repeat staging studies showed multiple lung nodules concerning for metastatic disease, those nodules are bilateral and measuring 5 to 8 mm and the are difficult to biopsy. The patient was sent to [facility] who concurred that the lesions are too small and too difficult location for biopsy. We decided to wait 6 weeks and repeat CT scan.   CT scan unfortunately showed very rapid progression of disease with widespread metastatic disease to the lungs and liver  We discussed options and decided to treat with AIM chemotherapy inpatient. Biopsy was done for molecular testing, and Echo was done prior to chemo   She had two cycles of chemo with good response, now coming for cycle 3   Past Medical History:   has a past medical history of Cancer ([address] History of radiation therapy, and PONV (postoperative nausea and vomiting). Past Surgical History:   has a past surgical history that includes Leg Surgery (Left, ); Breast biopsy (2014);  section, low transverse; Colonoscopy (N/A, 2018); Colonoscopy (2018); Leg biopsy excision (Left, 2021); Leg biopsy excision (Left, 2022); tissue transfer (Left, 2022); Leg Surgery (Left, 2022); Skin graft (Left, 2022); skin biopsy; CT NEEDLE BIOPSY LUNG PERCUTANEOUS (2022); and IR PORT PLACEMENT > 5 YEARS (2022). Medications:    Reviewed in Epic     Allergies:  Patient has no known allergies. Social History:   reports that she has never smoked. She has never been exposed to tobacco smoke. She has never used smokeless tobacco. She reports current alcohol use. She reports that she does not use drugs. Family History: family history includes Cancer in her maternal aunt, maternal aunt, and mother; Heart Attack in her maternal grandfather and maternal grandmother; Hypertension in her father; Other in her father and paternal grandmother; Thyroid Disease in her mother. REVIEW OF SYSTEMS:    Constitutional: No fever or chills.  No night sweats, no weight loss   Eyes: No eye discharge, double vision, or eye pain   HEENT: negative for sore mouth, sore throat, hoarseness and voice change   Respiratory: negative for cough , sputum, dyspnea, wheezing, hemoptysis, chest pain   Cardiovascular: negative for chest pain, dyspnea, palpitations, orthopnea, PND   Gastrointestinal: negative for nausea, vomiting, diarrhea, constipation, abdominal pain, Dysphagia, hematemesis and hematochezia   Genitourinary: negative for frequency, dysuria, nocturia, urinary incontinence, and hematuria   Integument: negative for rash, skin lesions, bruises.    Hematologic/Lymphatic: negative for easy bruising, bleeding, lymphadenopathy, or petechiae   Endocrine: negative for heat or cold intolerance,weight changes, change in bowel habits and hair loss   Musculoskeletal: negative for myalgias, arthralgias, pain, joint swelling,and bone pain   Neurological: negative for headaches, dizziness, seizures, weakness, numbness    PHYSICAL EXAM:      /61   Pulse 90   Temp 98.4 °F (36.9 °C) (Oral)   Resp 16   LMP 2010 (Approximate)   SpO2 98%    Temp (24hrs), Av.4 °F (36.9 °C), Min:98.4 °F (36.9 °C), Max:98.4 °F (36.9 °C)    General appearance - well appearing, no in pain or distress   Mental status - alert and cooperative   Eyes - pupils equal and reactive, extraocular eye movements intact   Ears - bilateral TM's and external ear canals normal   Mouth - mucous membranes moist, pharynx normal without lesions   Neck - supple, no significant adenopathy   Lymphatics - no palpable lymphadenopathy, no hepatosplenomegaly   Chest - clear to auscultation, no wheezes, rales or rhonchi, symmetric air entry   Heart - normal rate, regular rhythm, normal S1, S2, no murmurs  Abdomen - soft, nontender, nondistended, no masses or organomegaly   Neurological - alert, oriented, normal speech, no focal findings or movement disorder noted   Musculoskeletal - no joint tenderness, deformity or swelling   Extremities - peripheral pulses normal, no pedal edema, no clubbing or cyanosis   Skin - normal coloration and turgor, no rashes, no suspicious skin lesions noted ,    DATA:    Labs:   Lab Results   Component Value Date    WBC 11.7 (H) 2022    HGB 8.5 (L) 2022    HCT 26.3 (L) 2022    MCV 90.1 2022     2022     Lab Results   Component Value Date/Time     09/28/2022 10:32 AM    K 4.0 09/28/2022 10:32 AM    K 4.2 04/01/2022 07:40 AM     09/28/2022 10:32 AM    CO2 24 09/28/2022 10:32 AM    BUN 7 09/28/2022 10:32 AM    CREATININE 0.53 09/28/2022 10:32 AM    GLUCOSE 104 09/28/2022 10:32 AM    GLUCOSE 95 03/28/2012 07:25 AM    CALCIUM 9.1 09/28/2022 10:32 AM      Lab Results   Component Value Date    ALT 13 09/28/2022    AST 11 09/28/2022    ALKPHOS 105 (H) 09/28/2022    BILITOT 0.2 (L) 09/28/2022     Biopsy of lung met  -- Diagnosis --     LUNG, LEFT UPPER LOBE, CT-GUIDED NEEDLE BIOPSY:   -MALIGNANT NEOPLASM CONSISTENT WITH METASTATIC SARCOMA   -SEE COMMENT   IMAGING DATA:  Echocardiogram   Summary  Global left ventricular systolic function appears preserved with an  estimated ejection fraction of 60%. The left ventricular cavity size is within normal limits and the left  ventricular wall thickness is within normal limits. No definite specific wall motion abnormalities were identified. No significant valvular abnormalities. Diastolic left ventricular function is normal.     No prior studies were available for comparison. CT chest   Impression   1. Findings consistent with progressing pulmonary metastatic disease with   innumerable new and larger pulmonary nodules. 2.  A 1.8 cm liver lesion in the right hepatic lobe is identified, not   previously demonstrated. This could be further characterized with   contrast-enhanced MRI if clinically appropriate. 3.  New small right pleural effusion with findings suggestive of interstitial   edema. Primary Problem  Soft tissue sarcoma [facility])    Active Hospital Problems    Diagnosis Date Noted    Sarcoma [facility]) [C49.9] 09/28/2022     Priority: Medium    Soft tissue sarcoma ([address] [C49.9] 08/16/2022     Priority: Medium         IMPRESSION:   Metastatic sarcoma  Lung mets   Plan AIM chemotherapy     RECOMMENDATIONS:  Long discussion with the patient, I discussed treatment options.    Side effects and protocol were explained. Pt is agreeable to proceed with cycle 3   Continue home medications. Unchanged  PPI and stool softeners  DVT prophylaxis   Will watch labs closely   Watch for neurotoxicity   Check urine PH daily       Discussed with patient and Nurse. Thank you for asking us to see this patient.     Jamie Vyas MD  Hematologist/Medical Oncologist  Cell: [phone]

## 2022-09-29 LAB
ABSOLUTE EOS #: 0 K/UL (ref 0–0.4)
ABSOLUTE IMMATURE GRANULOCYTE: 0 K/UL (ref 0–0.3)
ABSOLUTE LYMPH #: 0.41 K/UL (ref 1–4.8)
ABSOLUTE MONO #: 0.27 K/UL (ref 0.1–0.8)
ALBUMIN SERPL-MCNC: 3.4 G/DL (ref 3.5–5.2)
ALBUMIN/GLOBULIN RATIO: 1.7 (ref 1–2.5)
ALP BLD-CCNC: 93 U/L (ref 35–104)
ALT SERPL-CCNC: 13 U/L (ref 5–33)
ANION GAP SERPL CALCULATED.3IONS-SCNC: 9 MMOL/L (ref 9–17)
AST SERPL-CCNC: 11 U/L
BASOPHILS # BLD: 0 % (ref 0–2)
BASOPHILS ABSOLUTE: 0 K/UL (ref 0–0.2)
BILIRUB SERPL-MCNC: 0.2 MG/DL (ref 0.3–1.2)
BILIRUBIN URINE: NEGATIVE
BUN BLDV-MCNC: 8 MG/DL (ref 8–23)
CALCIUM SERPL-MCNC: 8.7 MG/DL (ref 8.6–10.4)
CHLORIDE BLD-SCNC: 110 MMOL/L (ref 98–107)
CO2: 23 MMOL/L (ref 20–31)
COLOR: YELLOW
COMMENT UA: NORMAL
CREAT SERPL-MCNC: 0.6 MG/DL (ref 0.5–0.9)
EOSINOPHILS RELATIVE PERCENT: 0 % (ref 1–4)
GFR AFRICAN AMERICAN: >60 ML/MIN
GFR NON-AFRICAN AMERICAN: >60 ML/MIN
GFR SERPL CREATININE-BSD FRML MDRD: ABNORMAL ML/MIN/{1.73_M2}
GLUCOSE BLD-MCNC: 117 MG/DL (ref 70–99)
GLUCOSE URINE: NEGATIVE
HCT VFR BLD CALC: 24.6 % (ref 36.3–47.1)
HEMOGLOBIN: 7.8 G/DL (ref 11.9–15.1)
IMMATURE GRANULOCYTES: 0 %
KETONES, URINE: NEGATIVE
LEUKOCYTE ESTERASE, URINE: NEGATIVE
LYMPHOCYTES # BLD: 3 % (ref 24–44)
MCH RBC QN AUTO: 29.2 PG (ref 25.2–33.5)
MCHC RBC AUTO-ENTMCNC: 31.7 G/DL (ref 28.4–34.8)
MCV RBC AUTO: 92.1 FL (ref 82.6–102.9)
MONOCYTES # BLD: 2 % (ref 1–7)
MORPHOLOGY: ABNORMAL
NITRITE, URINE: NEGATIVE
NRBC AUTOMATED: 0.1 PER 100 WBC
PDW BLD-RTO: 17.4 % (ref 11.8–14.4)
PH UA: 5.5 (ref 5–8)
PLATELET # BLD: 348 K/UL (ref 138–453)
PMV BLD AUTO: 10 FL (ref 8.1–13.5)
POTASSIUM SERPL-SCNC: 3.8 MMOL/L (ref 3.7–5.3)
PROTEIN UA: NEGATIVE
RBC # BLD: 2.67 M/UL (ref 3.95–5.11)
SEG NEUTROPHILS: 95 % (ref 36–66)
SEGMENTED NEUTROPHILS ABSOLUTE COUNT: 12.92 K/UL (ref 1.8–7.7)
SODIUM BLD-SCNC: 142 MMOL/L (ref 135–144)
SPECIFIC GRAVITY UA: 1.02 (ref 1–1.03)
TOTAL PROTEIN: 5.4 G/DL (ref 6.4–8.3)
TURBIDITY: CLEAR
URINE HGB: NEGATIVE
UROBILINOGEN, URINE: NORMAL
WBC # BLD: 13.6 K/UL (ref 3.5–11.3)

## 2022-09-29 PROCEDURE — 85025 COMPLETE CBC W/AUTO DIFF WBC: CPT

## 2022-09-29 PROCEDURE — 81003 URINALYSIS AUTO W/O SCOPE: CPT

## 2022-09-29 PROCEDURE — 99232 SBSQ HOSP IP/OBS MODERATE 35: CPT | Performed by: INTERNAL MEDICINE

## 2022-09-29 PROCEDURE — 6360000002 HC RX W HCPCS: Performed by: INTERNAL MEDICINE

## 2022-09-29 PROCEDURE — 6370000000 HC RX 637 (ALT 250 FOR IP): Performed by: INTERNAL MEDICINE

## 2022-09-29 PROCEDURE — 36415 COLL VENOUS BLD VENIPUNCTURE: CPT

## 2022-09-29 PROCEDURE — 2580000003 HC RX 258: Performed by: INTERNAL MEDICINE

## 2022-09-29 PROCEDURE — 80053 COMPREHEN METABOLIC PANEL: CPT

## 2022-09-29 PROCEDURE — 1200000000 HC SEMI PRIVATE

## 2022-09-29 RX ORDER — ONDANSETRON 2 MG/ML
8 INJECTION INTRAMUSCULAR; INTRAVENOUS EVERY 8 HOURS
Status: COMPLETED | OUTPATIENT
Start: 2022-09-29 | End: 2022-09-30

## 2022-09-29 RX ORDER — 0.9 % SODIUM CHLORIDE 0.9 %
500 INTRAVENOUS SOLUTION INTRAVENOUS PRN
Status: DISCONTINUED | OUTPATIENT
Start: 2022-09-29 | End: 2022-10-02 | Stop reason: HOSPADM

## 2022-09-29 RX ORDER — 0.9 % SODIUM CHLORIDE 0.9 %
1000 INTRAVENOUS SOLUTION INTRAVENOUS ONCE
Status: COMPLETED | OUTPATIENT
Start: 2022-09-29 | End: 2022-09-29

## 2022-09-29 RX ORDER — 0.9 % SODIUM CHLORIDE 0.9 %
500 INTRAVENOUS SOLUTION INTRAVENOUS ONCE
Status: COMPLETED | OUTPATIENT
Start: 2022-09-29 | End: 2022-09-29

## 2022-09-29 RX ADMIN — MESNA 835 MG: 100 INJECTION, SOLUTION INTRAVENOUS at 15:17

## 2022-09-29 RX ADMIN — MESNA 835 MG: 100 INJECTION, SOLUTION INTRAVENOUS at 23:41

## 2022-09-29 RX ADMIN — PANTOPRAZOLE SODIUM 40 MG: 40 TABLET, DELAYED RELEASE ORAL at 06:21

## 2022-09-29 RX ADMIN — SODIUM CHLORIDE 42 MG: 9 INJECTION, SOLUTION INTRAVENOUS at 16:27

## 2022-09-29 RX ADMIN — SODIUM CHLORIDE: 9 INJECTION, SOLUTION INTRAVENOUS at 14:37

## 2022-09-29 RX ADMIN — ONDANSETRON 8 MG: 2 INJECTION INTRAMUSCULAR; INTRAVENOUS at 23:39

## 2022-09-29 RX ADMIN — ONDANSETRON 8 MG: 2 INJECTION INTRAMUSCULAR; INTRAVENOUS at 06:21

## 2022-09-29 RX ADMIN — MESNA 835 MG: 100 INJECTION, SOLUTION INTRAVENOUS at 00:03

## 2022-09-29 RX ADMIN — SODIUM CHLORIDE 500 ML: 9 INJECTION, SOLUTION INTRAVENOUS at 05:17

## 2022-09-29 RX ADMIN — MESNA 835 MG: 100 INJECTION, SOLUTION INTRAVENOUS at 19:29

## 2022-09-29 RX ADMIN — IFOSFAMIDE 4200 MG: 3 INJECTION, POWDER, FOR SOLUTION INTRAVENOUS at 15:45

## 2022-09-29 RX ADMIN — OLANZAPINE 5 MG: 5 TABLET, FILM COATED ORAL at 20:52

## 2022-09-29 RX ADMIN — ONDANSETRON 8 MG: 2 INJECTION INTRAMUSCULAR; INTRAVENOUS at 14:38

## 2022-09-29 RX ADMIN — SODIUM CHLORIDE 1000 ML: 9 INJECTION, SOLUTION INTRAVENOUS at 13:33

## 2022-09-29 RX ADMIN — DEXAMETHASONE SODIUM PHOSPHATE 12 MG: 4 INJECTION, SOLUTION INTRAMUSCULAR; INTRAVENOUS at 14:43

## 2022-09-29 RX ADMIN — ENOXAPARIN SODIUM 40 MG: 100 INJECTION SUBCUTANEOUS at 09:40

## 2022-09-29 ASSESSMENT — PAIN SCALES - GENERAL: PAINLEVEL_OUTOF10: 0

## 2022-09-29 NOTE — PLAN OF CARE
Problem: ABCDS Injury Assessment  Goal: Absence of physical injury  9/29/2022 0525 by Kendra Calderon RN  Outcome: Progressing  Flowsheets (Taken 9/28/2022 1930)  Absence of Physical Injury: Implement safety measures based on patient assessment  9/28/2022 1723 by Dominique Coon RN  Outcome: Progressing     Problem: Pain  Goal: Verbalizes/displays adequate comfort level or baseline comfort level  9/29/2022 0525 by Kendra Calderon RN  Outcome: Progressing  9/28/2022 1723 by Dominique Coon RN  Outcome: Progressing

## 2022-09-29 NOTE — CARE COORDINATION
09/29/22 1704   Readmission Assessment   Number of Days since last admission?  8-30 days   Previous Disposition Home with Family   Who is being Interviewed Patient   What was the patient's/caregiver's perception as to why they think they needed to return back to the hospital?   (planned return for IP chemo)

## 2022-09-30 LAB
ABSOLUTE EOS #: 0 K/UL (ref 0–0.4)
ABSOLUTE IMMATURE GRANULOCYTE: 0 K/UL (ref 0–0.3)
ABSOLUTE LYMPH #: 0.24 K/UL (ref 1–4.8)
ABSOLUTE MONO #: 0.47 K/UL (ref 0.1–0.8)
ALBUMIN SERPL-MCNC: 3.1 G/DL (ref 3.5–5.2)
ALBUMIN/GLOBULIN RATIO: 1.6 (ref 1–2.5)
ALP BLD-CCNC: 82 U/L (ref 35–104)
ALT SERPL-CCNC: 12 U/L (ref 5–33)
ANION GAP SERPL CALCULATED.3IONS-SCNC: 8 MMOL/L (ref 9–17)
AST SERPL-CCNC: 9 U/L
BASOPHILS # BLD: 0 % (ref 0–2)
BASOPHILS ABSOLUTE: 0 K/UL (ref 0–0.2)
BILIRUB SERPL-MCNC: 0.3 MG/DL (ref 0.3–1.2)
BILIRUBIN URINE: NEGATIVE
BUN BLDV-MCNC: 9 MG/DL (ref 8–23)
CALCIUM SERPL-MCNC: 8.6 MG/DL (ref 8.6–10.4)
CHLORIDE BLD-SCNC: 113 MMOL/L (ref 98–107)
CO2: 23 MMOL/L (ref 20–31)
COLOR: YELLOW
COMMENT UA: ABNORMAL
CREAT SERPL-MCNC: 0.57 MG/DL (ref 0.5–0.9)
EOSINOPHILS RELATIVE PERCENT: 0 % (ref 1–4)
GFR AFRICAN AMERICAN: >60 ML/MIN
GFR NON-AFRICAN AMERICAN: >60 ML/MIN
GFR SERPL CREATININE-BSD FRML MDRD: ABNORMAL ML/MIN/{1.73_M2}
GLUCOSE BLD-MCNC: 83 MG/DL (ref 70–99)
GLUCOSE URINE: NEGATIVE
HCT VFR BLD CALC: 23 % (ref 36.3–47.1)
HEMOGLOBIN: 7.2 G/DL (ref 11.9–15.1)
IMMATURE GRANULOCYTES: 0 %
KETONES, URINE: ABNORMAL
LEUKOCYTE ESTERASE, URINE: NEGATIVE
LYMPHOCYTES # BLD: 2 % (ref 24–44)
MCH RBC QN AUTO: 29.1 PG (ref 25.2–33.5)
MCHC RBC AUTO-ENTMCNC: 31.3 G/DL (ref 28.4–34.8)
MCV RBC AUTO: 93.1 FL (ref 82.6–102.9)
MONOCYTES # BLD: 4 % (ref 1–7)
MORPHOLOGY: ABNORMAL
NITRITE, URINE: NEGATIVE
NRBC AUTOMATED: 0.2 PER 100 WBC
PDW BLD-RTO: 18.4 % (ref 11.8–14.4)
PH UA: 5.5 (ref 5–8)
PLATELET # BLD: 277 K/UL (ref 138–453)
PMV BLD AUTO: 10.1 FL (ref 8.1–13.5)
POTASSIUM SERPL-SCNC: 3.9 MMOL/L (ref 3.7–5.3)
PROTEIN UA: NEGATIVE
RBC # BLD: 2.47 M/UL (ref 3.95–5.11)
SEG NEUTROPHILS: 94 % (ref 36–66)
SEGMENTED NEUTROPHILS ABSOLUTE COUNT: 11.09 K/UL (ref 1.8–7.7)
SODIUM BLD-SCNC: 144 MMOL/L (ref 135–144)
SPECIFIC GRAVITY UA: 1.01 (ref 1–1.03)
TOTAL PROTEIN: 5 G/DL (ref 6.4–8.3)
TURBIDITY: CLEAR
URINE HGB: NEGATIVE
UROBILINOGEN, URINE: NORMAL
WBC # BLD: 11.8 K/UL (ref 3.5–11.3)

## 2022-09-30 PROCEDURE — 6360000002 HC RX W HCPCS: Performed by: INTERNAL MEDICINE

## 2022-09-30 PROCEDURE — 85025 COMPLETE CBC W/AUTO DIFF WBC: CPT

## 2022-09-30 PROCEDURE — 6370000000 HC RX 637 (ALT 250 FOR IP): Performed by: INTERNAL MEDICINE

## 2022-09-30 PROCEDURE — 2580000003 HC RX 258: Performed by: INTERNAL MEDICINE

## 2022-09-30 PROCEDURE — 81003 URINALYSIS AUTO W/O SCOPE: CPT

## 2022-09-30 PROCEDURE — 99232 SBSQ HOSP IP/OBS MODERATE 35: CPT | Performed by: INTERNAL MEDICINE

## 2022-09-30 PROCEDURE — 1200000000 HC SEMI PRIVATE

## 2022-09-30 PROCEDURE — 80053 COMPREHEN METABOLIC PANEL: CPT

## 2022-09-30 PROCEDURE — 36415 COLL VENOUS BLD VENIPUNCTURE: CPT

## 2022-09-30 RX ORDER — ONDANSETRON 2 MG/ML
8 INJECTION INTRAMUSCULAR; INTRAVENOUS EVERY 8 HOURS
Status: COMPLETED | OUTPATIENT
Start: 2022-09-30 | End: 2022-10-01

## 2022-09-30 RX ORDER — SODIUM CHLORIDE 0.9 % (FLUSH) 0.9 %
5-40 SYRINGE (ML) INJECTION PRN
Status: ACTIVE | OUTPATIENT
Start: 2022-09-30 | End: 2022-10-01

## 2022-09-30 RX ORDER — SODIUM CHLORIDE 9 MG/ML
5-250 INJECTION, SOLUTION INTRAVENOUS PRN
Status: ACTIVE | OUTPATIENT
Start: 2022-09-30 | End: 2022-10-01

## 2022-09-30 RX ORDER — SODIUM CHLORIDE 9 MG/ML
5-40 INJECTION INTRAVENOUS PRN
Status: ACTIVE | OUTPATIENT
Start: 2022-09-30 | End: 2022-10-01

## 2022-09-30 RX ORDER — DOCUSATE SODIUM 100 MG/1
100 CAPSULE, LIQUID FILLED ORAL 2 TIMES DAILY PRN
Status: DISCONTINUED | OUTPATIENT
Start: 2022-09-30 | End: 2022-10-02 | Stop reason: HOSPADM

## 2022-09-30 RX ORDER — HEPARIN SODIUM (PORCINE) LOCK FLUSH IV SOLN 100 UNIT/ML 100 UNIT/ML
500 SOLUTION INTRAVENOUS PRN
Status: ACTIVE | OUTPATIENT
Start: 2022-09-30 | End: 2022-10-01

## 2022-09-30 RX ORDER — 0.9 % SODIUM CHLORIDE 0.9 %
1000 INTRAVENOUS SOLUTION INTRAVENOUS ONCE
Status: COMPLETED | OUTPATIENT
Start: 2022-09-30 | End: 2022-09-30

## 2022-09-30 RX ADMIN — SODIUM CHLORIDE: 9 INJECTION, SOLUTION INTRAVENOUS at 20:48

## 2022-09-30 RX ADMIN — SODIUM CHLORIDE 42 MG: 9 INJECTION, SOLUTION INTRAVENOUS at 20:53

## 2022-09-30 RX ADMIN — PANTOPRAZOLE SODIUM 40 MG: 40 TABLET, DELAYED RELEASE ORAL at 06:29

## 2022-09-30 RX ADMIN — IFOSFAMIDE 4200 MG: 3 INJECTION, POWDER, FOR SOLUTION INTRAVENOUS at 21:00

## 2022-09-30 RX ADMIN — ENOXAPARIN SODIUM 40 MG: 100 INJECTION SUBCUTANEOUS at 08:53

## 2022-09-30 RX ADMIN — DEXAMETHASONE SODIUM PHOSPHATE 12 MG: 4 INJECTION, SOLUTION INTRAMUSCULAR; INTRAVENOUS at 17:01

## 2022-09-30 RX ADMIN — DOCUSATE SODIUM 100 MG: 100 CAPSULE, LIQUID FILLED ORAL at 21:02

## 2022-09-30 RX ADMIN — OLANZAPINE 5 MG: 5 TABLET, FILM COATED ORAL at 21:02

## 2022-09-30 RX ADMIN — SODIUM CHLORIDE, PRESERVATIVE FREE 10 ML: 5 INJECTION INTRAVENOUS at 08:54

## 2022-09-30 RX ADMIN — SODIUM CHLORIDE 1000 ML: 9 INJECTION, SOLUTION INTRAVENOUS at 16:47

## 2022-09-30 RX ADMIN — ONDANSETRON 8 MG: 2 INJECTION INTRAMUSCULAR; INTRAVENOUS at 08:53

## 2022-09-30 RX ADMIN — MESNA 835 MG: 100 INJECTION, SOLUTION INTRAVENOUS at 17:41

## 2022-09-30 RX ADMIN — SODIUM CHLORIDE: 9 INJECTION, SOLUTION INTRAVENOUS at 09:47

## 2022-09-30 RX ADMIN — SODIUM CHLORIDE, PRESERVATIVE FREE 10 ML: 5 INJECTION INTRAVENOUS at 20:49

## 2022-09-30 RX ADMIN — ONDANSETRON 8 MG: 2 INJECTION INTRAMUSCULAR; INTRAVENOUS at 17:32

## 2022-09-30 ASSESSMENT — PAIN SCALES - GENERAL: PAINLEVEL_OUTOF10: 0

## 2022-09-30 NOTE — PLAN OF CARE
Problem: ABCDS Injury Assessment  Goal: Absence of physical injury  9/30/2022 0535 by Rafy Schuster RN  Outcome: Progressing  Flowsheets (Taken 9/29/2022 1922)  Absence of Physical Injury: Implement safety measures based on patient assessment  9/29/2022 1724 by Sukh Dunbar RN  Outcome: Progressing     Problem: Pain  Goal: Verbalizes/displays adequate comfort level or baseline comfort level  9/30/2022 0535 by Rafy Schuster RN  Outcome: Progressing  9/29/2022 1724 by Sukh Dunbar RN  Outcome: Progressing

## 2022-09-30 NOTE — PROGRESS NOTES
Pt went to get into recliner, went to put up the foot rest and her IV got caught in the chair and pulled. Pts friend came out of room to ask writer to come look at port. When writer went and looked at port, the needle was half way out. Writer then paused continuous doxorubicin that was running. [address] was called and told to wash with cold soap and water. In precautions we also called [address] they informed us to wash with cold water and soap. Pt is currently not experiencing any burning or pain at the site. It was washed with cold water and soap. Informed pt to call out as soon as there is pain at site or feel anything out of the normal.  Dr. Julius Chavez was called and informed he would be up to bedside. Dr. Julius Chavez came to bedside, evaluated pt. Writer told to re-insert new port and start back up chemotherapy infusion. Inserted port, got back blood return, flushed easily. Restarted chemotherapy infusion.

## 2022-09-30 NOTE — PLAN OF CARE
Problem: ABCDS Injury Assessment  Goal: Absence of physical injury  9/30/2022 1605 by Yoel Jaramillo RN  Outcome: Progressing  9/30/2022 0535 by Fabiana De La O RN  Outcome: Progressing  Flowsheets (Taken 9/29/2022 1922)  Absence of Physical Injury: Implement safety measures based on patient assessment     Problem: Pain  Goal: Verbalizes/displays adequate comfort level or baseline comfort level  9/30/2022 1605 by Yoel Jaramillo RN  Outcome: Progressing  9/30/2022 0535 by Fabiana De La O RN  Outcome: Progressing

## 2022-09-30 NOTE — PROGRESS NOTES
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 Afebrile      HISTORY OF PRESENT ILLNESS:    Ms. Kira Mo is a very pleasant 61 y.o. female with history of multiple co morbidities as listed. Patient is referred for evaluation of further management of high-grade liposarcoma. The patient states that she had left thigh swelling around November 2021 which was getting larger so she was evaluated by orthopedics and she had biopsy which showed liposarcoma. The patient had no significant pain or stiffness. No other systemic symptoms. She had neoadjuvant treatment with radiation therapy in February 2022. Following recovery she had wide excision on March 21, 2022. Pathology results showed 16.5 cm mass with greater than 50% necrosis with close margin of less than 2 mm. Patient is healing well from her surgery and she is undergoing rehab. No complications. Patient seen for further management. No chest pain or shortness of breath. No weight loss or decreased appetite. No other complaints. .  After resection, the patient was seen by us and then seen by Dr. Sixto Whitney at [facility] and while we are contemplating options for adjuvant therapy with reference to her is giving doxorubicin. Repeat staging studies showed multiple lung nodules concerning for metastatic disease, those nodules are bilateral and measuring 5 to 8 mm and the are difficult to biopsy. The patient was sent to [facility] clinic who concurred that the lesions are too small and too difficult location for biopsy. We decided to wait 6 weeks and repeat CT scan.   CT scan unfortunately showed very rapid progression of disease with widespread metastatic disease to the lungs and liver  We discussed options and decided to treat with AIM chemotherapy inpatient. Biopsy was done for molecular testing, and Echo was done prior to chemo   She had two cycles of chemo with good response, now coming for cycle 3   Past Medical History:   has a past medical history of Cancer ([address] History of radiation therapy, and PONV (postoperative nausea and vomiting). Past Surgical History:   has a past surgical history that includes Leg Surgery (Left, ); Breast biopsy (2014);  section, low transverse; Colonoscopy (N/A, 2018); Colonoscopy (2018); Leg biopsy excision (Left, 2021); Leg biopsy excision (Left, 2022); tissue transfer (Left, 2022); Leg Surgery (Left, 2022); Skin graft (Left, 2022); skin biopsy; CT NEEDLE BIOPSY LUNG PERCUTANEOUS (2022); and IR PORT PLACEMENT > 5 YEARS (2022). Medications:    Reviewed in Epic     Allergies:  Patient has no known allergies. Social History:   reports that she has never smoked. She has never been exposed to tobacco smoke. She has never used smokeless tobacco. She reports current alcohol use. She reports that she does not use drugs. Family History: family history includes Cancer in her maternal aunt, maternal aunt, and mother; Heart Attack in her maternal grandfather and maternal grandmother; Hypertension in her father; Other in her father and paternal grandmother; Thyroid Disease in her mother. REVIEW OF SYSTEMS:    Constitutional: No fever or chills.  No night sweats, no weight loss   Eyes: No eye discharge, double vision, or eye pain   HEENT: negative for sore mouth, sore throat, hoarseness and voice change   Respiratory: negative for cough , sputum, dyspnea, wheezing, hemoptysis, chest pain   Cardiovascular: negative for chest pain, dyspnea, palpitations, orthopnea, PND   Gastrointestinal: negative for nausea, vomiting, diarrhea, constipation, abdominal pain, Dysphagia, hematemesis and hematochezia   Genitourinary: negative for frequency, dysuria, nocturia, urinary incontinence, and hematuria   Integument: negative for rash, skin lesions, bruises.    Hematologic/Lymphatic: negative for easy bruising, bleeding, lymphadenopathy, or petechiae   Endocrine: negative for heat or cold intolerance,weight changes, change in bowel habits and hair loss   Musculoskeletal: negative for myalgias, arthralgias, pain, joint swelling,and bone pain   Neurological: negative for headaches, dizziness, seizures, weakness, numbness    PHYSICAL EXAM:      BP (!) 106/53   Pulse 75   Temp 97.8 °F (36.6 °C) (Oral)   Resp 16   Wt 141 lb 15.6 oz (64.4 kg)   LMP 2010 (Approximate)   SpO2 96%   BMI 25.15 kg/m²    Temp (24hrs), Av.6 °F (36.4 °C), Min:97.4 °F (36.3 °C), Max:97.8 °F (36.6 °C)    General appearance - well appearing, no in pain or distress   Mental status - alert and cooperative   Eyes - pupils equal and reactive, extraocular eye movements intact   Ears - bilateral TM's and external ear canals normal   Mouth - mucous membranes moist, pharynx normal without lesions   Neck - supple, no significant adenopathy   Lymphatics - no palpable lymphadenopathy, no hepatosplenomegaly   Chest - clear to auscultation, no wheezes, rales or rhonchi, symmetric air entry   Heart - normal rate, regular rhythm, normal S1, S2, no murmurs  Abdomen - soft, nontender, nondistended, no masses or organomegaly   Neurological - alert, oriented, normal speech, no focal findings or movement disorder noted   Musculoskeletal - no joint tenderness, deformity or swelling   Extremities - peripheral pulses normal, no pedal edema, no clubbing or cyanosis   Skin - normal coloration and turgor, no rashes, no suspicious skin lesions noted ,    DATA:    Labs:   Lab Results   Component Value Date    WBC 13.6 (H) 2022    HGB 7.8 (L) 2022    HCT 24.6 (L) 09/29/2022    MCV 92.1 09/29/2022     09/29/2022     Lab Results   Component Value Date/Time     09/29/2022 10:15 AM    K 3.8 09/29/2022 10:15 AM    K 4.2 04/01/2022 07:40 AM     09/29/2022 10:15 AM    CO2 23 09/29/2022 10:15 AM    BUN 8 09/29/2022 10:15 AM    CREATININE 0.60 09/29/2022 10:15 AM    GLUCOSE 117 09/29/2022 10:15 AM    GLUCOSE 95 03/28/2012 07:25 AM    CALCIUM 8.7 09/29/2022 10:15 AM      Lab Results   Component Value Date    ALT 13 09/29/2022    AST 11 09/29/2022    ALKPHOS 93 09/29/2022    BILITOT 0.2 (L) 09/29/2022     Biopsy of lung met  -- Diagnosis --     LUNG, LEFT UPPER LOBE, CT-GUIDED NEEDLE BIOPSY:   -MALIGNANT NEOPLASM CONSISTENT WITH METASTATIC SARCOMA   -SEE COMMENT   IMAGING DATA:  Echocardiogram   Summary  Global left ventricular systolic function appears preserved with an  estimated ejection fraction of 60%. The left ventricular cavity size is within normal limits and the left  ventricular wall thickness is within normal limits. No definite specific wall motion abnormalities were identified. No significant valvular abnormalities. Diastolic left ventricular function is normal.     No prior studies were available for comparison. CT chest   Impression   1. Findings consistent with progressing pulmonary metastatic disease with   innumerable new and larger pulmonary nodules. 2.  A 1.8 cm liver lesion in the right hepatic lobe is identified, not   previously demonstrated. This could be further characterized with   contrast-enhanced MRI if clinically appropriate. 3.  New small right pleural effusion with findings suggestive of interstitial   edema.      Primary Problem  Soft tissue sarcoma [facility])    Active Hospital Problems    Diagnosis Date Noted    Sarcoma [facility]) [C49.9] 09/28/2022     Priority: Medium    Soft tissue sarcoma ([address] [C49.9] 08/16/2022     Priority: Medium         IMPRESSION:   Metastatic sarcoma  Lung mets   Plan AIM chemotherapy RECOMMENDATIONS:  Long discussion with the patient, I discussed treatment options. Chemo well tolerated , continue per protocol     Continue home medications. Unchanged  PPI and stool softeners  DVT prophylaxis   Will watch labs closely   Watch for neurotoxicity         Discussed with patient and Nurse. Thank you for asking us to see this patient.     ZHOU POPE [facility] MD [place]  Hematologist/Medical Oncologist  Cell: [phone]

## 2022-09-30 NOTE — PROGRESS NOTES
staging studies showed multiple lung nodules concerning for metastatic disease, those nodules are bilateral and measuring 5 to 8 mm and the are difficult to biopsy. The patient was sent to [facility] clinic who concurred that the lesions are too small and too difficult location for biopsy. We decided to wait 6 weeks and repeat CT scan. CT scan unfortunately showed very rapid progression of disease with widespread metastatic disease to the lungs and liver  We discussed options and decided to treat with AIM chemotherapy inpatient. Biopsy was done for molecular testing, and Echo was done prior to chemo   She had two cycles of chemo with good response, now coming for cycle 3   Past Medical History:   has a past medical history of Cancer ([facility] [address] History of radiation therapy, and PONV (postoperative nausea and vomiting). Past Surgical History:   has a past surgical history that includes Leg Surgery (Left, ); Breast biopsy (2014);  section, low transverse; Colonoscopy (N/A, 2018); Colonoscopy (2018); Leg biopsy excision (Left, 2021); Leg biopsy excision (Left, 2022); tissue transfer (Left, 2022); Leg Surgery (Left, 2022); Skin graft (Left, 2022); skin biopsy; CT NEEDLE BIOPSY LUNG PERCUTANEOUS (2022); and IR PORT PLACEMENT > 5 YEARS (2022). Medications:    Reviewed in Epic     Allergies:  Patient has no known allergies. Social History:   reports that she has never smoked. She has never been exposed to tobacco smoke. She has never used smokeless tobacco. She reports current alcohol use. She reports that she does not use drugs. Family History: family history includes Cancer in her maternal aunt, maternal aunt, and mother; Heart Attack in her maternal grandfather and maternal grandmother; Hypertension in her father; Other in her father and paternal grandmother; Thyroid Disease in her mother.     REVIEW OF SYSTEMS:    Constitutional: No fever or chills. No night sweats, no weight loss   Eyes: No eye discharge, double vision, or eye pain   HEENT: negative for sore mouth, sore throat, hoarseness and voice change   Respiratory: negative for cough , sputum, dyspnea, wheezing, hemoptysis, chest pain   Cardiovascular: negative for chest pain, dyspnea, palpitations, orthopnea, PND   Gastrointestinal: negative for nausea, vomiting, diarrhea, constipation, abdominal pain, Dysphagia, hematemesis and hematochezia   Genitourinary: negative for frequency, dysuria, nocturia, urinary incontinence, and hematuria   Integument: negative for rash, skin lesions, bruises.    Hematologic/Lymphatic: negative for easy bruising, bleeding, lymphadenopathy, or petechiae   Endocrine: negative for heat or cold intolerance,weight changes, change in bowel habits and hair loss   Musculoskeletal: negative for myalgias, arthralgias, pain, joint swelling,and bone pain   Neurological: negative for headaches, dizziness, seizures, weakness, numbness    PHYSICAL EXAM:      BP (!) 102/59   Pulse 81   Temp 97.6 °F (36.4 °C) (Oral)   Resp 17   Wt 141 lb 15.6 oz (64.4 kg)   LMP 2010 (Approximate)   SpO2 97%   BMI 25.15 kg/m²    Temp (24hrs), Av.6 °F (36.4 °C), Min:97.4 °F (36.3 °C), Max:97.8 °F (36.6 °C)    General appearance - well appearing, no in pain or distress   Mental status - alert and cooperative   Eyes - pupils equal and reactive, extraocular eye movements intact   Ears - bilateral TM's and external ear canals normal   Mouth - mucous membranes moist, pharynx normal without lesions   Neck - supple, no significant adenopathy   Lymphatics - no palpable lymphadenopathy, no hepatosplenomegaly   Chest - clear to auscultation, no wheezes, rales or rhonchi, symmetric air entry   Heart - normal rate, regular rhythm, normal S1, S2, no murmurs  Abdomen - soft, nontender, nondistended, no masses or organomegaly   Neurological - alert, oriented, normal speech, no focal findings or movement disorder noted   Musculoskeletal - no joint tenderness, deformity or swelling   Extremities - peripheral pulses normal, no pedal edema, no clubbing or cyanosis   Skin - normal coloration and turgor, no rashes, no suspicious skin lesions noted ,    DATA:    Labs:   Lab Results   Component Value Date    WBC 11.8 (H) 09/30/2022    HGB 7.2 (L) 09/30/2022    HCT 23.0 (L) 09/30/2022    MCV 93.1 09/30/2022     09/30/2022     Lab Results   Component Value Date/Time     09/30/2022 06:38 AM    K 3.9 09/30/2022 06:38 AM    K 4.2 04/01/2022 07:40 AM     09/30/2022 06:38 AM    CO2 23 09/30/2022 06:38 AM    BUN 9 09/30/2022 06:38 AM    CREATININE 0.57 09/30/2022 06:38 AM    GLUCOSE 83 09/30/2022 06:38 AM    GLUCOSE 95 03/28/2012 07:25 AM    CALCIUM 8.6 09/30/2022 06:38 AM      Lab Results   Component Value Date    ALT 12 09/30/2022    AST 9 09/30/2022    ALKPHOS 82 09/30/2022    BILITOT 0.3 09/30/2022     Biopsy of lung met  -- Diagnosis --     LUNG, LEFT UPPER LOBE, CT-GUIDED NEEDLE BIOPSY:   -MALIGNANT NEOPLASM CONSISTENT WITH METASTATIC SARCOMA   -SEE COMMENT   IMAGING DATA:  Echocardiogram   Summary  Global left ventricular systolic function appears preserved with an  estimated ejection fraction of 60%. The left ventricular cavity size is within normal limits and the left  ventricular wall thickness is within normal limits. No definite specific wall motion abnormalities were identified. No significant valvular abnormalities. Diastolic left ventricular function is normal.     No prior studies were available for comparison. CT chest   Impression   1. Findings consistent with progressing pulmonary metastatic disease with   innumerable new and larger pulmonary nodules. 2.  A 1.8 cm liver lesion in the right hepatic lobe is identified, not   previously demonstrated. This could be further characterized with   contrast-enhanced MRI if clinically appropriate.        3.  New small right pleural effusion with findings suggestive of interstitial   edema. Primary Problem  Soft tissue sarcoma [facility])    Active Hospital Problems    Diagnosis Date Noted    Sarcoma [facility]) [C49.9] 09/28/2022     Priority: Medium    Soft tissue sarcoma ([address] [C49.9] 08/16/2022     Priority: Medium         IMPRESSION:   Metastatic sarcoma  Lung mets   Plan AIM chemotherapy     RECOMMENDATIONS:  Long discussion with the patient, I discussed treatment options. Chemo well tolerated , continue per protocol   The area of the heart inspected well, no evidence of extravasation. The port will be accessed and will resume chemotherapy    Continue home medications. Unchanged  PPI and stool softeners  DVT prophylaxis   Will watch labs closely   Watch for neurotoxicity         Discussed with patient and Nurse. Thank you for asking us to see this patient.     Brooklyn Vyas MD  Hematologist/Medical Oncologist  Cell: [phone]

## 2022-10-01 LAB
ABSOLUTE EOS #: 0 K/UL (ref 0–0.44)
ABSOLUTE IMMATURE GRANULOCYTE: 0.08 K/UL (ref 0–0.3)
ABSOLUTE LYMPH #: 0.3 K/UL (ref 1.1–3.7)
ABSOLUTE MONO #: 0.53 K/UL (ref 0.1–1.2)
ALBUMIN SERPL-MCNC: 3.1 G/DL (ref 3.5–5.2)
ALBUMIN/GLOBULIN RATIO: 1.5 (ref 1–2.5)
ALP BLD-CCNC: 84 U/L (ref 35–104)
ALT SERPL-CCNC: 16 U/L (ref 5–33)
ANION GAP SERPL CALCULATED.3IONS-SCNC: 8 MMOL/L (ref 9–17)
AST SERPL-CCNC: 16 U/L
BASOPHILS # BLD: 0 % (ref 0–2)
BASOPHILS ABSOLUTE: 0 K/UL (ref 0–0.2)
BILIRUB SERPL-MCNC: 0.3 MG/DL (ref 0.3–1.2)
BILIRUBIN URINE: NEGATIVE
BUN BLDV-MCNC: 10 MG/DL (ref 8–23)
CALCIUM SERPL-MCNC: 8.2 MG/DL (ref 8.6–10.4)
CHLORIDE BLD-SCNC: 112 MMOL/L (ref 98–107)
CO2: 22 MMOL/L (ref 20–31)
COLOR: YELLOW
COMMENT UA: ABNORMAL
CREAT SERPL-MCNC: 0.58 MG/DL (ref 0.5–0.9)
EOSINOPHILS RELATIVE PERCENT: 0 % (ref 1–4)
GFR AFRICAN AMERICAN: >60 ML/MIN
GFR NON-AFRICAN AMERICAN: >60 ML/MIN
GFR SERPL CREATININE-BSD FRML MDRD: ABNORMAL ML/MIN/{1.73_M2}
GLUCOSE BLD-MCNC: 83 MG/DL (ref 70–99)
GLUCOSE URINE: NEGATIVE
HCT VFR BLD CALC: 24.5 % (ref 36.3–47.1)
HEMOGLOBIN: 7.8 G/DL (ref 11.9–15.1)
IMMATURE GRANULOCYTES: 1 %
KETONES, URINE: ABNORMAL
LEUKOCYTE ESTERASE, URINE: NEGATIVE
LYMPHOCYTES # BLD: 4 % (ref 24–43)
MCH RBC QN AUTO: 29.4 PG (ref 25.2–33.5)
MCHC RBC AUTO-ENTMCNC: 31.8 G/DL (ref 28.4–34.8)
MCV RBC AUTO: 92.5 FL (ref 82.6–102.9)
MONOCYTES # BLD: 7 % (ref 3–12)
MORPHOLOGY: ABNORMAL
NITRITE, URINE: NEGATIVE
NRBC AUTOMATED: 0 PER 100 WBC
PDW BLD-RTO: 18.3 % (ref 11.8–14.4)
PH UA: 7.5 (ref 5–8)
PLATELET # BLD: 335 K/UL (ref 138–453)
PMV BLD AUTO: 10.7 FL (ref 8.1–13.5)
POTASSIUM SERPL-SCNC: 4.1 MMOL/L (ref 3.7–5.3)
PROTEIN UA: NEGATIVE
RBC # BLD: 2.65 M/UL (ref 3.95–5.11)
SEG NEUTROPHILS: 88 % (ref 36–65)
SEGMENTED NEUTROPHILS ABSOLUTE COUNT: 6.69 K/UL (ref 1.5–8.1)
SODIUM BLD-SCNC: 142 MMOL/L (ref 135–144)
SPECIFIC GRAVITY UA: 1.01 (ref 1–1.03)
TOTAL PROTEIN: 5.2 G/DL (ref 6.4–8.3)
TURBIDITY: CLEAR
URINE HGB: NEGATIVE
UROBILINOGEN, URINE: NORMAL
WBC # BLD: 7.6 K/UL (ref 3.5–11.3)

## 2022-10-01 PROCEDURE — 1200000000 HC SEMI PRIVATE

## 2022-10-01 PROCEDURE — 85025 COMPLETE CBC W/AUTO DIFF WBC: CPT

## 2022-10-01 PROCEDURE — 80053 COMPREHEN METABOLIC PANEL: CPT

## 2022-10-01 PROCEDURE — 6370000000 HC RX 637 (ALT 250 FOR IP): Performed by: INTERNAL MEDICINE

## 2022-10-01 PROCEDURE — 6360000002 HC RX W HCPCS: Performed by: INTERNAL MEDICINE

## 2022-10-01 PROCEDURE — 81003 URINALYSIS AUTO W/O SCOPE: CPT

## 2022-10-01 PROCEDURE — 36415 COLL VENOUS BLD VENIPUNCTURE: CPT

## 2022-10-01 PROCEDURE — 2580000003 HC RX 258: Performed by: INTERNAL MEDICINE

## 2022-10-01 PROCEDURE — 99232 SBSQ HOSP IP/OBS MODERATE 35: CPT | Performed by: INTERNAL MEDICINE

## 2022-10-01 RX ORDER — ONDANSETRON 2 MG/ML
8 INJECTION INTRAMUSCULAR; INTRAVENOUS EVERY 8 HOURS
Status: COMPLETED | OUTPATIENT
Start: 2022-10-01 | End: 2022-10-02

## 2022-10-01 RX ADMIN — ONDANSETRON 4 MG: 2 INJECTION INTRAMUSCULAR; INTRAVENOUS at 04:20

## 2022-10-01 RX ADMIN — ONDANSETRON 8 MG: 2 INJECTION INTRAMUSCULAR; INTRAVENOUS at 00:24

## 2022-10-01 RX ADMIN — ONDANSETRON 8 MG: 2 INJECTION INTRAMUSCULAR; INTRAVENOUS at 23:56

## 2022-10-01 RX ADMIN — MESNA 835 MG: 100 INJECTION, SOLUTION INTRAVENOUS at 00:27

## 2022-10-01 RX ADMIN — ENOXAPARIN SODIUM 40 MG: 100 INJECTION SUBCUTANEOUS at 08:46

## 2022-10-01 RX ADMIN — OLANZAPINE 5 MG: 5 TABLET, FILM COATED ORAL at 21:13

## 2022-10-01 RX ADMIN — MESNA 835 MG: 100 INJECTION, SOLUTION INTRAVENOUS at 16:10

## 2022-10-01 RX ADMIN — MESNA 835 MG: 100 INJECTION, SOLUTION INTRAVENOUS at 22:57

## 2022-10-01 RX ADMIN — SODIUM CHLORIDE, PRESERVATIVE FREE 10 ML: 5 INJECTION INTRAVENOUS at 23:57

## 2022-10-01 RX ADMIN — PANTOPRAZOLE SODIUM 40 MG: 40 TABLET, DELAYED RELEASE ORAL at 07:36

## 2022-10-01 RX ADMIN — SODIUM CHLORIDE: 9 INJECTION, SOLUTION INTRAVENOUS at 14:34

## 2022-10-01 RX ADMIN — SODIUM CHLORIDE, PRESERVATIVE FREE 10 ML: 5 INJECTION INTRAVENOUS at 06:54

## 2022-10-01 RX ADMIN — ONDANSETRON 8 MG: 2 INJECTION INTRAMUSCULAR; INTRAVENOUS at 06:53

## 2022-10-01 RX ADMIN — ONDANSETRON 8 MG: 2 INJECTION INTRAMUSCULAR; INTRAVENOUS at 15:32

## 2022-10-01 RX ADMIN — IFOSFAMIDE 4200 MG: 3 INJECTION, POWDER, FOR SOLUTION INTRAVENOUS at 16:35

## 2022-10-01 RX ADMIN — SODIUM CHLORIDE, PRESERVATIVE FREE 10 ML: 5 INJECTION INTRAVENOUS at 00:24

## 2022-10-01 RX ADMIN — MESNA 835 MG: 100 INJECTION, SOLUTION INTRAVENOUS at 04:23

## 2022-10-01 RX ADMIN — DOCUSATE SODIUM 100 MG: 100 CAPSULE, LIQUID FILLED ORAL at 21:43

## 2022-10-01 RX ADMIN — DOCUSATE SODIUM 100 MG: 100 CAPSULE, LIQUID FILLED ORAL at 08:46

## 2022-10-01 RX ADMIN — MESNA 835 MG: 100 INJECTION, SOLUTION INTRAVENOUS at 21:09

## 2022-10-01 RX ADMIN — DEXAMETHASONE SODIUM PHOSPHATE 12 MG: 4 INJECTION, SOLUTION INTRAMUSCULAR; INTRAVENOUS at 15:36

## 2022-10-01 RX ADMIN — SODIUM CHLORIDE, PRESERVATIVE FREE 10 ML: 5 INJECTION INTRAVENOUS at 04:20

## 2022-10-01 NOTE — PLAN OF CARE
Problem: ABCDS Injury Assessment  Goal: Absence of physical injury  10/1/2022 0549 by Alexandria Long RN  Outcome: Progressing  9/30/2022 1605 by Jennifer Walden RN  Outcome: Progressing     Problem: Pain  Goal: Verbalizes/displays adequate comfort level or baseline comfort level  10/1/2022 0549 by Alexandria Long RN  Outcome: Progressing  9/30/2022 1605 by Jennifer Walden RN  Outcome: Progressing

## 2022-10-01 NOTE — PROGRESS NOTES
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 Tolerating chemo fairly well. Yesterday that she has an incident where the needle from a port was dislodged because she accidentally pulled it while reclining. There was no extravasation in the subcutaneous area and the leak was on the skin    Patient tolerating the treatment well. States that she has some nausea which is treated by Zofran. Anticipating discharge tomorrow      HISTORY OF PRESENT ILLNESS:    Ms. Akbar Galvan is a very pleasant 61 y.o. female with history of multiple co morbidities as listed. Patient is referred for evaluation of further management of high-grade liposarcoma. The patient states that she had left thigh swelling around November 2021 which was getting larger so she was evaluated by orthopedics and she had biopsy which showed liposarcoma. The patient had no significant pain or stiffness. No other systemic symptoms. She had neoadjuvant treatment with radiation therapy in February 2022. Following recovery she had wide excision on March 21, 2022. Pathology results showed 16.5 cm mass with greater than 50% necrosis with close margin of less than 2 mm. Patient is healing well from her surgery and she is undergoing rehab. No complications. Patient seen for further management. No chest pain or shortness of breath. No weight loss or decreased appetite. No other complaints. .  After resection, the patient was seen by us and then seen by Dr. Pili Arriaga at [facility] and while we are contemplating options for adjuvant therapy with reference to her is giving doxorubicin.   Repeat staging studies showed multiple lung nodules concerning for metastatic disease, those nodules are bilateral and measuring 5 to 8 mm and the are difficult to biopsy. The patient was sent to [facility] clinic who concurred that the lesions are too small and too difficult location for biopsy. We decided to wait 6 weeks and repeat CT scan. CT scan unfortunately showed very rapid progression of disease with widespread metastatic disease to the lungs and liver  We discussed options and decided to treat with AIM chemotherapy inpatient. Biopsy was done for molecular testing, and Echo was done prior to chemo   She had two cycles of chemo with good response, now coming for cycle 3   Past Medical History:   has a past medical history of Cancer ([facility] [address] History of radiation therapy, and PONV (postoperative nausea and vomiting). Past Surgical History:   has a past surgical history that includes Leg Surgery (Left, ); Breast biopsy (2014);  section, low transverse; Colonoscopy (N/A, 2018); Colonoscopy (2018); Leg biopsy excision (Left, 2021); Leg biopsy excision (Left, 2022); tissue transfer (Left, 2022); Leg Surgery (Left, 2022); Skin graft (Left, 2022); skin biopsy; CT NEEDLE BIOPSY LUNG PERCUTANEOUS (2022); and IR PORT PLACEMENT > 5 YEARS (2022). Medications:    Reviewed in Epic     Allergies:  Patient has no known allergies. Social History:   reports that she has never smoked. She has never been exposed to tobacco smoke. She has never used smokeless tobacco. She reports current alcohol use. She reports that she does not use drugs. Family History: family history includes Cancer in her maternal aunt, maternal aunt, and mother; Heart Attack in her maternal grandfather and maternal grandmother; Hypertension in her father; Other in her father and paternal grandmother; Thyroid Disease in her mother. REVIEW OF SYSTEMS:    Constitutional: No fever or chills.  No night sweats, no weight loss   Eyes: No eye discharge, double vision, or eye pain   HEENT: negative for sore mouth, sore throat, hoarseness and voice change   Respiratory: negative for cough , sputum, dyspnea, wheezing, hemoptysis, chest pain   Cardiovascular: negative for chest pain, dyspnea, palpitations, orthopnea, PND   Gastrointestinal: negative for nausea, vomiting, diarrhea, constipation, abdominal pain, Dysphagia, hematemesis and hematochezia   Genitourinary: negative for frequency, dysuria, nocturia, urinary incontinence, and hematuria   Integument: negative for rash, skin lesions, bruises.    Hematologic/Lymphatic: negative for easy bruising, bleeding, lymphadenopathy, or petechiae   Endocrine: negative for heat or cold intolerance,weight changes, change in bowel habits and hair loss   Musculoskeletal: negative for myalgias, arthralgias, pain, joint swelling,and bone pain   Neurological: negative for headaches, dizziness, seizures, weakness, numbness    PHYSICAL EXAM:      BP 96/60   Pulse 62   Temp 97.8 °F (36.6 °C) (Oral)   Resp 16   Wt 141 lb 15.6 oz (64.4 kg)   LMP 2010 (Approximate)   SpO2 97%   BMI 25.15 kg/m²    Temp (24hrs), Av.9 °F (36.6 °C), Min:97.5 °F (36.4 °C), Max:98.2 °F (36.8 °C)    General appearance - well appearing, no in pain or distress   Mental status - alert and cooperative   Eyes - pupils equal and reactive, extraocular eye movements intact   Ears - bilateral TM's and external ear canals normal   Mouth - mucous membranes moist, pharynx normal without lesions   Neck - supple, no significant adenopathy   Lymphatics - no palpable lymphadenopathy, no hepatosplenomegaly   Chest - clear to auscultation, no wheezes, rales or rhonchi, symmetric air entry   Heart - normal rate, regular rhythm, normal S1, S2, no murmurs  Abdomen - soft, nontender, nondistended, no masses or organomegaly   Neurological - alert, oriented, normal speech, no focal findings or movement disorder noted   Musculoskeletal - no joint tenderness, deformity or swelling   Extremities - peripheral pulses normal, no pedal edema, no clubbing or cyanosis   Skin - normal coloration and turgor, no rashes, no suspicious skin lesions noted ,    DATA:    Labs:   Lab Results   Component Value Date    WBC 7.6 10/01/2022    HGB 7.8 (L) 10/01/2022    HCT 24.5 (L) 10/01/2022    MCV 92.5 10/01/2022     10/01/2022     Lab Results   Component Value Date/Time     10/01/2022 06:52 AM    K 4.1 10/01/2022 06:52 AM    K 4.2 04/01/2022 07:40 AM     10/01/2022 06:52 AM    CO2 22 10/01/2022 06:52 AM    BUN 10 10/01/2022 06:52 AM    CREATININE 0.58 10/01/2022 06:52 AM    GLUCOSE 83 10/01/2022 06:52 AM    GLUCOSE 95 03/28/2012 07:25 AM    CALCIUM 8.2 10/01/2022 06:52 AM      Lab Results   Component Value Date    ALT 16 10/01/2022    AST 16 10/01/2022    ALKPHOS 84 10/01/2022    BILITOT 0.3 10/01/2022     Biopsy of lung met  -- Diagnosis --     LUNG, LEFT UPPER LOBE, CT-GUIDED NEEDLE BIOPSY:   -MALIGNANT NEOPLASM CONSISTENT WITH METASTATIC SARCOMA   -SEE COMMENT   IMAGING DATA:  Echocardiogram   Summary  Global left ventricular systolic function appears preserved with an  estimated ejection fraction of 60%. The left ventricular cavity size is within normal limits and the left  ventricular wall thickness is within normal limits. No definite specific wall motion abnormalities were identified. No significant valvular abnormalities. Diastolic left ventricular function is normal.     No prior studies were available for comparison. CT chest   Impression   1. Findings consistent with progressing pulmonary metastatic disease with   innumerable new and larger pulmonary nodules. 2.  A 1.8 cm liver lesion in the right hepatic lobe is identified, not   previously demonstrated. This could be further characterized with   contrast-enhanced MRI if clinically appropriate.        3.  New small right pleural effusion with findings suggestive of interstitial   edema. Primary Problem  Soft tissue sarcoma [facility])    Active Hospital Problems    Diagnosis Date Noted    Sarcoma [facility]) [C49.9] 09/28/2022     Priority: Medium    Soft tissue sarcoma ([address] [C49.9] 08/16/2022     Priority: Medium         IMPRESSION:   Metastatic sarcoma  Lung mets   Plan AIM chemotherapy     RECOMMENDATIONS:  Long discussion with the patient, I discussed treatment options. Chemo well tolerated , continue per protocol   The area of the heart inspected well, no evidence of extravasation. The port will be accessed and will resume chemotherapy. Zofran for nausea  Continue home medications. Unchanged  PPI and stool softeners  DVT prophylaxis   Will watch labs closely   Watch for neurotoxicity       Anticipating discharge tomorrow a.m. Discussed with patient and Nurse. Thank you for asking us to see this patient. Andry Guan MD  Internal Medicine Resident, PGY-3  [facility]; [place], [place]  10/1/2022,3:27 PM    Attending Physician Statement   I have discussed the care of [facility], including pertinent history and exam findings with the resident. I have reviewed the key elements of all parts of the encounter with the resident. I have seen and examined the patient with the resident. I agree with the assessment and plan and status of the problem list as documented.                                [address] [facility]

## 2022-10-01 NOTE — CARE COORDINATION
Cm spoke with patient at bedside to discuss transitional planning. Patient's plan remains to return home with her at discharge. Patient verbalizes having no additional transitional needs at this time.

## 2022-10-01 NOTE — PLAN OF CARE
Problem: ABCDS Injury Assessment  Goal: Absence of physical injury  10/1/2022 1812 by Yanna Chung RN  Outcome: Progressing  10/1/2022 0549 by Robert Rodriguez RN  Outcome: Progressing     Problem: Pain  Goal: Verbalizes/displays adequate comfort level or baseline comfort level  10/1/2022 1812 by Yanna Chung RN  Outcome: Progressing  10/1/2022 0549 by Robert Rodriguez RN  Outcome: Progressing

## 2022-10-02 VITALS
DIASTOLIC BLOOD PRESSURE: 53 MMHG | HEART RATE: 66 BPM | TEMPERATURE: 98.1 F | RESPIRATION RATE: 16 BRPM | WEIGHT: 141.98 LBS | OXYGEN SATURATION: 97 % | SYSTOLIC BLOOD PRESSURE: 103 MMHG | BODY MASS INDEX: 25.15 KG/M2

## 2022-10-02 LAB
ABSOLUTE EOS #: 0 K/UL (ref 0–0.4)
ABSOLUTE IMMATURE GRANULOCYTE: 0 K/UL (ref 0–0.3)
ABSOLUTE LYMPH #: 0.39 K/UL (ref 1–4.8)
ABSOLUTE MONO #: 0.11 K/UL (ref 0.1–0.8)
ALBUMIN SERPL-MCNC: 2.9 G/DL (ref 3.5–5.2)
ALBUMIN/GLOBULIN RATIO: 1.5 (ref 1–2.5)
ALP BLD-CCNC: 79 U/L (ref 35–104)
ALT SERPL-CCNC: 32 U/L (ref 5–33)
ANION GAP SERPL CALCULATED.3IONS-SCNC: 6 MMOL/L (ref 9–17)
AST SERPL-CCNC: 29 U/L
BASOPHILS # BLD: 0 % (ref 0–2)
BASOPHILS ABSOLUTE: 0 K/UL (ref 0–0.2)
BILIRUB SERPL-MCNC: 0.3 MG/DL (ref 0.3–1.2)
BUN BLDV-MCNC: 10 MG/DL (ref 8–23)
CALCIUM SERPL-MCNC: 8.2 MG/DL (ref 8.6–10.4)
CHLORIDE BLD-SCNC: 113 MMOL/L (ref 98–107)
CO2: 19 MMOL/L (ref 20–31)
CREAT SERPL-MCNC: 0.58 MG/DL (ref 0.5–0.9)
EOSINOPHILS RELATIVE PERCENT: 0 % (ref 1–4)
GFR AFRICAN AMERICAN: >60 ML/MIN
GFR NON-AFRICAN AMERICAN: >60 ML/MIN
GFR SERPL CREATININE-BSD FRML MDRD: ABNORMAL ML/MIN/{1.73_M2}
GLUCOSE BLD-MCNC: 85 MG/DL (ref 70–99)
HCT VFR BLD CALC: 21.7 % (ref 36.3–47.1)
HEMOGLOBIN: 7.2 G/DL (ref 11.9–15.1)
IMMATURE GRANULOCYTES: 0 %
LYMPHOCYTES # BLD: 7 % (ref 24–44)
MCH RBC QN AUTO: 30.1 PG (ref 25.2–33.5)
MCHC RBC AUTO-ENTMCNC: 33.2 G/DL (ref 28.4–34.8)
MCV RBC AUTO: 90.8 FL (ref 82.6–102.9)
MONOCYTES # BLD: 2 % (ref 1–7)
MORPHOLOGY: ABNORMAL
MORPHOLOGY: ABNORMAL
NRBC AUTOMATED: 0 PER 100 WBC
PDW BLD-RTO: 17.9 % (ref 11.8–14.4)
PLATELET # BLD: 278 K/UL (ref 138–453)
PMV BLD AUTO: 10.1 FL (ref 8.1–13.5)
POTASSIUM SERPL-SCNC: 3.7 MMOL/L (ref 3.7–5.3)
RBC # BLD: 2.39 M/UL (ref 3.95–5.11)
SEG NEUTROPHILS: 91 % (ref 36–66)
SEGMENTED NEUTROPHILS ABSOLUTE COUNT: 5.1 K/UL (ref 1.8–7.7)
SODIUM BLD-SCNC: 138 MMOL/L (ref 135–144)
TOTAL PROTEIN: 4.8 G/DL (ref 6.4–8.3)
WBC # BLD: 5.6 K/UL (ref 3.5–11.3)

## 2022-10-02 PROCEDURE — 36415 COLL VENOUS BLD VENIPUNCTURE: CPT

## 2022-10-02 PROCEDURE — 6370000000 HC RX 637 (ALT 250 FOR IP): Performed by: INTERNAL MEDICINE

## 2022-10-02 PROCEDURE — 99239 HOSP IP/OBS DSCHRG MGMT >30: CPT | Performed by: INTERNAL MEDICINE

## 2022-10-02 PROCEDURE — 6360000002 HC RX W HCPCS: Performed by: INTERNAL MEDICINE

## 2022-10-02 PROCEDURE — 80053 COMPREHEN METABOLIC PANEL: CPT

## 2022-10-02 PROCEDURE — 2580000003 HC RX 258: Performed by: INTERNAL MEDICINE

## 2022-10-02 PROCEDURE — 85025 COMPLETE CBC W/AUTO DIFF WBC: CPT

## 2022-10-02 RX ORDER — HEPARIN SODIUM (PORCINE) LOCK FLUSH IV SOLN 100 UNIT/ML 100 UNIT/ML
500 SOLUTION INTRAVENOUS ONCE
Status: COMPLETED | OUTPATIENT
Start: 2022-10-02 | End: 2022-10-02

## 2022-10-02 RX ADMIN — ONDANSETRON 8 MG: 2 INJECTION INTRAMUSCULAR; INTRAVENOUS at 08:28

## 2022-10-02 RX ADMIN — Medication 500 UNITS: at 12:06

## 2022-10-02 RX ADMIN — PANTOPRAZOLE SODIUM 40 MG: 40 TABLET, DELAYED RELEASE ORAL at 05:59

## 2022-10-02 RX ADMIN — SODIUM CHLORIDE: 9 INJECTION, SOLUTION INTRAVENOUS at 04:30

## 2022-10-02 RX ADMIN — ENOXAPARIN SODIUM 40 MG: 100 INJECTION SUBCUTANEOUS at 08:29

## 2022-10-02 NOTE — PLAN OF CARE
Problem: ABCDS Injury Assessment  Goal: Absence of physical injury  10/2/2022 0351 by Lamar Horowitz RN  Outcome: Progressing  Flowsheets (Taken 10/1/2022 1945)  Absence of Physical Injury: Implement safety measures based on patient assessment  10/1/2022 1812 by Lanre Webb RN  Outcome: Progressing     Problem: Pain  Goal: Verbalizes/displays adequate comfort level or baseline comfort level  10/2/2022 0351 by Lamar Horowitz RN  Outcome: Progressing  10/1/2022 1812 by Lanre Webb RN  Outcome: Progressing

## 2022-10-02 NOTE — DISCHARGE SUMMARY
[address]      Hematology Oncology Service    INPATIENT DISCHARGE SUMMARY      Patient Identification:  Teja Mcclellan is a 61 y.o. female. :  1955  MRN: [id number]     Acct: [de-identified]   PCP: ANAIS Zaldivar CNP  Admit Date:  2022  Discharge date and time: No discharge date for patient encounter. Attending Provider: Edith Palumbo MD                                     [address]  Problem Lists:  Principal Problem:    Soft tissue sarcoma [facility])  Active Problems:    Sarcoma ([address]  Resolved Problems:    * No resolved hospital problems. *      HOSPITAL STAY     Brief Inpatient course:   Teja Mcclellan is a 61 y.o. female who was admitted for the management of Soft tissue sarcoma [facility]), admitted to the hospital for administration of chemotherapy for patient's soft tissue sarcoma. Patient hospitalization course was uncomplicated. Patient tolerated chemotherapy without any unexpected or severe side effects. After completion of chemotherapy patient was discharged home in a stable condition. She has follow-up appointment at the cancer center in [facility] for administration of growth factor support. Patient is also scheduled for port placement.     Procedures/ Significant Interventions:    Chemotherapy    Consults:     Consults:     Final Specialist Recommendations/Findings:   None      Any Hospital Acquired Infections: none    Discharge Functional Status:  stable    DISCHARGE PLAN     Disposition: home    Patient Instructions:   Discharge Medication List as of 2022 11:04 AM        CONTINUE these medications which have NOT CHANGED    Details   omeprazole (PRILOSEC OTC) 20 MG tablet Take 1 tablet by mouth daily, Disp-30 tablet, R-3Normal      acetaminophen (TYLENOL) 500 MG tablet Take 1,000 mg by mouth every 6 hours as needed for PainHistorical Med      ondansetron (ZOFRAN-ODT) 4 MG disintegrating tablet Take 1 tablet by mouth every 8 hours as needed for Nausea or Vomiting, Disp-30 tablet, R-0Normal      docusate sodium (COLACE) 100 MG capsule Take 100 mg by mouth as needed for ConstipationHistorical Med             Activity: activity as tolerated    Diet: regular diet    Follow-up:    No follow-up provider specified. Patient Instructions: Follow up with Oncology outpatient clinic for Neulasta administration on Monday 10/3/22  Follow up labs: CBC and possible blood transfusion 10/5/22  Follow up imaging: none    Note that over 30 minutes was spent in preparing discharge papers, discussing discharge with patient, medication review, etc.                              [facility]                            This note is created with the assistance of a speech recognition program.  While intending to generate a document that actually reflects the content of the visit, the document can still have some errors including those of syntax and sound a like substitutions which may escape proof reading. It such instances, actual meaning can be extrapolated by contextual diversion.     10/2/2022, 11:49 AM

## 2022-10-02 NOTE — PLAN OF CARE
Problem: ABCDS Injury Assessment  Goal: Absence of physical injury  10/2/2022 1220 by Sariah Navarro RN  Outcome: Adequate for Discharge  10/2/2022 0351 by Noel Ibanez RN  Outcome: Progressing  Flowsheets (Taken 10/1/2022 1945)  Absence of Physical Injury: Implement safety measures based on patient assessment

## 2022-10-03 ENCOUNTER — HOSPITAL ENCOUNTER (OUTPATIENT)
Dept: INFUSION THERAPY | Age: 67
Discharge: HOME | End: 2022-10-03
Payer: COMMERCIAL

## 2022-10-03 DIAGNOSIS — C49.22 SARCOMA OF LEFT THIGH (HCC): ICD-10-CM

## 2022-10-03 DIAGNOSIS — C78.01 MALIGNANT NEOPLASM METASTATIC TO BOTH LUNGS (HCC): Primary | ICD-10-CM

## 2022-10-03 DIAGNOSIS — C78.02 MALIGNANT NEOPLASM METASTATIC TO BOTH LUNGS (HCC): Primary | ICD-10-CM

## 2022-10-03 PROCEDURE — 96372 THER/PROPH/DIAG INJ SC/IM: CPT

## 2022-10-03 PROCEDURE — 6360000002 HC RX W HCPCS: Performed by: INTERNAL MEDICINE

## 2022-10-03 RX ADMIN — PEGFILGRASTIM-CBQV 6 MG: 6 INJECTION, SOLUTION SUBCUTANEOUS at 14:43

## 2022-10-03 NOTE — PLAN OF CARE
Problem: Pain  Goal: Verbalizes/displays adequate comfort level or baseline comfort level  Outcome: Adequate for Discharge     Problem: Safety - Adult  Goal: Free from fall injury  Outcome: Adequate for Discharge

## 2022-10-05 ENCOUNTER — HOSPITAL ENCOUNTER (OUTPATIENT)
Age: 67
Discharge: HOME | End: 2022-10-05
Payer: COMMERCIAL

## 2022-10-05 DIAGNOSIS — C49.22 SARCOMA OF LEFT THIGH (HCC): ICD-10-CM

## 2022-10-05 DIAGNOSIS — N94.10 DYSPAREUNIA IN FEMALE: ICD-10-CM

## 2022-10-05 DIAGNOSIS — R53.81 DEBILITY: ICD-10-CM

## 2022-10-05 DIAGNOSIS — C49.9 SOFT TISSUE SARCOMA (HCC): ICD-10-CM

## 2022-10-05 DIAGNOSIS — D12.6 SERRATED ADENOMA OF COLON: ICD-10-CM

## 2022-10-05 DIAGNOSIS — C78.01 MALIGNANT NEOPLASM METASTATIC TO BOTH LUNGS (HCC): ICD-10-CM

## 2022-10-05 DIAGNOSIS — M62.89 MASS OF MUSCLE OF LEFT LOWER EXTREMITY: ICD-10-CM

## 2022-10-05 DIAGNOSIS — Z45.2 ENCOUNTER FOR CARE RELATED TO VASCULAR ACCESS PORT: ICD-10-CM

## 2022-10-05 DIAGNOSIS — C49.9 LIPOSARCOMA (HCC): ICD-10-CM

## 2022-10-05 DIAGNOSIS — C78.02 MALIGNANT NEOPLASM METASTATIC TO BOTH LUNGS (HCC): ICD-10-CM

## 2022-10-05 DIAGNOSIS — N95.2 POST-MENOPAUSE ATROPHIC VAGINITIS: ICD-10-CM

## 2022-10-05 DIAGNOSIS — C49.9 SARCOMA (HCC): ICD-10-CM

## 2022-10-05 LAB
ABSOLUTE EOS #: 0 K/UL (ref 0–0.44)
ABSOLUTE IMMATURE GRANULOCYTE: 0 K/UL (ref 0–0.3)
ABSOLUTE LYMPH #: 0.08 K/UL (ref 1.1–3.7)
ABSOLUTE MONO #: 0.04 K/UL (ref 0.1–1.2)
ALBUMIN SERPL-MCNC: 4.4 G/DL (ref 3.5–5.2)
ALBUMIN/GLOBULIN RATIO: 1.8 (ref 1–2.5)
ALP BLD-CCNC: 143 U/L (ref 35–104)
ALT SERPL-CCNC: 92 U/L (ref 5–33)
ANION GAP SERPL CALCULATED.3IONS-SCNC: 11 MMOL/L (ref 9–17)
AST SERPL-CCNC: 52 U/L
BASOPHILS # BLD: 1 % (ref 0–2)
BASOPHILS ABSOLUTE: 0.04 K/UL (ref 0–0.2)
BILIRUB SERPL-MCNC: 0.9 MG/DL (ref 0.3–1.2)
BILIRUBIN URINE: ABNORMAL
BUN BLDV-MCNC: 18 MG/DL (ref 8–23)
BUN/CREAT BLD: 27 (ref 9–20)
CALCIUM SERPL-MCNC: 9 MG/DL (ref 8.6–10.4)
CASTS UA: NORMAL /LPF
CASTS UA: NORMAL /LPF
CHLORIDE BLD-SCNC: 100 MMOL/L (ref 98–107)
CO2: 23 MMOL/L (ref 20–31)
COLOR: YELLOW
CREAT SERPL-MCNC: 0.67 MG/DL (ref 0.5–0.9)
EOSINOPHILS RELATIVE PERCENT: 0 % (ref 1–4)
EPITHELIAL CELLS UA: NORMAL /HPF (ref 0–25)
GFR SERPL CREATININE-BSD FRML MDRD: >60 ML/MIN/1.73M2
GLUCOSE BLD-MCNC: 161 MG/DL (ref 70–99)
GLUCOSE URINE: ABNORMAL
HCT VFR BLD CALC: 28.2 % (ref 36.3–47.1)
HEMOGLOBIN: 9.4 G/DL (ref 11.9–15.1)
IMMATURE GRANULOCYTES: 0 %
KETONES, URINE: ABNORMAL
LEUKOCYTE ESTERASE, URINE: NEGATIVE
LYMPHOCYTES # BLD: 2 % (ref 24–43)
MCH RBC QN AUTO: 30 PG (ref 25.2–33.5)
MCHC RBC AUTO-ENTMCNC: 33.3 G/DL (ref 28.4–34.8)
MCV RBC AUTO: 90.1 FL (ref 82.6–102.9)
MONOCYTES # BLD: 1 % (ref 3–12)
MORPHOLOGY: NORMAL
NITRITE, URINE: NEGATIVE
NRBC AUTOMATED: 0 PER 100 WBC
PDW BLD-RTO: 16.3 % (ref 11.8–14.4)
PH UA: 5.5 (ref 5–9)
PLATELET # BLD: 241 K/UL (ref 138–453)
PMV BLD AUTO: 10 FL (ref 8.1–13.5)
POTASSIUM SERPL-SCNC: 3 MMOL/L (ref 3.7–5.3)
PROTEIN UA: ABNORMAL
RBC # BLD: 3.13 M/UL (ref 3.95–5.11)
RBC UA: NORMAL /HPF (ref 0–2)
RENAL EPITHELIAL, UA: NORMAL /HPF
SEG NEUTROPHILS: 96 % (ref 36–65)
SEGMENTED NEUTROPHILS ABSOLUTE COUNT: 3.74 K/UL (ref 1.5–8.1)
SODIUM BLD-SCNC: 134 MMOL/L (ref 135–144)
SPECIFIC GRAVITY UA: >1.03 (ref 1.01–1.02)
TOTAL PROTEIN: 6.8 G/DL (ref 6.4–8.3)
TURBIDITY: CLEAR
URINE HGB: ABNORMAL
UROBILINOGEN, URINE: NORMAL
WBC # BLD: 3.9 K/UL (ref 3.5–11.3)
WBC UA: NORMAL /HPF (ref 0–5)

## 2022-10-05 PROCEDURE — 85025 COMPLETE CBC W/AUTO DIFF WBC: CPT

## 2022-10-05 PROCEDURE — 36415 COLL VENOUS BLD VENIPUNCTURE: CPT

## 2022-10-05 PROCEDURE — 80053 COMPREHEN METABOLIC PANEL: CPT

## 2022-10-05 PROCEDURE — 81001 URINALYSIS AUTO W/SCOPE: CPT

## 2022-10-05 RX ORDER — POTASSIUM CHLORIDE 20 MEQ/1
20 TABLET, EXTENDED RELEASE ORAL DAILY
Qty: 30 TABLET | Refills: 1 | Status: SHIPPED | OUTPATIENT
Start: 2022-10-05

## 2022-10-07 ENCOUNTER — TELEPHONE (OUTPATIENT)
Dept: INFUSION THERAPY | Facility: MEDICAL CENTER | Age: 67
End: 2022-10-07

## 2022-10-07 NOTE — TELEPHONE ENCOUNTER
OSVALDO MARLEY RN CASE MANAGER CALLING TO \"TRY T CERTIFY MOLECULAR TESTING CALL OR FAX (1 [phone]) CLINICAL DOCUMENTATION, SO CAN AUTHORIZE ORDERED TESTING. PT SEEN IN [place], FIRST MESSAGE SENT ON 10/6/22, ABOVE. UNABLE TO CALL TO NOTIFY STAFF, SO MESSAGE SENT PER POOL ON 10/7/22. ANOTHER CALL ON 10/7/22. STATES PT RECENTLY INPT CHEMO \"NEED MOLECULARS FROM TUMOR AND TREATMENT THAT WANTS AUTHORIZED\" (P) [phone] [employer] .

## 2022-10-19 ENCOUNTER — TELEMEDICINE (OUTPATIENT)
Dept: ONCOLOGY | Age: 67
End: 2022-10-19
Payer: COMMERCIAL

## 2022-10-19 DIAGNOSIS — C78.01 MALIGNANT NEOPLASM METASTATIC TO BOTH LUNGS (HCC): Primary | ICD-10-CM

## 2022-10-19 DIAGNOSIS — C78.02 MALIGNANT NEOPLASM METASTATIC TO BOTH LUNGS (HCC): Primary | ICD-10-CM

## 2022-10-19 PROCEDURE — 3017F COLORECTAL CA SCREEN DOC REV: CPT | Performed by: INTERNAL MEDICINE

## 2022-10-19 PROCEDURE — G8428 CUR MEDS NOT DOCUMENT: HCPCS | Performed by: INTERNAL MEDICINE

## 2022-10-19 PROCEDURE — 1111F DSCHRG MED/CURRENT MED MERGE: CPT | Performed by: INTERNAL MEDICINE

## 2022-10-19 PROCEDURE — 99214 OFFICE O/P EST MOD 30 MIN: CPT | Performed by: INTERNAL MEDICINE

## 2022-10-20 ENCOUNTER — NURSE ONLY (OUTPATIENT)
Dept: PRIMARY CARE CLINIC | Age: 67
End: 2022-10-20
Payer: COMMERCIAL

## 2022-10-20 ENCOUNTER — TELEPHONE (OUTPATIENT)
Dept: ONCOLOGY | Age: 67
End: 2022-10-20

## 2022-10-20 VITALS — TEMPERATURE: 98.7 F

## 2022-10-20 DIAGNOSIS — Z23 NEED FOR INFLUENZA VACCINATION: Primary | ICD-10-CM

## 2022-10-20 PROCEDURE — 90674 CCIIV4 VAC NO PRSV 0.5 ML IM: CPT | Performed by: NURSE PRACTITIONER

## 2022-10-20 PROCEDURE — 90471 IMMUNIZATION ADMIN: CPT | Performed by: NURSE PRACTITIONER

## 2022-10-20 ASSESSMENT — PATIENT HEALTH QUESTIONNAIRE - PHQ9
2. FEELING DOWN, DEPRESSED OR HOPELESS: 0
SUM OF ALL RESPONSES TO PHQ QUESTIONS 1-9: 0
SUM OF ALL RESPONSES TO PHQ QUESTIONS 1-9: 0
SUM OF ALL RESPONSES TO PHQ9 QUESTIONS 1 & 2: 0
1. LITTLE INTEREST OR PLEASURE IN DOING THINGS: 0
SUM OF ALL RESPONSES TO PHQ QUESTIONS 1-9: 0
SUM OF ALL RESPONSES TO PHQ QUESTIONS 1-9: 0

## 2022-10-20 NOTE — PROGRESS NOTES
After obtaining consent, and per orders of Dr. Walker Soliman CNP, injection of Flu given in Right deltoid by Rashaun Moore LPN. Patient instructed to remain in clinic for 20 minutes afterwards, and to report any adverse reaction to me immediately. Vaccine Information Sheet, \"Influenza - Inactivated\"  given to Wan Raymundo, or parent/legal guardian of  Wan Raymundo and verbalized understanding. Patient responses:    Have you ever had a reaction to a flu vaccine? No  Are you able to eat eggs without adverse effects? Yes  Do you have any current illness? No  Have you ever had [place] Syndrome? No    Flu vaccine given per order. Please see immunization tab.

## 2022-10-20 NOTE — PROGRESS NOTES
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  Evidence of multiple lung lesions concerning for metastatic disease, June/22, biopsy-proven to be metastatic disease  Cancer Staging  Sarcoma of left thigh [facility])  Staging form: Soft Tissue Sarcoma Of The Trunk And Extremities, AJCC 8th Edition  - Pathologic stage from 3/21/2022: Stage IIIB (ypT4, pN0, cM0, FNCLCC histologic grade: G3) - Signed by Chuy Clay MD on 4/20/2022  - Clinical: Stage IB (cT2, cN0, cM0, FNCLCC histologic grade: GX) - Signed by Chuy Clay MD on 4/20/2022      CURRENT THERAPY:         Status post neoadjuvant radiation therapy 50 Gy February 10, 2022  Status post left thigh sarcoma radical resection March 21, 2022  Upon diagnosis of metastatic disease, started on AIM chemotherapy  August/2022  BRIEF CASE HISTORY:      Ms. Uriel Diaz is a very pleasant 61 y.o. female with history of multiple co morbidities as listed. Patient is referred for evaluation of further management of high-grade liposarcoma. The patient states that she had left thigh swelling around November 2021 which was getting larger so she was evaluated by orthopedics and she had biopsy which showed liposarcoma. The patient had no significant pain or stiffness. No other systemic symptoms. She had neoadjuvant treatment with radiation therapy in February 2022. Following recovery she had wide excision on March 21, 2022. Pathology results showed 16.5 cm mass with greater than 50% necrosis with close margin of less than 2 mm. Patient is healing well from her surgery and she is undergoing rehab. No complications. Patient seen for further management. No chest pain or shortness of breath. No weight loss or decreased appetite. No other complaints. .   After resection, the patient was seen by us and then seen by Dr. Rea Primes at [facility] and while we are contemplating options for adjuvant therapy with reference to her is giving doxorubicin. Repeat staging studies showed multiple lung nodules concerning for metastatic disease, those nodules are bilateral and measuring 5 to 8 mm and the are difficult to biopsy. The patient was sent to [facility] clinic who concurred that the lesions are too small and too difficult location for biopsy. We decided to wait 6 weeks and repeat CT scan. CT scan unfortunately showed very rapid progression of disease with widespread metastatic disease to the lungs and liver  We discussed options with the patient and we decided to start with chemotherapy with AIM chemo, molecular testing was ordered  INTERIM HISTORY  This is a virtual visit. The patient received 3 cycles of chemotherapy. The third cycle was complicated with severe fatigue and lack of appetite. She had no nausea or vomiting. She was seen at [facility] clinic we decided to stop therapy after 3 cycles and just switch her to pazopanib. PAST MEDICAL HISTORY: has a past medical history of Cancer ([address] History of radiation therapy, and PONV (postoperative nausea and vomiting). PAST SURGICAL HISTORY: has a past surgical history that includes Leg Surgery (Left, ); Breast biopsy (2014);  section, low transverse; Colonoscopy (N/A, 2018); Colonoscopy (2018); Leg biopsy excision (Left, 2021); Leg biopsy excision (Left, 2022); tissue transfer (Left, 2022); Leg Surgery (Left, 2022); Skin graft (Left, 2022); skin biopsy; CT NEEDLE BIOPSY LUNG PERCUTANEOUS (2022); and IR PORT PLACEMENT > 5 YEARS (2022). CURRENT MEDICATIONS:  has a current medication list which includes the following prescription(s): potassium chloride, fiber, ondansetron, and docusate sodium. ALLERGIES:  has No Known Allergies. FAMILY HISTORY: Negative for any hematological or oncological conditions. SOCIAL HISTORY:  reports that she has never smoked. She has never been exposed to tobacco smoke. She has never used smokeless tobacco. She reports current alcohol use. She reports that she does not use drugs. REVIEW OF SYSTEMS:     General: Fatigued and extreme discomfort have completely subsided. Eyes: No blurred vision, eye pain or double vision. Ears: No hearing problems or drainage. No tinnitus. Throat: No sore throat, problems with swallowing or dysphagia. Respiratory: No cough, sputum or hemoptysis. No shortness of breath. No pleuritic chest pain. Cardiovascular: No chest pain, orthopnea or PND. No lower extremity edema. No palpitation. Gastrointestinal: No problems with swallowing. No abdominal pain or bloating. No nausea or vomiting. No diarrhea or constipation. No GI bleeding. Genitourinary: No dysuria, hematuria, frequency or urgency. Musculoskeletal: As above. Dermatologic: No skin rashes or pruritus. No skin lesions or discolorations. Psychiatric: No depression, anxiety, or stress or signs of schizophrenia. No change in mood or affect. Hematologic: No history of bleeding tendency. No bruises or ecchymosis. No history of clotting problems. Infectious disease: No fever, chills or frequent infections. Endocrine: No polydipsia or polyuria. No temperature intolerance. Neurologic: No headaches or dizziness. No weakness or numbness of the extremities. No changes in balance, coordination,  memory, mentation, behavior. Allergic/Immunologic: No nasal congestion or hives. No repeated infections. PHYSICAL EXAM:  The patient is not in acute distress. Vital signs: Last menstrual period 01/01/2010, not currently breastfeeding.      PHYSICAL EXAMINATION:    Vital Signs: (As obtained by patient/caregiver or practitioner observation)    Blood pressure-  Heart rate-    Respiratory rate-    Temperature-  Pulse oximetry-     Constitutional: [x] Appears well-developed and well-nourished [x] No apparent distress      [] Abnormal-   Mental status  [x] Alert and awake  [x] Oriented to person/place/time [x]Able to follow commands      Eyes:  EOM    [x]  Normal  [] Abnormal-  Sclera  [x]  Normal  [] Abnormal -         Discharge [x]  None visible  [] Abnormal -    HENT:   [x] Normocephalic, atraumatic.   [] Abnormal   [x] Mouth/Throat: Mucous membranes are moist.     External Ears [x] Normal  [] Abnormal-     Neck: [x] No visualized mass     Pulmonary/Chest: [x] Respiratory effort normal.  [x] No visualized signs of difficulty breathing or respiratory distress        [] Abnormal-      Musculoskeletal:   [x] Normal gait with no signs of ataxia         [x] Normal range of motion of neck        [] Abnormal-       Neurological:        [x] No Facial Asymmetry (Cranial nerve 7 motor function) (limited exam to video visit)          [x] No gaze palsy        [] Abnormal-         Skin:        [x] No significant exanthematous lesions or discoloration noted on facial skin         [] Abnormal-            Psychiatric:       [x] Normal Affect [x] No Hallucinations        [] Abnormal-     Other pertinent observable physical exam findings-                            Review of Diagnostic data:   Lab Results   Component Value Date    WBC 3.9 10/05/2022    HGB 9.4 (L) 10/05/2022    HCT 28.2 (L) 10/05/2022    MCV 90.1 10/05/2022     10/05/2022       Chemistry        Component Value Date/Time     (L) 10/05/2022 1236    K 3.0 (L) 10/05/2022 1236    K 4.2 04/01/2022 0740     10/05/2022 1236    CO2 23 10/05/2022 1236    BUN 18 10/05/2022 1236    CREATININE 0.67 10/05/2022 1236        Component Value Date/Time    CALCIUM 9.0 10/05/2022 1236    ALKPHOS 143 (H) 10/05/2022 1236    AST 52 (H) 10/05/2022 1236    ALT 92 (H) 10/05/2022 1236    BILITOT 0.9 10/05/2022 1236          CT CHEST W CONTRAST  Narrative: EXAMINATION:  CT OF THE CHEST WITH CONTRAST 9/20/2022 8:01 am    TECHNIQUE:  CT of the chest was performed with the administration of intravenous  contrast. Multiplanar reformatted images are provided for review. Automated  exposure control, iterative reconstruction, and/or weight based adjustment of  the mA/kV was utilized to reduce the radiation dose to as low as reasonably  achievable. COMPARISON:  08/10/2022    HISTORY:  ORDERING SYSTEM PROVIDED HISTORY: Sarcoma of left thigh [facility])  TECHNOLOGIST PROVIDED HISTORY:  STAT Creatinine as needed:->Yes  compare to previous for treatment response    FINDINGS:  Mediastinum:  Thyroid is grossly unremarkable within the limits of CT. Right  chest wall Port-A-Cath line with tip at the cavoatrial junction. Heart and  pericardium are unremarkable. No definite evidence of mediastinal  lymphadenopathy. No discrete hilar lymph nodes. The central airways are  clear. Lungs/pleura: There are numerous solid noncalcified rounded pulmonary nodules  and masses in both lungs consistent with metastatic disease. However, these  have significantly decreased in size compared to prior examination suggesting  response to therapy. For example:    *A right lower lobe perihilar mass now measures approximately 3.1 x 2.2 cm in  size on image 55 of series 2 previously measuring 4.5 x 4.2 cm in size. *Right lower lobe subpleural mass measures 43 x 25 mm previously measuring 56  x 29 mm  *Left upper lobe subpleural mass measures approximately 18 x 12 mm on image  61 previously measuring approximately 26 x 18 mm  *There is a right lower lobe basilar nodule measuring approximately 41 x 17  mm previously measuring approximately 42 x 21 mm in size. *Left lower lobe nodule measuring 17 mm on image 77 previously measured  approximately 20 mm. *Right upper lobe nodule that measures 10 mm previously measured 15 mm best  seen on image 34 series 2. *Few scattered small 3-4 mm pulmonary nodule seen on prior examination  resolved since prior. Decreased size of the right pleural effusion. No pneumothorax. Azygous  fissure is noted. Upper Abdomen: Enlarging segment 4 left hepatic lobe mass measuring 27 x 33  mm in size previously measuring approximately 17 x 15 mm in size. Findings  suggest progression of hepatic metastatic disease. Soft Tissues/Bones: Left axillary soft tissue nodule/lymph node measures 17 x  18 mm and is stable to slightly increased since prior examination where it  measured approximately 16 x 15 mm. This best seen on image 15 of series 2. No other axillary lymphadenopathy is noted. No acute abnormality of the  visualized osseous structures. Impression: Overall mixed response to therapy. Numerous solid noncalcified pulmonary nodules and masses have decreased in  size and number compared to prior examination suggesting partial response to  therapy of metastatic disease from primary sarcoma. Slight enlargement of a left axillary soft tissue nodule/lymph node as well  as an enlarging segment 4 hepatic mass suggesting areas of progression of  disease. 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  Grade 3. Spread metastatic disease to the lung and liver, biopsy-proven  Started AIM chemotherapy in August/2022, received 3 cycles  Plan to transition to pazopanib  PLAN:   The patient is recovering from third cycle of chemotherapy. She decided not to proceed with cycle #4 at transition to pazopanib. She is having her care at [facility]. We will continue to monitor her as needed and flush her port. Pazopanib is provided through [facility] oncologist so we will be available as needed. She had some questions about the side effects and tolerability and also financial obligations from pazopanib.   We will work with her oncologist at [facility] "[employer] to apply for drug assistance program.    Perla Vyas MD  Hematologist/Medical Oncologist  [facility] hematology oncology physicians  Attestation   The patient  being evaluated by a Virtual Visit (video visit) encounter to address concerns as mentioned above. A caregiver was present when appropriate. Due to this being a TeleHealth encounter (During [facility] public health emergency), evaluation of the following organ systems was limited: Vitals/Constitutional/EENT/Resp/CV/GI//MS/Neuro/Skin/Heme-Lymph-Imm. Pursuant to the emergency declaration under the [address] [place] [address] and the [employer] Act, this Virtual Visit was conducted with patient's (and/or legal guardian's) consent, to reduce the patient's risk of exposure to COVID-19 and provide necessary medical care. The patient (and/or legal guardian) has also been advised to contact this office for worsening conditions or problems, and seek emergency medical treatment and/or call 911 if deemed necessary. Services were provided through a video synchronous discussion virtually to substitute for in-person clinic visit. Patient and provider were located at their individual homes. --Betsy Nagy MD on 4/6/2020 at 3:50 PM    An electronic signature was used to authenticate this note. This note is created with the assistance of a speech recognition program.  While intending to generate a document that actually reflects the content of the visit, the document can still have some errors including those of syntax and sound a like substitutions which may escape proof reading. It such instances, actual meaning can be extrapolated by contextual diversion.

## 2022-11-08 ENCOUNTER — HOSPITAL ENCOUNTER (OUTPATIENT)
Age: 67
Discharge: HOME | End: 2022-11-08
Payer: COMMERCIAL

## 2022-11-08 LAB
ALBUMIN SERPL-MCNC: 4.5 G/DL (ref 3.5–5.2)
ALBUMIN/GLOBULIN RATIO: 2 (ref 1–2.5)
ALP BLD-CCNC: 88 U/L (ref 35–104)
ALT SERPL-CCNC: 18 U/L (ref 5–33)
ANION GAP SERPL CALCULATED.3IONS-SCNC: 10 MMOL/L (ref 9–17)
AST SERPL-CCNC: 22 U/L
BILIRUB SERPL-MCNC: 0.8 MG/DL (ref 0.3–1.2)
BUN BLDV-MCNC: 16 MG/DL (ref 8–23)
BUN/CREAT BLD: 27 (ref 9–20)
CALCIUM SERPL-MCNC: 9.7 MG/DL (ref 8.6–10.4)
CHLORIDE BLD-SCNC: 103 MMOL/L (ref 98–107)
CO2: 28 MMOL/L (ref 20–31)
CREAT SERPL-MCNC: 0.6 MG/DL (ref 0.5–0.9)
GFR SERPL CREATININE-BSD FRML MDRD: >60 ML/MIN/1.73M2
GLUCOSE BLD-MCNC: 99 MG/DL (ref 70–99)
HCT VFR BLD CALC: 35.4 % (ref 36.3–47.1)
HEMOGLOBIN: 11.5 G/DL (ref 11.9–15.1)
MCH RBC QN AUTO: 31.6 PG (ref 25.2–33.5)
MCHC RBC AUTO-ENTMCNC: 32.5 G/DL (ref 28.4–34.8)
MCV RBC AUTO: 97.3 FL (ref 82.6–102.9)
NRBC AUTOMATED: 0 PER 100 WBC
PDW BLD-RTO: 15.4 % (ref 11.8–14.4)
PLATELET # BLD: 148 K/UL (ref 138–453)
PMV BLD AUTO: 10.9 FL (ref 8.1–13.5)
POTASSIUM SERPL-SCNC: 4.1 MMOL/L (ref 3.7–5.3)
RBC # BLD: 3.64 M/UL (ref 3.95–5.11)
SODIUM BLD-SCNC: 141 MMOL/L (ref 135–144)
TOTAL PROTEIN: 6.7 G/DL (ref 6.4–8.3)
WBC # BLD: 5.2 K/UL (ref 3.5–11.3)

## 2022-11-08 PROCEDURE — 80053 COMPREHEN METABOLIC PANEL: CPT

## 2022-11-08 PROCEDURE — 85027 COMPLETE CBC AUTOMATED: CPT

## 2022-11-08 PROCEDURE — 36415 COLL VENOUS BLD VENIPUNCTURE: CPT

## 2022-11-15 ENCOUNTER — HOSPITAL ENCOUNTER (OUTPATIENT)
Age: 67
Discharge: HOME | End: 2022-11-15
Payer: COMMERCIAL

## 2022-11-15 LAB
ABSOLUTE EOS #: 0.1 K/UL (ref 0–0.44)
ABSOLUTE IMMATURE GRANULOCYTE: 0.05 K/UL (ref 0–0.3)
ABSOLUTE LYMPH #: 0.74 K/UL (ref 1.1–3.7)
ABSOLUTE MONO #: 0.29 K/UL (ref 0.1–1.2)
ALBUMIN SERPL-MCNC: 4.4 G/DL (ref 3.5–5.2)
ALBUMIN/GLOBULIN RATIO: 2.1 (ref 1–2.5)
ALP BLD-CCNC: 92 U/L (ref 35–104)
ALT SERPL-CCNC: 22 U/L (ref 5–33)
ANION GAP SERPL CALCULATED.3IONS-SCNC: 8 MMOL/L (ref 9–17)
AST SERPL-CCNC: 25 U/L
BASOPHILS # BLD: 1 % (ref 0–2)
BASOPHILS ABSOLUTE: 0.05 K/UL (ref 0–0.2)
BILIRUB SERPL-MCNC: 1 MG/DL (ref 0.3–1.2)
BUN BLDV-MCNC: 11 MG/DL (ref 8–23)
BUN/CREAT BLD: 18 (ref 9–20)
CALCIUM SERPL-MCNC: 9.7 MG/DL (ref 8.6–10.4)
CHLORIDE BLD-SCNC: 102 MMOL/L (ref 98–107)
CO2: 28 MMOL/L (ref 20–31)
CREAT SERPL-MCNC: 0.61 MG/DL (ref 0.5–0.9)
EOSINOPHILS RELATIVE PERCENT: 2 % (ref 1–4)
GFR SERPL CREATININE-BSD FRML MDRD: >60 ML/MIN/1.73M2
GLUCOSE BLD-MCNC: 106 MG/DL (ref 70–99)
HCT VFR BLD CALC: 36.9 % (ref 36.3–47.1)
HEMOGLOBIN: 12.2 G/DL (ref 11.9–15.1)
IMMATURE GRANULOCYTES: 1 %
LYMPHOCYTES # BLD: 15 % (ref 24–43)
MCH RBC QN AUTO: 32.2 PG (ref 25.2–33.5)
MCHC RBC AUTO-ENTMCNC: 33.1 G/DL (ref 28.4–34.8)
MCV RBC AUTO: 97.4 FL (ref 82.6–102.9)
MONOCYTES # BLD: 6 % (ref 3–12)
MORPHOLOGY: NORMAL
NRBC AUTOMATED: 0 PER 100 WBC
PDW BLD-RTO: 15 % (ref 11.8–14.4)
PLATELET # BLD: ABNORMAL K/UL (ref 138–453)
PLATELET, FLUORESCENCE: 83 K/UL (ref 138–453)
PLATELET, IMMATURE FRACTION: 5.5 % (ref 1.1–10.3)
POTASSIUM SERPL-SCNC: 4.5 MMOL/L (ref 3.7–5.3)
RBC # BLD: 3.79 M/UL (ref 3.95–5.11)
SEG NEUTROPHILS: 75 % (ref 36–65)
SEGMENTED NEUTROPHILS ABSOLUTE COUNT: 3.67 K/UL (ref 1.5–8.1)
SODIUM BLD-SCNC: 138 MMOL/L (ref 135–144)
TOTAL PROTEIN: 6.5 G/DL (ref 6.4–8.3)
WBC # BLD: 4.9 K/UL (ref 3.5–11.3)

## 2022-11-15 PROCEDURE — 85025 COMPLETE CBC W/AUTO DIFF WBC: CPT

## 2022-11-15 PROCEDURE — 80053 COMPREHEN METABOLIC PANEL: CPT

## 2022-11-15 PROCEDURE — 85055 RETICULATED PLATELET ASSAY: CPT

## 2022-11-15 PROCEDURE — 36415 COLL VENOUS BLD VENIPUNCTURE: CPT

## 2022-11-22 ENCOUNTER — HOSPITAL ENCOUNTER (OUTPATIENT)
Age: 67
Discharge: HOME | End: 2022-11-22
Payer: COMMERCIAL

## 2022-11-22 LAB
ABSOLUTE EOS #: 0.15 K/UL (ref 0–0.44)
ABSOLUTE IMMATURE GRANULOCYTE: 0.03 K/UL (ref 0–0.3)
ABSOLUTE LYMPH #: 0.48 K/UL (ref 1.1–3.7)
ABSOLUTE MONO #: 0.06 K/UL (ref 0.1–1.2)
ALBUMIN SERPL-MCNC: 4.4 G/DL (ref 3.5–5.2)
ALBUMIN/GLOBULIN RATIO: 2 (ref 1–2.5)
ALP BLD-CCNC: 92 U/L (ref 35–104)
ALT SERPL-CCNC: 21 U/L (ref 5–33)
ANION GAP SERPL CALCULATED.3IONS-SCNC: 12 MMOL/L (ref 9–17)
AST SERPL-CCNC: 27 U/L
BASOPHILS # BLD: 0 % (ref 0–2)
BASOPHILS ABSOLUTE: 0 K/UL (ref 0–0.2)
BILIRUB SERPL-MCNC: 1.3 MG/DL (ref 0.3–1.2)
BUN BLDV-MCNC: 13 MG/DL (ref 8–23)
BUN/CREAT BLD: 17 (ref 9–20)
CALCIUM SERPL-MCNC: 9.8 MG/DL (ref 8.6–10.4)
CHLORIDE BLD-SCNC: 100 MMOL/L (ref 98–107)
CO2: 29 MMOL/L (ref 20–31)
CREAT SERPL-MCNC: 0.77 MG/DL (ref 0.5–0.9)
EOSINOPHILS RELATIVE PERCENT: 5 % (ref 1–4)
GFR SERPL CREATININE-BSD FRML MDRD: >60 ML/MIN/1.73M2
GLUCOSE BLD-MCNC: 135 MG/DL (ref 70–99)
HCT VFR BLD CALC: 37.3 % (ref 36.3–47.1)
HEMOGLOBIN: 12.4 G/DL (ref 11.9–15.1)
IMMATURE GRANULOCYTES: 1 %
LYMPHOCYTES # BLD: 16 % (ref 24–43)
MCH RBC QN AUTO: 32.4 PG (ref 25.2–33.5)
MCHC RBC AUTO-ENTMCNC: 33.2 G/DL (ref 28.4–34.8)
MCV RBC AUTO: 97.4 FL (ref 82.6–102.9)
MONOCYTES # BLD: 2 % (ref 3–12)
MORPHOLOGY: NORMAL
NRBC AUTOMATED: 0 PER 100 WBC
PDW BLD-RTO: 15.6 % (ref 11.8–14.4)
PLATELET # BLD: ABNORMAL K/UL (ref 138–453)
PLATELET, FLUORESCENCE: 55 K/UL (ref 138–453)
PLATELET, IMMATURE FRACTION: 7.1 % (ref 1.1–10.3)
POTASSIUM SERPL-SCNC: 4.1 MMOL/L (ref 3.7–5.3)
RBC # BLD: 3.83 M/UL (ref 3.95–5.11)
SEG NEUTROPHILS: 76 % (ref 36–65)
SEGMENTED NEUTROPHILS ABSOLUTE COUNT: 2.28 K/UL (ref 1.5–8.1)
SODIUM BLD-SCNC: 141 MMOL/L (ref 135–144)
TOTAL PROTEIN: 6.6 G/DL (ref 6.4–8.3)
WBC # BLD: 3 K/UL (ref 3.5–11.3)

## 2022-11-22 PROCEDURE — 85025 COMPLETE CBC W/AUTO DIFF WBC: CPT

## 2022-11-22 PROCEDURE — 36415 COLL VENOUS BLD VENIPUNCTURE: CPT

## 2022-11-22 PROCEDURE — 80053 COMPREHEN METABOLIC PANEL: CPT

## 2022-11-22 PROCEDURE — 85055 RETICULATED PLATELET ASSAY: CPT

## 2022-11-28 RX ORDER — POTASSIUM CHLORIDE 1500 MG/1
TABLET, EXTENDED RELEASE ORAL
Qty: 30 TABLET | Refills: 1 | Status: SHIPPED | OUTPATIENT
Start: 2022-11-28

## 2022-11-29 ENCOUNTER — HOSPITAL ENCOUNTER (OUTPATIENT)
Dept: INFUSION THERAPY | Age: 67
Discharge: HOME | End: 2022-11-29
Payer: COMMERCIAL

## 2022-11-29 DIAGNOSIS — C49.22 SARCOMA OF LEFT THIGH (HCC): ICD-10-CM

## 2022-11-29 DIAGNOSIS — Z45.2 ENCOUNTER FOR CARE RELATED TO VASCULAR ACCESS PORT: Primary | ICD-10-CM

## 2022-11-29 LAB
ABSOLUTE EOS #: 0.1 K/UL (ref 0–0.44)
ABSOLUTE IMMATURE GRANULOCYTE: 0 K/UL (ref 0–0.3)
ABSOLUTE LYMPH #: 0.65 K/UL (ref 1.1–3.7)
ABSOLUTE MONO #: 0.33 K/UL (ref 0.1–1.2)
ALBUMIN SERPL-MCNC: 4.1 G/DL (ref 3.5–5.2)
ALBUMIN/GLOBULIN RATIO: 2.1 (ref 1–2.5)
ALP BLD-CCNC: 89 U/L (ref 35–104)
ALT SERPL-CCNC: 21 U/L (ref 5–33)
ANION GAP SERPL CALCULATED.3IONS-SCNC: 9 MMOL/L (ref 9–17)
AST SERPL-CCNC: 26 U/L
BASOPHILS # BLD: 1 % (ref 0–2)
BASOPHILS ABSOLUTE: 0.03 K/UL (ref 0–0.2)
BILIRUB SERPL-MCNC: 0.9 MG/DL (ref 0.3–1.2)
BUN BLDV-MCNC: 14 MG/DL (ref 8–23)
BUN/CREAT BLD: 22 (ref 9–20)
CALCIUM SERPL-MCNC: 9.4 MG/DL (ref 8.6–10.4)
CHLORIDE BLD-SCNC: 99 MMOL/L (ref 98–107)
CO2: 27 MMOL/L (ref 20–31)
CREAT SERPL-MCNC: 0.63 MG/DL (ref 0.5–0.9)
EOSINOPHILS RELATIVE PERCENT: 4 % (ref 1–4)
GFR SERPL CREATININE-BSD FRML MDRD: >60 ML/MIN/1.73M2
GLUCOSE BLD-MCNC: 100 MG/DL (ref 70–99)
HCT VFR BLD CALC: 33.3 % (ref 36.3–47.1)
HEMOGLOBIN: 11.2 G/DL (ref 11.9–15.1)
IMMATURE GRANULOCYTES: 0 %
LYMPHOCYTES # BLD: 26 % (ref 24–43)
MCH RBC QN AUTO: 32.3 PG (ref 25.2–33.5)
MCHC RBC AUTO-ENTMCNC: 33.6 G/DL (ref 28.4–34.8)
MCV RBC AUTO: 96 FL (ref 82.6–102.9)
MONOCYTES # BLD: 13 % (ref 3–12)
MORPHOLOGY: ABNORMAL
NRBC AUTOMATED: 0 PER 100 WBC
PDW BLD-RTO: 16.8 % (ref 11.8–14.4)
PLATELET # BLD: ABNORMAL K/UL (ref 138–453)
PLATELET, FLUORESCENCE: 54 K/UL (ref 138–453)
PLATELET, IMMATURE FRACTION: 6.7 % (ref 1.1–10.3)
POTASSIUM SERPL-SCNC: 4 MMOL/L (ref 3.7–5.3)
RBC # BLD: 3.47 M/UL (ref 3.95–5.11)
SEG NEUTROPHILS: 56 % (ref 36–65)
SEGMENTED NEUTROPHILS ABSOLUTE COUNT: 1.39 K/UL (ref 1.5–8.1)
SODIUM BLD-SCNC: 135 MMOL/L (ref 135–144)
TOTAL PROTEIN: 6.1 G/DL (ref 6.4–8.3)
WBC # BLD: 2.5 K/UL (ref 3.5–11.3)

## 2022-11-29 PROCEDURE — 36591 DRAW BLOOD OFF VENOUS DEVICE: CPT

## 2022-11-29 PROCEDURE — 6360000002 HC RX W HCPCS: Performed by: INTERNAL MEDICINE

## 2022-11-29 PROCEDURE — 2580000003 HC RX 258: Performed by: INTERNAL MEDICINE

## 2022-11-29 PROCEDURE — 85025 COMPLETE CBC W/AUTO DIFF WBC: CPT

## 2022-11-29 PROCEDURE — 80053 COMPREHEN METABOLIC PANEL: CPT

## 2022-11-29 PROCEDURE — 85055 RETICULATED PLATELET ASSAY: CPT

## 2022-11-29 RX ORDER — HEPARIN SODIUM (PORCINE) LOCK FLUSH IV SOLN 100 UNIT/ML 100 UNIT/ML
500 SOLUTION INTRAVENOUS PRN
OUTPATIENT
Start: 2022-11-29

## 2022-11-29 RX ORDER — SODIUM CHLORIDE 0.9 % (FLUSH) 0.9 %
5-40 SYRINGE (ML) INJECTION PRN
OUTPATIENT
Start: 2022-11-29

## 2022-11-29 RX ORDER — SODIUM CHLORIDE 0.9 % (FLUSH) 0.9 %
5-40 SYRINGE (ML) INJECTION PRN
Status: DISCONTINUED | OUTPATIENT
Start: 2022-11-29 | End: 2022-11-30 | Stop reason: HOSPADM

## 2022-11-29 RX ORDER — SODIUM CHLORIDE 9 MG/ML
25 INJECTION, SOLUTION INTRAVENOUS PRN
OUTPATIENT
Start: 2022-11-29

## 2022-11-29 RX ORDER — HEPARIN SODIUM (PORCINE) LOCK FLUSH IV SOLN 100 UNIT/ML 100 UNIT/ML
500 SOLUTION INTRAVENOUS PRN
Status: DISCONTINUED | OUTPATIENT
Start: 2022-11-29 | End: 2022-11-30 | Stop reason: HOSPADM

## 2022-11-29 RX ADMIN — SODIUM CHLORIDE, PRESERVATIVE FREE 10 ML: 5 INJECTION INTRAVENOUS at 15:15

## 2022-11-29 RX ADMIN — SODIUM CHLORIDE, PRESERVATIVE FREE 10 ML: 5 INJECTION INTRAVENOUS at 15:14

## 2022-11-29 RX ADMIN — Medication 500 UNITS: at 15:17

## 2022-11-29 RX ADMIN — SODIUM CHLORIDE, PRESERVATIVE FREE 10 ML: 5 INJECTION INTRAVENOUS at 15:16

## 2022-12-04 ENCOUNTER — HOSPITAL ENCOUNTER (EMERGENCY)
Age: 67
Discharge: HOME | End: 2022-12-04
Attending: EMERGENCY MEDICINE
Payer: MEDICARE

## 2022-12-04 ENCOUNTER — APPOINTMENT (OUTPATIENT)
Dept: GENERAL RADIOLOGY | Age: 67
End: 2022-12-04
Payer: MEDICARE

## 2022-12-04 VITALS
OXYGEN SATURATION: 96 % | SYSTOLIC BLOOD PRESSURE: 128 MMHG | WEIGHT: 128 LBS | DIASTOLIC BLOOD PRESSURE: 73 MMHG | HEART RATE: 91 BPM | RESPIRATION RATE: 18 BRPM | BODY MASS INDEX: 22.67 KG/M2 | TEMPERATURE: 98.3 F

## 2022-12-04 DIAGNOSIS — J04.0 ACUTE LARYNGITIS: Primary | ICD-10-CM

## 2022-12-04 LAB
FLU A ANTIGEN: NEGATIVE
FLU B ANTIGEN: NEGATIVE
S PYO AG THROAT QL: NEGATIVE
SARS-COV-2, RAPID: NOT DETECTED
SOURCE: NORMAL
SPECIMEN DESCRIPTION: NORMAL

## 2022-12-04 PROCEDURE — 87804 INFLUENZA ASSAY W/OPTIC: CPT

## 2022-12-04 PROCEDURE — 71046 X-RAY EXAM CHEST 2 VIEWS: CPT

## 2022-12-04 PROCEDURE — C9803 HOPD COVID-19 SPEC COLLECT: HCPCS

## 2022-12-04 PROCEDURE — 87880 STREP A ASSAY W/OPTIC: CPT

## 2022-12-04 PROCEDURE — 99284 EMERGENCY DEPT VISIT MOD MDM: CPT

## 2022-12-04 PROCEDURE — 87635 SARS-COV-2 COVID-19 AMP PRB: CPT

## 2022-12-04 RX ORDER — HYDROCHLOROTHIAZIDE 12.5 MG/1
12.5 CAPSULE, GELATIN COATED ORAL DAILY
COMMUNITY

## 2022-12-04 RX ORDER — PREDNISONE 20 MG/1
40 TABLET ORAL DAILY
Qty: 10 TABLET | Refills: 0 | Status: SHIPPED | OUTPATIENT
Start: 2022-12-04 | End: 2022-12-09

## 2022-12-04 ASSESSMENT — LIFESTYLE VARIABLES: HOW OFTEN DO YOU HAVE A DRINK CONTAINING ALCOHOL: NEVER

## 2022-12-04 ASSESSMENT — ENCOUNTER SYMPTOMS
SHORTNESS OF BREATH: 0
RHINORRHEA: 1
ABDOMINAL DISTENTION: 0
BACK PAIN: 0
SORE THROAT: 1
COUGH: 1

## 2022-12-04 ASSESSMENT — PAIN - FUNCTIONAL ASSESSMENT: PAIN_FUNCTIONAL_ASSESSMENT: NONE - DENIES PAIN

## 2022-12-04 NOTE — DISCHARGE INSTRUCTIONS
Take the prednisone as prescribed with food. You can take the course for 3 days and see if your symptoms completely resolve. If need be finish the rest of the course as well. Follow-up with your doctor next week if symptoms do not improve.

## 2022-12-13 NOTE — TELEPHONE ENCOUNTER
Name: Eva Rendon  : 1955  MRN: [id number]    Oncology Navigation Follow-Up Note    Contact Type:  Telephone    Notes: Call made to patient for follow up. Pool Dykes states that she had a really hard time with last treatment and could not have another treatment. Pt is now being changed to Votrient as ordered by Dr. Rik Arshad form [facility]. Pt states that she will continue to come to [place] for port flushes. Pool Dykes informed that navigator will always be available to her if needed. Madai Alvarado for calling and checking on her.     Electronically signed by Yaquelin Patterson RN on 10/20/2022 at 4:47 PM Satisfactory

## 2022-12-19 ENCOUNTER — OFFICE VISIT (OUTPATIENT)
Dept: PRIMARY CARE CLINIC | Age: 67
End: 2022-12-19
Payer: MEDICARE

## 2022-12-19 VITALS
OXYGEN SATURATION: 97 % | DIASTOLIC BLOOD PRESSURE: 84 MMHG | HEART RATE: 72 BPM | SYSTOLIC BLOOD PRESSURE: 122 MMHG | TEMPERATURE: 98.3 F | BODY MASS INDEX: 22 KG/M2 | WEIGHT: 124.2 LBS | RESPIRATION RATE: 18 BRPM

## 2022-12-19 DIAGNOSIS — J04.0 LARYNGITIS: ICD-10-CM

## 2022-12-19 DIAGNOSIS — J20.9 BRONCHITIS WITH BRONCHOSPASM: Primary | ICD-10-CM

## 2022-12-19 PROCEDURE — G8420 CALC BMI NORM PARAMETERS: HCPCS | Performed by: NURSE PRACTITIONER

## 2022-12-19 PROCEDURE — 1036F TOBACCO NON-USER: CPT | Performed by: NURSE PRACTITIONER

## 2022-12-19 PROCEDURE — G8427 DOCREV CUR MEDS BY ELIG CLIN: HCPCS | Performed by: NURSE PRACTITIONER

## 2022-12-19 PROCEDURE — 1123F ACP DISCUSS/DSCN MKR DOCD: CPT | Performed by: NURSE PRACTITIONER

## 2022-12-19 PROCEDURE — 1090F PRES/ABSN URINE INCON ASSESS: CPT | Performed by: NURSE PRACTITIONER

## 2022-12-19 PROCEDURE — 3017F COLORECTAL CA SCREEN DOC REV: CPT | Performed by: NURSE PRACTITIONER

## 2022-12-19 PROCEDURE — 99214 OFFICE O/P EST MOD 30 MIN: CPT | Performed by: NURSE PRACTITIONER

## 2022-12-19 PROCEDURE — G8399 PT W/DXA RESULTS DOCUMENT: HCPCS | Performed by: NURSE PRACTITIONER

## 2022-12-19 PROCEDURE — G8482 FLU IMMUNIZE ORDER/ADMIN: HCPCS | Performed by: NURSE PRACTITIONER

## 2022-12-19 RX ORDER — AMOXICILLIN AND CLAVULANATE POTASSIUM 875; 125 MG/1; MG/1
1 TABLET, FILM COATED ORAL 2 TIMES DAILY
Qty: 20 TABLET | Refills: 0 | Status: SHIPPED | OUTPATIENT
Start: 2022-12-19 | End: 2022-12-29

## 2022-12-19 ASSESSMENT — ENCOUNTER SYMPTOMS
SINUS PAIN: 0
VOICE CHANGE: 1
SINUS PRESSURE: 0
SORE THROAT: 1
RHINORRHEA: 1
GASTROINTESTINAL NEGATIVE: 1
ALLERGIC/IMMUNOLOGIC NEGATIVE: 1
EYES NEGATIVE: 1
COUGH: 1

## 2022-12-19 ASSESSMENT — PATIENT HEALTH QUESTIONNAIRE - PHQ9
SUM OF ALL RESPONSES TO PHQ QUESTIONS 1-9: 0
1. LITTLE INTEREST OR PLEASURE IN DOING THINGS: 0
SUM OF ALL RESPONSES TO PHQ QUESTIONS 1-9: 0
2. FEELING DOWN, DEPRESSED OR HOPELESS: 0
SUM OF ALL RESPONSES TO PHQ QUESTIONS 1-9: 0
SUM OF ALL RESPONSES TO PHQ9 QUESTIONS 1 & 2: 0
SUM OF ALL RESPONSES TO PHQ QUESTIONS 1-9: 0

## 2022-12-19 NOTE — PATIENT INSTRUCTIONS
SURVEY:     You may be receiving a survey from [employer] regarding your visit today. Please complete the survey to enable us to provide the highest quality of care to you and your family. If you cannot score us a very good on any question, please call the office to discuss how we could have made your experience a very good one.      Thank you,    Hollie Soliman, APRN-CNP  Kalyani Corcoran, APRN-CNP  Tamiko Jones, LPN  Nena Ch, MELVIN Morocho, MELVIN Platt, CMA  Carlie, PCA  Betty, PM

## 2022-12-19 NOTE — PROGRESS NOTES
[facility]  Stefania Judge, [facility]  [address]  Dept: [phone]  Dept Fax: [phone]      Name: Kat Bowman  : 1955         Chief Complaint:     Chief Complaint   Patient presents with    Laryngitis     X 2 weeks. C/o not having a voice. Otalgia     X 3 days. C/o right ear pain. Congestion     X 2 weeks. History of Present Illness:      Kat Bowman is a 74 y.o.  female who presents with Laryngitis (X 2 weeks. C/o not having a voice. ), Otalgia (X 3 days. C/o right ear pain. ), and Congestion (X 2 weeks. )    Larisa Orozco is here today for laryngitis that she has had for 2 weeks. She was seen in the ER on 2022. She was given Prednisone in the ER. She has not had any improvement. She had a negative Covid and Influenza test at that time. She states that she has some right ear pain that she describes as throbbing. She denies a fever. She has had rhinorrhea and congestion for 2 weeks. She has a sore throat with the drainage. She has a slight productive cough. Denies body aches or chills. She denies sinus pain or headache. She has taken some Delsyum. She does have some sores inside her mouth and is currently on an oral chemotherapy. She has some nausea she feels is chemo related also.     Past Medical History:     Past Medical History:   Diagnosis Date    Cancer ([address]     SARCOMA LEFT THIGH    History of radiation therapy     PONV (postoperative nausea and vomiting)       Reviewed all health maintenance requirements and ordered appropriate tests  Health Maintenance Due   Topic Date Due    Annual Wellness Visit (AWV)  2022       Past Surgical History:     Past Surgical History:   Procedure Laterality Date    BREAST BIOPSY  2014     SECTION, LOW TRANSVERSE       &     COLONOSCOPY N/A 2018    COLONOSCOPY WITH BIOPSY and photos performed by Piero Mackay MD at [address] not taking: No sig reported    Historical Provider, MD   ondansetron (ZOFRAN-ODT) 4 MG disintegrating tablet Take 1 tablet by mouth every 8 hours as needed for Nausea or Vomiting  Patient not taking: No sig reported 4/2/22   Yokasta Lane DO        Allergies:       Patient has no known allergies. Social History:     Tobacco:    reports that she has never smoked. She has never been exposed to tobacco smoke. She has never used smokeless tobacco.  Alcohol:      reports current alcohol use. Drug Use:  reports no history of drug use. Family History:     Family History   Problem Relation Age of Onset    Cancer Mother         parotid    Thyroid Disease Mother     Other Father         PKD    Hypertension Father     Heart Attack Maternal Grandmother     Heart Attack Maternal Grandfather     Other Paternal Grandmother         PKD    Cancer Maternal Aunt         breast    Cancer Maternal Aunt         pancreas       Review of Systems:     Positive and Negative as described in HPI    Review of Systems   Constitutional:  Positive for appetite change. Negative for chills, diaphoresis and fever. HENT:  Positive for congestion, ear pain, rhinorrhea, sore throat and voice change. Negative for sinus pressure and sinus pain. Eyes: Negative. Respiratory:  Positive for cough. Cardiovascular: Negative. Gastrointestinal: Negative. Endocrine: Negative. Genitourinary: Negative. Musculoskeletal:  Negative for arthralgias. Allergic/Immunologic: Negative. Neurological: Negative. Hematological: Negative. Psychiatric/Behavioral: Negative. Physical Exam:   Vitals:  /84   Pulse 72   Temp 98.3 °F (36.8 °C) (Temporal)   Resp 18   Wt 124 lb 3.2 oz (56.3 kg)   LMP 01/01/2010 (Approximate)   SpO2 97%   BMI 22.00 kg/m²     Physical Exam  Vitals and nursing note reviewed. Constitutional:       General: She is not in acute distress. Appearance: Normal appearance.    HENT:      Head: Normocephalic. Right Ear: Tympanic membrane normal.      Left Ear: Tympanic membrane normal.      Ears:      Comments: Cloudy fluid noted in right canal.       Nose: Mucosal edema, congestion and rhinorrhea present. Rhinorrhea is clear. Right Turbinates: Swollen. Left Turbinates: Swollen (turbinates boggy). Mouth/Throat:      Lips: Pink. Mouth: Mucous membranes are moist.      Pharynx: Posterior oropharyngeal erythema present. Tonsils: No tonsillar exudate. Eyes:      Conjunctiva/sclera: Conjunctivae normal.      Pupils: Pupils are equal, round, and reactive to light. Cardiovascular:      Rate and Rhythm: Normal rate and regular rhythm. Heart sounds: Normal heart sounds. Pulmonary:      Effort: Pulmonary effort is normal.      Breath sounds: Normal breath sounds. Musculoskeletal:         General: Normal range of motion. Cervical back: Normal range of motion and neck supple. Lymphadenopathy:      Cervical: Cervical adenopathy (anterior) present. Skin:     General: Skin is warm. Capillary Refill: Capillary refill takes less than 2 seconds. Neurological:      General: No focal deficit present. Mental Status: She is alert and oriented to person, place, and time. Psychiatric:         Mood and Affect: Mood normal.         Behavior: Behavior normal.         Thought Content:  Thought content normal.         Judgment: Judgment normal.       Data:     Lab Results   Component Value Date/Time     11/29/2022 03:13 PM    K 4.0 11/29/2022 03:13 PM    K 4.2 04/01/2022 07:40 AM    CL 99 11/29/2022 03:13 PM    CO2 27 11/29/2022 03:13 PM    BUN 14 11/29/2022 03:13 PM    CREATININE 0.63 11/29/2022 03:13 PM    GLUCOSE 100 11/29/2022 03:13 PM    GLUCOSE 95 03/28/2012 07:25 AM    PROT 6.1 11/29/2022 03:13 PM    LABALBU 4.1 11/29/2022 03:13 PM    BILITOT 0.9 11/29/2022 03:13 PM    ALKPHOS 89 11/29/2022 03:13 PM    AST 26 11/29/2022 03:13 PM    ALT 21 11/29/2022 03:13 PM Lab Results   Component Value Date/Time    WBC 2.5 11/29/2022 03:13 PM    RBC 3.47 11/29/2022 03:13 PM    HGB 11.2 11/29/2022 03:13 PM    HCT 33.3 11/29/2022 03:13 PM    MCV 96.0 11/29/2022 03:13 PM    MCH 32.3 11/29/2022 03:13 PM    MCHC 33.6 11/29/2022 03:13 PM    RDW 16.8 11/29/2022 03:13 PM    PLT See Reflexed IPF Result 11/29/2022 03:13 PM    MPV 10.9 11/08/2022 04:11 PM     No results found for: TSH  Lab Results   Component Value Date/Time    CHOL 217 06/16/2022 07:22 AM    HDL 59 06/16/2022 07:22 AM       Assessment/Plan:      Diagnosis Orders   1. Bronchitis with bronchospasm  amoxicillin-clavulanate (AUGMENTIN) 875-125 MG per tablet      2. Laryngitis          Practice meticulous handwashing and cover cough to prevent spread of infection  Encouraged to increase fluids and rest  Tylenol/Ibuprofen OTC PRN for pain, discomfort or fever as directed on package  Warm salt water gargles for sore throat  Cool mist humidifier  Hot tea with honey and lemon for cough and sore throat PRN  Recommend taking Mucinex OTC as directed. Start Augmentin as prescribed. Mucinex and Augmentin checked with Votrient in [employer] and no interactions noted. Return to office if no improvement in 10 days. 1.  [employer] received counseling on the following healthy behaviors: nutrition, exercise, and medication adherence  2. Patient given educational materials - see patient instructions  3. Was a self-tracking handout given in paper form or via [employer]? No  If yes, see orders or list here. 4.  Discussed use, benefit, and side effects of prescribed medications. Barriers to medication compliance addressed. All patient questions answered. Pt voiced understanding. 5.  Reviewed prior labs and health maintenance  6. Continue current medications, diet and exercise.     Completed Refills   Requested Prescriptions     Signed Prescriptions Disp Refills    amoxicillin-clavulanate (AUGMENTIN) 875-125 MG per tablet 20 tablet 0     Sig: Take 1 tablet by mouth 2 times daily for 10 days         No follow-ups on file.

## 2022-12-20 NOTE — DISCHARGE SUMMARY
Phone: Brian          Fax: [phone]                            Outpatient Physical Therapy                                                                    Discharge Summary    Patient: Brisa Armas  : 1955  CSN #: [id number]   Referring Physician: Dedra Chowdhury MD   Diagnosis: L thigh soft tissue sarcoma  Treatment Diagnosis: L LE pain, decreased ambulation tolerance      Date Treatment Initiated: 1/10/2022  Date of Last Treatment: 2022      PT Visit Information  Onset Date: 22  PT Insurance Information: Medical Bowdle  Total # of Visits Approved: 45  Total # of Visits to Date: 45  Plan of Care/Certification Expiration Date: 22  Referring Provider (secondary): Dr. Sindhu Quinonez      Frequency/Duration  Plan Frequency: 3x/wk times per week  Plan weeks: 8 weeks weeks      Treatment Received  [] HP/CP      [] Electrical Stim   [x] Therapeutic Exercise      [x] Gait Training  [] Aquatics   [] Ultrasound         [x] Patient Education/HEP   [x] Manual Therapy  [] Traction    [] Neuro-sadie        [x] Soft Tissue Mobs            [] Home TENS  [] Iontophoresis    [] Orthotic casting/fitting      [x] Lymphedema     Assessment  Assessment: Pt was D/C from PT for lymphedema for having surgery     Goals  Short Term Goals  Time Frame for Short Term Goals: 3 weeks  Short Term Goal 1: Pt will be educated on her POC and HEP-met  Short Term Goal 2: Pt will increase L knee AROM to 50 degrees flexion in order to normalize gait-met/cont    Long Term Goals  Time Frame for Long Term Goals : 6 weeks  Long Term Goal 1: Pt will be safe and independent with her HEP  Long Term Goal 2: Pt will increase L knee flexion to 90 degrees in order to ambulation with normal gait pattern and without a brace when allowed by physician-progressing  Long Term Goal 3: Pt will increase L quad strength to 4-/5 in order to perform 5 SLR in order to increase functional strength-progressing  Long Term Goal 4: Pt will demonstrate no increase in girth measurements for edema in order to increase L knee AROM  Long Term Goal 5: Pt will return to 50% of normal activities including community ambulation      Reason for Discharge  [] Goals Achieved                        []  Poor Follow Through/Attendance                  []  Optimal Function Achieved     []  Patient Discharged Self    []  Hospitalization                         [x]  Physician discharge      Thank you for this referral      Norma Monterroso, PT, DPT               Date: 12/20/2022

## 2022-12-20 NOTE — DISCHARGE SUMMARY
Phone: Brian          Fax: [phone]                            Outpatient Physical Therapy                                                                    Discharge Summary    Patient: Devan Deleon  : 1955  CSN #: [id number]   Referring Physician: No ref. provider found   Diagnosis: L thigh soft tissue sarcoma  Treatment Diagnosis: L LE pain, decreased ambulation tolerance      Date Treatment Initiated: 2022  Date of Last Treatment: 2022      PT Visit Information  Onset Date: 22  PT Insurance Information: Medical Virgie  Total # of Visits to Date: 45  Plan of Care/Certification Expiration Date: 22  Referring Provider (secondary): Dr. Tod Baron      Frequency/Duration  Plan Frequency: 3x/wk times per week  Plan weeks: 8 weeks weeks      Treatment Received  [x] HP/CP      [] Electrical Stim   [x] Therapeutic Exercise      [x] Gait Training  [] Aquatics   [] Ultrasound         [x] Patient Education/HEP   [x] Manual Therapy  [] Traction    [x] Neuro-sadie        [x] Soft Tissue Mobs            [] Home TENS  [] Iontophoresis    [] Orthotic casting/fitting      [] Dry Needling    Assessment  Assessment: STM/DTM performed to pt's hamstring in prone in order to reduce muscle tension to gain PROM, PROM performed with overpressure in sitting.  Pt toleraqted exercises this date but still minimal to no quad activation       Goals  Short Term Goals  Time Frame for Short Term Goals: 3 weeks  Short Term Goal 1: Pt will be educated on her POC and HEP-met  Short Term Goal 2: Pt will increase L knee AROM to 50 degrees flexion in order to normalize gait-met/cont    Long Term Goals  Time Frame for Long Term Goals : 6 weeks  Long Term Goal 1: Pt will be safe and independent with her HEP  Long Term Goal 2: Pt will increase L knee flexion to 90 degrees in order to ambulation with normal gait pattern and without a brace when allowed by physician-progressing  Long Term Goal 3: Pt will increase L quad strength to 4-/5 in order to perform 5 SLR in order to increase functional strength-progressing  Long Term Goal 4: Pt will demonstrate no increase in girth measurements for edema in order to increase L knee AROM  Long Term Goal 5: Pt will return to 50% of normal activities including community ambulation      Reason for Discharge  [] Goals Achieved                        []  Poor Follow Through/Attendance                  []  Optimal Function Achieved     []  Patient Discharged Self    []  Hospitalization                         [x]  Physician discharge      Thank you for this referral      Toña Onofre PT, DPT               Date: 12/20/2022

## 2023-01-06 ENCOUNTER — HOSPITAL ENCOUNTER (OUTPATIENT)
Dept: CT IMAGING | Age: 68
End: 2023-01-06
Payer: MEDICARE

## 2023-01-06 DIAGNOSIS — C49.9 LIPOSARCOMA (HCC): ICD-10-CM

## 2023-01-06 DIAGNOSIS — C78.00 MALIGNANT NEOPLASM METASTATIC TO LUNG, UNSPECIFIED LATERALITY (HCC): ICD-10-CM

## 2023-01-06 PROCEDURE — 6360000002 HC RX W HCPCS: Performed by: INTERNAL MEDICINE

## 2023-01-06 PROCEDURE — 6360000004 HC RX CONTRAST MEDICATION: Performed by: INTERNAL MEDICINE

## 2023-01-06 PROCEDURE — 74177 CT ABD & PELVIS W/CONTRAST: CPT

## 2023-01-06 RX ORDER — HEPARIN SODIUM (PORCINE) LOCK FLUSH IV SOLN 100 UNIT/ML 100 UNIT/ML
100 SOLUTION INTRAVENOUS ONCE
Status: COMPLETED | OUTPATIENT
Start: 2023-01-06 | End: 2023-01-06

## 2023-01-06 RX ADMIN — IOPAMIDOL 18 ML: 755 INJECTION, SOLUTION INTRAVENOUS at 15:14

## 2023-01-06 RX ADMIN — IOPAMIDOL 75 ML: 755 INJECTION, SOLUTION INTRAVENOUS at 15:15

## 2023-01-06 RX ADMIN — HEPARIN 100 UNITS: 100 SYRINGE at 15:33

## 2023-01-06 NOTE — PLAN OF CARE
Port in right upper chest accessed with a #20g x 0.75 inch port needle that was CT compatible for dye injection for study. Sterile procedure was used with both RN and patient wearing masks. Patient tolerated well. Brisk blood return noted and line flushed with 20 mL sterile normal saline so ready for dye injection. After CT was completed, port was again flushed with 20 mL sterile saline and 5 mL of heparin was flushed. Port access discontinued. Patient tolerated well and site with out complication. Small pressure dressing applied for scant amount of bleeding. Patient left radiology department.  Gina Sousa RN

## 2023-02-12 ENCOUNTER — HOSPITAL ENCOUNTER (EMERGENCY)
Age: 68
Discharge: HOME | End: 2023-02-12
Attending: EMERGENCY MEDICINE
Payer: MEDICARE

## 2023-02-12 VITALS
HEART RATE: 88 BPM | WEIGHT: 122 LBS | HEIGHT: 63 IN | OXYGEN SATURATION: 98 % | TEMPERATURE: 97.5 F | RESPIRATION RATE: 16 BRPM | SYSTOLIC BLOOD PRESSURE: 112 MMHG | BODY MASS INDEX: 21.62 KG/M2 | DIASTOLIC BLOOD PRESSURE: 62 MMHG

## 2023-02-12 DIAGNOSIS — C49.22 SARCOMA OF LEFT THIGH (HCC): ICD-10-CM

## 2023-02-12 DIAGNOSIS — D12.6 SERRATED ADENOMA OF COLON: ICD-10-CM

## 2023-02-12 DIAGNOSIS — C49.9 LIPOSARCOMA (HCC): ICD-10-CM

## 2023-02-12 DIAGNOSIS — R53.1 GENERALIZED WEAKNESS: Primary | ICD-10-CM

## 2023-02-12 DIAGNOSIS — R11.0 NAUSEA: ICD-10-CM

## 2023-02-12 LAB
ABSOLUTE EOS #: 0 K/UL (ref 0–0.44)
ABSOLUTE IMMATURE GRANULOCYTE: 0 K/UL (ref 0–0.3)
ABSOLUTE LYMPH #: 0.28 K/UL (ref 1.1–3.7)
ABSOLUTE MONO #: 0.32 K/UL (ref 0.1–1.2)
ALBUMIN SERPL-MCNC: 4.1 G/DL (ref 3.5–5.2)
ALBUMIN/GLOBULIN RATIO: 1.6 (ref 1–2.5)
ALP SERPL-CCNC: 81 U/L (ref 35–104)
ALT SERPL-CCNC: 22 U/L (ref 5–33)
ANION GAP SERPL CALCULATED.3IONS-SCNC: 11 MMOL/L (ref 9–17)
AST SERPL-CCNC: 16 U/L
BACTERIA: ABNORMAL
BASOPHILS # BLD: 0 % (ref 0–2)
BASOPHILS ABSOLUTE: 0 K/UL (ref 0–0.2)
BILIRUB SERPL-MCNC: 0.8 MG/DL (ref 0.3–1.2)
BILIRUBIN URINE: NEGATIVE
BUN SERPL-MCNC: 20 MG/DL (ref 8–23)
BUN/CREAT BLD: 31 (ref 9–20)
CALCIUM SERPL-MCNC: 9.8 MG/DL (ref 8.6–10.4)
CHLORIDE SERPL-SCNC: 103 MMOL/L (ref 98–107)
CO2 SERPL-SCNC: 27 MMOL/L (ref 20–31)
COLOR: YELLOW
CREAT SERPL-MCNC: 0.64 MG/DL (ref 0.5–0.9)
EOSINOPHILS RELATIVE PERCENT: 0 % (ref 1–4)
EPITHELIAL CELLS UA: ABNORMAL /HPF (ref 0–25)
GFR SERPL CREATININE-BSD FRML MDRD: >60 ML/MIN/1.73M2
GLUCOSE SERPL-MCNC: 118 MG/DL (ref 70–99)
GLUCOSE UR STRIP.AUTO-MCNC: NEGATIVE MG/DL
HCT VFR BLD AUTO: 36.9 % (ref 36.3–47.1)
HGB BLD-MCNC: 12.3 G/DL (ref 11.9–15.1)
IMMATURE GRANULOCYTES: 0 %
KETONES UR STRIP.AUTO-MCNC: ABNORMAL MG/DL
LACTATE PLASV-SCNC: 1.1 MMOL/L (ref 0.5–2.2)
LEUKOCYTE ESTERASE UR QL STRIP.AUTO: NEGATIVE
LIPASE SERPL-CCNC: 13 U/L (ref 13–60)
LYMPHOCYTES # BLD: 6 % (ref 24–43)
MCH RBC QN AUTO: 32.8 PG (ref 25.2–33.5)
MCHC RBC AUTO-ENTMCNC: 33.3 G/DL (ref 28.4–34.8)
MCV RBC AUTO: 98.4 FL (ref 82.6–102.9)
MONOCYTES # BLD: 7 % (ref 3–12)
MORPHOLOGY: NORMAL
MUCUS: ABNORMAL
NITRITE UR QL STRIP.AUTO: NEGATIVE
NRBC AUTOMATED: 0 PER 100 WBC
PDW BLD-RTO: 15 % (ref 11.8–14.4)
PLATELET # BLD AUTO: 171 K/UL (ref 138–453)
PMV BLD AUTO: 8.8 FL (ref 8.1–13.5)
POTASSIUM SERPL-SCNC: 3.6 MMOL/L (ref 3.7–5.3)
PROT SERPL-MCNC: 6.7 G/DL (ref 6.4–8.3)
PROT UR STRIP.AUTO-MCNC: 6 MG/DL (ref 5–9)
PROT UR STRIP.AUTO-MCNC: NEGATIVE MG/DL
RBC # BLD: 3.75 M/UL (ref 3.95–5.11)
RBC CLUMPS #/AREA URNS AUTO: ABNORMAL /HPF (ref 0–2)
SEG NEUTROPHILS: 87 % (ref 36–65)
SEGMENTED NEUTROPHILS ABSOLUTE COUNT: 4 K/UL (ref 1.5–8.1)
SODIUM SERPL-SCNC: 141 MMOL/L (ref 135–144)
SPECIFIC GRAVITY UA: >1.03 (ref 1.01–1.02)
TURBIDITY: CLEAR
URINE HGB: NEGATIVE
UROBILINOGEN, URINE: NORMAL
WBC # BLD AUTO: 4.6 K/UL (ref 3.5–11.3)
WBC UA: ABNORMAL /HPF (ref 0–5)

## 2023-02-12 PROCEDURE — 2580000003 HC RX 258: Performed by: EMERGENCY MEDICINE

## 2023-02-12 PROCEDURE — 85025 COMPLETE CBC W/AUTO DIFF WBC: CPT

## 2023-02-12 PROCEDURE — 6360000002 HC RX W HCPCS: Performed by: EMERGENCY MEDICINE

## 2023-02-12 PROCEDURE — 96374 THER/PROPH/DIAG INJ IV PUSH: CPT

## 2023-02-12 PROCEDURE — 80053 COMPREHEN METABOLIC PANEL: CPT

## 2023-02-12 PROCEDURE — 83690 ASSAY OF LIPASE: CPT

## 2023-02-12 PROCEDURE — 81001 URINALYSIS AUTO W/SCOPE: CPT

## 2023-02-12 PROCEDURE — 83605 ASSAY OF LACTIC ACID: CPT

## 2023-02-12 PROCEDURE — 36415 COLL VENOUS BLD VENIPUNCTURE: CPT

## 2023-02-12 PROCEDURE — 99284 EMERGENCY DEPT VISIT MOD MDM: CPT

## 2023-02-12 RX ORDER — LORAZEPAM 1 MG/1
1 TABLET ORAL ONCE
Status: DISCONTINUED | OUTPATIENT
Start: 2023-02-12 | End: 2023-02-12 | Stop reason: HOSPADM

## 2023-02-12 RX ORDER — LORAZEPAM 2 MG/ML
1 INJECTION INTRAMUSCULAR ONCE
Status: COMPLETED | OUTPATIENT
Start: 2023-02-12 | End: 2023-02-12

## 2023-02-12 RX ORDER — LORAZEPAM 1 MG/1
1 TABLET ORAL EVERY 8 HOURS PRN
Qty: 12 TABLET | Refills: 0 | Status: SHIPPED | OUTPATIENT
Start: 2023-02-12 | End: 2023-03-14

## 2023-02-12 RX ORDER — 0.9 % SODIUM CHLORIDE 0.9 %
1000 INTRAVENOUS SOLUTION INTRAVENOUS ONCE
Status: COMPLETED | OUTPATIENT
Start: 2023-02-12 | End: 2023-02-12

## 2023-02-12 RX ADMIN — SODIUM CHLORIDE 1000 ML: 9 INJECTION, SOLUTION INTRAVENOUS at 12:54

## 2023-02-12 RX ADMIN — LORAZEPAM 1 MG: 2 INJECTION INTRAMUSCULAR at 15:00

## 2023-02-12 ASSESSMENT — PAIN SCALES - GENERAL: PAINLEVEL_OUTOF10: 2

## 2023-02-12 ASSESSMENT — PAIN DESCRIPTION - DESCRIPTORS: DESCRIPTORS: ACHING

## 2023-02-12 ASSESSMENT — PAIN - FUNCTIONAL ASSESSMENT: PAIN_FUNCTIONAL_ASSESSMENT: 0-10

## 2023-02-12 ASSESSMENT — PAIN DESCRIPTION - LOCATION: LOCATION: HEAD

## 2023-02-12 ASSESSMENT — LIFESTYLE VARIABLES: HOW OFTEN DO YOU HAVE A DRINK CONTAINING ALCOHOL: NEVER

## 2023-02-12 NOTE — DISCHARGE INSTRUCTIONS
Be sure to continue current medications as prescribed. Drink plenty of fluids. Rest while at home. Follow-up with your primary care provider in 2 to 3 days for reevaluation if symptoms not resolved. Please seek medical attention immediately if you develop any worsening symptoms or any other acute concerns.

## 2023-02-12 NOTE — ED PROVIDER NOTES
[facility] ED  EMERGENCY DEPARTMENT ENCOUNTER      Pt Name: Ana Alas  MRN: [id number]  [place] 1955  Date of evaluation: 2023  Provider: Zackary Fuentes MD    CHIEF COMPLAINT       Chief Complaint   Patient presents with    Fatigue     Increased fatigue, HA, with decreased intake x couple days. CA pt         HISTORY OF PRESENT ILLNESS   (Location/Symptom, Timing/Onset, Context/Setting, Quality, Duration, Modifying Factors, Severity)  Note limiting factors. Ana Alas is a 74 y.o. female who presents to the emergency department      80-year-old female presents emergency department for evaluation nausea vomiting decreased eating and drinking and generalized fatigue. Patient does have a history of sarcoma and adenocarcinoma of the colon. Currently taking daily oral chemotherapy. She is not having any intravenous therapy or radiation at this time. She has not any fevers or chills. No URI symptoms. Denies any chest pain or shortness of breath. No diarrhea. No dysuria or urinary frequency. No localized pain concerns. No relief with home medications. Nursing Notes were reviewed. REVIEW OF SYSTEMS    (2-9 systems for level 4, 10 or more for level 5)     Review of Systems   All other systems reviewed and are negative. Except as noted above the remainder of the review of systems was reviewed and negative.        PAST MEDICAL HISTORY     Past Medical History:   Diagnosis Date    Cancer ([facility] [address]     SARCOMA LEFT THIGH    History of radiation therapy     PONV (postoperative nausea and vomiting)          SURGICAL HISTORY       Past Surgical History:   Procedure Laterality Date    BREAST BIOPSY  2014     SECTION, LOW TRANSVERSE       &     COLONOSCOPY N/A 2018    COLONOSCOPY WITH BIOPSY and photos performed by Beltran Cardoso MD at [address] [facility]  2018    Small polyp upper part of the right colon--sessile serrated adenoma, redundant colon and spasms    CT NEEDLE BIOPSY LUNG PERCUTANEOUS  8/12/2022    CT NEEDLE BIOPSY LUNG PERCUTANEOUS 8/12/2022 [facility] CT SCAN    IR PORT PLACEMENT EQUAL OR GREATER THAN 5 YEARS  8/24/2022    IR PORT PLACEMENT EQUAL OR GREATER THAN 5 YEARS 8/24/2022 [facility] SPECIAL PROCEDURES    LEG BIOPSY EXCISION Left 11/22/2021    INCISIONAL BIOPSY LEFT THIGH MASS performed by Ashley Dean MD at [address] Left 03/21/2022    WIDE EXCISION LEFT THIGH SARCOMA performed by Ashley Dean MD at [address] Left 1979    LLE, TUMOR ON SHIN    LEG SURGERY Left 03/21/2022    . performed by Allison Pandey MD at [address] Left 03/21/2022    . performed by Allison Pandey MD at [address] Left 03/21/2022    LEFT LEG RECONSTRUCITON WITH GASTROCNEMIUS, RECTUS FEMORIS FLAP, POSSIBLE DEEP INFERIOR EPIGASTRIC ARTERY  FLAP POSSIBLE ADJACENT TISSUE TRANSFER POSSIBLE SPLIT THICKNESS SKIN GRAFT performed by Allison Pandey MD at [address]       Previous Medications    DOCUSATE SODIUM (COLACE) 100 MG CAPSULE    Take 100 mg by mouth as needed for Constipation    HYDROCHLOROTHIAZIDE (MICROZIDE) 12.5 MG CAPSULE    Take 12.5 mg by mouth daily    KLOR-CON M20 20 MEQ EXTENDED RELEASE TABLET    TAKE ONE TABLET BY MOUTH DAILY    METAMUCIL FIBER PO    Take by mouth daily    ONDANSETRON (ZOFRAN-ODT) 4 MG DISINTEGRATING TABLET    Take 1 tablet by mouth every 8 hours as needed for Nausea or Vomiting    PAZOPANIB HCL (VOTRIENT) 200 MG CHEMO TABLET    Take 800 mg by mouth daily    PROCHLORPERAZINE MALEATE (COMPAZINE PO)    Take by mouth       ALLERGIES     Patient has no known allergies.     FAMILY HISTORY       Family History   Problem Relation Age of Onset    Cancer Mother         parotid    Thyroid Disease Mother     Other Father         PKD    Hypertension Father     Heart Attack Maternal Grandmother     Heart Attack Maternal Grandfather     Other Paternal Grandmother         PKD    Cancer Maternal Aunt         breast    Cancer Maternal Aunt         pancreas          SOCIAL HISTORY       Social History     Socioeconomic History    Marital status:      Spouse name: None    Number of children: None    Years of education: None    Highest education level: None   Tobacco Use    Smoking status: Never     Passive exposure: Never    Smokeless tobacco: Never   Vaping Use    Vaping Use: Never used   Substance and Sexual Activity    Alcohol use: Yes     Comment: Socially    Drug use: No     Social Determinants of Health     Financial Resource Strain: Unknown    Difficulty of Paying Living Expenses: Patient refused   Food Insecurity: Unknown    Worried About Running Out of Food in the Last Year: Patient refused    Ran Out of Food in the Last Year: Patient refused       SCREENINGS        [place] Coma Scale  Eye Opening: Spontaneous  Best Verbal Response: Oriented  Best Motor Response: Obeys commands  [place] Coma Scale Score: 15               PHYSICAL EXAM    (up to 7 for level 4, 8 or more for level 5)     ED Triage Vitals [02/12/23 1016]   BP Temp Temp src Heart Rate Resp SpO2 Height Weight   137/66 -- -- 88 16 98 % 5' 3\" (1.6 m) 122 lb (55.3 kg)       Physical Exam  Vitals and nursing note reviewed. Constitutional:       Comments: Chronically ill-appearing. Non toxic appearing   HENT:      Head: Normocephalic and atraumatic. Cardiovascular:      Rate and Rhythm: Normal rate and regular rhythm. Pulmonary:      Effort: Pulmonary effort is normal. No respiratory distress. Breath sounds: Normal breath sounds. Skin:     General: Skin is warm and dry. Neurological:      General: No focal deficit present. Mental Status: She is oriented to person, place, and time.        DIAGNOSTIC RESULTS     EKG: All EKG's are interpreted by the Emergency Department Physician who either signs or Co-signs this chart in the absence of a cardiologist.        RADIOLOGY: Non-plain film images such as CT, Ultrasound and MRI are read by the radiologist. Plain radiographic images are visualized and preliminarily interpreted by the emergency physician with the below findings:        Interpretation per the Radiologist below, if available at the time of this note:    No orders to display         ED BEDSIDE ULTRASOUND:   Performed by ED Physician - none    LABS:  Labs Reviewed   CBC WITH AUTO DIFFERENTIAL - Abnormal; Notable for the following components:       Result Value    RBC 3.75 (*)     RDW 15.0 (*)     Seg Neutrophils 87 (*)     Lymphocytes 6 (*)     Eosinophils % 0 (*)     Absolute Lymph # 0.28 (*)     All other components within normal limits   COMPREHENSIVE METABOLIC PANEL - Abnormal; Notable for the following components:    Glucose 118 (*)     Bun/Cre Ratio 31 (*)     Potassium 3.6 (*)     All other components within normal limits   URINALYSIS WITH REFLEX TO CULTURE - Abnormal; Notable for the following components:    Ketones, Urine 2+ (*)     Specific Gravity, UA >1.030 (*)     All other components within normal limits   MICROSCOPIC URINALYSIS - Abnormal; Notable for the following components:    Bacteria, UA 1+ (*)     Mucus, UA 2+ (*)     All other components within normal limits   LACTIC ACID   LIPASE       All other labs were within normal range or not returned as of this dictation.     EMERGENCY DEPARTMENT COURSE and DIFFERENTIAL DIAGNOSIS/MDM:   Vitals:    Vitals:    02/12/23 1513 02/12/23 1533 02/12/23 1545 02/12/23 1600   BP: (!) 123/53 (!) 102/57 (!) 113/56 112/62   Pulse:       Resp:       Temp:       TempSrc:       SpO2: 98% 98% 98% 98%   Weight:       Height:             MDM  Number of Diagnoses or Management Options  Generalized weakness  Liposarcoma (HCC)  Nausea  Sarcoma of left thigh (HCC)  Serrated adenoma of colon  Diagnosis management comments: 72-year-old female history of cancer presents emergency department for evaluation of fatigue as well as nausea and unable to keep down much food or fluid. IV fluids were ordered. Laboratory studies were unremarkable. Patient does have Zofran at home but did not feel it was working that well. Did give 1 dose of Ativan. She has been able to keep down some fluids. Applesauce is given. Discussed treatment plans with her. Will send her home with a prescription for 6 tablets of Ativan to use the Zofran is not working for nausea or vomiting she is to follow-up with her primary care provider and will seek medical attention immediately for fevers chills not able to keep down any food or fluids or other acute concerns. Home care ED return and follow-up discussed with patient and her         Donald Tony   Total Critical Care time was  minutes, excluding separately reportable procedures. There was a high probability of clinically significant/life threatening deterioration in the patient's condition which required my urgent intervention. CONSULTS:  None    PROCEDURES:  Unless otherwise noted below, none     Procedures        FINAL IMPRESSION      1. Generalized weakness    2. Nausea    3. Sarcoma of left thigh ([address]    4. Serrated adenoma of colon    5. Liposarcoma [facility])          DISPOSITION/PLAN   DISPOSITION Decision To Discharge 02/12/2023 02:47:55 PM      PATIENT REFERRED TO:  Natalie Soliman, APRN - [facility]  [phone]      Follow-up in 1 to 2 days for recheck    DISCHARGE MEDICATIONS:  New Prescriptions    LORAZEPAM (ATIVAN) 1 MG TABLET    Take 1 tablet by mouth every 8 hours as needed (Anxiety nausea or vomiting) for up to 30 days. Max Daily Amount: 3 mg     Controlled Substances Monitoring:     No flowsheet data found.     (Please note that portions of this note were completed with a voice recognition program.  Efforts were made to edit the dictations but occasionally words are mis-transcribed.)    Rock Reynaga MD (electronically signed)  Attending Emergency Physician             Anihsa Fernandez MD  02/12/23 2356

## 2023-03-10 ENCOUNTER — TELEPHONE (OUTPATIENT)
Dept: PRIMARY CARE CLINIC | Age: 68
End: 2023-03-10

## 2023-03-10 NOTE — TELEPHONE ENCOUNTER
Johanna the nurse from [facility] called our office to let you know that Cherelle Kaplan has started hospice services at home.

## 2024-05-30 NOTE — CARE COORDINATION
Case Management Assessment           Initial Evaluation                Date / Time of Evaluation: 3/28/2022 12:40 PM                 Assessment Completed by: HÉCTOR Roca    Patient Name: Gisselle Ledezma     YOB: 1955  Diagnosis: Debility [R53.81]     Date / Time: 3/25/2022  3:50 PM    Patient Admission Status: Inpatient    If patient is discharged prior to next notation, then this note serves as note for discharge by case management. [facility] Patient: No    Chart Reviewed: Yes  Patient/ Family Interviewed: Yes    Initial assessment completed at bedside with: Patient    Hospitalization in the last 30 days: No    Emergency Contacts:  Extended Emergency Contact Information  Primary Emergency Contact: PriyaMejia  Address: [address] [place] [address] Phone: [phone]  Work Phone: [phone]  Mobile Phone: [phone]  Relation: Spouse  Hearing or visual needs: None  Other needs: None  Preferred language: English   needed?  No  Secondary Emergency Contact: [facility] Phone: [phone]  Mobile Phone: [phone]  Relation: Child    Advance Directives:   Code Status: Full [address]: No    Financial  Payor: Lucia Stanley 150 / Plan: Ivonne Curtis [address] [place] [address] Road / Product Type: *No Product type* /     Pre-cert required for SNF: Yes    [address] [place] [address]  Phone: [phone] Fax: [phone]    [address] [phone] [place] [phone]  [address] [facility] [address]  Phone: [phone] Fax: [phone]      Potential assistance Purchasing Medications: Potential Assistance Purchasing Medications: No  Does Patient want to participate in local refill/ meds to beds program?:      Meds To Beds General Rules:  1. Can ONLY be done Monday- Friday between 8:30am-5pm  2. Prescription(s) must be in pharmacy by 3pm to be filled same day  3. Copy of patient's insurance/ prescription drug card and patient face sheet must be sent along with the prescription(s)  4. Cost of Rx cannot be added to hospital bill. If financial assistance is needed, please contact unit  or ;  or  CANNOT provide pharmacy voucher for patients co-pays  5. Patients can then  the prescription on their way out of the hospital at discharge, or pharmacy can deliver to the bedside if staff is available. (payment due at time of pick-up or delivery - cash, check, or card accepted)     Able to afford home medications/ co-pay costs: Yes    ADLS  Support Systems: Spouse/Significant Other,Children,Family Members    PT AM-PAC:   /24  OT AM-PAC:   /24    [place]: lives at home with spouse in [place], [address]- 3 hours away  Steps:     Plans to RETURN to current housing: Yes  Barriers to RETURNING to current housing: none at this time    Jennifer Helms 80  Currently ACTIVE with [address]: No  Home Care Agency: Not Applicable    Durable Medical Equipment  DME Provider:   Equipment:     Home Oxygen and [address] prior to admission: No  Marianna Maurer [address]: Not Applicable  Other Respiratory Equipment:     Dialysis  Active with HD/PD prior to admission: No  Nephrologist:     DISCHARGE PLAN:  Disposition: return home w/spouse + skilled [place] for discharge: spouse     Factors facilitating achievement of predicted outcomes: Family support, Cooperative and Pleasant    Barriers to discharge: skilled Home Care will need to be arranged a few days prior to DC as Pt is concern that the only [facility] [place] agency with her insurance is [address].      Additional Case Management Notes:   SW met with Pt at bedside this afternoon. Pt lives at home with her spouse in [place], [place] - 3 hours [place]. Pt states spouse works full-time but she Rashaad Chase have family or friend that can be with me if needed. \" Pt states she works full-time for an [employer] [facility] [employer] [facility] Pt states that her skilled [address] will need to be arranged \"a few days before discharge because they tried when I was on the hospital side and Vin Leyva was full and that's who I have to use with my [employer]. \"  SW will follow and assist Pt with DC plan and needs and keep spouse advise of plan as he is at home. The Plan for Transition of Care is related to the following treatment goals of Debility [R53.81]    The Patient and/or patient representative Genny Price and her family were provided with a choice of provider and agrees with the discharge plan Yes    Freedom of choice list was provided with basic dialogue that supports the patient's individualized plan of care/goals and shares the quality data associated with the providers.  Yes    Care Transition patient: No    Soo Pelayo  Case Management Department  Ph: [phone] Nael Deleon

## 2025-03-12 NOTE — PROGRESS NOTES
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 pt currently performing functional mobility with CGA- min A using RW primarily needing assist with management of LLE due to being splinted in extension and experiencing increased difficulty lifting in and out of bed and requiring support of LLE during transfers. pt limited by overall endurance requiring rest breaks with gait. pt needing occasional cues for safe techniques and sequencing while negotiating stairs. pt will have PRN assist at home from spouse and will require RW at discharge. pt will continue to benefit from skilled PT to maximize independence and return to OF. 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 has a past medical history of Cancer ([address] History of radiation therapy, and PONV (postoperative nausea and vomiting).    has a past surgical history that includes Leg Surgery (Left, ); Breast biopsy (2014);  section, low transverse; Colonoscopy (N/A, 3/30/2018); Colonoscopy (2018); Leg biopsy excision (Left, 2021); Leg biopsy excision (Left, 3/21/2022); tissue transfer (Left, 3/21/2022); Leg Surgery (Left, 3/21/2022); and Skin graft (Left, 3/21/2022). Restrictions  Position Activity Restriction  Other position/activity restrictions: ambulate pt, up with assist, FWBAT; KI prn; per plastic surgery on 3/26- pt should only sponge bath until wound vac and drain are discontinued  Vision/Hearing  Vision: Within Functional Limits  Hearing: Within functional limits     Subjective  General  Chart Reviewed: Yes  Patient assessed for rehabilitation services?: Yes  Additional Pertinent Hx: 61y.o. year old female status post wide excision of left thigh sarcoma, complex closure . (3/21). Pmhx: L thigh sarcoma. Referring Practitioner: DO Ariana  Diagnosis: L thigh sarcoma  Follows Commands: Within Functional Limits  Subjective  Subjective: pt up in chair and agreeable to PT, denies pain  Pain Screening  Patient Currently in Pain: Denies  Vital Signs  Patient Currently in Pain: Denies       Orientation  Orientation  Overall Orientation Status: Within Functional Limits  Social/Functional History  Social/Functional History  Lives With: Spouse  Type of Home:  (condo)  Home Layout: One level,Able to Live on Main level with bedroom/bathroom  Home Access: Stairs to enter with rails  Entrance Stairs - Number of Steps: Pt has 2-3 steps w/ B handrails from garage and 2 steps from main entrace w/o rails. Pt also has a back patio that would be one step up into her home.   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  Stairs?: Yes  Stairs  # Steps : 4 ((pt only has 4 inch steps within home))  Stairs Height: 4\"  Rails: Right ascending  Curbs: 6\"  Device: Rolling walker  Comment: step to pattern, up with RLE, down with LLE, cues for hand placement and sequencing     Balance  Posture: Good  Sitting - Static: Good (SBA EOB)  Sitting - Dynamic: Fair;+  Standing - Static: Fair (CGA at sink)  Standing - Dynamic: Fair (CGA with mobility using RW)        Plan   Plan  Times per week: 5x/week 90 min/day  Current Treatment Recommendations: Balance Training,Functional Mobility Training,Gait Training,Transfer Training,Stair training,Strengthening,Endurance Training,Neuromuscular Re-education,Patient/Caregiver Education & Training,Safety Education & Training,Home Exercise Program  Safety Devices  Type of devices: Call light within reach,Gait belt,Nurse notified,Chair alarm in place,Left in chair    Second Session:  Pt up in chair and agreeable to PT. Pt performed multiple sit <> stand transfers with CGA at RW from recliner, chair, and toilet (with min A to support LLE). Pt ambulated 75'x2 <> gym using RW with CGA demonstrating decreased cuong and decreased stance time on LLE requiring rest breaks due to fatigue. Pt then performed supine <> sit onto flat mat with min A for LLE in preparation for supine LE exercises. Pt participated in LE exercises in the form of ankle pumps, quad sets, hip abduction, hip adduction, and SLR (on RLE only) for 1 set of 15 reps with 0 lbs on LLE and 1.5lbs on RLE. Pt returned to room requesting to use the restroom prior to returning to recliner, pt performed toilet transfer with min A for LLE support and performed standing balance at sink with CGA at RW to wash hands. Pt return to recliner with all needs in reach and alarm activated.      Goals  Short term goals  Time Frame for Short term goals: 7-10 days  Short term goal 1: pt will perform bed mobility tasks mod I  Short term goal 2: pt will perform functional transfers with LRAD and mod I  Short term goal 3: pt will ambulate 150' with LRAD and Mod I  Short term goal 4: pt will negotiate 4 steps with RHR and mod I  Patient Goals   Patient goals : to have a successful healing process       Therapy Time   Individual Concurrent Group Co-treatment   Time In 0945         Time Out 1045         Minutes 60         Timed Code Treatment Minutes: 60 Minutes  Variance: 0  Second Session Therapy Time:   Individual Concurrent Group Co-treatment   Time In 1130         Time Out 1208         Minutes 38           Timed Code Treatment Minutes:  60+38    Total Treatment Minutes:   98 min    Barb Monahan, PT No indicators present

## (undated) DEVICE — Z CONVERTED USE 2271043 CONTAINER SPEC COLL 4OZ SCR ON LID PEEL PCH

## (undated) DEVICE — ZIMMER® STERILE DISPOSABLE TOURNIQUET CUFF WITH PLC, DUAL PORT, SINGLE BLADDER, 30 IN. (76 CM)

## (undated) DEVICE — PLASTIC MAJOR: Brand: MEDLINE INDUSTRIES, INC.

## (undated) DEVICE — DRAPE MICSCP W54XL150IN W/ 4 BINOC GLS LENS LEICA

## (undated) DEVICE — Device

## (undated) DEVICE — GAUZE,SPONGE,4"X4",16PLY,XRAY,STRL,LF: Brand: MEDLINE

## (undated) DEVICE — SOLUTION IV 250ML 0.9% SOD CHL PH 5 INJ USP VIAFLX PLAS

## (undated) DEVICE — DRAIN SURG 15FR L3 16IN DIA47MM SIL RND HUBLESS FULL FLUT

## (undated) DEVICE — COVER XR CASS W21XL40IN UNIV ADH MICROSHIELD

## (undated) DEVICE — SPONGE,TONSIL,DBL STRNG,XRAY,MED,1",STRL: Brand: MEDLINE INDUSTRIES, INC.

## (undated) DEVICE — MEDICINE CUP, GRADUATED, STER: Brand: MEDLINE

## (undated) DEVICE — AGENT HEMSTAT W4XL8IN OXIDIZED REGENERATED CELOS ABSRB

## (undated) DEVICE — DRESSING GERM DIA1IN CNTR H DIA7MM BLU CHG ANTIMIC PROTCT

## (undated) DEVICE — GLOVE ORANGE PI 8 1/2   MSG9085

## (undated) DEVICE — Z DISCONTINUED USE 2131664 WIPE INSTR L4XW5IN SPNG MEROCEL

## (undated) DEVICE — SUTURE MCRYL SZ 3-0 L27IN ABSRB UD L19MM PS-2 3/8 CIR PRIM Y427H

## (undated) DEVICE — DUP USE 393023 JELLY LUBRICATING EZ 2OZ

## (undated) DEVICE — DRAPE IRRIG FLD WRM W44XL44IN W/ AORN STD PRTBL INTRATEMP

## (undated) DEVICE — PREVENA PLUS PEEL & PLACE SYSTEM KIT- 35 CM: Brand: PREVENA  PLUS™ PEEL & PLACE™

## (undated) DEVICE — SUTURE PERMAHAND SZ 2-0 L12X18IN NONABSORBABLE BLK SILK A185H

## (undated) DEVICE — DRESSING,GAUZE,XEROFORM,CURAD,1"X8",ST: Brand: CURAD

## (undated) DEVICE — GOWN,SIRUS,POLYRNF,BRTHSLV,XLN/XXL,18/CS: Brand: MEDLINE

## (undated) DEVICE — DEFENDO AIR WATER SUCTION AND BIOPSY VALVE KIT FOR  OLYMPUS: Brand: DEFENDO AIR/WATER/SUCTION AND BIOPSY VALVE

## (undated) DEVICE — FLUID TRAP FOR MINIVAC ES EQUIP FLD TRAP

## (undated) DEVICE — 3M™ IOBAN™ 2 ANTIMICROBIAL INCISE DRAPE 6640EZ: Brand: IOBAN™ 2

## (undated) DEVICE — BLANKET WRM W40.2XL55.9IN IORT LO BODY + MISTRAL AIR

## (undated) DEVICE — DRESSING FOAM W4XL4IN SIL FACE BORD ADH PD SUP ABSRB COR

## (undated) DEVICE — SPONGE,LAP,18"X18",DLX,XR,ST,5/PK,40/PK: Brand: MEDLINE

## (undated) DEVICE — STERILE LATEX POWDER-FREE SURGICAL GLOVESWITH NITRILE COATING: Brand: PROTEXIS

## (undated) DEVICE — 1010 S-DRAPE TOWEL DRAPE 10/BX: Brand: STERI-DRAPE™

## (undated) DEVICE — TOWEL,STOP FLAG GOLD N-W: Brand: MEDLINE

## (undated) DEVICE — TOTAL TRAY, 16FR 10ML SIL FOLEY, URN: Brand: MEDLINE

## (undated) DEVICE — GOWN,REINF,POLY,AURORA,XLNG/XXL,STRL: Brand: MEDLINE

## (undated) DEVICE — CANNULA ANTERIOR CHAMBER 30G

## (undated) DEVICE — INTENDED USE FOR SURGICAL MARKING ON INTACT SKIN, ALSO PROVIDES A PERMANENT METHOD OF IDENTIFYING OBJECTS IN THE OPERATING ROOM: Brand: WRITESITE® PLUS MINI PREP RESISTANT MARKER

## (undated) DEVICE — DECANTER BAG 9": Brand: MEDLINE INDUSTRIES, INC.

## (undated) DEVICE — SUTURE STRATAFIX SPRL MCRYL + SZ 3-0 L18IN ABSRB UD PS-2 SXMP1B107

## (undated) DEVICE — TUBE SUCT LAPSCP

## (undated) DEVICE — FORCEPS BX L240CM WRK CHN 2.8MM STD CAP W/ NDL MIC MESH

## (undated) DEVICE — SUTURE ETHLN SZ 8-0 L5IN NONABSORBABLE BLK L6.5MM BV130-5 2808G

## (undated) DEVICE — CANNULA PERF L1.3IN TIP L2MM S STL POLYUR TB ARTOTMY BLB

## (undated) DEVICE — ELECTRODE PT RET AD L9FT HI MOIST COND ADH HYDRGEL CORDED

## (undated) DEVICE — KIT EVAC 400CC DIA1/4IN H PAT 12.5IN 3 SPR RND SHP PVC DRN

## (undated) DEVICE — NEEDLE HYPO 22GA L1.5IN BLK POLYPR HUB S STL REG BVL STR

## (undated) DEVICE — SPEAR SURG TRIANG SHP HNDL PCH WECK-CEL

## (undated) DEVICE — DERMATOME BLADES: Brand: DERMATOME

## (undated) DEVICE — HIGH FLOW TIP

## (undated) DEVICE — GENERAL: Brand: MEDLINE INDUSTRIES, INC.

## (undated) DEVICE — STANDARD HYPODERMIC NEEDLE,POLYPROPYLENE HUB: Brand: MONOJECT

## (undated) DEVICE — ELECTROSURGICAL PENCIL ROCKER SWITCH NON COATED BLADE ELECTRODE 10 FT (3 M) CORD HOLSTER: Brand: MEGADYNE

## (undated) DEVICE — SUTURE MCRYL + SZ 4-0 L18IN ABSRB UD L19MM PS-2 3/8 CIR MCP496G

## (undated) DEVICE — SOLUTION IRRIG 1000ML 09% SOD CHL USP PIC PLAS CONTAINER

## (undated) DEVICE — E-Z CLEAN, NON-STICK, PTFE COATED, ELECTROSURGICAL BLADE ELECTRODE, MODIFIED EXTENDED INSULATION, 2.5 INCH (6.35 CM): Brand: MEGADYNE

## (undated) DEVICE — GOWN,SIRUS,POLYRNF,BRTHSLV,LG,30/CS: Brand: MEDLINE

## (undated) DEVICE — BLADE CLIPPER SURG SENSICLIP

## (undated) DEVICE — PENCIL ES ULT VAC W TELSCP NOSE EZ CLN BLDE 10FT

## (undated) DEVICE — RETRACTOR SURG WIDE FLAT LO PROF LTWT PHOTONGUIDE

## (undated) DEVICE — Z INACTIVE USE 2735373 APPLICATOR FBR LAIN COT WOOD TIP ECONOMICAL

## (undated) DEVICE — APPLIER CLP L9.375IN APER 2.1MM CLS L3.8MM 20 SM TI CLP

## (undated) DEVICE — SYRINGE IRRIG 60ML SFT PLIABLE BLB EZ TO GRP 1 HND USE W/

## (undated) DEVICE — PIN SFTY L L2IN S STL FOR GRP HLD AND RET

## (undated) DEVICE — INTENDED FOR TISSUE SEPARATION, AND OTHER PROCEDURES THAT REQUIRE A SHARP SURGICAL BLADE TO PUNCTURE OR CUT.: Brand: BARD-PARKER ® CARBON RIB-BACK BLADES

## (undated) DEVICE — TRAY PREP DRY W/ PREM GLV 2 APPL 6 SPNG 2 UNDPD 1 OVERWRAP

## (undated) DEVICE — 3M™ TEGADERM™ TRANSPARENT FILM DRESSING FRAME STYLE, 1628, 6 IN X 8 IN (15 CM X 20 CM), 10/CT 8CT/CASE: Brand: 3M™ TEGADERM™

## (undated) DEVICE — 3M™ TEGADERM™ TRANSPARENT FILM DRESSING FRAME STYLE, 1624W, 2-3/8 IN X 2-3/4 IN (6 CM X 7 CM), 100/CT 4CT/CASE: Brand: 3M™ TEGADERM™

## (undated) DEVICE — DRAPE,T,LAPARO,TRANS,STERILE: Brand: MEDLINE

## (undated) DEVICE — LOOP VES W13MM THK09MM MINI RED SIL FLD REPELLENT

## (undated) DEVICE — 3M™ TEGADERM™ TRANSPARENT FILM DRESSING FRAME STYLE, 1629, 8 IN X 12 IN (20 CM X 30 CM), 10/CT 8CT/CASE: Brand: 3M™ TEGADERM™

## (undated) DEVICE — KIT PROC 1 ICG VI AQ SOLVNT DRP SPY ELITE

## (undated) DEVICE — PROTECTOR ULN NRV PUR FOAM HK LOOP STRP ANATOMICALLY

## (undated) DEVICE — COVER LT HNDL BLU PLAS

## (undated) DEVICE — CAUTERY TIP POLISHER: Brand: DEVON

## (undated) DEVICE — HANDPIECE SUCTION TUBING INTERPULSE 10FT

## (undated) DEVICE — PACK PROCEDURE SURG EXTREMITY SORGER CDS

## (undated) DEVICE — SUTURE PERMAHAND SZ 3-0 L18IN NONABSORBABLE BLK SILK BRAID A184H

## (undated) DEVICE — SYRINGE MED 3ML CLR PLAS STD N CTRL LUERLOCK TIP DISP

## (undated) DEVICE — LOOP,VESSEL,MAXI,BLUE,2/PK,STERILE: Brand: MEDLINE

## (undated) DEVICE — CLIP LIG M BLU TI HRT SHP WIRE HORZ 180 PER BX

## (undated) DEVICE — GLOVE ORANGE PI 7 1/2   MSG9075

## (undated) DEVICE — PADDING CAST W6INXL4YD COT LO LINTING WYTEX

## (undated) DEVICE — BLADE ES L4IN INSUL EDGE

## (undated) DEVICE — BLANKET WRM W29.9XL79.1IN UP BODY FORC AIR MISTRAL-AIR

## (undated) DEVICE — SOLUTION IV IRRIG 500ML 0.9% SODIUM CHL 2F7123

## (undated) DEVICE — YANKAUER,OPEN TIP,W/O VENT,STERILE: Brand: MEDLINE INDUSTRIES, INC.

## (undated) DEVICE — TUBING, SUCTION, 1/4" X 12', STRAIGHT: Brand: MEDLINE

## (undated) DEVICE — SUTURE CHROMIC GUT SZ 4-0 L27IN ABSRB BRN L17MM RB-1 1/2 U203H

## (undated) DEVICE — SENSOR TISS OXMTR SM PTCH T. OX

## (undated) DEVICE — CORD,CAUTERY,BIPOLAR,STERILE: Brand: MEDLINE

## (undated) DEVICE — SHEET,DRAPE,40X58,STERILE: Brand: MEDLINE

## (undated) DEVICE — APPLIER LIG CLP M L11IN TI STR RNG HNDL FOR 20 CLP DISP

## (undated) DEVICE — Z DISCONTINUED USE 2272114 DRAPE SURG UTIL 26X15 IN W/ TAPE N INVASIVE MULTLYR DISP

## (undated) DEVICE — PACK,UNIVERSAL,NO GOWNS: Brand: MEDLINE

## (undated) DEVICE — SUTURE PERMAHAND SZ 2-0 L18IN NONABSORBABLE BLK L26MM SH C012D

## (undated) DEVICE — SUTURE PLN GUT SZ 5-0 L18IN ABSRB YELLOWISH TAN L13MM PC-1 1915G

## (undated) DEVICE — COUNTER NDL 40 COUNT HLD 70 NUM FOAM BLK SGL MAG W BLDE REMV

## (undated) DEVICE — APPLICATOR MEDICATED 26 CC SOLUTION HI LT ORNG CHLORAPREP

## (undated) DEVICE — TOWEL,OR,DSP,ST,BLUE,DLX,8/PK,10PK/CS: Brand: MEDLINE

## (undated) DEVICE — STAPLER SKIN H3.9MM WIRE DIA0.58MM CRWN 6.9MM 35 STPL ROT

## (undated) DEVICE — SYRINGE MED 30ML STD CLR PLAS LUERLOCK TIP N CTRL DISP

## (undated) DEVICE — SEALANT TISS 10 CC FIBRIN VISTASEAL

## (undated) DEVICE — 3M™ STERI-DRAPE™ INSTRUMENT POUCH 1018: Brand: STERI-DRAPE™

## (undated) DEVICE — SYRINGE MED 10ML LUERLOCK TIP W/O SFTY DISP

## (undated) DEVICE — SUTURE MCRYL SZ 0 L18IN ABSRB VLT L36MM CT-1 1/2 CIR Y740D

## (undated) DEVICE — GEL US 20GM NONIRRITATING OVERWRAPPED FILE PCH TRNSMIT

## (undated) DEVICE — SYRINGE MED 5ML STD CLR PLAS LUERLOCK TIP N CTRL DISP

## (undated) DEVICE — GLOVE SURG SZ 7 CRM LTX FREE POLYISOPRENE POLYMER BEAD ANTI

## (undated) DEVICE — COVER,MAYO STAND,XL,STERILE: Brand: MEDLINE

## (undated) DEVICE — CLIP SM RED INTERN HMOCLP TITAN LIGATING

## (undated) DEVICE — PAD,NON-ADHERENT,3X8,STERILE,LF,1/PK: Brand: MEDLINE

## (undated) DEVICE — SUTURE VCRL SZ 2-0 L18IN ABSRB VLT L26MM SH 1/2 CIR J775D

## (undated) DEVICE — COVER,TABLE,HVY DUTY,EXT,77"X96",STRL: Brand: MEDLINE

## (undated) DEVICE — SUTURE ETHLN SZ 3-0 L18IN NONABSORBABLE BLK L24MM FS-1 3/8 663G

## (undated) DEVICE — GLOVE SURG SZ 65 L12IN FNGR THK75MIL WHT LTX POLYMER BEAD

## (undated) DEVICE — SUTURE ETHBND EXCEL SZ 0 L18IN NONABSORBABLE GRN L36MM CT-1 CX21D

## (undated) DEVICE — ARGYLE FRAZIER SURGICAL SUCTION INSTRUMENT 8 FR/CH (2.7 MM): Brand: ARGYLE

## (undated) DEVICE — HOOK RETRCT 12MM S STL BLNT E STAY LONE STAR

## (undated) DEVICE — GOWN,REINFORCED,POLY,AURORA,XLARGE,STRL: Brand: MEDLINE

## (undated) DEVICE — Z DUP USE 2701075 SYSTEM SKIN CLSR 42CM DERMBND PRINEO

## (undated) DEVICE — AIRLIFE™ NASAL OXYGEN CANNULA CURVED, FLARED TIP, WITH 7 FEET (2.1 M) CRUSH RESISTANT TUBING, OVER-THE-EAR STYLE: Brand: AIRLIFE™

## (undated) DEVICE — GLOVE ORANGE PI 8   MSG9080

## (undated) DEVICE — PAD FOAM 11.75X7-7/8 IN RESTON LF

## (undated) DEVICE — SUTURE NONABSORBABLE MONOFILAMENT 5-0 M1 P-3 18 IN PROLENE 8698H

## (undated) DEVICE — AGENT HEMSTAT 3GM OXIDIZED REGENERATED CELOS ABSRB FOR CONT (ORDER MULTIPLES OF 5EA)